# Patient Record
Sex: MALE | Race: BLACK OR AFRICAN AMERICAN | Employment: UNEMPLOYED | URBAN - METROPOLITAN AREA
[De-identification: names, ages, dates, MRNs, and addresses within clinical notes are randomized per-mention and may not be internally consistent; named-entity substitution may affect disease eponyms.]

---

## 2018-12-18 ENCOUNTER — APPOINTMENT (OUTPATIENT)
Dept: GENERAL RADIOLOGY | Age: 68
End: 2018-12-18
Attending: EMERGENCY MEDICINE
Payer: MEDICARE

## 2018-12-18 ENCOUNTER — HOSPITAL ENCOUNTER (EMERGENCY)
Age: 68
Discharge: HOME | End: 2018-12-18
Attending: EMERGENCY MEDICINE
Payer: MEDICARE

## 2018-12-18 VITALS
TEMPERATURE: 98 F | RESPIRATION RATE: 22 BRPM | SYSTOLIC BLOOD PRESSURE: 122 MMHG | OXYGEN SATURATION: 99 % | DIASTOLIC BLOOD PRESSURE: 79 MMHG | HEART RATE: 63 BPM

## 2018-12-18 DIAGNOSIS — R07.9 CHEST PAIN, UNSPECIFIED TYPE: Primary | ICD-10-CM

## 2018-12-18 LAB
ALBUMIN SERPL-MCNC: 2.8 G/DL (ref 3.5–5)
ALBUMIN/GLOB SERPL: 0.6 {RATIO} (ref 1.1–2.2)
ALP SERPL-CCNC: 87 U/L (ref 45–117)
ALT SERPL-CCNC: 22 U/L (ref 12–78)
ANION GAP SERPL CALC-SCNC: 7 MMOL/L (ref 5–15)
AST SERPL-CCNC: 34 U/L (ref 15–37)
BASOPHILS # BLD: 0.1 K/UL (ref 0–0.1)
BASOPHILS NFR BLD: 1 % (ref 0–1)
BILIRUB SERPL-MCNC: 0.6 MG/DL (ref 0.2–1)
BUN SERPL-MCNC: 22 MG/DL (ref 6–20)
BUN/CREAT SERPL: 20 (ref 12–20)
CALCIUM SERPL-MCNC: 8.6 MG/DL (ref 8.5–10.1)
CHLORIDE SERPL-SCNC: 111 MMOL/L (ref 97–108)
CO2 SERPL-SCNC: 23 MMOL/L (ref 21–32)
CREAT SERPL-MCNC: 1.1 MG/DL (ref 0.7–1.3)
D DIMER PPP FEU-MCNC: 0.42 MG/L FEU (ref 0–0.65)
DIFFERENTIAL METHOD BLD: ABNORMAL
EOSINOPHIL # BLD: 0.3 K/UL (ref 0–0.4)
EOSINOPHIL NFR BLD: 4 % (ref 0–7)
ERYTHROCYTE [DISTWIDTH] IN BLOOD BY AUTOMATED COUNT: 13.9 % (ref 11.5–14.5)
GLOBULIN SER CALC-MCNC: 4.5 G/DL (ref 2–4)
GLUCOSE SERPL-MCNC: 94 MG/DL (ref 65–100)
HCT VFR BLD AUTO: 51.4 % (ref 36.6–50.3)
HGB BLD-MCNC: 17.5 G/DL (ref 12.1–17)
IMM GRANULOCYTES # BLD: 0 K/UL (ref 0–0.04)
IMM GRANULOCYTES NFR BLD AUTO: 0 % (ref 0–0.5)
LYMPHOCYTES # BLD: 2.1 K/UL (ref 0.8–3.5)
LYMPHOCYTES NFR BLD: 34 % (ref 12–49)
MCH RBC QN AUTO: 31.8 PG (ref 26–34)
MCHC RBC AUTO-ENTMCNC: 34 G/DL (ref 30–36.5)
MCV RBC AUTO: 93.5 FL (ref 80–99)
MONOCYTES # BLD: 0.7 K/UL (ref 0–1)
MONOCYTES NFR BLD: 11 % (ref 5–13)
NEUTS SEG # BLD: 3.1 K/UL (ref 1.8–8)
NEUTS SEG NFR BLD: 50 % (ref 32–75)
NRBC # BLD: 0 K/UL (ref 0–0.01)
NRBC BLD-RTO: 0 PER 100 WBC
PLATELET # BLD AUTO: 194 K/UL (ref 150–400)
PMV BLD AUTO: 10.5 FL (ref 8.9–12.9)
POTASSIUM SERPL-SCNC: 4.2 MMOL/L (ref 3.5–5.1)
PROT SERPL-MCNC: 7.3 G/DL (ref 6.4–8.2)
RBC # BLD AUTO: 5.5 M/UL (ref 4.1–5.7)
SODIUM SERPL-SCNC: 141 MMOL/L (ref 136–145)
TROPONIN I SERPL-MCNC: <0.05 NG/ML
TROPONIN I SERPL-MCNC: <0.05 NG/ML
WBC # BLD AUTO: 6.1 K/UL (ref 4.1–11.1)

## 2018-12-18 PROCEDURE — 80053 COMPREHEN METABOLIC PANEL: CPT

## 2018-12-18 PROCEDURE — 71046 X-RAY EXAM CHEST 2 VIEWS: CPT

## 2018-12-18 PROCEDURE — 93005 ELECTROCARDIOGRAM TRACING: CPT

## 2018-12-18 PROCEDURE — 36415 COLL VENOUS BLD VENIPUNCTURE: CPT

## 2018-12-18 PROCEDURE — 74011250637 HC RX REV CODE- 250/637: Performed by: EMERGENCY MEDICINE

## 2018-12-18 PROCEDURE — 85025 COMPLETE CBC W/AUTO DIFF WBC: CPT

## 2018-12-18 PROCEDURE — 99285 EMERGENCY DEPT VISIT HI MDM: CPT

## 2018-12-18 PROCEDURE — 85379 FIBRIN DEGRADATION QUANT: CPT

## 2018-12-18 PROCEDURE — 84484 ASSAY OF TROPONIN QUANT: CPT

## 2018-12-18 RX ADMIN — ALUMINUM HYDROXIDE AND MAGNESIUM HYDROXIDE 30 ML: 200; 200 SUSPENSION ORAL at 21:03

## 2018-12-18 NOTE — ED PROVIDER NOTES
EMERGENCY DEPARTMENT HISTORY AND PHYSICAL EXAM      Date: 12/18/2018  Patient Name: Yong Parks    History of Presenting Illness     Chief Complaint   Patient presents with    Chest Pain       History Provided By: Patient    HPI: Yong Parks, 70 y.o. male with PMHx significant for HTN, presents via EMS to the ED with cc of new onset sharp, stabbing, sternal chest pain radiating to the left sided chest, to the left shoulder and into the left sided back [phone] this evening. Pt reports associated sx of lightheadedness, SOB and nausea as well. He expresses he was walking to get the mail at the onset of sx with no exacerbating or alleviating factors leading him to call EMS. Upon EMS arrival pt was given ASA with relief in his discomfort. He states his chest discomfort lasted ~15 minutes before resolving. Pt denies any h/o heart disease. Pt denies any h/o DVT/PE, recent surgeries, recent travel or any recent hospitalizations. He denies any fevers, chills, cough, leg swelling, abdominal pain, vomiting, diarrhea, or dysuria. Pt is sx free at the time of my eval    There are no other complaints, changes, or physical findings at this time. PCP: Nagi, MD Collin  SHx: (+)Tobacco: 5ppd; (+) ETOH: heavy; (+) Illicit drug use: marijuana  Current Outpatient Medications   Medication Sig Dispense Refill    clopidogrel (PLAVIX) 75 mg tablet Take  by mouth daily.  labetalol (NORMODYNE) 100 mg tablet Take  by mouth two (2) times a day.  spironolactone (ALDACTONE) 50 mg tablet Take  by mouth daily.  lisinopril (PRINIVIL, ZESTRIL) 10 mg tablet Take  by mouth daily.  ibuprofen (MOTRIN) 800 mg tablet Take 1 Tab by mouth two (2) times daily (after meals). 60 Tab 12    clopidogrel (PLAVIX) 75 mg tablet Take 1 Tab by mouth daily. 30 Tab 0    labetalol (NORMODYNE) 100 mg tablet Take 1 Tab by mouth every twelve (12) hours. 60 Tab 0    lisinopril (PRINIVIL, ZESTRIL) 10 mg tablet Take 1 Tab by mouth daily. 30 Tab 0    spironolactone (ALDACTONE) 50 mg tablet Take 1 Tab by mouth daily. 30 Tab 0       Past History     Past Medical History:  Past Medical History:   Diagnosis Date    HTN (hypertension)        Past Surgical History:  No past surgical history on file. Family History:  Family History   Problem Relation Age of Onset    Kidney Disease Mother     Alcohol abuse Father        Social History:  Social History     Tobacco Use    Smoking status: Current Every Day Smoker     Packs/day: 5.00     Types: Cigarettes   Substance Use Topics    Alcohol use: Yes     Comment: Reports being a \"heavy drinker\"    Drug use: Yes     Types: Marijuana       Allergies:  No Known Allergies      Review of Systems   Review of Systems   Constitutional: Negative for chills and fever. HENT: Negative for congestion, rhinorrhea and sore throat. Respiratory: Positive for shortness of breath. Negative for cough. Cardiovascular: Positive for chest pain (sternal radiating to left chest and into back ). Negative for leg swelling. Gastrointestinal: Positive for nausea. Negative for abdominal pain, diarrhea and vomiting. Genitourinary: Negative for dysuria and urgency. Skin: Negative for rash. Neurological: Positive for light-headedness. Negative for dizziness and headaches. All other systems reviewed and are negative. Physical Exam   Physical Exam   Constitutional: He is oriented to person, place, and time. He appears well-developed and well-nourished. No distress. HENT:   Head: Normocephalic and atraumatic. Eyes: Conjunctivae and EOM are normal. Pupils are equal, round, and reactive to light. Neck: Normal range of motion. Cardiovascular: Normal rate, regular rhythm and intact distal pulses. Pulmonary/Chest: Effort normal and breath sounds normal. No stridor. No respiratory distress. Abdominal: Soft. He exhibits no distension. There is no tenderness. Musculoskeletal: Normal range of motion. Neurological: He is alert and oriented to person, place, and time. Skin: Skin is warm and dry. Psychiatric: He has a normal mood and affect. Nursing note and vitals reviewed. Diagnostic Study Results     Labs -     Recent Results (from the past 12 hour(s))   EKG, 12 LEAD, INITIAL    Collection Time: 12/18/18  4:39 PM   Result Value Ref Range    Ventricular Rate 70 BPM    Atrial Rate 70 BPM    P-R Interval 336 ms    QRS Duration 100 ms    Q-T Interval 392 ms    QTC Calculation (Bezet) 423 ms    Calculated P Axis 79 degrees    Calculated R Axis 2 degrees    Calculated T Axis 66 degrees    Diagnosis       Sinus rhythm with 1st degree AV block  Minimal voltage criteria for LVH, may be normal variant  Nonspecific T wave abnormality  When compared with ECG of 30-JUN-2013 13:47,  QT has shortened     CBC WITH AUTOMATED DIFF    Collection Time: 12/18/18  5:28 PM   Result Value Ref Range    WBC 6.1 4.1 - 11.1 K/uL    RBC 5.50 4. 10 - 5.70 M/uL    HGB 17.5 (H) 12.1 - 17.0 g/dL    HCT 51.4 (H) 36.6 - 50.3 %    MCV 93.5 80.0 - 99.0 FL    MCH 31.8 26.0 - 34.0 PG    MCHC 34.0 30.0 - 36.5 g/dL    RDW 13.9 11.5 - 14.5 %    PLATELET 043 097 - 410 K/uL    MPV 10.5 8.9 - 12.9 FL    NRBC 0.0 0  WBC    ABSOLUTE NRBC 0.00 0.00 - 0.01 K/uL    NEUTROPHILS 50 32 - 75 %    LYMPHOCYTES 34 12 - 49 %    MONOCYTES 11 5 - 13 %    EOSINOPHILS 4 0 - 7 %    BASOPHILS 1 0 - 1 %    IMMATURE GRANULOCYTES 0 0.0 - 0.5 %    ABS. NEUTROPHILS 3.1 1.8 - 8.0 K/UL    ABS. LYMPHOCYTES 2.1 0.8 - 3.5 K/UL    ABS. MONOCYTES 0.7 0.0 - 1.0 K/UL    ABS. EOSINOPHILS 0.3 0.0 - 0.4 K/UL    ABS. BASOPHILS 0.1 0.0 - 0.1 K/UL    ABS. IMM. GRANS. 0.0 0.00 - 0.04 K/UL    DF AUTOMATED     D DIMER    Collection Time: 12/18/18  5:28 PM   Result Value Ref Range    D-dimer 0.42 0.00 - 0.65 mg/L FEU       Radiologic Studies -   XR CHEST PA LAT   Final Result   IMPRESSION: Atelectasis right lung base with elevated right hemidiaphragm.           Medical Decision Making   I am the first provider for this patient. I reviewed the vital signs, available nursing notes, past medical history, past surgical history, family history and social history. Vital Signs-Reviewed the patient's vital signs. Patient Vitals for the past 12 hrs:   Temp Pulse Resp BP SpO2   12/18/18 2130  64 16 121/81 98 %   12/18/18 2100  66 17 (!) 121/91 99 %   12/18/18 2030  62 14 121/88 100 %   12/18/18 2000  63 15 128/86 98 %   12/18/18 1930  65 14 122/83 100 %   12/18/18 1900  61 17 117/88 99 %   12/18/18 1830  66 16 (!) 139/97 100 %   12/18/18 1800  77 14 133/83 100 %   12/18/18 1753  65 15 131/90 100 %   12/18/18 1730  (!) 59 13 125/88    12/18/18 1700  70 16 (!) 156/107    12/18/18 1647 98 °F (36.7 °C) 74 16 (!) 160/109 99 %       Pulse Oximetry Analysis - 99% on room air    Cardiac Monitor:   Rate: 74 bpm  Rhythm: Normal Sinus Rhythm      EKG interpretation: (Preliminary) 1639  Rhythm: sinus rhythm with 1st degree AV block; and regular . Rate (approx.): 70; Axis: normal; [place] interval: prolonged; QRS interval: normal ; ST/T wave: nonspecific T wave abnormality; Other findings: non-ischemic; similar to prior on 6/30/2013. Records Reviewed: Nursing Notes, Old Medical Records, Previous electrocardiograms, Previous Radiology Studies and Previous Laboratory Studies    Provider Notes (Medical Decision Making):   DDx: ACS, Arrhythmia, PE, PNA, GERD, near syncope    The pt is unlikely to have PE. There is no pleuritic chest pain, no tachycardia, no hypoxia, no unilateral leg swelling, however will check ddimer    The pt is unlikely to have aortic dissection. The pulses are equal, there is no sharp tearing chest pain radiating to back, the patient is not extremily hypertensive. Will pursue cardiac work-up with EKG, troponinx2, CXR.  While the pt is less likely to have pneumonia as there is no cough and no fever, and less likely to have ptx, will order CXR to assess lung fields       ED Course:   Initial assessment performed. The patients presenting problems have been discussed, and they are in agreement with the care plan formulated and outlined with them. I have encouraged them to ask questions as they arise throughout their visit. Progress Notes:    9:49 PM   The pt has been re-evaluated. Pt was updated on reassuring troponin x2 and negative CXR. Pt was informed of the plan for discharge with PCP follow up should his sx persist.    Critical Care Time: 0 minutes    Disposition:  Discharge Note:  9:49 PM  The patient is ready for discharge. The patient's signs, symptoms, diagnosis, and discharge instruction have been discussed and the patient has conveyed their understanding. The patient is to follow up as recommended or return to the ER should their symptoms worsen. Plan has been discussed and the patient is in agreement. Written by Jose Luis Vaughn ED Scribe, as dictated by TAMARA Lara MD    PLAN:  1. Current Discharge Medication List        2. Follow-up Information     Follow up With Specialties Details Why Contact Info    Other, MD Collin  Schedule an appointment as soon as possible for a visit  Patient can only remember the practice name and not the physician      [facility] EMERGENCY DEPT Emergency Medicine  As needed, If symptoms worsen [address]  [address] [facility]  [phone]        Return to ED if worse     Diagnosis     Clinical Impression:   1. Chest pain, unspecified type        Attestations:    Attestation: This note is prepared by Milo Vaughn, acting as Scribe for TAMARA Lara MD.      Negro Hammer. Sudheer Lara MD: The scribe's documentation has been prepared under my direction and personally reviewed by me in its entirety. I confirm that the note above accurately reflects all work, treatment, procedures, and medical decision making performed by me.

## 2018-12-19 LAB
ATRIAL RATE: 70 BPM
CALCULATED P AXIS, ECG09: 79 DEGREES
CALCULATED R AXIS, ECG10: 2 DEGREES
CALCULATED T AXIS, ECG11: 66 DEGREES
DIAGNOSIS, 93000: NORMAL
P-R INTERVAL, ECG05: 336 MS
Q-T INTERVAL, ECG07: 392 MS
QRS DURATION, ECG06: 100 MS
QTC CALCULATION (BEZET), ECG08: 423 MS
VENTRICULAR RATE, ECG03: 70 BPM

## 2018-12-19 NOTE — ED NOTES
Pt. Resting comfortably in bed, denies needs at this time. No pain or SOB. Awaiting pending lab results. Bed locked and low, call bell in reach.

## 2018-12-19 NOTE — DISCHARGE INSTRUCTIONS

## 2018-12-19 NOTE — ED NOTES
Dr. Candy Carvalho gave and reviewed discharge instructions with the patient. The patient verbalized understanding. The patient was given opportunity for questions. Patient discharged in stable condition to the waiting room via wheelchair with female visitor.

## 2018-12-19 NOTE — ED NOTES
Assumed care of pt from [place] at bedside with verbal report consisting of Situation, Background, Assessment, and Recommendations (SBAR). Pt is A&O x 4. Pt resting comfortably on the stretcher in a position of comfort. Call bell within reach. Side rails x 2. Cardiac monitor x 3. Stretcher locked in the lowest position. Concerns and questions addressed at this time. Pt in no acute distress at this the time. Will continue to monitor.

## 2018-12-19 NOTE — ED NOTES
Pt resting comfortably with female visitor at bedside at this time. Pt aware that blood work is in process and that MD should be around soon.

## 2019-01-07 ENCOUNTER — HOSPITAL ENCOUNTER (EMERGENCY)
Age: 69
Discharge: HOME | End: 2019-01-07
Attending: EMERGENCY MEDICINE | Admitting: EMERGENCY MEDICINE
Payer: MEDICARE

## 2019-01-07 ENCOUNTER — APPOINTMENT (OUTPATIENT)
Dept: GENERAL RADIOLOGY | Age: 69
End: 2019-01-07
Attending: EMERGENCY MEDICINE
Payer: MEDICARE

## 2019-01-07 VITALS
HEART RATE: 80 BPM | SYSTOLIC BLOOD PRESSURE: 97 MMHG | WEIGHT: 154.32 LBS | RESPIRATION RATE: 16 BRPM | DIASTOLIC BLOOD PRESSURE: 74 MMHG | TEMPERATURE: 97.8 F | OXYGEN SATURATION: 99 % | BODY MASS INDEX: 24.17 KG/M2

## 2019-01-07 DIAGNOSIS — R07.9 ACUTE CHEST PAIN: Primary | ICD-10-CM

## 2019-01-07 LAB
ALBUMIN SERPL-MCNC: 3.6 G/DL (ref 3.5–5)
ALBUMIN/GLOB SERPL: 0.7 {RATIO} (ref 1.1–2.2)
ALP SERPL-CCNC: 95 U/L (ref 45–117)
ALT SERPL-CCNC: 27 U/L (ref 12–78)
ANION GAP SERPL CALC-SCNC: 9 MMOL/L (ref 5–15)
AST SERPL-CCNC: 46 U/L (ref 15–37)
BASOPHILS # BLD: 0 K/UL (ref 0–0.1)
BASOPHILS NFR BLD: 1 % (ref 0–1)
BILIRUB SERPL-MCNC: 0.9 MG/DL (ref 0.2–1)
BUN SERPL-MCNC: 28 MG/DL (ref 6–20)
BUN/CREAT SERPL: 19 (ref 12–20)
CALCIUM SERPL-MCNC: 9.2 MG/DL (ref 8.5–10.1)
CHLORIDE SERPL-SCNC: 106 MMOL/L (ref 97–108)
CK MB CFR SERPL CALC: 6.4 % (ref 0–2.5)
CK MB SERPL-MCNC: 1.8 NG/ML (ref 5–25)
CK SERPL-CCNC: 28 U/L (ref 39–308)
CO2 SERPL-SCNC: 24 MMOL/L (ref 21–32)
CREAT SERPL-MCNC: 1.47 MG/DL (ref 0.7–1.3)
DIFFERENTIAL METHOD BLD: ABNORMAL
EOSINOPHIL # BLD: 0.1 K/UL (ref 0–0.4)
EOSINOPHIL NFR BLD: 2 % (ref 0–7)
ERYTHROCYTE [DISTWIDTH] IN BLOOD BY AUTOMATED COUNT: 14.6 % (ref 11.5–14.5)
GLOBULIN SER CALC-MCNC: 5.4 G/DL (ref 2–4)
GLUCOSE SERPL-MCNC: 91 MG/DL (ref 65–100)
HCT VFR BLD AUTO: 52.9 % (ref 36.6–50.3)
HGB BLD-MCNC: 18.1 G/DL (ref 12.1–17)
IMM GRANULOCYTES # BLD: 0 K/UL (ref 0–0.04)
IMM GRANULOCYTES NFR BLD AUTO: 1 % (ref 0–0.5)
LYMPHOCYTES # BLD: 1.9 K/UL (ref 0.8–3.5)
LYMPHOCYTES NFR BLD: 33 % (ref 12–49)
MCH RBC QN AUTO: 32.4 PG (ref 26–34)
MCHC RBC AUTO-ENTMCNC: 34.2 G/DL (ref 30–36.5)
MCV RBC AUTO: 94.6 FL (ref 80–99)
MONOCYTES # BLD: 0.7 K/UL (ref 0–1)
MONOCYTES NFR BLD: 11 % (ref 5–13)
NEUTS SEG # BLD: 3.2 K/UL (ref 1.8–8)
NEUTS SEG NFR BLD: 53 % (ref 32–75)
NRBC # BLD: 0 K/UL (ref 0–0.01)
NRBC BLD-RTO: 0 PER 100 WBC
PLATELET # BLD AUTO: 274 K/UL (ref 150–400)
PMV BLD AUTO: 9.8 FL (ref 8.9–12.9)
POTASSIUM SERPL-SCNC: 4.5 MMOL/L (ref 3.5–5.1)
PROT SERPL-MCNC: 9 G/DL (ref 6.4–8.2)
RBC # BLD AUTO: 5.59 M/UL (ref 4.1–5.7)
SODIUM SERPL-SCNC: 139 MMOL/L (ref 136–145)
TROPONIN I BLD-MCNC: <0.04 NG/ML (ref 0–0.08)
TROPONIN I SERPL-MCNC: <0.05 NG/ML
WBC # BLD AUTO: 5.9 K/UL (ref 4.1–11.1)

## 2019-01-07 PROCEDURE — 36415 COLL VENOUS BLD VENIPUNCTURE: CPT

## 2019-01-07 PROCEDURE — 80053 COMPREHEN METABOLIC PANEL: CPT

## 2019-01-07 PROCEDURE — 84484 ASSAY OF TROPONIN QUANT: CPT

## 2019-01-07 PROCEDURE — 93005 ELECTROCARDIOGRAM TRACING: CPT

## 2019-01-07 PROCEDURE — 85025 COMPLETE CBC W/AUTO DIFF WBC: CPT

## 2019-01-07 PROCEDURE — 82550 ASSAY OF CK (CPK): CPT

## 2019-01-07 PROCEDURE — 99284 EMERGENCY DEPT VISIT MOD MDM: CPT

## 2019-01-07 PROCEDURE — 71046 X-RAY EXAM CHEST 2 VIEWS: CPT

## 2019-01-07 NOTE — ED PROVIDER NOTES
EXPRESS CARE NOTE: 
1:13 PM 
I have seen and evaluated this patient in the Express Care portion of triage for chest pain which he states is progressively worsening. Pt states he has had similar pain for several weeks and is scheduled for a stress test in a few weeks. Pt does not know who the cardiologist is and states the stress test was scheduled by his PCP at [facility]. The patients care will begin now and orders have been placed. This patient will be seen and provided further care in the Emergency Room. NHAN Jurado 
 
EMERGENCY DEPARTMENT HISTORY AND PHYSICAL EXAM 
 
 
Date: 1/7/2019 Patient Name: Duane Cove History of Presenting Illness Chief Complaint Patient presents with  Chest Pain Arrives via EMS for continued chest pain Pt reports onset of CP 3 weeks ago and reports being seen several times for same Pt states he is due for stress test \"sometime middle of month\" History Provided By: Patient and EMS 
 
HPI: Duane Cove, 70 y.o. male with PMHx significant for HTN, presents via EMS to the ED with cc of a worsening, mid, \"tight\" chest pain x 1 month. He reports associated symptoms of SOB. He notes he was sitting in bed x 2 days ago when his chest pain had restarted once more. He had taken a NTG today to which he had felt better afterwards. Pt endorses having a prior stress test x 2 years ago and per triage note he states he is due for another stress test \"sometime middle of month. \" He denies any tobacco use. Pt denies any prior h/o cardiac disease. There are no other complaints, changes, or physical findings at this time. PCP: Nagi, MD Collin 
 
No current facility-administered medications on file prior to encounter. Current Outpatient Medications on File Prior to Encounter Medication Sig Dispense Refill  clopidogrel (PLAVIX) 75 mg tablet Take  by mouth daily.  labetalol (NORMODYNE) 100 mg tablet Take  by mouth two (2) times a day.  spironolactone (ALDACTONE) 50 mg tablet Take  by mouth daily.  lisinopril (PRINIVIL, ZESTRIL) 10 mg tablet Take  by mouth daily.  ibuprofen (MOTRIN) 800 mg tablet Take 1 Tab by mouth two (2) times daily (after meals). 60 Tab 12  
 clopidogrel (PLAVIX) 75 mg tablet Take 1 Tab by mouth daily. 30 Tab 0  
 labetalol (NORMODYNE) 100 mg tablet Take 1 Tab by mouth every twelve (12) hours. 60 Tab 0  
 lisinopril (PRINIVIL, ZESTRIL) 10 mg tablet Take 1 Tab by mouth daily. 30 Tab 0  
 spironolactone (ALDACTONE) 50 mg tablet Take 1 Tab by mouth daily. 30 Tab 0 Past History Past Medical History: 
Past Medical History:  
Diagnosis Date  
 HTN (hypertension) Past Surgical History: 
History reviewed. No pertinent surgical history. Family History: 
Family History Problem Relation Age of Onset  Kidney Disease Mother  Alcohol abuse Father Social History: 
Social History Tobacco Use  Smoking status: Former Smoker Packs/day: 0.50 Types: Cigarettes  Smokeless tobacco: Never Used Substance Use Topics  Alcohol use: Yes Comment: Reports being a [place] drinker\"  Drug use: Yes Types: Marijuana Allergies: 
No Known Allergies Review of Systems Review of Systems Constitutional: Negative for chills, fatigue and fever. HENT: Negative for congestion and rhinorrhea. Eyes: Negative for visual disturbance. Respiratory: Positive for shortness of breath. Negative for cough and wheezing. Cardiovascular: Positive for chest pain. Negative for palpitations. Gastrointestinal: Negative for abdominal distention, abdominal pain, constipation, diarrhea, nausea and vomiting. Endocrine: Negative. Genitourinary: Negative for difficulty urinating and dysuria. Musculoskeletal: Negative. Skin: Negative for rash. Neurological: Negative for dizziness, weakness and light-headedness. Psychiatric/Behavioral: Negative for suicidal ideas. Physical Exam  
Physical Exam  
Constitutional: He is oriented to person, place, and time. He appears well-developed and well-nourished. No distress. HENT:  
Head: Normocephalic and atraumatic. Mouth/Throat: Oropharynx is clear and moist.  
Eyes: Conjunctivae and EOM are normal.  
Neck: Neck supple. No JVD present. No tracheal deviation present. Cardiovascular: Normal rate, regular rhythm and intact distal pulses. Exam reveals no gallop and no friction rub. No murmur heard. Pulmonary/Chest: Effort normal and breath sounds normal. No stridor. No respiratory distress. He has no wheezes. Abdominal: Soft. Bowel sounds are normal. He exhibits no distension and no mass. There is no tenderness. There is no guarding. Musculoskeletal: Normal range of motion. He exhibits no edema or tenderness. No deformity Neurological: He is alert and oriented to person, place, and time. He has normal strength. No focal deficits Skin: Skin is warm, dry and intact. No rash noted. Psychiatric: He has a normal mood and affect. His behavior is normal. Judgment and thought content normal.  
Nursing note and vitals reviewed. Diagnostic Study Results Labs - Recent Results (from the past 12 hour(s)) CBC WITH AUTOMATED DIFF Collection Time: 01/07/19  1:13 PM  
Result Value Ref Range WBC 5.9 4.1 - 11.1 K/uL  
 RBC 5.59 4. 10 - 5.70 M/uL  
 HGB 18.1 (H) 12.1 - 17.0 g/dL HCT 52.9 (H) 36.6 - 50.3 % MCV 94.6 80.0 - 99.0 FL  
 MCH 32.4 26.0 - 34.0 PG  
 MCHC 34.2 30.0 - 36.5 g/dL  
 RDW 14.6 (H) 11.5 - 14.5 % PLATELET 155 825 - 313 K/uL MPV 9.8 8.9 - 12.9 FL  
 NRBC 0.0 0  WBC ABSOLUTE NRBC 0.00 0.00 - 0.01 K/uL NEUTROPHILS 53 32 - 75 % LYMPHOCYTES 33 12 - 49 % MONOCYTES 11 5 - 13 % EOSINOPHILS 2 0 - 7 % BASOPHILS 1 0 - 1 % IMMATURE GRANULOCYTES 1 (H) 0.0 - 0.5 % ABS. NEUTROPHILS 3.2 1.8 - 8.0 K/UL  
 ABS. LYMPHOCYTES 1.9 0.8 - 3.5 K/UL  
 ABS. MONOCYTES 0.7 0.0 - 1.0 K/UL ABS. EOSINOPHILS 0.1 0.0 - 0.4 K/UL  
 ABS. BASOPHILS 0.0 0.0 - 0.1 K/UL  
 ABS. IMM. GRANS. 0.0 0.00 - 0.04 K/UL  
 DF AUTOMATED METABOLIC PANEL, COMPREHENSIVE Collection Time: 01/07/19  1:13 PM  
Result Value Ref Range Sodium 139 136 - 145 mmol/L Potassium 4.5 3.5 - 5.1 mmol/L Chloride 106 97 - 108 mmol/L  
 CO2 24 21 - 32 mmol/L Anion gap 9 5 - 15 mmol/L Glucose 91 65 - 100 mg/dL BUN 28 (H) 6 - 20 MG/DL Creatinine 1.47 (H) 0.70 - 1.30 MG/DL  
 BUN/Creatinine ratio 19 12 - 20 GFR est AA 58 (L) >60 ml/min/1.73m2 GFR est non-AA 47 (L) >60 ml/min/1.73m2 Calcium 9.2 8.5 - 10.1 MG/DL Bilirubin, total 0.9 0.2 - 1.0 MG/DL  
 ALT (SGPT) 27 12 - 78 U/L  
 AST (SGOT) 46 (H) 15 - 37 U/L Alk. phosphatase 95 45 - 117 U/L Protein, total 9.0 (H) 6.4 - 8.2 g/dL Albumin 3.6 3.5 - 5.0 g/dL Globulin 5.4 (H) 2.0 - 4.0 g/dL A-G Ratio 0.7 (L) 1.1 - 2.2 CK W/ CKMB & INDEX Collection Time: 01/07/19  1:13 PM  
Result Value Ref Range CK 28 (L) 39 - 308 U/L  
 CK - MB 1.8 <3.6 NG/ML  
 CK-MB Index 6.4 (H) 0 - 2.5    
TROPONIN I Collection Time: 01/07/19  1:13 PM  
Result Value Ref Range Troponin-I, Qt. <0.05 <0.05 ng/mL POC TROPONIN-I Collection Time: 01/07/19  4:54 PM  
Result Value Ref Range Troponin-I (POC) <0.04 0.00 - 0.08 ng/mL Radiologic Studies - CXR Results  (Last 48 hours) 01/07/19 1623  XR CHEST PA LAT Final result Impression:  IMPRESSION: No acute findings. Narrative:  EXAM: XR CHEST PA LAT INDICATION: Chest pain recurring since onset 3 weeks ago. COMPARISON: Chest x-ray 12/18/2018. FINDINGS: PA and lateral radiographs of the chest demonstrate platelike  
atelectasis overlying the right diaphragm with no significant change from prior. The lungs are otherwise clear.  The cardiac and mediastinal contours and  
pulmonary vascularity are normal. Atherosclerotic calcifications affect the  
 aortic arch. Chest structures visualized upper abdomen show no acute interval  
change with note of degenerative spine and shoulder changes. Medical Decision Making I am the first provider for this patient. I reviewed the vital signs, available nursing notes, past medical history, past surgical history, family history and social history. Vital Signs-Reviewed the patient's vital signs. Patient Vitals for the past 12 hrs: 
 Temp Pulse Resp BP SpO2  
01/07/19 1106 97.8 °F (36.6 °C) 80 16 97/74 99 % Pulse Oximetry Analysis - 98% on RA Cardiac Monitor:  
Rate: 69 bpm 
Rhythm: Normal Sinus Rhythm EKG interpretation: (Preliminary): 1110 Rhythm: sinus rhythm with 1st degree AV block; and regular . Rate (approx.): 81; Axis: normal; [place] interval: prolonged; QRS interval: normal ; ST/T wave: T wave inversion in lateral leads. Written by Eula Marmolejo ED Scribe, as dictated by Emelyn Johnson DO. Records Reviewed: Nursing Notes, Old Medical Records, Previous electrocardiograms, Ambulance Run Sheet, Previous Radiology Studies and Previous Laboratory Studies Provider Notes (Medical Decision Making): Pt presenting with CP. He was seen here several weeks ago for similar complaints. At that time he had a negative cardiac workup, negative CXR. Now presenting with similar pain. He has a stress test schedule for the 15th of January. Will check labs, cardiac enzymes to r/o ACS and have him f/u with cardiology. ED Course:  
Initial assessment performed. The patients presenting problems have been discussed, and they are in agreement with the care plan formulated and outlined with them. I have encouraged them to ask questions as they arise throughout their visit. PROGRESS NOTE: 
4:49 PM 
Pt has a stress test scheduled on 1/15/19. Will order a POC troponin. If negative, will anticipate discharge home. Critical Care Time:  
0 Disposition: 
DISCHARGE NOTE 
5:36 PM 
 The patient has been re-evaluated and is ready for discharge. Reviewed available results with patient. Counseled patient on diagnosis and care plan. Patient has expressed understanding, and all questions have been answered. Patient agrees with plan and agrees to follow up as recommended, or return to the ED if their symptoms worsen. Discharge instructions have been provided and explained to the patient, along with reasons to return to the ED. PLAN: 
1. Discharge Current Discharge Medication List  
  
 
2. Follow-up Information Follow up With Specialties Details Why Contact Info Your PCP  Schedule an appointment as soon as possible for a visit Your cardiologist  Schedule an appointment as soon as possible for a visit Return to ED if worse Diagnosis Clinical Impression: 1. Acute chest pain Attestations: This note is prepared by Elyse Ballesteros, acting as Scribe for Sharita Gunter DO. Sharita Gunter DO: The scribe's documentation has been prepared under my direction and personally reviewed by me in its entirety. I confirm that the note above accurately reflects all work, treatment, procedures, and medical decision making performed by me. This note will not be viewable in [address]

## 2019-01-07 NOTE — DISCHARGE INSTRUCTIONS
Patient Education        Chest Pain: Care Instructions    Go to your stress test on 1/15/19 and follow up with your primary care doctor and cardiologist.     Your Care Instructions    There are many things that can cause chest pain. Some are not serious and will get better on their own in a few days. But some kinds of chest pain need more testing and treatment. Your doctor may have recommended a follow-up visit in the next 8 to 12 hours. If you are not getting better, you may need more tests or treatment. Even though your doctor has released you, you still need to watch for any problems. The doctor carefully checked you, but sometimes problems can develop later. If you have new symptoms or if your symptoms do not get better, get medical care right away. If you have worse or different chest pain or pressure that lasts more than 5 minutes or you passed out (lost consciousness), call 911 or seek other emergency help right away. A medical visit is only one step in your treatment. Even if you feel better, you still need to do what your doctor recommends, such as going to all suggested follow-up appointments and taking medicines exactly as directed. This will help you recover and help prevent future problems. How can you care for yourself at home? · Rest until you feel better. · Take your medicine exactly as prescribed. Call your doctor if you think you are having a problem with your medicine. · Do not drive after taking a prescription pain medicine. When should you call for help? Call 911 if:    · You passed out (lost consciousness).     · You have severe difficulty breathing.     · You have symptoms of a heart attack. These may include:  ? Chest pain or pressure, or a strange feeling in your chest.  ? Sweating. ? Shortness of breath. ? Nausea or vomiting. ? Pain, pressure, or a strange feeling in your back, neck, jaw, or upper belly or in one or both shoulders or arms.   ? Lightheadedness or sudden weakness. ? A fast or irregular heartbeat. After you call 911, the  may tell you to chew 1 adult-strength or 2 to 4 low-dose aspirin. Wait for an ambulance. Do not try to drive yourself.    Call your doctor today if:    · You have any trouble breathing.     · Your chest pain gets worse.     · You are dizzy or lightheaded, or you feel like you may faint.     · You are not getting better as expected.     · You are having new or different chest pain. Where can you learn more? Go to http://lizzy-johana.info/. Enter A120 in the search box to learn more about \"Chest Pain: Care Instructions. \"  Current as of: November 20, 2017  Content Version: 11.8  © 9354-1995 [employer]. Care instructions adapted under license by [employer] (which disclaims liability or warranty for this information). If you have questions about a medical condition or this instruction, always ask your healthcare professional. [place] [address] any warranty or liability for your use of this information.

## 2019-01-08 LAB
ATRIAL RATE: 81 BPM
CALCULATED P AXIS, ECG09: 63 DEGREES
CALCULATED R AXIS, ECG10: 36 DEGREES
CALCULATED T AXIS, ECG11: 114 DEGREES
DIAGNOSIS, 93000: NORMAL
P-R INTERVAL, ECG05: 296 MS
Q-T INTERVAL, ECG07: 366 MS
QRS DURATION, ECG06: 100 MS
QTC CALCULATION (BEZET), ECG08: 425 MS
VENTRICULAR RATE, ECG03: 81 BPM

## 2021-08-20 ENCOUNTER — APPOINTMENT (OUTPATIENT)
Dept: GENERAL RADIOLOGY | Age: 71
End: 2021-08-20
Attending: STUDENT IN AN ORGANIZED HEALTH CARE EDUCATION/TRAINING PROGRAM
Payer: MEDICARE

## 2021-08-20 ENCOUNTER — HOSPITAL ENCOUNTER (EMERGENCY)
Age: 71
Discharge: HOME | End: 2021-08-20
Attending: STUDENT IN AN ORGANIZED HEALTH CARE EDUCATION/TRAINING PROGRAM
Payer: MEDICARE

## 2021-08-20 VITALS
OXYGEN SATURATION: 100 % | SYSTOLIC BLOOD PRESSURE: 151 MMHG | TEMPERATURE: 98.1 F | HEART RATE: 71 BPM | DIASTOLIC BLOOD PRESSURE: 105 MMHG | WEIGHT: 145.5 LBS | BODY MASS INDEX: 22.79 KG/M2 | RESPIRATION RATE: 21 BRPM

## 2021-08-20 DIAGNOSIS — R07.9 CHEST PAIN, UNSPECIFIED TYPE: Primary | ICD-10-CM

## 2021-08-20 LAB
ALBUMIN SERPL-MCNC: 3.3 G/DL (ref 3.5–5)
ALBUMIN/GLOB SERPL: 0.7 {RATIO} (ref 1.1–2.2)
ALP SERPL-CCNC: 52 U/L (ref 45–117)
ALT SERPL-CCNC: 14 U/L (ref 12–78)
ANION GAP SERPL CALC-SCNC: 11 MMOL/L (ref 5–15)
AST SERPL-CCNC: 33 U/L (ref 15–37)
BASOPHILS # BLD: 0 K/UL (ref 0–0.1)
BASOPHILS NFR BLD: 1 % (ref 0–1)
BILIRUB SERPL-MCNC: 1.2 MG/DL (ref 0.2–1)
BUN SERPL-MCNC: 28 MG/DL (ref 6–20)
BUN/CREAT SERPL: 17 (ref 12–20)
CALCIUM SERPL-MCNC: 9.6 MG/DL (ref 8.5–10.1)
CHLORIDE SERPL-SCNC: 109 MMOL/L (ref 97–108)
CO2 SERPL-SCNC: 27 MMOL/L (ref 21–32)
CREAT SERPL-MCNC: 1.64 MG/DL (ref 0.7–1.3)
DIFFERENTIAL METHOD BLD: ABNORMAL
EOSINOPHIL # BLD: 0.2 K/UL (ref 0–0.4)
EOSINOPHIL NFR BLD: 5 % (ref 0–7)
ERYTHROCYTE [DISTWIDTH] IN BLOOD BY AUTOMATED COUNT: 13.5 % (ref 11.5–14.5)
GLOBULIN SER CALC-MCNC: 4.7 G/DL (ref 2–4)
GLUCOSE SERPL-MCNC: 88 MG/DL (ref 65–100)
HCT VFR BLD AUTO: 48.7 % (ref 36.6–50.3)
HGB BLD-MCNC: 16.1 G/DL (ref 12.1–17)
IMM GRANULOCYTES # BLD AUTO: 0 K/UL (ref 0–0.04)
IMM GRANULOCYTES NFR BLD AUTO: 0 % (ref 0–0.5)
LYMPHOCYTES # BLD: 1.7 K/UL (ref 0.8–3.5)
LYMPHOCYTES NFR BLD: 32 % (ref 12–49)
MAGNESIUM SERPL-MCNC: 1.4 MG/DL (ref 1.6–2.4)
MCH RBC QN AUTO: 31.1 PG (ref 26–34)
MCHC RBC AUTO-ENTMCNC: 33.1 G/DL (ref 30–36.5)
MCV RBC AUTO: 94.2 FL (ref 80–99)
MONOCYTES # BLD: 0.8 K/UL (ref 0–1)
MONOCYTES NFR BLD: 15 % (ref 5–13)
NEUTS SEG # BLD: 2.5 K/UL (ref 1.8–8)
NEUTS SEG NFR BLD: 47 % (ref 32–75)
NRBC # BLD: 0 K/UL (ref 0–0.01)
NRBC BLD-RTO: 0 PER 100 WBC
PLATELET # BLD AUTO: 205 K/UL (ref 150–400)
PMV BLD AUTO: 10.7 FL (ref 8.9–12.9)
POTASSIUM SERPL-SCNC: 4.4 MMOL/L (ref 3.5–5.1)
PROT SERPL-MCNC: 8 G/DL (ref 6.4–8.2)
RBC # BLD AUTO: 5.17 M/UL (ref 4.1–5.7)
SODIUM SERPL-SCNC: 147 MMOL/L (ref 136–145)
TROPONIN I BLD-MCNC: <0.04 NG/ML (ref 0–0.08)
TROPONIN I SERPL-MCNC: <0.05 NG/ML
WBC # BLD AUTO: 5.2 K/UL (ref 4.1–11.1)

## 2021-08-20 PROCEDURE — 85025 COMPLETE CBC W/AUTO DIFF WBC: CPT

## 2021-08-20 PROCEDURE — 84484 ASSAY OF TROPONIN QUANT: CPT

## 2021-08-20 PROCEDURE — 74011250637 HC RX REV CODE- 250/637: Performed by: STUDENT IN AN ORGANIZED HEALTH CARE EDUCATION/TRAINING PROGRAM

## 2021-08-20 PROCEDURE — 80053 COMPREHEN METABOLIC PANEL: CPT

## 2021-08-20 PROCEDURE — 99285 EMERGENCY DEPT VISIT HI MDM: CPT

## 2021-08-20 PROCEDURE — 93005 ELECTROCARDIOGRAM TRACING: CPT

## 2021-08-20 PROCEDURE — 83735 ASSAY OF MAGNESIUM: CPT

## 2021-08-20 PROCEDURE — 71045 X-RAY EXAM CHEST 1 VIEW: CPT

## 2021-08-20 PROCEDURE — 36415 COLL VENOUS BLD VENIPUNCTURE: CPT

## 2021-08-20 RX ORDER — FAMOTIDINE 20 MG/1
20 TABLET, FILM COATED ORAL 2 TIMES DAILY
Qty: 20 TABLET | Refills: 0 | Status: SHIPPED | OUTPATIENT
Start: 2021-08-20 | End: 2021-08-30

## 2021-08-20 RX ORDER — NITROGLYCERIN 0.4 MG/1
0.4 TABLET SUBLINGUAL
Status: DISCONTINUED | OUTPATIENT
Start: 2021-08-20 | End: 2021-08-20 | Stop reason: HOSPADM

## 2021-08-20 RX ADMIN — NITROGLYCERIN 0.4 MG: 0.4 TABLET SUBLINGUAL at 13:40

## 2021-08-20 NOTE — ED PROVIDER NOTES
EMERGENCY DEPARTMENT HISTORY AND PHYSICAL EXAM      Date: 8/20/2021  Patient Name: Tremayne Castro    History of Presenting Illness     Chief Complaint   Patient presents with    Chest Pain         HPI: Tremayne Castro, 69 y.o. male presents to the ED with cc of chest pain. He describes this as a burning and sharp pain of the left side of his chest.  It has been going on for the past 3 days, it is worse with drinking sodas. Gets somewhat better when he drinks water. He reports some associated shortness of breath. He denies any fevers, no coughing, no associated nausea. He does feel slightly sweaty. No vomiting or abdominal pain. He reports having chest pain like this in the past, has had a stress test about a year ago, follows with his primary care doctor but has never followed up with cardiology. He denies any personal cardiac history. There are no other complaints, changes, or physical findings at this time. PCP: Other, MD Collin    No current facility-administered medications on file prior to encounter. Current Outpatient Medications on File Prior to Encounter   Medication Sig Dispense Refill    clopidogrel (PLAVIX) 75 mg tablet Take  by mouth daily.  labetalol (NORMODYNE) 100 mg tablet Take  by mouth two (2) times a day.  spironolactone (ALDACTONE) 50 mg tablet Take  by mouth daily.  lisinopril (PRINIVIL, ZESTRIL) 10 mg tablet Take  by mouth daily.  ibuprofen (MOTRIN) 800 mg tablet Take 1 Tab by mouth two (2) times daily (after meals). 60 Tab 12    clopidogrel (PLAVIX) 75 mg tablet Take 1 Tab by mouth daily. 30 Tab 0    labetalol (NORMODYNE) 100 mg tablet Take 1 Tab by mouth every twelve (12) hours. 60 Tab 0    lisinopril (PRINIVIL, ZESTRIL) 10 mg tablet Take 1 Tab by mouth daily. 30 Tab 0    spironolactone (ALDACTONE) 50 mg tablet Take 1 Tab by mouth daily.  30 Tab 0       Past History     Past Medical History:  Past Medical History:   Diagnosis Date    HTN (hypertension)        Past Surgical History:  No past surgical history on file. Family History:  Family History   Problem Relation Age of Onset    Kidney Disease Mother     Alcohol abuse Father        Social History:  Social History     Tobacco Use    Smoking status: Former Smoker     Packs/day: 0.50     Types: Cigarettes    Smokeless tobacco: Never Used   Substance Use Topics    Alcohol use: Yes     Comment: Reports being a \"heavy drinker\"    Drug use: Yes     Types: Marijuana       Allergies:  No Known Allergies      Review of Systems   no fever  No eye pain  No ear pain  Reports shortness of breath  Reports chest pain  no abdominal pain  no dysuria  no leg pain  No rash  No lymphadenopathy  No weight loss    Physical Exam   Physical Exam  Constitutional:       General: He is not in acute distress. Appearance: He is not toxic-appearing. HENT:      Head: Normocephalic. Eyes:      Extraocular Movements: Extraocular movements intact. Cardiovascular:      Rate and Rhythm: Normal rate and regular rhythm. Pulmonary:      Effort: Pulmonary effort is normal.      Breath sounds: Normal breath sounds. Abdominal:      Palpations: Abdomen is soft. Tenderness: There is no abdominal tenderness. Musculoskeletal:      Cervical back: Neck supple. Right lower leg: No tenderness. No edema. Left lower leg: No tenderness. No edema. Skin:     General: Skin is warm. Neurological:      General: No focal deficit present. Mental Status: He is alert.    Psychiatric:         Mood and Affect: Mood normal.         Diagnostic Study Results     Labs -     Recent Results (from the past 24 hour(s))   EKG, 12 LEAD, INITIAL    Collection Time: 08/20/21 12:37 PM   Result Value Ref Range    Ventricular Rate 80 BPM    Atrial Rate 80 BPM    P-R Interval 388 ms    QRS Duration 92 ms    Q-T Interval 488 ms    QTC Calculation (Bezet) 562 ms    Calculated P Axis 54 degrees    Calculated R Axis 25 degrees Calculated T Axis 59 degrees    Diagnosis       Sinus rhythm with 1st degree AV block  Nonspecific T wave abnormality  Prolonged QT  Abnormal ECG  When compared with ECG of 07-JAN-2019 11:10,  T wave inversion no longer evident in Lateral leads  QT has lengthened     CBC WITH AUTOMATED DIFF    Collection Time: 08/20/21  1:08 PM   Result Value Ref Range    WBC 5.2 4.1 - 11.1 K/uL    RBC 5.17 4.10 - 5.70 M/uL    HGB 16.1 12.1 - 17.0 g/dL    HCT 48.7 36.6 - 50.3 %    MCV 94.2 80.0 - 99.0 FL    MCH 31.1 26.0 - 34.0 PG    MCHC 33.1 30.0 - 36.5 g/dL    RDW 13.5 11.5 - 14.5 %    PLATELET 984 639 - 212 K/uL    MPV 10.7 8.9 - 12.9 FL    NRBC 0.0 0  WBC    ABSOLUTE NRBC 0.00 0.00 - 0.01 K/uL    NEUTROPHILS 47 32 - 75 %    LYMPHOCYTES 32 12 - 49 %    MONOCYTES 15 (H) 5 - 13 %    EOSINOPHILS 5 0 - 7 %    BASOPHILS 1 0 - 1 %    IMMATURE GRANULOCYTES 0 0.0 - 0.5 %    ABS. NEUTROPHILS 2.5 1.8 - 8.0 K/UL    ABS. LYMPHOCYTES 1.7 0.8 - 3.5 K/UL    ABS. MONOCYTES 0.8 0.0 - 1.0 K/UL    ABS. EOSINOPHILS 0.2 0.0 - 0.4 K/UL    ABS. BASOPHILS 0.0 0.0 - 0.1 K/UL    ABS. IMM. GRANS. 0.0 0.00 - 0.04 K/UL    DF AUTOMATED     METABOLIC PANEL, COMPREHENSIVE    Collection Time: 08/20/21  1:08 PM   Result Value Ref Range    Sodium 147 (H) 136 - 145 mmol/L    Potassium 4.4 3.5 - 5.1 mmol/L    Chloride 109 (H) 97 - 108 mmol/L    CO2 27 21 - 32 mmol/L    Anion gap 11 5 - 15 mmol/L    Glucose 88 65 - 100 mg/dL    BUN 28 (H) 6 - 20 MG/DL    Creatinine 1.64 (H) 0.70 - 1.30 MG/DL    BUN/Creatinine ratio 17 12 - 20      GFR est AA 50 (L) >60 ml/min/1.73m2    GFR est non-AA 42 (L) >60 ml/min/1.73m2    Calcium 9.6 8.5 - 10.1 MG/DL    Bilirubin, total 1.2 (H) 0.2 - 1.0 MG/DL    ALT (SGPT) 14 12 - 78 U/L    AST (SGOT) 33 15 - 37 U/L    Alk.  phosphatase 52 45 - 117 U/L    Protein, total 8.0 6.4 - 8.2 g/dL    Albumin 3.3 (L) 3.5 - 5.0 g/dL    Globulin 4.7 (H) 2.0 - 4.0 g/dL    A-G Ratio 0.7 (L) 1.1 - 2.2     TROPONIN I    Collection Time: 08/20/21  1:08 PM   Result Value Ref Range    Troponin-I, Qt. <0.05 <0.05 ng/mL       Radiologic Studies -   XR CHEST PORT   Final Result   No acute cardiopulmonary process. CT Results  (Last 48 hours)    None        CXR Results  (Last 48 hours)               08/20/21 1350  XR CHEST PORT Final result    Impression:  No acute cardiopulmonary process. Narrative:  EXAM: XR CHEST PORT       HISTORY: chest pain. COMPARISON: 1/7/2019       FINDINGS: Single view(s) of the chest. There is unchanged elevation right   hemidiaphragm with unchanged scarring/atelectasis in the right base. No focal   consolidation, pleural effusion, or pneumothorax. The cardiomediastinal   silhouette is unremarkable. The visualized osseous structures are unremarkable. Medical Decision Making   I am the first provider for this patient. I reviewed the vital signs, available nursing notes, past medical history, past surgical history, family history and social history. Vital Signs-Reviewed the patient's vital signs. Patient Vitals for the past 24 hrs:   Temp Pulse Resp BP SpO2   08/20/21 1233 98.1 °F (36.7 °C) 78 18 (!) 151/105 100 %         Provider Notes (Medical Decision Making):   78-year-old male presenting with chest pain. His burning nonexertional chest pain after drinking caffeinated beverages is concerning for possible gastritis or GERD, differential includes musculoskeletal pain, ACS is pleuritic type pain, atypical for ACS but given his age and comorbidities cardiac work-up also initiated. ED Course:     Initial assessment performed. The patients presenting problems have been discussed, and they are in agreement with the care plan formulated and outlined with them. I have encouraged them to ask questions as they arise throughout their visit.         EKG is performed at 12: 37, shows sinus rhythm with first-degree AV block at a rate of 80, , QRS 92, QTc is prolonged at 562, axis upright, no ST segment elevation or depression concerning for ACS, this is interpreted as sinus rhythm with first-degree AV block and prolonged QTC. CBC negative for leukocytosis or anemia, basic metabolic panel with elevated BUN/creatinine 28/1.64, prior labs reviewed and this is trending up from prior, he is informed of this finding. Troponin is negative, chest x-ray unremarkable. On reevaluation, patient is resting comfortably and states that they feel improved. Troponin will be repeated. Repeat troponin is also negative. Patient is counseled on supportive care and return precautions. Will return to the ED for any worsening pain, shortness of breath, or any new or worrisome symptoms. Will followup with cardiology, he will call on Monday, will also call his primary care doctor. Critical Care Time:         Disposition:  Home    PLAN:  1. Current Discharge Medication List        2.    Follow-up Information    None       Return to ED if worse     Diagnosis     Clinical Impression: Acute atypical chest pain

## 2021-08-20 NOTE — ED NOTES
It has been going on for the past 3 days, it is worse with drinking sodas. Gets somewhat better when he drinks water. He reports some associated shortness of breath. Patient is alert and oriented x 4 and in no acute distress at this time. Respirations are at a regular rate, depth, and pattern. Patient updated on plan of care and has no questions or concerns at this time. Call bell within reach. Will continue to monitor. Please reference nursing assessment. Emergency Department Nursing Plan of Care       The Nursing Plan of Care is developed from the Nursing assessment and Emergency Department Attending provider initial evaluation. The plan of care may be reviewed in the ED Provider note.     The Plan of Care was developed with the following considerations:   Patient / Family readiness to learn indicated by:verbalized understanding and successful return demonstration  Persons(s) to be included in education: patient  Barriers to Learning/Limitations:No    Signed     Cate Johnson RN    8/20/2021   1:00 PM

## 2021-08-20 NOTE — PROGRESS NOTES
CM opened case for assessment of D/C planning needs, CM reviewed chart. Cm assist with transportation back home.     [id number]  [phone]

## 2021-08-23 LAB
ATRIAL RATE: 80 BPM
CALCULATED P AXIS, ECG09: 54 DEGREES
CALCULATED R AXIS, ECG10: 25 DEGREES
CALCULATED T AXIS, ECG11: 59 DEGREES
DIAGNOSIS, 93000: NORMAL
P-R INTERVAL, ECG05: 388 MS
Q-T INTERVAL, ECG07: 488 MS
QRS DURATION, ECG06: 92 MS
QTC CALCULATION (BEZET), ECG08: 562 MS
VENTRICULAR RATE, ECG03: 80 BPM

## 2021-09-28 ENCOUNTER — HOSPITAL ENCOUNTER (INPATIENT)
Age: 71
LOS: 2 days | Discharge: NURSING, REHAB OR LONG-TERM CARE | DRG: 313 | End: 2021-10-05
Attending: STUDENT IN AN ORGANIZED HEALTH CARE EDUCATION/TRAINING PROGRAM | Admitting: INTERNAL MEDICINE
Payer: MEDICARE

## 2021-09-28 ENCOUNTER — APPOINTMENT (OUTPATIENT)
Dept: CT IMAGING | Age: 71
DRG: 313 | End: 2021-09-28
Attending: STUDENT IN AN ORGANIZED HEALTH CARE EDUCATION/TRAINING PROGRAM
Payer: MEDICARE

## 2021-09-28 ENCOUNTER — APPOINTMENT (OUTPATIENT)
Dept: GENERAL RADIOLOGY | Age: 71
DRG: 313 | End: 2021-09-28
Attending: STUDENT IN AN ORGANIZED HEALTH CARE EDUCATION/TRAINING PROGRAM
Payer: MEDICARE

## 2021-09-28 DIAGNOSIS — R07.9 CHEST PAIN, UNSPECIFIED TYPE: Primary | ICD-10-CM

## 2021-09-28 DIAGNOSIS — I44.1 MOBITZ (TYPE) I (WENCKEBACH'S) ATRIOVENTRICULAR BLOCK: Chronic | ICD-10-CM

## 2021-09-28 DIAGNOSIS — I10 HTN (HYPERTENSION), BENIGN: ICD-10-CM

## 2021-09-28 LAB
ALBUMIN SERPL-MCNC: 2.3 G/DL (ref 3.5–5)
ALBUMIN/GLOB SERPL: 0.6 {RATIO} (ref 1.1–2.2)
ALP SERPL-CCNC: 56 U/L (ref 45–117)
ALT SERPL-CCNC: 18 U/L (ref 12–78)
ANION GAP SERPL CALC-SCNC: 10 MMOL/L (ref 5–15)
AST SERPL-CCNC: 43 U/L (ref 15–37)
BASOPHILS # BLD: 0 K/UL (ref 0–0.1)
BASOPHILS NFR BLD: 1 % (ref 0–1)
BILIRUB SERPL-MCNC: 0.5 MG/DL (ref 0.2–1)
BNP SERPL-MCNC: 690 PG/ML
BUN SERPL-MCNC: 13 MG/DL (ref 6–20)
BUN/CREAT SERPL: 13 (ref 12–20)
CALCIUM SERPL-MCNC: 7.1 MG/DL (ref 8.5–10.1)
CHLORIDE SERPL-SCNC: 117 MMOL/L (ref 97–108)
CO2 SERPL-SCNC: 19 MMOL/L (ref 21–32)
CREAT SERPL-MCNC: 0.98 MG/DL (ref 0.7–1.3)
D DIMER PPP FEU-MCNC: 2.37 MG/L FEU (ref 0–0.65)
DIFFERENTIAL METHOD BLD: NORMAL
EOSINOPHIL # BLD: 0 K/UL (ref 0–0.4)
EOSINOPHIL NFR BLD: 1 % (ref 0–7)
ERYTHROCYTE [DISTWIDTH] IN BLOOD BY AUTOMATED COUNT: 14.3 % (ref 11.5–14.5)
GLOBULIN SER CALC-MCNC: 4 G/DL (ref 2–4)
GLUCOSE SERPL-MCNC: 78 MG/DL (ref 65–100)
HCT VFR BLD AUTO: 48.8 % (ref 36.6–50.3)
HGB BLD-MCNC: 16.5 G/DL (ref 12.1–17)
IMM GRANULOCYTES # BLD AUTO: 0 K/UL (ref 0–0.04)
IMM GRANULOCYTES NFR BLD AUTO: 0 % (ref 0–0.5)
LYMPHOCYTES # BLD: 1 K/UL (ref 0.8–3.5)
LYMPHOCYTES NFR BLD: 19 % (ref 12–49)
MAGNESIUM SERPL-MCNC: 1.6 MG/DL (ref 1.6–2.4)
MCH RBC QN AUTO: 31.4 PG (ref 26–34)
MCHC RBC AUTO-ENTMCNC: 33.8 G/DL (ref 30–36.5)
MCV RBC AUTO: 92.8 FL (ref 80–99)
MONOCYTES # BLD: 0.6 K/UL (ref 0–1)
MONOCYTES NFR BLD: 13 % (ref 5–13)
NEUTS SEG # BLD: 3.4 K/UL (ref 1.8–8)
NEUTS SEG NFR BLD: 66 % (ref 32–75)
NRBC # BLD: 0 K/UL (ref 0–0.01)
NRBC BLD-RTO: 0 PER 100 WBC
PLATELET # BLD AUTO: 185 K/UL (ref 150–400)
PMV BLD AUTO: 10.7 FL (ref 8.9–12.9)
POTASSIUM SERPL-SCNC: 3 MMOL/L (ref 3.5–5.1)
PROT SERPL-MCNC: 6.3 G/DL (ref 6.4–8.2)
RBC # BLD AUTO: 5.26 M/UL (ref 4.1–5.7)
SODIUM SERPL-SCNC: 146 MMOL/L (ref 136–145)
TROPONIN I SERPL-MCNC: <0.05 NG/ML
WBC # BLD AUTO: 5.1 K/UL (ref 4.1–11.1)

## 2021-09-28 PROCEDURE — 71045 X-RAY EXAM CHEST 1 VIEW: CPT

## 2021-09-28 PROCEDURE — 96374 THER/PROPH/DIAG INJ IV PUSH: CPT

## 2021-09-28 PROCEDURE — 74177 CT ABD & PELVIS W/CONTRAST: CPT

## 2021-09-28 PROCEDURE — 93005 ELECTROCARDIOGRAM TRACING: CPT

## 2021-09-28 PROCEDURE — 83880 ASSAY OF NATRIURETIC PEPTIDE: CPT

## 2021-09-28 PROCEDURE — 85379 FIBRIN DEGRADATION QUANT: CPT

## 2021-09-28 PROCEDURE — 83735 ASSAY OF MAGNESIUM: CPT

## 2021-09-28 PROCEDURE — 99285 EMERGENCY DEPT VISIT HI MDM: CPT

## 2021-09-28 PROCEDURE — 80053 COMPREHEN METABOLIC PANEL: CPT

## 2021-09-28 PROCEDURE — 85025 COMPLETE CBC W/AUTO DIFF WBC: CPT

## 2021-09-28 PROCEDURE — 74011000636 HC RX REV CODE- 636: Performed by: STUDENT IN AN ORGANIZED HEALTH CARE EDUCATION/TRAINING PROGRAM

## 2021-09-28 PROCEDURE — 74011250637 HC RX REV CODE- 250/637: Performed by: STUDENT IN AN ORGANIZED HEALTH CARE EDUCATION/TRAINING PROGRAM

## 2021-09-28 PROCEDURE — 36415 COLL VENOUS BLD VENIPUNCTURE: CPT

## 2021-09-28 PROCEDURE — 71275 CT ANGIOGRAPHY CHEST: CPT

## 2021-09-28 PROCEDURE — 74011250636 HC RX REV CODE- 250/636: Performed by: STUDENT IN AN ORGANIZED HEALTH CARE EDUCATION/TRAINING PROGRAM

## 2021-09-28 PROCEDURE — 96375 TX/PRO/DX INJ NEW DRUG ADDON: CPT

## 2021-09-28 PROCEDURE — 84484 ASSAY OF TROPONIN QUANT: CPT

## 2021-09-28 RX ORDER — MORPHINE SULFATE 2 MG/ML
4 INJECTION, SOLUTION INTRAMUSCULAR; INTRAVENOUS ONCE
Status: COMPLETED | OUTPATIENT
Start: 2021-09-28 | End: 2021-09-28

## 2021-09-28 RX ORDER — POTASSIUM CHLORIDE 20 MEQ/1
40 TABLET, EXTENDED RELEASE ORAL
Status: COMPLETED | OUTPATIENT
Start: 2021-09-28 | End: 2021-09-28

## 2021-09-28 RX ORDER — ONDANSETRON 2 MG/ML
4 INJECTION INTRAMUSCULAR; INTRAVENOUS
Status: COMPLETED | OUTPATIENT
Start: 2021-09-28 | End: 2021-09-28

## 2021-09-28 RX ORDER — LORAZEPAM 2 MG/ML
1 INJECTION INTRAMUSCULAR
Status: COMPLETED | OUTPATIENT
Start: 2021-09-28 | End: 2021-09-28

## 2021-09-28 RX ADMIN — ONDANSETRON 4 MG: 2 INJECTION INTRAMUSCULAR; INTRAVENOUS at 23:40

## 2021-09-28 RX ADMIN — IOPAMIDOL 100 ML: 755 INJECTION, SOLUTION INTRAVENOUS at 22:11

## 2021-09-28 RX ADMIN — MORPHINE SULFATE 4 MG: 2 INJECTION, SOLUTION INTRAMUSCULAR; INTRAVENOUS at 19:00

## 2021-09-28 RX ADMIN — POTASSIUM CHLORIDE 40 MEQ: 20 TABLET, EXTENDED RELEASE ORAL at 22:45

## 2021-09-28 RX ADMIN — LORAZEPAM 1 MG: 2 INJECTION INTRAMUSCULAR; INTRAVENOUS at 21:54

## 2021-09-28 NOTE — ED NOTES
Patient arrives to ED via EMS with a cc of chest pain that started this am.  Patient is also complaining of left hip/leg pain that started this am as well. Patient is alert and oriented x 4, resting comfortably in stretcher with side rails up and call bell within reach. MD blank at bedside evaluating patient.

## 2021-09-28 NOTE — ED NOTES
Bedside and Verbal shift change report given to Gala Esteban RN and Jane Carter RN (oncoming nurse) by this RN (offgoing nurse). Report included the following information SBAR.

## 2021-09-29 ENCOUNTER — APPOINTMENT (OUTPATIENT)
Dept: NON INVASIVE DIAGNOSTICS | Age: 71
DRG: 313 | End: 2021-09-29
Attending: INTERNAL MEDICINE
Payer: MEDICARE

## 2021-09-29 PROBLEM — I44.1 MOBITZ (TYPE) I (WENCKEBACH'S) ATRIOVENTRICULAR BLOCK: Chronic | Status: ACTIVE | Noted: 2021-09-29

## 2021-09-29 PROBLEM — Z86.73 HISTORY OF CVA (CEREBROVASCULAR ACCIDENT): Status: ACTIVE | Noted: 2021-09-29

## 2021-09-29 PROBLEM — I24.9 ACS (ACUTE CORONARY SYNDROME) (HCC): Status: ACTIVE | Noted: 2021-09-29

## 2021-09-29 LAB
ATRIAL RATE: 105 BPM
ATRIAL RATE: 52 BPM
ATRIAL RATE: 91 BPM
CALCULATED P AXIS, ECG09: 48 DEGREES
CALCULATED R AXIS, ECG10: 1 DEGREES
CALCULATED R AXIS, ECG10: 16 DEGREES
CALCULATED R AXIS, ECG10: 24 DEGREES
CALCULATED T AXIS, ECG11: 40 DEGREES
CALCULATED T AXIS, ECG11: 45 DEGREES
CALCULATED T AXIS, ECG11: 47 DEGREES
CHOLEST SERPL-MCNC: 198 MG/DL
DIAGNOSIS, 93000: NORMAL
ECHO AO ASC DIAM: 3.51 CM
ECHO AO ROOT DIAM: 2.96 CM
ECHO AV AREA PEAK VELOCITY: 3.28 CM2
ECHO AV AREA VTI: 4.03 CM2
ECHO AV AREA/BSA PEAK VELOCITY: 1.7 CM2/M2
ECHO AV AREA/BSA VTI: 2.1 CM2/M2
ECHO AV MEAN GRADIENT: 2.39 MMHG
ECHO AV PEAK GRADIENT: 4.39 MMHG
ECHO AV PEAK VELOCITY: 104.81 CM/S
ECHO AV VTI: 17.62 CM
ECHO LA MAJOR AXIS: 3.64 CM
ECHO LA MINOR AXIS: 1.92 CM
ECHO LV EDV A4C: 73.61 ML
ECHO LV EDV INDEX A4C: 38.7 ML/M2
ECHO LV EJECTION FRACTION A4C: 75 PERCENT
ECHO LV ESV A4C: 18.5 ML
ECHO LV ESV INDEX A4C: 9.7 ML/M2
ECHO LV INTERNAL DIMENSION DIASTOLIC: 3.26 CM (ref 4.2–5.9)
ECHO LV INTERNAL DIMENSION SYSTOLIC: 2.84 CM
ECHO LV IVSD: 1.1 CM (ref 0.6–1)
ECHO LV MASS 2D: 107.2 G (ref 88–224)
ECHO LV MASS INDEX 2D: 56.4 G/M2 (ref 49–115)
ECHO LV POSTERIOR WALL DIASTOLIC: 1.1 CM (ref 0.6–1)
ECHO LVOT DIAM: 2.08 CM
ECHO LVOT PEAK GRADIENT: 4.13 MMHG
ECHO LVOT PEAK VELOCITY: 101.56 CM/S
ECHO LVOT SV: 71.1 ML
ECHO LVOT VTI: 20.98 CM
ECHO MV AREA VTI: 3.12 CM2
ECHO MV E DECELERATION TIME (DT): 133.32 MS
ECHO MV E VELOCITY: 110.69 CM/S
ECHO MV MAX VELOCITY: 153.62 CM/S
ECHO MV MEAN GRADIENT: 3.52 MMHG
ECHO MV PEAK GRADIENT: 9.44 MMHG
ECHO MV VTI: 22.76 CM
ECHO PV MAX VELOCITY: 118.53 CM/S
ECHO PV PEAK INSTANTANEOUS GRADIENT SYSTOLIC: 5.62 MMHG
ECHO TV REGURGITANT MAX VELOCITY: 188.37 CM/S
ECHO TV REGURGITANT MAX VELOCITY: 563.47 CM/S
ECHO TV REGURGITANT PEAK GRADIENT: 14.23 MMHG
EST. AVERAGE GLUCOSE BLD GHB EST-MCNC: 85 MG/DL
HBA1C MFR BLD: 4.6 % (ref 4–5.6)
HDLC SERPL-MCNC: 125 MG/DL
HDLC SERPL: 1.6 {RATIO} (ref 0–5)
INR PPP: 1.1 (ref 0.9–1.1)
LDLC SERPL CALC-MCNC: 58 MG/DL (ref 0–100)
LVOT MG: 2.06 MMHG
PROTHROMBIN TIME: 11.6 SEC (ref 9–11.1)
Q-T INTERVAL, ECG07: 318 MS
Q-T INTERVAL, ECG07: 366 MS
Q-T INTERVAL, ECG07: 454 MS
QRS DURATION, ECG06: 104 MS
QRS DURATION, ECG06: 86 MS
QRS DURATION, ECG06: 94 MS
QTC CALCULATION (BEZET), ECG08: 460 MS
QTC CALCULATION (BEZET), ECG08: 472 MS
QTC CALCULATION (BEZET), ECG08: 475 MS
TRIGL SERPL-MCNC: 75 MG/DL (ref ?–150)
TROPONIN I SERPL-MCNC: <0.05 NG/ML
VENTRICULAR RATE, ECG03: 100 BPM
VENTRICULAR RATE, ECG03: 126 BPM
VENTRICULAR RATE, ECG03: 66 BPM
VLDLC SERPL CALC-MCNC: 15 MG/DL

## 2021-09-29 PROCEDURE — 96376 TX/PRO/DX INJ SAME DRUG ADON: CPT

## 2021-09-29 PROCEDURE — 99218 HC RM OBSERVATION: CPT

## 2021-09-29 PROCEDURE — 80061 LIPID PANEL: CPT

## 2021-09-29 PROCEDURE — 74011250636 HC RX REV CODE- 250/636: Performed by: INTERNAL MEDICINE

## 2021-09-29 PROCEDURE — 74011250636 HC RX REV CODE- 250/636: Performed by: NURSE PRACTITIONER

## 2021-09-29 PROCEDURE — 74011250637 HC RX REV CODE- 250/637: Performed by: NURSE PRACTITIONER

## 2021-09-29 PROCEDURE — 99223 1ST HOSP IP/OBS HIGH 75: CPT | Performed by: INTERNAL MEDICINE

## 2021-09-29 PROCEDURE — 84484 ASSAY OF TROPONIN QUANT: CPT

## 2021-09-29 PROCEDURE — 93306 TTE W/DOPPLER COMPLETE: CPT

## 2021-09-29 PROCEDURE — 77010033678 HC OXYGEN DAILY

## 2021-09-29 PROCEDURE — 93306 TTE W/DOPPLER COMPLETE: CPT | Performed by: INTERNAL MEDICINE

## 2021-09-29 PROCEDURE — 94760 N-INVAS EAR/PLS OXIMETRY 1: CPT

## 2021-09-29 PROCEDURE — 36415 COLL VENOUS BLD VENIPUNCTURE: CPT

## 2021-09-29 PROCEDURE — 83036 HEMOGLOBIN GLYCOSYLATED A1C: CPT

## 2021-09-29 PROCEDURE — 85610 PROTHROMBIN TIME: CPT

## 2021-09-29 RX ORDER — ACETAMINOPHEN 325 MG/1
650 TABLET ORAL
Status: DISCONTINUED | OUTPATIENT
Start: 2021-09-29 | End: 2021-10-05 | Stop reason: HOSPADM

## 2021-09-29 RX ORDER — ONDANSETRON 2 MG/ML
4 INJECTION INTRAMUSCULAR; INTRAVENOUS
Status: DISCONTINUED | OUTPATIENT
Start: 2021-09-29 | End: 2021-10-05 | Stop reason: HOSPADM

## 2021-09-29 RX ORDER — IPRATROPIUM BROMIDE AND ALBUTEROL SULFATE 2.5; .5 MG/3ML; MG/3ML
3 SOLUTION RESPIRATORY (INHALATION)
Status: DISCONTINUED | OUTPATIENT
Start: 2021-09-29 | End: 2021-10-05 | Stop reason: HOSPADM

## 2021-09-29 RX ORDER — FAMOTIDINE 20 MG/1
20 TABLET, FILM COATED ORAL 2 TIMES DAILY
Status: DISCONTINUED | OUTPATIENT
Start: 2021-09-29 | End: 2021-10-05 | Stop reason: HOSPADM

## 2021-09-29 RX ORDER — CLOPIDOGREL BISULFATE 75 MG/1
75 TABLET ORAL DAILY
Status: DISCONTINUED | OUTPATIENT
Start: 2021-09-29 | End: 2021-10-05 | Stop reason: HOSPADM

## 2021-09-29 RX ORDER — GUAIFENESIN 100 MG/5ML
81 LIQUID (ML) ORAL DAILY
Status: DISCONTINUED | OUTPATIENT
Start: 2021-09-29 | End: 2021-10-05 | Stop reason: HOSPADM

## 2021-09-29 RX ORDER — SODIUM CHLORIDE 0.9 % (FLUSH) 0.9 %
5-40 SYRINGE (ML) INJECTION EVERY 8 HOURS
Status: DISCONTINUED | OUTPATIENT
Start: 2021-09-29 | End: 2021-10-05 | Stop reason: HOSPADM

## 2021-09-29 RX ORDER — DEXTROSE MONOHYDRATE 50 MG/ML
50 INJECTION, SOLUTION INTRAVENOUS CONTINUOUS
Status: DISCONTINUED | OUTPATIENT
Start: 2021-09-29 | End: 2021-10-01

## 2021-09-29 RX ORDER — HYDRALAZINE HYDROCHLORIDE 20 MG/ML
10 INJECTION INTRAMUSCULAR; INTRAVENOUS
Status: DISCONTINUED | OUTPATIENT
Start: 2021-09-29 | End: 2021-10-05 | Stop reason: HOSPADM

## 2021-09-29 RX ORDER — OXYCODONE AND ACETAMINOPHEN 5; 325 MG/1; MG/1
1 TABLET ORAL
Status: DISCONTINUED | OUTPATIENT
Start: 2021-09-29 | End: 2021-10-05 | Stop reason: HOSPADM

## 2021-09-29 RX ORDER — MAG HYDROX/ALUMINUM HYD/SIMETH 200-200-20
30 SUSPENSION, ORAL (FINAL DOSE FORM) ORAL
Status: DISCONTINUED | OUTPATIENT
Start: 2021-09-29 | End: 2021-10-05 | Stop reason: HOSPADM

## 2021-09-29 RX ORDER — LISINOPRIL 10 MG/1
10 TABLET ORAL DAILY
Status: DISCONTINUED | OUTPATIENT
Start: 2021-09-29 | End: 2021-09-30

## 2021-09-29 RX ORDER — SODIUM CHLORIDE 0.9 % (FLUSH) 0.9 %
5-40 SYRINGE (ML) INJECTION AS NEEDED
Status: DISCONTINUED | OUTPATIENT
Start: 2021-09-29 | End: 2021-10-05 | Stop reason: HOSPADM

## 2021-09-29 RX ORDER — LABETALOL 100 MG/1
100 TABLET, FILM COATED ORAL 2 TIMES DAILY
Status: DISCONTINUED | OUTPATIENT
Start: 2021-09-29 | End: 2021-09-30

## 2021-09-29 RX ADMIN — Medication 10 ML: at 22:26

## 2021-09-29 RX ADMIN — Medication 10 ML: at 14:00

## 2021-09-29 RX ADMIN — OXYCODONE AND ACETAMINOPHEN 1 TABLET: 5; 325 TABLET ORAL at 18:41

## 2021-09-29 RX ADMIN — Medication 5 ML: at 06:13

## 2021-09-29 RX ADMIN — FAMOTIDINE 20 MG: 20 TABLET, FILM COATED ORAL at 17:24

## 2021-09-29 RX ADMIN — CLOPIDOGREL BISULFATE 75 MG: 75 TABLET ORAL at 09:53

## 2021-09-29 RX ADMIN — LISINOPRIL 10 MG: 10 TABLET ORAL at 09:53

## 2021-09-29 RX ADMIN — DEXTROSE MONOHYDRATE 50 ML/HR: 50 INJECTION, SOLUTION INTRAVENOUS at 09:28

## 2021-09-29 RX ADMIN — ONDANSETRON 4 MG: 2 INJECTION INTRAMUSCULAR; INTRAVENOUS at 09:41

## 2021-09-29 RX ADMIN — FAMOTIDINE 20 MG: 20 TABLET, FILM COATED ORAL at 09:53

## 2021-09-29 RX ADMIN — DEXTROSE MONOHYDRATE 50 ML/HR: 50 INJECTION, SOLUTION INTRAVENOUS at 22:26

## 2021-09-29 RX ADMIN — ASPIRIN 81 MG: 81 TABLET, CHEWABLE ORAL at 09:53

## 2021-09-29 NOTE — H&P
Hospitalist Admission Note    NAME: Ryan Casas   :  1950   MRN:  [id number]     Date/Time:  2021 12:38 AM    Patient PCP: Collin Lazar MD  ______________________________________________________________________  Given the patient's current clinical presentation, I have a high level of concern for decompensation if discharged from the emergency department. Complex decision making was performed, which includes reviewing the patient's available past medical records, laboratory results, and x-ray films. My assessment of this patient's clinical condition and my plan of care is as follows. Assessment / Plan:    Chest pain, POA  Costochondritis, POA  R/O GERD  ECG: Sinus tachycardia with 2nd degree AV block (Mobitz I)   Minimal voltage criteria for LVH, may be normal variant   CXR: Cardiomediastinal silhouette is within normal limits. Pulmonary  vasculature is not engorged. Right hemidiaphragm is elevated with atelectasis  right base. No change. No pneumothorax or consolidation. No pleural effusion  Troponin: <0.05, trend  Pro BNP: 690  A1C: pending  Lipid profile: pending  - cardiology consult  - NPO   - ASA, Plavix  - resumed home BB and ACEI  - prn O2 and neb treatment  - pain management, PPI      Hypertension, POA  - resume home meds    Code Status: Full  Surrogate Decision Maker: Anali Leahy, friend ([phone])    DVT Prophylaxis: SCD  GI Prophylaxis: Pepcid    Baseline: walks with crutches (pain on left leg with weight-bearing), lives alone      Subjective:   CHIEF COMPLAINT: chest pain, recurrent    HISTORY OF PRESENT ILLNESS:     Ryan Casas is a 66 y.o.  male who presents with left sided worsening chest pain for the past week. Pain is aggravated by carbonated drinks and is worse on pressure application. Accompanying symptoms include SOB and nausea.  Patient was recently seen in ED for the same complaint last 21 and was sent home on Famotidine and cardiology follow up. Patient never followed up with cardiology. He also reports left LE weakness and left hip pain making him unable to bear weight on his left leg and ambulates with crutches because of that. Also reports numbness and intermittent swelling on his left leg. Only documented past medical history is hypertension but also admitted to having asthma and uses inhaler intermittently. We were asked to admit for work up and evaluation of the above problems. Past Medical History:   Diagnosis Date    HTN (hypertension)         No past surgical history on file. Social History     Tobacco Use    Smoking status: Former Smoker     Packs/day: 0.50     Types: Cigarettes    Smokeless tobacco: Never Used   Substance Use Topics    Alcohol use: Yes     Comment: Reports being a \"heavy drinker\"        Family History   Problem Relation Age of Onset    Kidney Disease Mother     Alcohol abuse Father      No Known Allergies     Prior to Admission medications    Medication Sig Start Date End Date Taking? Authorizing Provider   clopidogrel (PLAVIX) 75 mg tablet Take  by mouth daily. Provider, Historical   labetalol (NORMODYNE) 100 mg tablet Take  by mouth two (2) times a day. Provider, Historical   spironolactone (ALDACTONE) 50 mg tablet Take  by mouth daily. Provider, Historical   lisinopril (PRINIVIL, ZESTRIL) 10 mg tablet Take  by mouth daily. Provider, Historical   ibuprofen (MOTRIN) 800 mg tablet Take 1 Tab by mouth two (2) times daily (after meals). 7/15/13   Emilia Sotelo MD   clopidogrel (PLAVIX) 75 mg tablet Take 1 Tab by mouth daily. 7/3/13   Wanda Cisneros MD   labetalol (NORMODYNE) 100 mg tablet Take 1 Tab by mouth every twelve (12) hours. 7/3/13   Wanda Cisneros MD   lisinopril (PRINIVIL, ZESTRIL) 10 mg tablet Take 1 Tab by mouth daily. 7/3/13   Wanda Cisneros MD   spironolactone (ALDACTONE) 50 mg tablet Take 1 Tab by mouth daily.  7/3/13   Wanda Cisnerso MD REVIEW OF SYSTEMS:     I am not able to complete the review of systems because: The patient is intubated and sedated    The patient has altered mental status due to his acute medical problems    The patient has baseline aphasia from prior stroke(s)    The patient has baseline dementia and is not reliable historian    The patient is in acute medical distress and unable to provide information           Total of 12 systems reviewed as follows:       POSITIVE= bolded text  Negative = text not bolded  General:  fever, chills, sweats, generalized weakness, weight loss/gain,      loss of appetite   Eyes:    blurred vision, eye pain, loss of vision, double vision  ENT:    rhinorrhea, pharyngitis   Respiratory:   cough, sputum production, SOB, SANDERS, wheezing, pleuritic pain   Cardiology:   chest pain, palpitations, orthopnea, PND, edema, syncope   Gastrointestinal:  abdominal pain , nausea, vomiting, diarrhea, dysphagia, constipation, bleeding   Genitourinary:  frequency, urgency, dysuria, hematuria, incontinence   Muskuloskeletal :  arthralgia, myalgia, back pain, left LE and hip pain  Hematology:  easy bruising, nose or gum bleeding, lymphadenopathy   Dermatological: rash, ulceration, pruritis, color change / jaundice  Endocrine:   hot flashes or polydipsia   Neurological:  headache, dizziness, confusion, left LE weakness, left LE paresthesia,     Speech difficulties, memory loss, gait difficulty  Psychological: Feelings of anxiety, depression, agitation    Objective:   VITALS:    Visit Vitals  /66 (BP 1 Location: Left upper arm, BP Patient Position: At rest)   Pulse 85   Temp 98.9 °F (37.2 °C)   Resp 15   Ht 5' 10\" (1.778 m)   Wt 72.6 kg (160 lb 1.6 oz)   SpO2 99%   BMI 22.97 kg/m²       PHYSICAL EXAM:    General:    Alert, cooperative, no distress, appears stated age.      HEENT: Atraumatic, anicteric sclerae, pink conjunctivae     No oral ulcers, mucosa moist, throat clear, dentition fair  Neck:  Supple, symmetrical,  thyroid: non tender  Lungs:   Clear to auscultation bilaterally. No Wheezing or Rhonchi. No rales. Chest wall:  tenderness to pressure application No Accessory muscle use. Heart:   Regular  rhythm,  No  murmur   No edema  Abdomen:   Soft, non-tender. Not distended. Bowel sounds normal  Extremities: No cyanosis. No clubbing,      Skin turgor normal, Capillary refill normal, Radial dial pulse 2+  Skin:     Not pale. Not Jaundiced  No rashes   Psych:  Good insight. Not depressed. Not anxious or agitated. Neurologic: EOMs intact. No facial asymmetry. No aphasia or slurred speech. Symmetrical strength, LE diminished sensation. Alert and oriented X 3.     _________________________________________________y______________________  Care Plan discussed with:    Comments   Patient y    Family      RN y    Care Manager                    Consultant:      _______________________________________________________________________  Expected  Disposition:   Home with Family y   HH/PT/OT/RN    SNF/LTC    JAYLEN    ________________________________________________________________________        Comments    y Reviewed previous records   >50% of visit spent in counseling and coordination of care y Discussion with patient and/or family and questions answered       ________________________________________________________________________  Signed: Donald Teixeira NP    Procedures: see electronic medical records for all procedures/Xrays and details which were not copied into this note but were reviewed prior to creation of Plan.     LAB DATA REVIEWED:    Recent Results (from the past 24 hour(s))   EKG, 12 LEAD, INITIAL    Collection Time: 09/28/21  6:27 PM   Result Value Ref Range    Ventricular Rate 100 BPM    Atrial Rate 52 BPM    QRS Duration 94 ms    Q-T Interval 366 ms    QTC Calculation (Bezet) 472 ms    Calculated R Axis 24 degrees    Calculated T Axis 40 degrees    Diagnosis       ** Poor data quality, interpretation may be adversely affected  Undetermined rhythm  Nonspecific T wave abnormality  When compared with ECG of 20-AUG-2021 12:37,  Current undetermined rhythm precludes rhythm comparison, needs review  QT has shortened     TROPONIN I    Collection Time: 09/28/21  6:44 PM   Result Value Ref Range    Troponin-I, Qt. <0.05 <1.54 ng/mL   METABOLIC PANEL, COMPREHENSIVE    Collection Time: 09/28/21  6:44 PM   Result Value Ref Range    Sodium 146 (H) 136 - 145 mmol/L    Potassium 3.0 (L) 3.5 - 5.1 mmol/L    Chloride 117 (H) 97 - 108 mmol/L    CO2 19 (L) 21 - 32 mmol/L    Anion gap 10 5 - 15 mmol/L    Glucose 78 65 - 100 mg/dL    BUN 13 6 - 20 MG/DL    Creatinine 0.98 0.70 - 1.30 MG/DL    BUN/Creatinine ratio 13 12 - 20      GFR est AA >60 >60 ml/min/1.73m2    GFR est non-AA >60 >60 ml/min/1.73m2    Calcium 7.1 (L) 8.5 - 10.1 MG/DL    Bilirubin, total 0.5 0.2 - 1.0 MG/DL    ALT (SGPT) 18 12 - 78 U/L    AST (SGOT) 43 (H) 15 - 37 U/L    Alk. phosphatase 56 45 - 117 U/L    Protein, total 6.3 (L) 6.4 - 8.2 g/dL    Albumin 2.3 (L) 3.5 - 5.0 g/dL    Globulin 4.0 2.0 - 4.0 g/dL    A-G Ratio 0.6 (L) 1.1 - 2.2     CBC WITH AUTOMATED DIFF    Collection Time: 09/28/21  6:44 PM   Result Value Ref Range    WBC 5.1 4.1 - 11.1 K/uL    RBC 5.26 4.10 - 5.70 M/uL    HGB 16.5 12.1 - 17.0 g/dL    HCT 48.8 36.6 - 50.3 %    MCV 92.8 80.0 - 99.0 FL    MCH 31.4 26.0 - 34.0 PG    MCHC 33.8 30.0 - 36.5 g/dL    RDW 14.3 11.5 - 14.5 %    PLATELET 759 050 - 773 K/uL    MPV 10.7 8.9 - 12.9 FL    NRBC 0.0 0  WBC    ABSOLUTE NRBC 0.00 0.00 - 0.01 K/uL    NEUTROPHILS 66 32 - 75 %    LYMPHOCYTES 19 12 - 49 %    MONOCYTES 13 5 - 13 %    EOSINOPHILS 1 0 - 7 %    BASOPHILS 1 0 - 1 %    IMMATURE GRANULOCYTES 0 0.0 - 0.5 %    ABS. NEUTROPHILS 3.4 1.8 - 8.0 K/UL    ABS. LYMPHOCYTES 1.0 0.8 - 3.5 K/UL    ABS. MONOCYTES 0.6 0.0 - 1.0 K/UL    ABS. EOSINOPHILS 0.0 0.0 - 0.4 K/UL    ABS. BASOPHILS 0.0 0.0 - 0.1 K/UL    ABS. IMM.  GRANS. 0.0 0.00 - 0.04 K/UL    DF AUTOMATED     D DIMER    Collection Time: 09/28/21  6:45 PM   Result Value Ref Range    D-dimer 2.37 (H) 0.00 - 0.65 mg/L FEU   NT-PRO BNP    Collection Time: 09/28/21  6:45 PM   Result Value Ref Range    NT pro- (H) <125 PG/ML   MAGNESIUM    Collection Time: 09/28/21  6:45 PM   Result Value Ref Range    Magnesium 1.6 1.6 - 2.4 mg/dL   EKG, 12 LEAD, INITIAL    Collection Time: 09/28/21  8:49 PM   Result Value Ref Range    Ventricular Rate 126 BPM    Atrial Rate 91 BPM    QRS Duration 86 ms    Q-T Interval 318 ms    QTC Calculation (Bezet) 460 ms    Calculated R Axis 16 degrees    Calculated T Axis 45 degrees    Diagnosis       ** Poor data quality, interpretation may be adversely affected  Accelerated Junctional rhythm with occasional premature ventricular complexes  Junctional ST depression, probably normal  When compared with ECG of 28-SEP-2021 18:27,  MANUAL COMPARISON REQUIRED, DATA IS UNCONFIRMED     EKG, 12 LEAD, SUBSEQUENT    Collection Time: 09/28/21 10:51 PM   Result Value Ref Range    Ventricular Rate 66 BPM    Atrial Rate 105 BPM    QRS Duration 104 ms    Q-T Interval 454 ms    QTC Calculation (Bezet) 475 ms    Calculated P Axis 48 degrees    Calculated R Axis 1 degrees    Calculated T Axis 47 degrees    Diagnosis       Sinus tachycardia with 2nd degree AV block (Mobitz I)  Minimal voltage criteria for LVH, may be normal variant  When compared with ECG of 28-SEP-2021 20:49,  MANUAL COMPARISON REQUIRED, DATA IS UNCONFIRMED

## 2021-09-29 NOTE — ED PROVIDER NOTES
EMERGENCY DEPARTMENT HISTORY AND PHYSICAL EXAM      Date: 9/28/2021  Patient Name: Shanon Bashir    History of Presenting Illness     Chief Complaint   Patient presents with    Chest Pain         HPI: Shanon Bashir, 66 y.o. male presents to the ED with cc of chest pain. He describes pain in his left leg and pain in his chest.  He states that he has had this multiple times in the past, however got much worse again today. He describes as a sharp pain in his left thigh region, and then a constant substernal chest tightness associated with shortness of breath, nausea and sweatiness. He denies any coughing or fevers. He denies any weakness or numbness in the extremities. He denies any history of trauma. There are no other complaints, changes, or physical findings at this time. PCP: Nagi, MD Collin    No current facility-administered medications on file prior to encounter. Current Outpatient Medications on File Prior to Encounter   Medication Sig Dispense Refill    clopidogrel (PLAVIX) 75 mg tablet Take  by mouth daily.  labetalol (NORMODYNE) 100 mg tablet Take  by mouth two (2) times a day.  spironolactone (ALDACTONE) 50 mg tablet Take  by mouth daily.  lisinopril (PRINIVIL, ZESTRIL) 10 mg tablet Take  by mouth daily.  ibuprofen (MOTRIN) 800 mg tablet Take 1 Tab by mouth two (2) times daily (after meals). 60 Tab 12    clopidogrel (PLAVIX) 75 mg tablet Take 1 Tab by mouth daily. 30 Tab 0    labetalol (NORMODYNE) 100 mg tablet Take 1 Tab by mouth every twelve (12) hours. 60 Tab 0    lisinopril (PRINIVIL, ZESTRIL) 10 mg tablet Take 1 Tab by mouth daily. 30 Tab 0    spironolactone (ALDACTONE) 50 mg tablet Take 1 Tab by mouth daily. 30 Tab 0       Past History     Past Medical History:  Past Medical History:   Diagnosis Date    HTN (hypertension)        Past Surgical History:  No past surgical history on file.     Family History:  Family History   Problem Relation Age of Onset    Kidney Disease Mother     Alcohol abuse Father        Social History:  Social History     Tobacco Use    Smoking status: Former Smoker     Packs/day: 0.50     Types: Cigarettes    Smokeless tobacco: Never Used   Substance Use Topics    Alcohol use: Yes     Comment: Reports being a \"heavy drinker\"    Drug use: Yes     Types: Marijuana       Allergies:  No Known Allergies      Review of Systems   no fever  No ear pain  No eye pain  Reports shortness of breath  Reports chest pain  no abdominal pain  no dysuria  Reports left leg pain  No rash  No lymphadenopathy  No weight loss    Physical Exam   Physical Exam  Constitutional:       General: He is in acute distress. Comments: Uncomfortable appearing   Eyes:      Extraocular Movements: Extraocular movements intact. Cardiovascular:      Rate and Rhythm: Tachycardia present. Rhythm irregular. Pulmonary:      Effort: Pulmonary effort is normal.      Breath sounds: Normal breath sounds. Abdominal:      Palpations: Abdomen is soft. Tenderness: There is no abdominal tenderness. Musculoskeletal:      Cervical back: Neck supple. Comments: I cannot elicit any reproducible tenderness over his extremities, no swelling or warmth, full range of motion at all joints, he points to his left thigh region when asked where the pain is, however not reproducible on exam.  Extremities are warm and well perfused, no edema, sensation to light touch intact diffusely   Skin:     General: Skin is warm. Neurological:      General: No focal deficit present. Mental Status: He is alert.    Psychiatric:         Mood and Affect: Mood normal.         Diagnostic Study Results     Labs -     Recent Results (from the past 24 hour(s))   EKG, 12 LEAD, INITIAL    Collection Time: 09/28/21  6:27 PM   Result Value Ref Range    Ventricular Rate 100 BPM    Atrial Rate 52 BPM    QRS Duration 94 ms    Q-T Interval 366 ms    QTC Calculation (Bezet) 472 ms    Calculated R Axis 24 degrees    Calculated T Axis 40 degrees    Diagnosis       ** Poor data quality, interpretation may be adversely affected  Undetermined rhythm  Nonspecific T wave abnormality  When compared with ECG of 20-AUG-2021 12:37,  Current undetermined rhythm precludes rhythm comparison, needs review  QT has shortened     TROPONIN I    Collection Time: 09/28/21  6:44 PM   Result Value Ref Range    Troponin-I, Qt. <0.05 <9.63 ng/mL   METABOLIC PANEL, COMPREHENSIVE    Collection Time: 09/28/21  6:44 PM   Result Value Ref Range    Sodium 146 (H) 136 - 145 mmol/L    Potassium 3.0 (L) 3.5 - 5.1 mmol/L    Chloride 117 (H) 97 - 108 mmol/L    CO2 19 (L) 21 - 32 mmol/L    Anion gap 10 5 - 15 mmol/L    Glucose 78 65 - 100 mg/dL    BUN 13 6 - 20 MG/DL    Creatinine 0.98 0.70 - 1.30 MG/DL    BUN/Creatinine ratio 13 12 - 20      GFR est AA >60 >60 ml/min/1.73m2    GFR est non-AA >60 >60 ml/min/1.73m2    Calcium 7.1 (L) 8.5 - 10.1 MG/DL    Bilirubin, total 0.5 0.2 - 1.0 MG/DL    ALT (SGPT) 18 12 - 78 U/L    AST (SGOT) 43 (H) 15 - 37 U/L    Alk. phosphatase 56 45 - 117 U/L    Protein, total 6.3 (L) 6.4 - 8.2 g/dL    Albumin 2.3 (L) 3.5 - 5.0 g/dL    Globulin 4.0 2.0 - 4.0 g/dL    A-G Ratio 0.6 (L) 1.1 - 2.2     CBC WITH AUTOMATED DIFF    Collection Time: 09/28/21  6:44 PM   Result Value Ref Range    WBC 5.1 4.1 - 11.1 K/uL    RBC 5.26 4.10 - 5.70 M/uL    HGB 16.5 12.1 - 17.0 g/dL    HCT 48.8 36.6 - 50.3 %    MCV 92.8 80.0 - 99.0 FL    MCH 31.4 26.0 - 34.0 PG    MCHC 33.8 30.0 - 36.5 g/dL    RDW 14.3 11.5 - 14.5 %    PLATELET 216 941 - 434 K/uL    MPV 10.7 8.9 - 12.9 FL    NRBC 0.0 0  WBC    ABSOLUTE NRBC 0.00 0.00 - 0.01 K/uL    NEUTROPHILS 66 32 - 75 %    LYMPHOCYTES 19 12 - 49 %    MONOCYTES 13 5 - 13 %    EOSINOPHILS 1 0 - 7 %    BASOPHILS 1 0 - 1 %    IMMATURE GRANULOCYTES 0 0.0 - 0.5 %    ABS. NEUTROPHILS 3.4 1.8 - 8.0 K/UL    ABS. LYMPHOCYTES 1.0 0.8 - 3.5 K/UL    ABS. MONOCYTES 0.6 0.0 - 1.0 K/UL    ABS.  EOSINOPHILS 0.0 0.0 - 0.4 K/UL    ABS. BASOPHILS 0.0 0.0 - 0.1 K/UL    ABS. IMM. GRANS. 0.0 0.00 - 0.04 K/UL    DF AUTOMATED     D DIMER    Collection Time: 09/28/21  6:45 PM   Result Value Ref Range    D-dimer 2.37 (H) 0.00 - 0.65 mg/L FEU   NT-PRO BNP    Collection Time: 09/28/21  6:45 PM   Result Value Ref Range    NT pro- (H) <125 PG/ML   MAGNESIUM    Collection Time: 09/28/21  6:45 PM   Result Value Ref Range    Magnesium 1.6 1.6 - 2.4 mg/dL   EKG, 12 LEAD, INITIAL    Collection Time: 09/28/21  8:49 PM   Result Value Ref Range    Ventricular Rate 126 BPM    Atrial Rate 91 BPM    QRS Duration 86 ms    Q-T Interval 318 ms    QTC Calculation (Bezet) 460 ms    Calculated R Axis 16 degrees    Calculated T Axis 45 degrees    Diagnosis       ** Poor data quality, interpretation may be adversely affected  Accelerated Junctional rhythm with occasional premature ventricular complexes  Junctional ST depression, probably normal  When compared with ECG of 28-SEP-2021 18:27,  MANUAL COMPARISON REQUIRED, DATA IS UNCONFIRMED     EKG, 12 LEAD, SUBSEQUENT    Collection Time: 09/28/21 10:51 PM   Result Value Ref Range    Ventricular Rate 66 BPM    Atrial Rate 105 BPM    QRS Duration 104 ms    Q-T Interval 454 ms    QTC Calculation (Bezet) 475 ms    Calculated P Axis 48 degrees    Calculated R Axis 1 degrees    Calculated T Axis 47 degrees    Diagnosis       Sinus tachycardia with 2nd degree AV block (Mobitz I)  Minimal voltage criteria for LVH, may be normal variant  When compared with ECG of 28-SEP-2021 20:49,  MANUAL COMPARISON REQUIRED, DATA IS UNCONFIRMED         Radiologic Studies -   CTA CHEST W OR W WO CONT   Final Result   1. No acute cardiopulmonary disease. No acute pulmonary embolus   2. No acute abnormality of the abdomen or pelvis      CT ABD PELV W CONT   Final Result   1. No acute cardiopulmonary disease. No acute pulmonary embolus   2. No acute abnormality of the abdomen or pelvis      XR CHEST PORT   Final Result   1. No change, no acute disease           CT Results  (Last 48 hours)               09/28/21 2211  CTA CHEST W OR W WO CONT Final result    Impression:  1. No acute cardiopulmonary disease. No acute pulmonary embolus   2. No acute abnormality of the abdomen or pelvis       Narrative:  EXAM: CTA CHEST W OR W WO CONT, CT ABD PELV W CONT       INDICATION: Chest pain. COMPARISON: None       TECHNIQUE:  2.5 mm axial images were obtained from the bases to the lung apices   after the intravenous administration of 100 cc of Isovue-370. Three-dimensional   postprocessing was performed by the technologist with MIP reconstructions. Portal venous imaging was obtained through the abdomen and pelvis with sagittal   and coronal reformats. Subsequent portal venous imaging of the abdomen and   pelvis was performed with sagittal and coronal reformats. Oral contrast Was not   administered CT dose reduction was achieved through use of a standardized   protocol tailored for this examination and automatic exposure control for dose   modulation. FINDINGS:       THYROID: No nodule. MEDIASTINUM: No mass or lymphadenopathy. AMANDA: No mass or lymphadenopathy. THORACIC AORTA: No dissection or marisol aneurysm with 3.9 cm ascending aortic   ectasia. MAIN PULMONARY ARTERY: No acute pulmonary embolus   TRACHEA/BRONCHI: Patent. ESOPHAGUS: No wall thickening or dilatation. HEART: The heart is normal in size without pericardial effusion. Coronary artery   calcifications are noted. PLEURA: No effusion or pneumothorax. LUNGS: Dependent atelectasis with otherwise clear lungs. LIVER: No mass or biliary dilatation. GALLBLADDER: Unremarkable. SPLEEN: No mass. PANCREAS: No mass or ductal dilatation. ADRENALS: Unremarkable. KIDNEYS: No mass, calculus, or hydronephrosis. STOMACH: Unremarkable. SMALL BOWEL: No dilatation or wall thickening. COLON: No dilatation or wall thickening. APPENDIX: Unremarkable. PERITONEUM: No ascites or pneumoperitoneum. RETROPERITONEUM: No lymphadenopathy or aortic aneurysm with tortuosity and   atherosclerotic change of the abdominal aorta. REPRODUCTIVE ORGANS: Prostate and seminal vesicles are unremarkable. URINARY BLADDER: No mass or calculus. BONES: Degenerative spine change. Severe right and mild left hip osteoarthritic   change. No acute fracture or aggressive lesion. ADDITIONAL COMMENTS: Bilateral gynecomastia.           09/28/21 2211  CT ABD PELV W CONT Final result    Impression:  1. No acute cardiopulmonary disease. No acute pulmonary embolus   2. No acute abnormality of the abdomen or pelvis       Narrative:  EXAM: CTA CHEST W OR W WO CONT, CT ABD PELV W CONT       INDICATION: Chest pain. COMPARISON: None       TECHNIQUE:  2.5 mm axial images were obtained from the bases to the lung apices   after the intravenous administration of 100 cc of Isovue-370. Three-dimensional   postprocessing was performed by the technologist with MIP reconstructions. Portal venous imaging was obtained through the abdomen and pelvis with sagittal   and coronal reformats. Subsequent portal venous imaging of the abdomen and   pelvis was performed with sagittal and coronal reformats. Oral contrast Was not   administered CT dose reduction was achieved through use of a standardized   protocol tailored for this examination and automatic exposure control for dose   modulation. FINDINGS:       THYROID: No nodule. MEDIASTINUM: No mass or lymphadenopathy. AMANDA: No mass or lymphadenopathy. THORACIC AORTA: No dissection or marisol aneurysm with 3.9 cm ascending aortic   ectasia. MAIN PULMONARY ARTERY: No acute pulmonary embolus   TRACHEA/BRONCHI: Patent. ESOPHAGUS: No wall thickening or dilatation. HEART: The heart is normal in size without pericardial effusion. Coronary artery   calcifications are noted. PLEURA: No effusion or pneumothorax.    LUNGS: Dependent atelectasis with otherwise clear lungs. LIVER: No mass or biliary dilatation. GALLBLADDER: Unremarkable. SPLEEN: No mass. PANCREAS: No mass or ductal dilatation. ADRENALS: Unremarkable. KIDNEYS: No mass, calculus, or hydronephrosis. STOMACH: Unremarkable. SMALL BOWEL: No dilatation or wall thickening. COLON: No dilatation or wall thickening. APPENDIX: Unremarkable. PERITONEUM: No ascites or pneumoperitoneum. RETROPERITONEUM: No lymphadenopathy or aortic aneurysm with tortuosity and   atherosclerotic change of the abdominal aorta. REPRODUCTIVE ORGANS: Prostate and seminal vesicles are unremarkable. URINARY BLADDER: No mass or calculus. BONES: Degenerative spine change. Severe right and mild left hip osteoarthritic   change. No acute fracture or aggressive lesion. ADDITIONAL COMMENTS: Bilateral gynecomastia. CXR Results  (Last 48 hours)               09/28/21 1847  XR CHEST PORT Final result    Impression:  1. No change, no acute disease           Narrative:  INDICATION:  cp        Exam: Portable chest 1839. Comparison: 8/20/2021. Findings: Cardiomediastinal silhouette is within normal limits. Pulmonary   vasculature is not engorged. Right hemidiaphragm is elevated with atelectasis   right base. No change. No pneumothorax or consolidation. No pleural effusion                   Medical Decision Making   I am the first provider for this patient. I reviewed the vital signs, available nursing notes, past medical history, past surgical history, family history and social history. Vital Signs-Reviewed the patient's vital signs.   Patient Vitals for the past 24 hrs:   Temp Pulse Resp BP SpO2   09/28/21 2343 98.9 °F (37.2 °C) 85 15 127/66 99 %   09/28/21 2259  82 19  98 %   09/28/21 1924 98.6 °F (37 °C) (!) 119 22 117/83 99 %   09/28/21 1833 98.3 °F (36.8 °C) (!) 117 26 (!) 156/103 98 %         Provider Notes (Medical Decision Making):   79-year-old male presenting with chest pain, shortness of breath and leg pain. Concern for possible ACS, musculoskeletal pain, dysrhythmia, PE.    ED Course:     Initial assessment performed. The patients presenting problems have been discussed, and they are in agreement with the care plan formulated and outlined with them. I have encouraged them to ask questions as they arise throughout their visit. EKG is performed at 18: 27, shows rhythm at a rate of 100, QRS 94, QTc 472, I can see some P waves, however the rate is irregular, this seems to be either heart block or first-degree AV block with frequent PACs. There is no ST segment elevation or depression concerning for ACS, this is interpreted as possible sinus rhythm with PACs and first-degree AV block. CBC negative for leukocytosis or anemia, basic metabolic panel with normal renal function. D-dimer is elevated 2.37, CT PE will be obtained. Potassium is low at 3, this is replaced orally. CT PE is negative for pulmonary embolus. On reevaluation, patient is resting comfortably, chest pain improved. Repeat EKG is performed at 22: 51, shows a rate of 66, , QTc 475, left axis deviation, findings are concerning for second-degree heart block, cannot rule out second-degree type II. Given this, the patient does warrant admission as well as with his high risk chest pain. He is in agreement with the plan. Critical Care Time:     CRITICAL CARE NOTE :    12:04 AM    IMPENDING DETERIORATION -Cardiovascular  ASSOCIATED RISK FACTORS - Dysrhythmia  MANAGEMENT- Bedside Assessment and Supervision of Care  INTERPRETATION -  Xrays, ECG and Blood Pressure  CASE REVIEW - Hospitalist/Intensivist, Medical Sub-Specialist and Nursing  TREATMENT RESPONSE -Improved and Stable  PERFORMED BY - Self    NOTES   :  I have spent 45 minutes of critical care time involved in lab review, consultations with specialist, family decision- making, bedside attention and documentation.  This time excludes time spent in any separate billed procedures. During this entire length of time I was immediately available to the patient . Sylwia Thornton MD      Disposition:  Admit    PLAN:  1. Current Discharge Medication List        2.    Follow-up Information    None       Return to ED if worse     Diagnosis     Clinical Impression: Acute chest pain, acute second-degree heart block

## 2021-09-29 NOTE — CONSULTS
Subjective:                  [address]  [phone]    PLEASE NOTE THAT WE CONFIRMED WITH THE REFERRING PHYSICIAN PRIOR TO SEEING THE PATIENT THAT THE PATIENT IS BEING REFERRED FOR INITIAL HOSPITAL EVALUATION AND FOR LONG-TERM ONGOING CARDIAC CARE. Date of  Admission: 9/28/2021  6:12 PM     Admission type:Emergency    Art Farmer is a 66 y.o. male admitted for ACS (acute coronary syndrome) ([facility] [address] [I24.9]. C/o chest pain - walking with crutches. Poor historian. Pain is reproducible with palpitations. Denies sob/syncope/near syncope. Asked to eval for mobitz I hb. Patient Active Problem List    Diagnosis Date Noted    ACS (acute coronary syndrome) ([facility] [address] 09/29/2021    Mobitz (type) I (Wenckebach's) atrioventricular block 09/29/2021    History of CVA (cerebrovascular accident) 09/29/2021    HTN (hypertension), benign 07/15/2013    Degeneration of cervical intervertebral disc 07/15/2013    Spinal stenosis of cervical region 07/15/2013    Degenerative disc disease, lumbar 07/15/2013    Cannabis abuse 07/01/2013    Hypertensive emergency 06/30/2013    Hypokalemia 06/30/2013    Arm weakness 06/30/2013    Leg weakness 06/30/2013    Abnormal LFTs 06/30/2013    Elevated hemoglobin ([facility] [address] 06/30/2013      Other, MD Collin  Past Medical History:   Diagnosis Date    History of CVA (cerebrovascular accident) 9/29/2021    HTN (hypertension)       No past surgical history on file.   No Known Allergies  Social History     Tobacco Use    Smoking status: Former Smoker     Packs/day: 0.50     Types: Cigarettes    Smokeless tobacco: Never Used   Substance Use Topics    Alcohol use: Yes     Comment: Reports being a \"heavy drinker\"    Drug use: Yes     Types: Marijuana     Family History   Problem Relation Age of Onset    Kidney Disease Mother     Alcohol abuse Father       Current Facility-Administered Medications   Medication Dose Route Frequency    sodium chloride (NS) flush 5-40 mL  5-40 mL IntraVENous Q8H    sodium chloride (NS) flush 5-40 mL  5-40 mL IntraVENous PRN    acetaminophen (TYLENOL) tablet 650 mg  650 mg Oral Q4H PRN    ondansetron (ZOFRAN) injection 4 mg  4 mg IntraVENous Q4H PRN    oxyCODONE-acetaminophen (PERCOCET) 5-325 mg per tablet 1 Tablet  1 Tablet Oral Q4H PRN    clopidogreL (PLAVIX) tablet 75 mg  75 mg Oral DAILY    lisinopriL (PRINIVIL, ZESTRIL) tablet 10 mg  10 mg Oral DAILY    aspirin chewable tablet 81 mg  81 mg Oral DAILY    [Held by provider] labetaloL (NORMODYNE) tablet 100 mg  100 mg Oral BID    albuterol-ipratropium (DUO-NEB) 2.5 MG-0.5 MG/3 ML  3 mL Nebulization Q4H PRN    alum-mag hydroxide-simeth (MYLANTA) oral suspension 30 mL  30 mL Oral Q4H PRN    famotidine (PEPCID) tablet 20 mg  20 mg Oral BID    dextrose 5% infusion  50 mL/hr IntraVENous CONTINUOUS         Review of Symptoms:    Constitutional: negative  Eyes: negative  Ears, nose, mouth, throat, and face: negative  Respiratory: SOB at rest  Cardiovascular: chest pain  Gastrointestinal: n/v new onset  Genitourinary:negative  Musculoskeletal:c/o buttocks and eliseo LE tingling  Neurological: LE weakness requiring walker  Behvioral/Psych: negative  Endocrine: negative         Subjective:      Visit Vitals  BP (!) 146/86 (BP 1 Location: Left upper arm, BP Patient Position: At rest)   Pulse 85   Temp 97.9 °F (36.6 °C)   Resp 16   Ht 5' 10\" (1.778 m)   Wt 160 lb 1.6 oz (72.6 kg)   SpO2 100%   BMI 22.97 kg/m²       Physical Exam:  General:  Alert, cooperative, thin, elderly AAM   Eyes:  Sclera anicteric. Pupils equally round and reactive to light. Mouth/Throat: Mucous membranes normal, oral pharynx clear   Neck: Supple   Lungs:   Clear to auscultation bilaterally, good effort   CV:  Regular rate and rhythm,no murmur, click, rub or gallop   Abdomen:   Soft, non-tender.  bowel sounds normal. non-distended   Extremities: No cyanosis or edema   Skin: Skin color, texture, turgor normal. no acute rash or lesions   Lymph nodes: Cervical and supraclavicular normal   Musculoskeletal: No swelling or deformity       Psych: Alert and oriented, normal mood affect given the setting         Cardiographics    Telemetry: s tach with mobitz 1 av block    ECG: nsr with mobitz 1 AV block      Labs:  Recent Labs     09/28/21  1844   WBC 5.1   HGB 16.5   HCT 48.8        Recent Labs     09/29/21  0134 09/28/21  1845 09/28/21  1844   NA  --   --  146*   K  --   --  3.0*   CL  --   --  117*   CO2  --   --  19*   GLU  --   --  78   BUN  --   --  13   CREA  --   --  0.98   CA  --   --  7.1*   MG  --  1.6  --    ALB  --   --  2.3*   TBILI  --   --  0.5   ALT  --   --  18   INR 1.1  --   --        Recent Labs     09/29/21 0134 09/28/21  1844   TROIQ <0.05 <0.05         Intake/Output Summary (Last 24 hours) at 9/29/2021 1346  Last data filed at 9/29/2021 0914  Gross per 24 hour   Intake    Output 200 ml   Net -200 ml         Assessment:     Assessment:       Principal Problem:    ACS (acute coronary syndrome) ([facility] [address] (9/29/2021)    Active Problems:    HTN (hypertension), benign (7/15/2013)      Mobitz (type) I (Wenckebach's) atrioventricular block (9/29/2021)      History of CVA (cerebrovascular accident) (9/29/2021)         Plan:     Ryan Casas is a pleasant gentleman with htn who has mobitz 1 heart block on ecg and telemetry. Labetalol is being held. Patient is sinus tach. No evidence of mobitz 2 heart block or need for a pacemaker. Will cont to observe on tele. htn management per Dr Radha Combs.  Thank you for this consultation      Dona Putnam MD, [facility]    9/29/2021

## 2021-09-29 NOTE — PROGRESS NOTES
Hospitalist Progress Note    NAME: Shanon Bashir   :  1950   MRN:  [id number]       Assessment / Plan:  Atypical chest pain to rule out acute coronary syndrome, POA  EKG on admission sinus tachycardia with second-degree AV block Mobitz 1  Chest x-ray on admission with no acute abnormality  Troponin less than 0.05 on admission, less than 0.05 for repeat  proBNP 690  On aspirin and Plavix, continue  Holding beta-blocker given AV block  Cardiology was consulted, input is appreciated. EP consultation to [facility] for pacemaker  Chest pain-free  Supportive care  Plans for stress test before discharge    History of GERD  Continue on famotidine    Hypertension  Continue home meds as tolerated except beta-blocker    Hypovolemic hypernatremia  Hypokalemia  Replace electrolytes  Start D5 for the hypernatremia  Follow BMP    Patient's primary care physician called me early this a.m. and updated me on this patient medical history. She reported that he had history of noncompliance and none showing up for his appointment. 18.5 - 24.9 Normal weight / Body mass index is 22.97 kg/m². Estimated discharge date:   Barriers: EP consultation, stress test, clinical improvement    Code status: Full  Prophylaxis: SCD's  Recommended Disposition: Home w/Family     Subjective:     Patient was seen and examined. No acute events overnight. Discussed with RN overnight events. All patient's questions were answered.     \"Doing okay\"    Review of Systems:  Symptom Y/N Comments  Symptom Y/N Comments   Fever/Chills n   Chest Pain n    Poor Appetite    Edema     Cough n   Abdominal Pain n    Sputum    Joint Pain     SOB/SANDERS n   Pruritis/Rash     Nausea/vomit n   Tolerating PT/OT     Diarrhea    Tolerating Diet y    Constipation    Other       Could NOT obtain due to:            Review of Systems:  Symptom Y/N Comments  Symptom Y/N Comments   Fever/Chills    Chest Pain     Poor Appetite    Edema Cough    Abdominal Pain     Sputum    Joint Pain     SOB/SANDERS    Pruritis/Rash     Nausea/vomit    Tolerating PT/OT     Diarrhea    Tolerating Diet     Constipation    Other       Could NOT obtain due to:      Objective:     VITALS:   Last 24hrs VS reviewed since prior progress note. Most recent are:  Patient Vitals for the past 24 hrs:   Temp Pulse Resp BP SpO2   09/29/21 1220 97.9 °F (36.6 °C) 85 16 (!) 146/86 100 %   09/29/21 0912 98 °F (36.7 °C) 87 16 (!) 151/99 97 %   09/29/21 0351 97.9 °F (36.6 °C) 88 16 (!) 149/89 93 %   09/29/21 0100  76 19 120/72 99 %   09/29/21 0015  75 17 106/65 98 %   09/28/21 2345  86 18 127/66 98 %   09/28/21 2343 98.9 °F (37.2 °C) 85 15 127/66 99 %   09/28/21 2259  82 19  98 %   09/28/21 1924 98.6 °F (37 °C) (!) 119 22 117/83 99 %   09/28/21 1833 98.3 °F (36.8 °C) (!) 117 26 (!) 156/103 98 %       Intake/Output Summary (Last 24 hours) at 9/29/2021 1317  Last data filed at 9/29/2021 0914  Gross per 24 hour   Intake    Output 200 ml   Net -200 ml        I had a face to face encounter and independently examined this patient on 9/29/2021, as outlined below:  PHYSICAL EXAM:  General: WD, WN. Alert, cooperative, no acute distress    EENT:  EOMI. Anicteric sclerae. MMM  Resp:  CTA bilaterally, no wheezing or rales. No accessory muscle use  CV:  Regular  rhythm,  No edema  GI:  Soft, Non distended, Non tender. +Bowel sounds  Neurologic:  Alert and oriented X 3, normal speech,   Psych:   Good insight. Not anxious nor agitated  Skin:  No rashes.   No jaundice    Reviewed most current lab test results and cultures  YES  Reviewed most current radiology test results   YES  Review and summation of old records today    NO  Reviewed patient's current orders and MAR    YES  PMH/SH reviewed - no change compared to H&P  ________________________________________________________________________  Care Plan discussed with:    Comments   Patient     Family      RN     Care Manager     Consultant Multidiciplinary team rounds were held today with , nursing, pharmacist and clinical coordinator. Patient's plan of care was discussed; medications were reviewed and discharge planning was addressed. ________________________________________________________________________  Total NON critical care TIME:  36   Minutes    Total CRITICAL CARE TIME Spent:   Minutes non procedure based      Comments   >50% of visit spent in counseling and coordination of care     ________________________________________________________________________  Misael Lowery MD     Procedures: see electronic medical records for all procedures/Xrays and details which were not copied into this note but were reviewed prior to creation of Plan. LABS:  I reviewed today's most current labs and imaging studies.   Pertinent labs include:  Recent Labs     09/28/21 1844   WBC 5.1   HGB 16.5   HCT 48.8        Recent Labs     09/29/21  0134 09/28/21  1845 09/28/21  1844   NA  --   --  146*   K  --   --  3.0*   CL  --   --  117*   CO2  --   --  19*   GLU  --   --  78   BUN  --   --  13   CREA  --   --  0.98   CA  --   --  7.1*   MG  --  1.6  --    ALB  --   --  2.3*   TBILI  --   --  0.5   ALT  --   --  18   INR 1.1  --   --        Signed: Misael Lowery MD

## 2021-09-29 NOTE — ACP (ADVANCE CARE PLANNING)
Advance Care Planning Note      NAME: Van Baig   :  1950   MRN:  [id number]     Date/Time:  2021 2:57 AM    Active Diagnoses:  Hospital Problems  Date Reviewed: 2013        Codes Class Noted POA    ACS (acute coronary syndrome) [facility]) ICD-10-CM: I24.9  ICD-9-CM: 411.1  2021 Unknown              These active diagnoses are of sufficient risk that focused discussion on advance care planning is indicated in order to allow the patient to thoughtfully consider personal goals of care, and if situations arise that prevent the ability to personally give input, to ensure appropriate representation of their personal desires for different levels and aggressiveness of care. Discussion:   Code status addressed and wants to be a Full Code. Patient wants central line and vasopressors if needed. Patient would also want a feeding tube, if needed, for nutritional support. Patient  would like to assign Guille Guevara, friend ([phone])  as the surrogate decision maker. Persons present and participating in discussion: Ruby Joel NP      Time Spent:   Total time spent face-to-face in education and discussion:   16  minutes.          Deysi Oscar NP   Hospitalist

## 2021-09-29 NOTE — CONSULTS
[facility]     Date of  Admission: 9/28/2021  6:12 PM     Admission type:Emergency    Referral for: heart block  Referral by:hospitalist     Subjective:   Before the patient was seen by [address] [place] [address] [address] [facility], we confirmed with the attending physician that this patient is referred for initial hospital care and for the long-term, ongoing follow up with our office. Yvan Regalado is a 66 y.o. male admitted for ACS (acute coronary syndrome) ([facility] [I24.9]. Admitted with c/o SOB, N/V and chest pain. Patient has no previous cardiac hx. Poor historian. Talking about walking with \"crutches\" and how it hurts his chest (walks with them and a walker due to previous CVA years ago). Denies any recent straining of his chest muscles. Difficult to follow why he was nauseated with vomiting. ECG with SR with 2nd deg type 1 mobitz, rate 80-110bpm (previous hx of 1st deg AVB)  Troponin neg. NTprBNP 600    Patient has not received COVID vaccine, and has not received COVID test since admission. PMH:  HTN, CVA at [facility]    Previous treatment/evaluation includes none . Cardiac risk factors: sedentary life style, male gender, hypertension.       Patient Active Problem List    Diagnosis Date Noted    ACS (acute coronary syndrome) ([facility] 09/29/2021    Mobitz (type) I (Wenckebach's) atrioventricular block 09/29/2021    History of CVA (cerebrovascular accident) 09/29/2021    HTN (hypertension), benign 07/15/2013    Degeneration of cervical intervertebral disc 07/15/2013    Spinal stenosis of cervical region 07/15/2013    Degenerative disc disease, lumbar 07/15/2013    Cannabis abuse 07/01/2013    Hypertensive emergency 06/30/2013    Hypokalemia 06/30/2013    Arm weakness 06/30/2013    Leg weakness 06/30/2013    Abnormal LFTs 06/30/2013    Elevated hemoglobin ([facility] 06/30/2013      Other, MD Collin  Past Medical History:   Diagnosis Date    History of CVA (cerebrovascular accident) 9/29/2021    HTN (hypertension)       Social History     Socioeconomic History    Marital status: SINGLE     Spouse name: Not on file    Number of children: Not on file    Years of education: Not on file    Highest education level: Not on file   Tobacco Use    Smoking status: Former Smoker     Packs/day: 0.50     Types: Cigarettes    Smokeless tobacco: Never Used   Substance and Sexual Activity    Alcohol use: Yes     Comment: Reports being a \"JLMES drinker\"    Drug use: Yes     Types: Marijuana    Sexual activity: Yes     Partners: Female   Other Topics Concern     Service No    Blood Transfusions No    Caffeine Concern No    Occupational Exposure No    Hobby Hazards No    Sleep Concern No    Stress Concern No    Weight Concern No    Special Diet No    Back Care No    Exercise No    Bike Helmet No    Seat Belt No    Self-Exams No   Social History Narrative    ** Merged History Encounter **         [place], native. In [place] for [place] years. 10th-grade education from [employer]     with two daughters in good health. Social Determinants of Health     Financial Resource Strain:     Difficulty of Paying Living Expenses:    Food Insecurity:     Worried About Running Out of Food in the Last Year:     [address] in the Last Year:    Transportation Needs:     Lack of Transportation (Medical):      Lack of Transportation (Non-Medical):    Physical Activity:     Days of Exercise per Week:     Minutes of Exercise per Session:    Stress:     Feeling of Stress :    Social Connections:     Frequency of Communication with Friends and Family:     Frequency of Social Gatherings with Friends and Family:     Attends Buddhism Services:     Active Member of Clubs or Organizations:     Attends Club or Organization Meetings:     Marital Status:      No Known Allergies   Family History   Problem Relation Age of Onset    Kidney Disease Mother     Alcohol abuse Father       Current Facility-Administered Medications   Medication Dose Route Frequency    sodium chloride (NS) flush 5-40 mL  5-40 mL IntraVENous Q8H    sodium chloride (NS) flush 5-40 mL  5-40 mL IntraVENous PRN    acetaminophen (TYLENOL) tablet 650 mg  650 mg Oral Q4H PRN    ondansetron (ZOFRAN) injection 4 mg  4 mg IntraVENous Q4H PRN    oxyCODONE-acetaminophen (PERCOCET) 5-325 mg per tablet 1 Tablet  1 Tablet Oral Q4H PRN    clopidogreL (PLAVIX) tablet 75 mg  75 mg Oral DAILY    lisinopriL (PRINIVIL, ZESTRIL) tablet 10 mg  10 mg Oral DAILY    aspirin chewable tablet 81 mg  81 mg Oral DAILY    [Held by provider] labetaloL (NORMODYNE) tablet 100 mg  100 mg Oral BID    albuterol-ipratropium (DUO-NEB) 2.5 MG-0.5 MG/3 ML  3 mL Nebulization Q4H PRN    alum-mag hydroxide-simeth (MYLANTA) oral suspension 30 mL  30 mL Oral Q4H PRN    famotidine (PEPCID) tablet 20 mg  20 mg Oral BID    dextrose 5% infusion  50 mL/hr IntraVENous CONTINUOUS        Review of Symptoms:  Constitutional: negative  Eyes: negative  Ears, nose, mouth, throat, and face: negative  Respiratory: SOB at rest  Cardiovascular: chest pain  Gastrointestinal: n/v new onset  Genitourinary:negative  Musculoskeletal:c/o buttocks and eliseo LE tingling  Neurological: LE weakness requiring walker  Behvioral/Psych: negative  Endocrine: negative        Objective:      Visit Vitals  BP (!) 151/99 (BP 1 Location: Left upper arm, BP Patient Position: At rest)   Pulse 87   Temp 98 °F (36.7 °C)   Resp 16   Ht 5' 10\" (1.778 m)   Wt 160 lb 1.6 oz (72.6 kg)   SpO2 97%   BMI 22.97 kg/m²       Physical Assessment:   General Appearance:  alert, cooperative, thin elderly AAM in no acute distress; appears stated age  Eyes: sclera anicteric  Mouth/Throat: moist mucous membranes; oral pharynx clear  Pulmonary:  clear to auscultation bilaterally; good effort  Cardiovascular: regular rate and rhythm; no murmur, click, rub, or gallop  Abdomen: soft, non-tender, non-distended; bowel sounds normal  Musculoskeletal: palpable tenderness at right intercostal; no swelling or deformity; moves all extremities  Extremities: no edema; palpable distal pulses   Skin: warm and dry  Neuro: grossly normal  Psych: normal mood and affect given the setting      Data Review:   Recent Labs     09/28/21 1844   WBC 5.1   HGB 16.5   HCT 48.8        Recent Labs     09/29/21  0134 09/28/21  1845 09/28/21 1844   NA  --   --  146*   K  --   --  3.0*   CL  --   --  117*   CO2  --   --  19*   GLU  --   --  78   BUN  --   --  13   CREA  --   --  0.98   CA  --   --  7.1*   MG  --  1.6  --    ALB  --   --  2.3*   TBILI  --   --  0.5   ALT  --   --  18   INR 1.1  --   --        Recent Labs     09/29/21 0134 09/28/21 1844   TROIQ <0.05 <0.05         Intake/Output Summary (Last 24 hours) at 9/29/2021 1042  Last data filed at 9/29/2021 0914  Gross per 24 hour   Intake    Output 200 ml   Net -200 ml        Cardiographics    Telemetry: SR with 2nd deg mobitz 1   ECG: SR with 2nd deg HB    Echocardiogram: pending    CXRAY: no acute process       Assessment:       Principal Problem:    ACS (acute coronary syndrome) ([facility] (9/29/2021)    Active Problems:    HTN (hypertension), benign (7/15/2013)      Mobitz (type) I (Wenckebach's) atrioventricular block (9/29/2021)      History of CVA (cerebrovascular accident) (9/29/2021)         Plan: Mobitz type 1 AVB; Rate stable at 90-110s, hold BB, last dose of BB PTA  EP consult to determine need for pacemaker    Chest Pain:  Neg troponin, neg ECG for ischemic changes, and chest pain likely musculoskeletal  Risk factors:  HTN, CVA,  Age   Plan for a dobutamine lexiscan stress test prior to discharge  Continues ASA, BB on hold. Check  Lipid profile. Thank you for consulting RCA. Kristen Vargas NP       [facility]             Patient seen, examined by me personally. Plan discussed as detailed.  Agree with note as outlined by  NP with modifications as noted. My independent physical exam reveals : Physical Exam  Vitals and nursing note reviewed. Cardiovascular:      Rate and Rhythm: Normal rate and regular rhythm. Heart sounds: Normal heart sounds. Pulmonary:      Breath sounds: Normal breath sounds. Neurological:      Mental Status: He is alert and oriented to person, place, and time. Psychiatric:         Mood and Affect: Mood normal.          No additional findings noted. Agree with plan as outlined above with modifications as noted. Will hold beta blocker. Ask EP to evaluate. Pharmacologic stress test. Chest pain appears atypical, reproducible. Has significant risk factors.     Shana Cano MD

## 2021-09-29 NOTE — ED NOTES
Patient is being transferred to [address], Room # 2180. Report given to [facility], RN on Gina Mullins for routine progression of care. Report consisted of the following information SBAR, ED Summary, MAR and Recent Results. Patient transferred to receiving unit. Outstanding consults needed: Yes    Next labs due: No     The following personal items will be sent with the patient during transfer to the floor:     All valuables:    Cardiac monitoring ordered: Yes    The following CURRENT information was reported to the receiving RN:    Code status: Full Code at time of transfer    Last set of vital signs:  Vital Signs  Level of Consciousness: Alert (0) (09/28/21 2343)  Temp: 98.9 °F (37.2 °C) (09/28/21 2343)  Temp Source: Oral (09/28/21 2343)  Pulse (Heart Rate): 76 (09/29/21 0100)  Heart Rate Source: Monitor (09/28/21 2343)  Resp Rate: 19 (09/29/21 0100)  BP: 120/72 (09/29/21 0100)  MAP (Monitor): 89 (09/29/21 0100)  MAP (Calculated): 88 (09/29/21 0100)  BP 1 Location: Left upper arm (09/28/21 2343)  BP 1 Method: Automatic (09/28/21 2343)  BP Patient Position: At rest (09/28/21 2343)  MEWS Score: 1 (09/28/21 2343)         Oxygen Therapy  O2 Sat (%): 99 % (09/29/21 0100)  Pulse via Oximetry: 79 beats per minute (09/29/21 0100)  O2 Device: Nasal cannula (09/28/21 2343)  O2 Flow Rate (L/min): 2 l/min (09/28/21 2343)      Last pain assessment:  Pain 1  Pain Scale 1: Numeric (0 - 10)  Pain Intensity 1: 10  Pain Onset 1: this am  Pain Location 1: Chest, Leg, Hip      Wounds: No     Urinary catheter: voiding  Is there a sharma order: No     LDAs:       Peripheral IV 09/28/21 Right Antecubital (Active)   Site Assessment Clean, dry, & intact 09/28/21 2125   Phlebitis Assessment 0 09/28/21 2125   Infiltration Assessment 0 09/28/21 2125   Dressing Status Clean, dry, & intact 09/28/21 2125   Dressing Type Transparent 09/28/21 2125   Hub Color/Line Status Pink;Flushed;Patent 09/28/21 2125   Action Taken [employer] 09/28/21 2125         Opportunity for questions and clarification was provided.     Leah Parisi RN

## 2021-09-29 NOTE — ED NOTES
Patient states that his heartburn is increasing and that there is pain through his entire left leg, and that his left arm is numb. Assisted patient to sit up on edge of bed, will stay with patient until returned to bed.  To notify MD.

## 2021-09-29 NOTE — ED NOTES
Patient stated that his chest felt like it was burning and that he was feeling dizzy. Monitor showed that the rhythm was similar to A-Fib. EKG repeated, Lindy CRESPO notified. MD to provide orders.

## 2021-09-29 NOTE — PROGRESS NOTES
Transition of Care Plan:    RUR: N/A - pt is observation status  Disposition: Home with family assistance  Follow up appointments: PCP  DME needed: Pt has crutches and a RW  Transportation at Discharge: Pt's girlfriend  Sharda Jaffe or means to access home:  Pt's girlfriend has access      IM Medicare Letter: To be given prior to d/c  Is patient a BCPI-A Bundle:      No     If yes, was Bundle Letter given?:     Caregiver Contact: Sandip Garcia ([phone])  Discharge Caregiver contacted prior to discharge? Pt's cg will be contact prior to d/c\    Reason for Admission:  ACS (acute coronary syndrome)                     RUR Score:          N/A - pt is oberservation           Plan for utilizing home health:     No needs identified at this time     PCP: First and Last name:  Other, MD Collin     Name of Practice:    Are you a current patient: Yes/No:    Approximate date of last visit:    Can you participate in a virtual visit with your PCP:                     Current Advanced Directive/Advance Care Plan: Full Code      Healthcare Decision Maker: Sandip Garcia ([phone])    Transition of Care Plan:     Home with family assistance        CM met with patient at the bedside to complete initial assessment. CM introduced role, verified demographics, and discussed discharge planning. Pt is a 65 yo male with an admitting diagnosis of ACS. Pt uses [address] on [address]. Pt lives with his girlfriend in a 2 story home with 6 NIALL. Pt reports that he is independent at baseline. Pt has crutches and RW. Pt does not drive and reports that he often uses [employer] for transportation. Pt has a hx of SNF in 2013 and [facility] however does not remember the name of the agency. Pt's girlfriend will transport him home at d/c. CM will continue to follow for discharge planning while patient is admitted on current unit.  Please contact this CM with questions or issues related to discharge. Care Management Interventions  PCP Verified by CM: No (Pt reports seeing Dr. Priyanka August at [facility])  Mode of Transport at Discharge:  Other (see comment) (Pt's girlfriend)  Transition of Care Consult (CM Consult): Discharge Planning  Discharge Durable Medical Equipment: No (Pt has crutches and a RW)  Physical Therapy Consult: No  Occupational Therapy Consult: No  Speech Therapy Consult: No  Support Systems: Spouse/Significant Other (Pt lives in a 2 story home with 6 NIALL)  Confirm Follow Up Transport: Family  Discharge Location  Discharge Placement: Home with family assistance    CLARENCE Hoang  Care Manager [facility]  [phone]

## 2021-09-29 NOTE — PROGRESS NOTES
Bedside and Verbal shift change report given to [address] (oncoming nurse) by Vicky Asencio (offgoing nurse). Report included the following information SBAR, Kardex, Intake/Output, MAR and Recent Results.

## 2021-09-29 NOTE — ED NOTES
CT came to get patient and told me that the patient's IV was out. Went to room and replaced IV line. Called CT to notify of new line.

## 2021-09-29 NOTE — ACP (ADVANCE CARE PLANNING)
Advance Care Planning Note      NAME: Nik Oleary   :  1950   MRN:  [id number]     Date/Time:  2021 3:13 AM    Active Diagnoses:  Hospital Problems  Date Reviewed: 2013        Codes Class Noted POA    ACS (acute coronary syndrome) [facility]) ICD-10-CM: I24.9  ICD-9-CM: 411.1  2021 Unknown              These active diagnoses are of sufficient risk that focused discussion on advance care planning is indicated in order to allow the patient to thoughtfully consider personal goals of care, and if situations arise that prevent the ability to personally give input, to ensure appropriate representation of their personal desires for different levels and aggressiveness of care. Discussion:   Code status addressed and wants to be a Full Code. Patient wants central line and vasopressors if needed. Patient would also want a feeding tube, if needed, for nutritional support. Patient  would like to assign Carlos De La Paz, katie ([phone])  as the surrogate decision maker. Persons present and participating in discussion: Cherelle oKch NP      Time Spent:   Total time spent face-to-face in education and discussion:   16  minutes.          Raelene Skiff, NP   Hospitalist

## 2021-09-29 NOTE — PROGRESS NOTES
[id number]    Pt taken to ECHO at 0, returned at [id number]. Awake and alert in bed. Coughing thick white sputum. Only had bites of food due to to poor appetite. No other complaints noted.

## 2021-09-30 ENCOUNTER — HOSPITAL ENCOUNTER (OUTPATIENT)
Dept: NON INVASIVE DIAGNOSTICS | Age: 71
Discharge: HOME | DRG: 313 | End: 2021-09-30
Attending: NURSE PRACTITIONER
Payer: MEDICARE

## 2021-09-30 ENCOUNTER — APPOINTMENT (OUTPATIENT)
Dept: NUCLEAR MEDICINE | Age: 71
DRG: 313 | End: 2021-09-30
Attending: NURSE PRACTITIONER
Payer: MEDICARE

## 2021-09-30 VITALS
WEIGHT: 150 LBS | SYSTOLIC BLOOD PRESSURE: 139 MMHG | DIASTOLIC BLOOD PRESSURE: 86 MMHG | BODY MASS INDEX: 21.52 KG/M2 | HEART RATE: 79 BPM

## 2021-09-30 LAB
ANION GAP SERPL CALC-SCNC: 5 MMOL/L (ref 5–15)
BUN SERPL-MCNC: 17 MG/DL (ref 6–20)
BUN/CREAT SERPL: 18 (ref 12–20)
CALCIUM SERPL-MCNC: 9 MG/DL (ref 8.5–10.1)
CHLORIDE SERPL-SCNC: 107 MMOL/L (ref 97–108)
CO2 SERPL-SCNC: 24 MMOL/L (ref 21–32)
CREAT SERPL-MCNC: 0.92 MG/DL (ref 0.7–1.3)
ERYTHROCYTE [DISTWIDTH] IN BLOOD BY AUTOMATED COUNT: 14.3 % (ref 11.5–14.5)
ERYTHROCYTE [DISTWIDTH] IN BLOOD BY AUTOMATED COUNT: 14.3 % (ref 11.5–14.5)
GLUCOSE SERPL-MCNC: 88 MG/DL (ref 65–100)
HCT VFR BLD AUTO: 42.7 % (ref 36.6–50.3)
HCT VFR BLD AUTO: 48 % (ref 36.6–50.3)
HGB BLD-MCNC: 14.3 G/DL (ref 12.1–17)
HGB BLD-MCNC: 16.2 G/DL (ref 12.1–17)
MCH RBC QN AUTO: 31.3 PG (ref 26–34)
MCH RBC QN AUTO: 31.6 PG (ref 26–34)
MCHC RBC AUTO-ENTMCNC: 33.5 G/DL (ref 30–36.5)
MCHC RBC AUTO-ENTMCNC: 33.8 G/DL (ref 30–36.5)
MCV RBC AUTO: 92.8 FL (ref 80–99)
MCV RBC AUTO: 94.3 FL (ref 80–99)
NRBC # BLD: 0 K/UL (ref 0–0.01)
NRBC # BLD: 0 K/UL (ref 0–0.01)
NRBC BLD-RTO: 0 PER 100 WBC
NRBC BLD-RTO: 0 PER 100 WBC
PLATELET # BLD AUTO: 135 K/UL (ref 150–400)
PLATELET # BLD AUTO: 153 K/UL (ref 150–400)
PMV BLD AUTO: 10.6 FL (ref 8.9–12.9)
PMV BLD AUTO: 10.8 FL (ref 8.9–12.9)
POTASSIUM SERPL-SCNC: 3.6 MMOL/L (ref 3.5–5.1)
RBC # BLD AUTO: 4.53 M/UL (ref 4.1–5.7)
RBC # BLD AUTO: 5.17 M/UL (ref 4.1–5.7)
SODIUM SERPL-SCNC: 136 MMOL/L (ref 136–145)
STRESS BASELINE DIAS BP: 95 MMHG
STRESS BASELINE HR: 86 BPM
STRESS BASELINE SYS BP: 158 MMHG
STRESS ESTIMATED WORKLOAD: 1 METS
STRESS EXERCISE DUR MIN: NORMAL
STRESS O2 SAT PEAK: 97 %
STRESS O2 SAT REST: 97 %
STRESS PEAK DIAS BP: 120 MMHG
STRESS PEAK SYS BP: 182 MMHG
STRESS PERCENT HR ACHIEVED: 109 %
STRESS POST PEAK HR: 162 BPM
STRESS RATE PRESSURE PRODUCT: NORMAL BPM*MMHG
STRESS ST DEPRESSION: 0 MM
STRESS ST ELEVATION: 0 MM
STRESS TARGET HR: 148 BPM
WBC # BLD AUTO: 5.6 K/UL (ref 4.1–11.1)
WBC # BLD AUTO: 6.1 K/UL (ref 4.1–11.1)

## 2021-09-30 PROCEDURE — 74011250636 HC RX REV CODE- 250/636: Performed by: INTERNAL MEDICINE

## 2021-09-30 PROCEDURE — 74011000250 HC RX REV CODE- 250: Performed by: INTERNAL MEDICINE

## 2021-09-30 PROCEDURE — 93017 CV STRESS TEST TRACING ONLY: CPT

## 2021-09-30 PROCEDURE — 80048 BASIC METABOLIC PNL TOTAL CA: CPT

## 2021-09-30 PROCEDURE — 74011250637 HC RX REV CODE- 250/637: Performed by: NURSE PRACTITIONER

## 2021-09-30 PROCEDURE — 99218 HC RM OBSERVATION: CPT

## 2021-09-30 PROCEDURE — 99232 SBSQ HOSP IP/OBS MODERATE 35: CPT | Performed by: INTERNAL MEDICINE

## 2021-09-30 PROCEDURE — 36415 COLL VENOUS BLD VENIPUNCTURE: CPT

## 2021-09-30 PROCEDURE — 94760 N-INVAS EAR/PLS OXIMETRY 1: CPT

## 2021-09-30 PROCEDURE — 85027 COMPLETE CBC AUTOMATED: CPT

## 2021-09-30 PROCEDURE — A9500 TC99M SESTAMIBI: HCPCS

## 2021-09-30 PROCEDURE — 77010033678 HC OXYGEN DAILY

## 2021-09-30 RX ORDER — TETRAKIS(2-METHOXYISOBUTYLISOCYANIDE)COPPER(I) TETRAFLUOROBORATE 1 MG/ML
10 INJECTION, POWDER, LYOPHILIZED, FOR SOLUTION INTRAVENOUS
Status: COMPLETED | OUTPATIENT
Start: 2021-09-30 | End: 2021-09-30

## 2021-09-30 RX ORDER — LISINOPRIL 20 MG/1
20 TABLET ORAL DAILY
Status: DISCONTINUED | OUTPATIENT
Start: 2021-10-01 | End: 2021-10-05 | Stop reason: HOSPADM

## 2021-09-30 RX ORDER — LISINOPRIL 10 MG/1
10 TABLET ORAL DAILY
Status: COMPLETED | OUTPATIENT
Start: 2021-09-30 | End: 2021-09-30

## 2021-09-30 RX ORDER — METOPROLOL TARTRATE 5 MG/5ML
2.5 INJECTION INTRAVENOUS ONCE
Status: COMPLETED | OUTPATIENT
Start: 2021-09-30 | End: 2021-09-30

## 2021-09-30 RX ORDER — DOBUTAMINE HYDROCHLORIDE 200 MG/100ML
0-10 INJECTION INTRAVENOUS
Status: DISCONTINUED | OUTPATIENT
Start: 2021-09-30 | End: 2021-10-04 | Stop reason: HOSPADM

## 2021-09-30 RX ORDER — METOPROLOL TARTRATE 25 MG/1
25 TABLET, FILM COATED ORAL EVERY 12 HOURS
Status: DISCONTINUED | OUTPATIENT
Start: 2021-09-30 | End: 2021-10-05 | Stop reason: HOSPADM

## 2021-09-30 RX ORDER — TETRAKIS(2-METHOXYISOBUTYLISOCYANIDE)COPPER(I) TETRAFLUOROBORATE 1 MG/ML
30 INJECTION, POWDER, LYOPHILIZED, FOR SOLUTION INTRAVENOUS
Status: COMPLETED | OUTPATIENT
Start: 2021-09-30 | End: 2021-09-30

## 2021-09-30 RX ORDER — ATROPINE SULFATE 0.4 MG/ML
.25-.5 INJECTION, SOLUTION ENDOTRACHEAL; INTRAMEDULLARY; INTRAMUSCULAR; INTRAVENOUS; SUBCUTANEOUS
Status: DISCONTINUED | OUTPATIENT
Start: 2021-09-30 | End: 2021-10-04 | Stop reason: HOSPADM

## 2021-09-30 RX ADMIN — Medication 10 ML: at 13:59

## 2021-09-30 RX ADMIN — ALUMINUM HYDROXIDE, MAGNESIUM HYDROXIDE, AND SIMETHICONE 30 ML: 200; 200; 20 SUSPENSION ORAL at 21:04

## 2021-09-30 RX ADMIN — Medication 10 ML: at 06:00

## 2021-09-30 RX ADMIN — OXYCODONE AND ACETAMINOPHEN 1 TABLET: 5; 325 TABLET ORAL at 21:04

## 2021-09-30 RX ADMIN — LISINOPRIL 10 MG: 10 TABLET ORAL at 13:54

## 2021-09-30 RX ADMIN — TETRAKIS(2-METHOXYISOBUTYLISOCYANIDE)COPPER(I) TETRAFLUOROBORATE 10 MILLICURIE: 1 INJECTION, POWDER, LYOPHILIZED, FOR SOLUTION INTRAVENOUS at 09:30

## 2021-09-30 RX ADMIN — DOBUTAMINE HYDROCHLORIDE 10 MCG/KG/MIN: 200 INJECTION INTRAVENOUS at 12:48

## 2021-09-30 RX ADMIN — CLOPIDOGREL BISULFATE 75 MG: 75 TABLET ORAL at 13:54

## 2021-09-30 RX ADMIN — DOBUTAMINE HYDROCHLORIDE 20 MCG/KG/MIN: 200 INJECTION INTRAVENOUS at 12:52

## 2021-09-30 RX ADMIN — TETRAKIS(2-METHOXYISOBUTYLISOCYANIDE)COPPER(I) TETRAFLUOROBORATE 30 MILLICURIE: 1 INJECTION, POWDER, LYOPHILIZED, FOR SOLUTION INTRAVENOUS at 12:50

## 2021-09-30 RX ADMIN — METOPROLOL TARTRATE 2.5 MG: 5 INJECTION INTRAVENOUS at 12:58

## 2021-09-30 RX ADMIN — OXYCODONE AND ACETAMINOPHEN 1 TABLET: 5; 325 TABLET ORAL at 13:57

## 2021-09-30 RX ADMIN — ASPIRIN 81 MG: 81 TABLET, CHEWABLE ORAL at 13:54

## 2021-09-30 NOTE — PROGRESS NOTES
Report received from [place], [address] [place] [address]. SBAR were discussed. , RN assumed care of the pt. Eron Reed RN    Problem: Falls - Risk of  Goal: *Absence of Falls  Description: Document Marciana Distance Fall Risk and appropriate interventions in the flowsheet. Outcome: Progressing Towards Goal  Note: Fall Risk Interventions:  Mobility Interventions: Bed/chair exit alarm, OT consult for ADLs, Patient to call before getting OOB, PT Consult for mobility concerns         Medication Interventions: Bed/chair exit alarm, Evaluate medications/consider consulting pharmacy, Patient to call before getting OOB, Teach patient to arise slowly                   Problem: Patient Education: Go to Patient Education Activity  Goal: Patient/Family Education  Outcome: Progressing Towards Goal     Problem: Pain  Goal: *Control of Pain  Outcome: Progressing Towards Goal  Goal: *PALLIATIVE CARE:  Alleviation of Pain  Outcome: Progressing Towards Goal     Problem: Patient Education: Go to Patient Education Activity  Goal: Patient/Family Education  Outcome: Progressing Towards Goal     Problem:  Activity Intolerance  Goal: *Oxygen saturation during activity within specified parameters  Outcome: Progressing Towards Goal  Goal: *Able to remain out of bed as prescribed  Outcome: Progressing Towards Goal     Problem: Patient Education: Go to Patient Education Activity  Goal: Patient/Family Education  Outcome: Progressing Towards Goal

## 2021-09-30 NOTE — PROGRESS NOTES
Hospitalist Progress Note    NAME: Ryan Casas   :  1950   MRN:  [id number]       Assessment / Plan:  Atypical chest pain to rule out acute coronary syndrome, POA  EKG on admission sinus tachycardia with second-degree AV block Mobitz 1  Chest x-ray on admission with no acute abnormality  Troponin less than 0.05 on admission, less than 0.05 for repeat  proBNP 690  On aspirin and Plavix, continue  Holding beta-blocker given AV block  Cardiology was consulted, plans for dobutamine stress test which will take 24 hours to be completed  EP were consulted, no plans for pacemaker at this point  Chest pain-free  Supportive care    History of GERD  Continue on famotidine    Hypertension  Continue home meds as tolerated except beta-blocker    Hypovolemic hypernatremia  Hypokalemia  Replace electrolytes  Started on, labs are pending today D5 for the hypernatremia  Follow BMP    Patient's primary care physician called me yesterday morning. and updated me on this patient medical history. She reported that he had history of noncompliance and none showing up for his appointment. 18.5 - 24.9 Normal weight / Body mass index is 22.96 kg/m². Estimated discharge date:   Barriers: EP consultation, stress test, clinical improvement    Code status: Full  Prophylaxis: SCD's  Recommended Disposition: Home w/Family     Subjective:     Patient was seen and examined. No acute events overnight. Discussed with RN overnight events. All patient's questions were answered.     \"feeling better\"    Review of Systems:  Symptom Y/N Comments  Symptom Y/N Comments   Fever/Chills n   Chest Pain n    Poor Appetite    Edema     Cough n   Abdominal Pain n    Sputum    Joint Pain     SOB/SANDERS n   Pruritis/Rash     Nausea/vomit n   Tolerating PT/OT     Diarrhea    Tolerating Diet  npo   Constipation    Other       Could NOT obtain due to:            Review of Systems:  Symptom Y/N Comments  Symptom Y/N Comments   Fever/Chills    Chest Pain     Poor Appetite    Edema     Cough    Abdominal Pain     Sputum    Joint Pain     SOB/SANDERS    Pruritis/Rash     Nausea/vomit    Tolerating PT/OT     Diarrhea    Tolerating Diet     Constipation    Other       Could NOT obtain due to:      Objective:     VITALS:   Last 24hrs VS reviewed since prior progress note. Most recent are:  Patient Vitals for the past 24 hrs:   Temp Pulse Resp BP SpO2   09/30/21 1117 98.3 °F (36.8 °C) (!) 103 18 (!) 156/87 98 %   09/30/21 0752 98.1 °F (36.7 °C) (!) 105 18 (!) 154/91 99 %   09/29/21 2334 97.9 °F (36.6 °C) 70 18 (!) 148/96 100 %   09/29/21 1958 98.2 °F (36.8 °C) 78 18 (!) 149/88 99 %   09/29/21 1600  85      09/29/21 1513 98.1 °F (36.7 °C) 81 16 (!) 160/88 100 %   09/29/21 1441    (!) 146/86        Intake/Output Summary (Last 24 hours) at 9/30/2021 1313  Last data filed at 9/29/2021 1500  Gross per 24 hour   Intake 750 ml   Output 400 ml   Net 350 ml        I had a face to face encounter and independently examined this patient on 9/30/2021, as outlined below:  PHYSICAL EXAM:  General: WD, WN. Alert, cooperative, no acute distress    EENT:  EOMI. Anicteric sclerae. MMM  Resp:  CTA bilaterally, no wheezing or rales. No accessory muscle use  CV:  Regular  rhythm,  No edema  GI:  Soft, Non distended, Non tender. +Bowel sounds  Neurologic:  Alert and oriented X 3, normal speech,   Psych:   Good insight. Not anxious nor agitated  Skin:  No rashes.   No jaundice    Reviewed most current lab test results and cultures  YES  Reviewed most current radiology test results   YES  Review and summation of old records today    NO  Reviewed patient's current orders and MAR    YES  PMH/SH reviewed - no change compared to H&P  ________________________________________________________________________  Care Plan discussed with:    Comments   Patient x    Family      RN x    Care Manager     Consultant  x  cardiology nurse practitioner Multidiciplinary team rounds were held today with , nursing, pharmacist and clinical coordinator. Patient's plan of care was discussed; medications were reviewed and discharge planning was addressed. ________________________________________________________________________  Total NON critical care TIME:  36   Minutes    Total CRITICAL CARE TIME Spent:   Minutes non procedure based      Comments   >50% of visit spent in counseling and coordination of care     ________________________________________________________________________  Uday Rebollar MD     Procedures: see electronic medical records for all procedures/Xrays and details which were not copied into this note but were reviewed prior to creation of Plan. LABS:  I reviewed today's most current labs and imaging studies.   Pertinent labs include:  Recent Labs     09/28/21 1844   WBC 5.1   HGB 16.5   HCT 48.8        Recent Labs     09/29/21  0134 09/28/21  1845 09/28/21 1844   NA  --   --  146*   K  --   --  3.0*   CL  --   --  117*   CO2  --   --  19*   GLU  --   --  78   BUN  --   --  13   CREA  --   --  0.98   CA  --   --  7.1*   MG  --  1.6  --    ALB  --   --  2.3*   TBILI  --   --  0.5   ALT  --   --  18   INR 1.1  --   --        Signed: Uday Rebollar MD

## 2021-09-30 NOTE — PROGRESS NOTES
[address], [facility], [address] [facility]  [phone]      Cardiology Progress Note      9/30/2021 10:00 AM    Admit Date: 9/28/2021    Admit Diagnosis:   ACS (acute coronary syndrome) ([address] [I24.9]    Subjective: Jie Oconnor has no c/o SOB, CP. Echo normal EF. Nuclear dobutamine lexiscan to start today for further evaluation of possible ischemia. Appreciate Dr. Violet Das, EP consult. No need for pacemaker indicated. Visit Vitals  BP (!) 148/96   Pulse 70   Temp 97.9 °F (36.6 °C)   Resp 18   Ht 5' 10\" (1.778 m)   Wt 160 lb (72.6 kg)   SpO2 100%   BMI 22.96 kg/m²       Current Facility-Administered Medications   Medication Dose Route Frequency    sodium chloride (NS) flush 5-40 mL  5-40 mL IntraVENous Q8H    sodium chloride (NS) flush 5-40 mL  5-40 mL IntraVENous PRN    acetaminophen (TYLENOL) tablet 650 mg  650 mg Oral Q4H PRN    ondansetron (ZOFRAN) injection 4 mg  4 mg IntraVENous Q4H PRN    oxyCODONE-acetaminophen (PERCOCET) 5-325 mg per tablet 1 Tablet  1 Tablet Oral Q4H PRN    clopidogreL (PLAVIX) tablet 75 mg  75 mg Oral DAILY    lisinopriL (PRINIVIL, ZESTRIL) tablet 10 mg  10 mg Oral DAILY    aspirin chewable tablet 81 mg  81 mg Oral DAILY    [Held by provider] labetaloL (NORMODYNE) tablet 100 mg  100 mg Oral BID    albuterol-ipratropium (DUO-NEB) 2.5 MG-0.5 MG/3 ML  3 mL Nebulization Q4H PRN    alum-mag hydroxide-simeth (MYLANTA) oral suspension 30 mL  30 mL Oral Q4H PRN    famotidine (PEPCID) tablet 20 mg  20 mg Oral BID    dextrose 5% infusion  50 mL/hr IntraVENous CONTINUOUS    hydrALAZINE (APRESOLINE) 20 mg/mL injection 10 mg  10 mg IntraVENous Q6H PRN       Objective:      Physical Exam:  General Appearance:  elederly thin AAM in no acute distress  Chest:   Clear  Cardiovascular:  Regular rate and rhythm, no murmur. Abdomen:   Soft, non-tender, bowel sounds are active. Extremities: no peripheral edema  Skin:  Warm and dry.      Data Review:   Recent Labs 09/28/21  1844   WBC 5.1   HGB 16.5   HCT 48.8        Recent Labs     09/29/21  0134 09/28/21  1845 09/28/21  1844   NA  --   --  146*   K  --   --  3.0*   CL  --   --  117*   CO2  --   --  19*   GLU  --   --  78   BUN  --   --  13   CREA  --   --  0.98   CA  --   --  7.1*   MG  --  1.6  --    ALB  --   --  2.3*   TBILI  --   --  0.5   ALT  --   --  18   INR 1.1  --   --        Recent Labs     09/29/21  0134 09/28/21  1844   TROIQ <0.05 <0.05         Intake/Output Summary (Last 24 hours) at 9/30/2021 0743  Last data filed at 9/29/2021 1500  Gross per 24 hour   Intake 750 ml   Output 600 ml   Net 150 ml        Telemetry: SR with Mobitz 1      Echo: 9/29/2021  · LV: Estimated LVEF is 55 - 60%. Normal cavity size, wall thickness, systolic function (ejection fraction normal) and diastolic function. · AV: Mild aortic valve leaflet calcification present without reduced excursion. · MV: Mitral valve leaflet calcification. Mild mitral valve regurgitation is present. Assessment:     Principal Problem:    ACS (acute coronary syndrome) ([address] (9/29/2021)    Active Problems:    HTN (hypertension), benign (7/15/2013)      Mobitz (type) I (Wenckebach's) atrioventricular block (9/29/2021)      History of CVA (cerebrovascular accident) (9/29/2021)        Plan: Mobitz type 1 AVB; Rate stable at 90-110s, BB on hold, but will restart after Dobumatine nuclear stress test completed. No need for pacemaker     Chest Pain:  Neg troponin, neg ECG for ischemic changes, and chest pain likely musculoskeletal  Risk factors:  HTN, CVA,  Age   Plan for a dobutamine lexiscan stress   Continues ASA, BB as above. Lipid profile with normal levels. No indications for statin     [address] Cardiology             Patient seen, examined by me personally. Plan discussed as detailed. Agree with note as outlined by  NP with modifications as noted.  My independent physical exam reveals : Physical Exam  Vitals and nursing note reviewed. Cardiovascular:      Rate and Rhythm: Normal rate and regular rhythm. Heart sounds: Normal heart sounds. Pulmonary:      Breath sounds: Normal breath sounds. Musculoskeletal:      Right lower leg: No edema. Left lower leg: No edema. Neurological:      Mental Status: He is alert. Mental status is at baseline. Psychiatric:         Mood and Affect: Mood normal.          No additional findings noted. Agree with plan as outlined above with modifications as noted. Await stress test results. Restart labetalol.      Awais Peraza MD

## 2021-09-30 NOTE — PROGRESS NOTES
Bedside and Verbal shift change report given to [facility] RN (oncoming nurse) by Mary Kat (offgoing nurse). Report included the following information SBAR, Kardex, Intake/Output, MAR and Recent Results.

## 2021-09-30 NOTE — PROGRESS NOTES
Stress test normal. Discussed with pcp at [facility]. He is on metoprolol 25 BID at home. Will switch. Has f/u with Ms Siomn Antes FNP on Monday at 2:30.

## 2021-09-30 NOTE — PALLIATIVE CARE DISCHARGE
Pt tolerated dobutamine stress test without difficulty. Target heart rate acheived at 20mcg/kg/min dobutamine. Pt received 2.5mg IV metoprolol to aid in lowering HR back down.

## 2021-10-01 ENCOUNTER — APPOINTMENT (OUTPATIENT)
Dept: CT IMAGING | Age: 71
DRG: 313 | End: 2021-10-01
Attending: INTERNAL MEDICINE
Payer: MEDICARE

## 2021-10-01 ENCOUNTER — APPOINTMENT (OUTPATIENT)
Dept: GENERAL RADIOLOGY | Age: 71
DRG: 313 | End: 2021-10-01
Attending: INTERNAL MEDICINE
Payer: MEDICARE

## 2021-10-01 PROBLEM — I44.1 MOBITZ (TYPE) I (WENCKEBACH'S) ATRIOVENTRICULAR BLOCK: Chronic | Status: RESOLVED | Noted: 2021-09-29 | Resolved: 2021-10-01

## 2021-10-01 PROBLEM — I24.9 ACS (ACUTE CORONARY SYNDROME) (HCC): Status: RESOLVED | Noted: 2021-09-29 | Resolved: 2021-10-01

## 2021-10-01 LAB
ANION GAP SERPL CALC-SCNC: 6 MMOL/L (ref 5–15)
BUN SERPL-MCNC: 18 MG/DL (ref 6–20)
BUN/CREAT SERPL: 23 (ref 12–20)
CALCIUM SERPL-MCNC: 8.7 MG/DL (ref 8.5–10.1)
CHLORIDE SERPL-SCNC: 104 MMOL/L (ref 97–108)
CO2 SERPL-SCNC: 26 MMOL/L (ref 21–32)
CREAT SERPL-MCNC: 0.78 MG/DL (ref 0.7–1.3)
ERYTHROCYTE [DISTWIDTH] IN BLOOD BY AUTOMATED COUNT: 14.1 % (ref 11.5–14.5)
GLUCOSE SERPL-MCNC: 77 MG/DL (ref 65–100)
HCT VFR BLD AUTO: 46.7 % (ref 36.6–50.3)
HGB BLD-MCNC: 15.7 G/DL (ref 12.1–17)
MCH RBC QN AUTO: 31.3 PG (ref 26–34)
MCHC RBC AUTO-ENTMCNC: 33.6 G/DL (ref 30–36.5)
MCV RBC AUTO: 93.2 FL (ref 80–99)
NRBC # BLD: 0 K/UL (ref 0–0.01)
NRBC BLD-RTO: 0 PER 100 WBC
PLATELET # BLD AUTO: 135 K/UL (ref 150–400)
PMV BLD AUTO: 10.8 FL (ref 8.9–12.9)
POTASSIUM SERPL-SCNC: 3.4 MMOL/L (ref 3.5–5.1)
RBC # BLD AUTO: 5.01 M/UL (ref 4.1–5.7)
SODIUM SERPL-SCNC: 136 MMOL/L (ref 136–145)
WBC # BLD AUTO: 7.3 K/UL (ref 4.1–11.1)

## 2021-10-01 PROCEDURE — 99218 HC RM OBSERVATION: CPT

## 2021-10-01 PROCEDURE — 99233 SBSQ HOSP IP/OBS HIGH 50: CPT | Performed by: INTERNAL MEDICINE

## 2021-10-01 PROCEDURE — 97161 PT EVAL LOW COMPLEX 20 MIN: CPT

## 2021-10-01 PROCEDURE — 77010033678 HC OXYGEN DAILY

## 2021-10-01 PROCEDURE — 85027 COMPLETE CBC AUTOMATED: CPT

## 2021-10-01 PROCEDURE — 94760 N-INVAS EAR/PLS OXIMETRY 1: CPT

## 2021-10-01 PROCEDURE — 74011250637 HC RX REV CODE- 250/637: Performed by: INTERNAL MEDICINE

## 2021-10-01 PROCEDURE — 74011250637 HC RX REV CODE- 250/637: Performed by: NURSE PRACTITIONER

## 2021-10-01 PROCEDURE — 72131 CT LUMBAR SPINE W/O DYE: CPT

## 2021-10-01 PROCEDURE — 97530 THERAPEUTIC ACTIVITIES: CPT

## 2021-10-01 PROCEDURE — 80048 BASIC METABOLIC PNL TOTAL CA: CPT

## 2021-10-01 PROCEDURE — 97116 GAIT TRAINING THERAPY: CPT

## 2021-10-01 PROCEDURE — 36415 COLL VENOUS BLD VENIPUNCTURE: CPT

## 2021-10-01 PROCEDURE — 73502 X-RAY EXAM HIP UNI 2-3 VIEWS: CPT

## 2021-10-01 PROCEDURE — 97166 OT EVAL MOD COMPLEX 45 MIN: CPT

## 2021-10-01 PROCEDURE — 74011250636 HC RX REV CODE- 250/636: Performed by: INTERNAL MEDICINE

## 2021-10-01 RX ORDER — GUAIFENESIN 100 MG/5ML
81 LIQUID (ML) ORAL DAILY
Qty: 30 TABLET | Refills: 0 | Status: SHIPPED | OUTPATIENT
Start: 2021-10-01

## 2021-10-01 RX ORDER — PANTOPRAZOLE SODIUM 40 MG/1
40 TABLET, DELAYED RELEASE ORAL DAILY
Qty: 30 TABLET | Refills: 0 | Status: SHIPPED | OUTPATIENT
Start: 2021-10-01

## 2021-10-01 RX ORDER — SODIUM CHLORIDE 9 MG/ML
100 INJECTION, SOLUTION INTRAVENOUS CONTINUOUS
Status: DISPENSED | OUTPATIENT
Start: 2021-10-01 | End: 2021-10-02

## 2021-10-01 RX ORDER — CLOPIDOGREL BISULFATE 75 MG/1
75 TABLET ORAL DAILY
Qty: 30 TABLET | Refills: 0 | Status: SHIPPED | OUTPATIENT
Start: 2021-10-01

## 2021-10-01 RX ORDER — METOPROLOL TARTRATE 25 MG/1
25 TABLET, FILM COATED ORAL EVERY 12 HOURS
Qty: 60 TABLET | Refills: 0 | Status: SHIPPED | OUTPATIENT
Start: 2021-10-01

## 2021-10-01 RX ADMIN — ALUMINUM HYDROXIDE, MAGNESIUM HYDROXIDE, AND SIMETHICONE 30 ML: 200; 200; 20 SUSPENSION ORAL at 16:22

## 2021-10-01 RX ADMIN — FAMOTIDINE 20 MG: 20 TABLET, FILM COATED ORAL at 16:44

## 2021-10-01 RX ADMIN — ALUMINUM HYDROXIDE, MAGNESIUM HYDROXIDE, AND SIMETHICONE 30 ML: 200; 200; 20 SUSPENSION ORAL at 21:16

## 2021-10-01 RX ADMIN — METOPROLOL TARTRATE 25 MG: 25 TABLET, FILM COATED ORAL at 09:36

## 2021-10-01 RX ADMIN — POTASSIUM BICARBONATE 40 MEQ: 782 TABLET, EFFERVESCENT ORAL at 10:00

## 2021-10-01 RX ADMIN — SODIUM CHLORIDE 100 ML/HR: 9 INJECTION, SOLUTION INTRAVENOUS at 23:16

## 2021-10-01 RX ADMIN — ASPIRIN 81 MG: 81 TABLET, CHEWABLE ORAL at 09:36

## 2021-10-01 RX ADMIN — Medication 10 ML: at 14:54

## 2021-10-01 RX ADMIN — CLOPIDOGREL BISULFATE 75 MG: 75 TABLET ORAL at 09:36

## 2021-10-01 RX ADMIN — METOPROLOL TARTRATE 25 MG: 25 TABLET, FILM COATED ORAL at 21:15

## 2021-10-01 RX ADMIN — LISINOPRIL 20 MG: 20 TABLET ORAL at 09:36

## 2021-10-01 RX ADMIN — Medication 10 ML: at 21:16

## 2021-10-01 RX ADMIN — SODIUM CHLORIDE 100 ML/HR: 9 INJECTION, SOLUTION INTRAVENOUS at 12:39

## 2021-10-01 RX ADMIN — FAMOTIDINE 20 MG: 20 TABLET, FILM COATED ORAL at 09:36

## 2021-10-01 NOTE — PROGRESS NOTES
Pharmacist Discharge Medication Reconciliation    Significant PMH:   Past Medical History:   Diagnosis Date    History of CVA (cerebrovascular accident) 9/29/2021    HTN (hypertension)     Stroke [facility])      Encounter Diagnoses:   Encounter Diagnoses   Name Primary? Chest pain, unspecified type Yes    Mobitz (type) I (Wenckebach's) atrioventricular block     HTN (hypertension), benign      Allergies: Patient has no known allergies. Discharge Medications:   Current Discharge Medication List        START taking these medications    Details   aspirin 81 mg chewable tablet Take 1 Tablet by mouth daily. Qty: 30 Tablet, Refills: 0  Start date: 10/1/2021      metoprolol tartrate (LOPRESSOR) 25 mg tablet Take 1 Tablet by mouth every twelve (12) hours. Qty: 60 Tablet, Refills: 0  Start date: 10/1/2021      pantoprazole (Protonix) 40 mg tablet Take 1 Tablet by mouth daily. Qty: 30 Tablet, Refills: 0  Start date: 10/1/2021           CONTINUE these medications which have CHANGED    Details   clopidogreL (PLAVIX) 75 mg tab Take 1 Tablet by mouth daily. Qty: 30 Tablet, Refills: 0  Start date: 10/1/2021    Associated Diagnoses: HTN (hypertension), benign           CONTINUE these medications which have NOT CHANGED    Details   lisinopril (PRINIVIL, ZESTRIL) 10 mg tablet Take 1 Tab by mouth daily. Qty: 30 Tab, Refills: 0      spironolactone (ALDACTONE) 50 mg tablet Take 1 Tab by mouth daily. Qty: 30 Tab, Refills: 0           STOP taking these medications       labetalol (NORMODYNE) 100 mg tablet Comments:   Reason for Stopping:         ibuprofen (MOTRIN) 800 mg tablet Comments:   Reason for Stopping:         labetalol (NORMODYNE) 100 mg tablet Comments:   Reason for Stopping:               The patient's chart, MAR and AVS were reviewed by Catherine Jiménez [facility].     Discharging Provider: Claudia Bernstein MD    Thank you,     Catherine Jiménez, [facility]

## 2021-10-01 NOTE — PROGRESS NOTES
Transition of Care Plan:     RUR: N/A - pt is observation status  Disposition: SNF - [address] [place] [address] pending auth  Follow up appointments: PCP  DME needed: Pt has crutches and a RW  Transportation at Discharge: Pt's girlfriend  [place] or means to access home:  Pt's girlfriend has access      IM Medicare Letter: To be given prior to d/c  Is patient a BCPI-A Bundle:      No                If yes, was Bundle Letter given?:     Caregiver Contact: Kira Garcia ([phone])  Discharge Caregiver contacted prior to discharge? Pt's cg will be contact prior to d/c    3:06 PM  CM updated [facility] about pt's discharge plan. Pt has been accepted by [address] [place] [address] and they will begin auth. 12:13 PM  CM acknowledged d/c orders. PT/OT are recommending pt d/c to a SNF. CM met with pt at the bedside to discuss d/c planning. He is in agreement with d/c to SNF for rehabilitation. Pt is a Sujit Jang pt, has [employer], and will require insurance auth. CM sent referral to [id number] [facility] to update Sujit Jang RN. CM will continue to follow for discharge planning while patient is admitted on current unit. Please contact this CM with questions or issues related to discharge.      Sharad Bagley MSW  Care Manager [facility]  [phone]

## 2021-10-01 NOTE — PROGRESS NOTES
Hospitalist Progress Note    NAME: Jie Oconnor   :  1950   MRN:  [id number]       Assessment / Plan:  Atypical chest pain to rule out acute coronary syndrome, POA  EKG on admission sinus tachycardia with second-degree AV block Mobitz 1  Chest x-ray on admission with no acute abnormality  Troponin less than 0.05 on admission, less than 0.05 for repeat  proBNP 690  On aspirin and Plavix, continue  I discussed cardiologist morning, recommendation to resume metoprolol  Cardiology was consulted, stress test with no evidence of ischemia reported  EP were consulted, no plans for pacemaker at this point  Chest pain-free  Supportive care    Orthostatic hypotension  Debility  Severe spinal stenosis L4-L5  We were planning to discharge patient today however when he was trying to stand up he felt weak  PT OT evaluated patient recommending SNF   is aware, progress appreciated  CT scan revealed severe spinal canal stenosis L4-5, will consult neurosurgery  I order x-ray of the left hip due to complaints of new pain    History of GERD  Continue on famotidine    Hypertension  Continue home meds as tolerated except beta-blocker    Hypovolemic hypernatremia  Hypokalemia  Replace electrolytes  Started on, labs are pending today D5 for the hypernatremia  Follow BMP    Patient's primary care physician called me yesterday morning. and updated me on this patient medical history. She reported that he had history of noncompliance and none showing up for his appointment. 18.5 - 24.9 Normal weight / Body mass index is 22.96 kg/m². Estimated discharge date:   Barriers: Clinically stable at this point, awaiting SNF and blood pressure optimization before discharge    Code status: Full  Prophylaxis: SCD's  Recommended Disposition: Home w/Family     Subjective:     Patient was seen and examined. No acute events overnight. Discussed with RN overnight events.     All patient's questions were answered. \"Feeling weak when I stand up, and my hip hurts today\"    Review of Systems:  Symptom Y/N Comments  Symptom Y/N Comments   Fever/Chills n   Chest Pain n    Poor Appetite    Edema     Cough n   Abdominal Pain n    Sputum    Joint Pain y  left hip   SOB/SANDERS n   Pruritis/Rash     Nausea/vomit n   Tolerating PT/OT     Diarrhea    Tolerating Diet y    Constipation    Other       Could NOT obtain due to:            Review of Systems:  Symptom Y/N Comments  Symptom Y/N Comments   Fever/Chills    Chest Pain     Poor Appetite    Edema     Cough    Abdominal Pain     Sputum    Joint Pain     SOB/SANDERS    Pruritis/Rash     Nausea/vomit    Tolerating PT/OT     Diarrhea    Tolerating Diet     Constipation    Other       Could NOT obtain due to:      Objective:     VITALS:   Last 24hrs VS reviewed since prior progress note. Most recent are:  Patient Vitals for the past 24 hrs:   Temp Pulse Resp BP SpO2   10/01/21 1128 98.8 °F (37.1 °C) 98 20 (!) 141/83 99 %   10/01/21 1011  (!) 108  (!) 140/97    10/01/21 1006  (!) 135  99/85    10/01/21 0955  (!) 115  (!) 142/101 98 %   10/01/21 0739 97.4 °F (36.3 °C) (!) 101 18 (!) 145/84 98 %   09/30/21 2300 98 °F (36.7 °C) 80 18 130/81 100 %   09/30/21 2015 98.3 °F (36.8 °C) 82 19 (!) 147/85 100 %   09/30/21 1438 98.4 °F (36.9 °C) 85 18 129/73 95 %       Intake/Output Summary (Last 24 hours) at 10/1/2021 1259  Last data filed at 10/1/2021 0500  Gross per 24 hour   Intake 2898.33 ml   Output 650 ml   Net 2248.33 ml        I had a face to face encounter and independently examined this patient on 10/1/2021, as outlined below:  PHYSICAL EXAM:  General: WD, WN. Alert, cooperative, no acute distress    EENT:  EOMI. Anicteric sclerae. MMM  Resp:  CTA bilaterally, no wheezing or rales. No accessory muscle use  CV:  Regular  rhythm,  No edema  GI:  Soft, Non distended, Non tender.   +Bowel sounds  Neurologic:  Alert and oriented X 3, normal speech,   Psych:   Good insight. Not anxious nor agitated  Skin:  No rashes. No jaundice    Reviewed most current lab test results and cultures  YES  Reviewed most current radiology test results   YES  Review and summation of old records today    NO  Reviewed patient's current orders and MAR    YES  PMH/SH reviewed - no change compared to H&P  ________________________________________________________________________  Care Plan discussed with:    Comments   Patient x    Family      RN x    Care Manager     Consultant  x  cardiology                      Multidiciplinary team rounds were held today with , nursing, pharmacist and clinical coordinator. Patient's plan of care was discussed; medications were reviewed and discharge planning was addressed. ________________________________________________________________________  Total NON critical care TIME:  36   Minutes    Total CRITICAL CARE TIME Spent:   Minutes non procedure based      Comments   >50% of visit spent in counseling and coordination of care     ________________________________________________________________________  Maria Del Rosario Frost MD     Procedures: see electronic medical records for all procedures/Xrays and details which were not copied into this note but were reviewed prior to creation of Plan. LABS:  I reviewed today's most current labs and imaging studies.   Pertinent labs include:  Recent Labs     10/01/21  0451 09/30/21  1620 09/30/21  1315   WBC 7.3 6.1 5.6   HGB 15.7 16.2 14.3   HCT 46.7 48.0 42.7   * 153 135*     Recent Labs     10/01/21  0451 09/30/21  1620 09/29/21  0134 09/28/21 1845 09/28/21 1844    136  --   --  146*   K 3.4* 3.6  --   --  3.0*    107  --   --  117*   CO2 26 24  --   --  19*   GLU 77 88  --   --  78   BUN 18 17  --   --  13   CREA 0.78 0.92  --   --  0.98   CA 8.7 9.0  --   --  7.1*   MG  --   --   --  1.6  --    ALB  --   --   --   --  2.3*   TBILI  --   --   --   --  0.5   ALT  --   --   --   --  18   INR --   --  1.1  --   --        Signed: Ryan Sevilla MD

## 2021-10-01 NOTE — PROGRESS NOTES
Cardiology Progress Note      10/1/2021 8:13 AM    Admit Date: 9/28/2021    Admit Diagnosis: ACS (acute coronary syndrome) ([facility] [address] [I24.9]      Subjective: Calos Buzzard c/o abdominal pain after taking percocet.     Visit Vitals  BP (!) 145/84 (BP 1 Location: Left upper arm, BP Patient Position: At rest)   Pulse (!) 101   Temp 97.4 °F (36.3 °C)   Resp 18   Ht 5' 10\" (1.778 m)   Wt 160 lb (72.6 kg)   SpO2 98%   BMI 22.96 kg/m²       Current Facility-Administered Medications   Medication Dose Route Frequency    lisinopriL (PRINIVIL, ZESTRIL) tablet 20 mg  20 mg Oral DAILY    metoprolol tartrate (LOPRESSOR) tablet 25 mg  25 mg Oral Q12H    sodium chloride (NS) flush 5-40 mL  5-40 mL IntraVENous Q8H    sodium chloride (NS) flush 5-40 mL  5-40 mL IntraVENous PRN    acetaminophen (TYLENOL) tablet 650 mg  650 mg Oral Q4H PRN    ondansetron (ZOFRAN) injection 4 mg  4 mg IntraVENous Q4H PRN    oxyCODONE-acetaminophen (PERCOCET) 5-325 mg per tablet 1 Tablet  1 Tablet Oral Q4H PRN    clopidogreL (PLAVIX) tablet 75 mg  75 mg Oral DAILY    aspirin chewable tablet 81 mg  81 mg Oral DAILY    albuterol-ipratropium (DUO-NEB) 2.5 MG-0.5 MG/3 ML  3 mL Nebulization Q4H PRN    alum-mag hydroxide-simeth (MYLANTA) oral suspension 30 mL  30 mL Oral Q4H PRN    famotidine (PEPCID) tablet 20 mg  20 mg Oral BID    dextrose 5% infusion  50 mL/hr IntraVENous CONTINUOUS    hydrALAZINE (APRESOLINE) 20 mg/mL injection 10 mg  10 mg IntraVENous Q6H PRN     Facility-Administered Medications Ordered in Other Encounters   Medication Dose Route Frequency    DOBUTamine (DOBUTREX) 500 mg/250 mL (2,000 mcg/mL) infusion  0-10 mcg/kg/min IntraVENous TITRATE    atropine 0.4 mg/mL injection 0.25-0.5 mg  0.25-0.5 mg IntraVENous Multiple         Objective:      Physical Exam:  Visit Vitals  BP (!) 145/84 (BP 1 Location: Left upper arm, BP Patient Position: At rest)   Pulse (!) 101   Temp 97.4 °F (36.3 °C)   Resp 18   Ht 5' 10\" (1.778 m) Wt 160 lb (72.6 kg)   SpO2 98%   BMI 22.96 kg/m²     General Appearance:  Well developed, well nourished,alert and oriented x 3, and individual in no acute distress. Ears/Nose/Mouth/Throat:   Hearing grossly normal.         Neck: Supple. Chest:   Lungs clear to auscultation bilaterally. Cardiovascular:  Regular rate and rhythm, S1, S2 normal, no murmur. Abdomen:   Soft, non-tender, bowel sounds are active. Extremities: No edema bilaterally. Skin: Warm and dry.                Data Review:   Labs:    Recent Results (from the past 24 hour(s))   CBC W/O DIFF    Collection Time: 09/30/21  1:15 PM   Result Value Ref Range    WBC 5.6 4.1 - 11.1 K/uL    RBC 4.53 4.10 - 5.70 M/uL    HGB 14.3 12.1 - 17.0 g/dL    HCT 42.7 36.6 - 50.3 %    MCV 94.3 80.0 - 99.0 FL    MCH 31.6 26.0 - 34.0 PG    MCHC 33.5 30.0 - 36.5 g/dL    RDW 14.3 11.5 - 14.5 %    PLATELET 187 (L) 696 - 400 K/uL    MPV 10.8 8.9 - 12.9 FL    NRBC 0.0 0  WBC    ABSOLUTE NRBC 0.00 0.00 - 0.01 K/uL   NUCLEAR CARDIAC STRESS TEST    Collection Time: 09/30/21  3:10 PM   Result Value Ref Range    Target  bpm    Exercise duration time 00:04:11     Stress Base Systolic  mmHg    Stress Base Diastolic  mmHg    Post peak  BPM    Baseline HR 86 BPM    Estimated workload 1.0 METS    Percent  %    Stress Rate Pressure Product 29,484 BPM*mmHg    Baseline  mmHg    O2 sat rest 97 %    O2 sat peak 97 %    Stress Base Diastolic BP 95 mmHg    ST Elevation (mm) 0 mm    ST Depression (mm) 0 mm   CBC W/O DIFF    Collection Time: 09/30/21  4:20 PM   Result Value Ref Range    WBC 6.1 4.1 - 11.1 K/uL    RBC 5.17 4.10 - 5.70 M/uL    HGB 16.2 12.1 - 17.0 g/dL    HCT 48.0 36.6 - 50.3 %    MCV 92.8 80.0 - 99.0 FL    MCH 31.3 26.0 - 34.0 PG    MCHC 33.8 30.0 - 36.5 g/dL    RDW 14.3 11.5 - 14.5 %    PLATELET 849 088 - 508 K/uL    MPV 10.6 8.9 - 12.9 FL    NRBC 0.0 0  WBC    ABSOLUTE NRBC 0.00 0.00 - 2.12 K/uL   METABOLIC PANEL, BASIC    Collection Time: 09/30/21  4:20 PM   Result Value Ref Range    Sodium 136 136 - 145 mmol/L    Potassium 3.6 3.5 - 5.1 mmol/L    Chloride 107 97 - 108 mmol/L    CO2 24 21 - 32 mmol/L    Anion gap 5 5 - 15 mmol/L    Glucose 88 65 - 100 mg/dL    BUN 17 6 - 20 MG/DL    Creatinine 0.92 0.70 - 1.30 MG/DL    BUN/Creatinine ratio 18 12 - 20      GFR est AA >60 >60 ml/min/1.73m2    GFR est non-AA >60 >60 ml/min/1.73m2    Calcium 9.0 8.5 - 10.7 MG/DL   METABOLIC PANEL, BASIC    Collection Time: 10/01/21  4:51 AM   Result Value Ref Range    Sodium 136 136 - 145 mmol/L    Potassium 3.4 (L) 3.5 - 5.1 mmol/L    Chloride 104 97 - 108 mmol/L    CO2 26 21 - 32 mmol/L    Anion gap 6 5 - 15 mmol/L    Glucose 77 65 - 100 mg/dL    BUN 18 6 - 20 MG/DL    Creatinine 0.78 0.70 - 1.30 MG/DL    BUN/Creatinine ratio 23 (H) 12 - 20      GFR est AA >60 >60 ml/min/1.73m2    GFR est non-AA >60 >60 ml/min/1.73m2    Calcium 8.7 8.5 - 10.1 MG/DL   CBC W/O DIFF    Collection Time: 10/01/21  4:51 AM   Result Value Ref Range    WBC 7.3 4.1 - 11.1 K/uL    RBC 5.01 4.10 - 5.70 M/uL    HGB 15.7 12.1 - 17.0 g/dL    HCT 46.7 36.6 - 50.3 %    MCV 93.2 80.0 - 99.0 FL    MCH 31.3 26.0 - 34.0 PG    MCHC 33.6 30.0 - 36.5 g/dL    RDW 14.1 11.5 - 14.5 %    PLATELET 299 (L) 090 - 400 K/uL    MPV 10.8 8.9 - 12.9 FL    NRBC 0.0 0  WBC    ABSOLUTE NRBC 0.00 0.00 - 0.01 K/uL       Telemetry: normal sinus rhythm      Assessment:     Hospital Problems  Date Reviewed: 9/29/2021        Codes Class Noted POA    * (Principal) ACS (acute coronary syndrome) (HCC) ICD-10-CM: I24.9  ICD-9-CM: 411.1  9/29/2021 Unknown        Mobitz (type) I [facility]) atrioventricular block (Chronic) ICD-10-CM: I44.1  ICD-9-CM: 426.13  9/29/2021 Unknown        History of CVA (cerebrovascular accident) ICD-10-CM: C45.51  ICD-9-CM: V12.54  9/29/2021 Unknown        HTN (hypertension), benign (Chronic) ICD-10-CM: I10  ICD-9-CM: 401.1  7/15/2013 Yes              incresaed.     Can go home from cardiac stand point. Has f/u with pcp Monday.     Neptali Argueta MD

## 2021-10-01 NOTE — PROGRESS NOTES
End of Shift Note    Bedside shift change report given to BRIGITTE Boggs (oncoming nurse) by Axel Dubon RN (offgoing nurse). Report included the following information SBAR, Kardex and Recent Results    Shift worked:  7p-7a     Shift summary and any significant changes:     Uneventful shift. Pt c/o indigestion and leg pain,  Pain meds given once during the shift. Concerns for physician to address:  none     Zone phone for oncoming shift:   [phone]       Activity:  Activity Level: Bed Rest  Number times ambulated in hallways past shift: 0  Number of times OOB to chair past shift: 0    Cardiac:   Cardiac Monitoring: Yes      Cardiac Rhythm: Sinus Rhythm, PVC    Access:   Current line(s): PIV     Genitourinary:   Urinary status: voiding    Respiratory:   O2 Device: Nasal cannula  Chronic home O2 use?: NO  Incentive spirometer at bedside: NO     GI:  Last Bowel Movement Date: 09/29/21  Current diet:  ADULT DIET Regular  DIET ONE TIME MESSAGE  Passing flatus: YES  Tolerating current diet: YES       Pain Management:   Patient states pain is manageable on current regimen: YES    Skin:  Kane Score: 19  Interventions: increase time out of bed and limit briefs    Patient Safety:  Fall Score:  Total Score: 2  Interventions: assistive device (walker, cane, etc), gripper socks, pt to call before getting OOB and stay with me (per policy)       Length of Stay:  Expected LOS: - - -  Actual LOS: 0      Axel Dubon RN

## 2021-10-01 NOTE — PROGRESS NOTES
Problem: Mobility Impaired (Adult and Pediatric)  Goal: *Acute Goals and Plan of Care (Insert Text)  Description: FUNCTIONAL STATUS PRIOR TO ADMISSION: Patient was modified independent using a rolling walker and crutches for functional mobility. He endorses several falls and stopped using the crutches because it caused him to have chest pain. HOME SUPPORT PRIOR TO ADMISSION: The patient lived with his SO who provided some assistance with adls. He reports she works during the day. Physical Therapy Goals  Initiated 10/1/2021  1. Patient will move from supine to sit and sit to supine , scoot up and down, and roll side to side in bed with minimal assistance/contact guard assist within 7 day(s). 2.  Patient will transfer from bed to chair and chair to bed with supervision/set-up using the least restrictive device within 7 day(s). 3.  Patient will perform sit to stand with supervision/set-up within 7 day(s). 4.  Patient will ambulate with contact guard assist for 25 feet with the least restrictive device within 7 day(s). Outcome: Not Progressing Towards Goal   PHYSICAL THERAPY EVALUATION  Patient: Pooja Javier (1 y.o. male)  Date: 10/1/2021  Primary Diagnosis: [facility] syndrome[facility] [I24.9]        Precautions:   Fall    ASSESSMENT  Based on the objective data described below, the patient presents with symptomatic orthostatic hypotension, BLE pain LLE>RLE, impaired gait mechanics, impaired standing balance, impaired mobility, and decreased activity tolerance. Pt received seated in bedside chair, agreeable to PT evaluation. Pt very limited by LE pain and orthostatic hypotension. His standing tolerance is very limited due to pain. He is functioning below his baseline, endorses several falls at home, and is alone during the day. At this time, pt would benefit from rehab at discharge to improve his mobility and safety prior to returning home.     Current Level of Function Impacting Discharge (mobility/balance):   Bed Mobility:   Deferred, pt seated in bedside chair  Transfers:  Sit to Stand: Contact guard assistance  Stand to Sit: Contact guard assistance  Bed to Chair: Moderate assistance; Additional time (inferred - received in chair)  Ambulation/Gait Training:  Distance (ft): 3 Feet (ft)  Assistive Device: Gait belt;Walker, rolling  Ambulation - Level of Assistance: Contact guard assistance;Minimal assistance     Functional Outcome Measure: The patient scored 45/100 on the Barthel Index outcome measure which is indicative of 55% impaired function/adls      Other factors to consider for discharge: limited support during the day at home, functioning below his baseline     Patient will benefit from skilled therapy intervention to address the above noted impairments. PLAN :  Recommendations and Planned Interventions: bed mobility training, transfer training, gait training, therapeutic exercises, patient and family training/education, and therapeutic activities      Frequency/Duration: Patient will be followed by physical therapy:  4 times a week to address goals. Recommendation for discharge: (in order for the patient to meet his/her long term goals)  Therapy up to 5 days/week in SNF setting    This discharge recommendation:  A follow-up discussion with the attending provider and/or case management is planned    IF patient discharges home will need the following DME: patient owns DME required for discharge         SUBJECTIVE:   Patient stated my leg is killing me.     OBJECTIVE DATA SUMMARY:   HISTORY:    Past Medical History:   Diagnosis Date    History of CVA (cerebrovascular accident) 9/29/2021    HTN (hypertension)     Stroke ([facility] [address]    No past surgical history on file.     Personal factors and/or comorbidities impacting plan of care: BLE pain, orthostatic hypotension    Home Situation  Home Environment: Private residence  # Steps to Enter: 5 (5)  Rails to Enter: Yes  Hand Rails : Bilateral  One/Two Story Residence: Two story, live on 1st floor  Living Alone: No  Support Systems: Spouse/Significant Other  Current DME Used/Available at Home: Crutches, [address] chair, Walker, rolling, Wheelchair, power  Tub or Shower Type: Tub/Shower combination    EXAMINATION/PRESENTATION/DECISION MAKING:   Critical Behavior:      Hearing: Auditory  Auditory Impairment: None  Range Of Motion:  AROM: Grossly decreased, non-functional (BLEs and R shoulder, R elbow/wrist/hand and LUE functional )       Strength:    Strength: Grossly decreased, non-functional      Tone & Sensation:   Tone: Normal         Coordination:  Coordination: Grossly decreased, non-functional    Functional Mobility:  Bed Mobility:   Deferred, pt seated in bedside chair        Transfers:  Sit to Stand: Contact guard assistance  Stand to Sit: Contact guard assistance        Bed to Chair: Moderate assistance; Additional time (inferred - received in chair)      Balance:   Sitting: Impaired  Sitting - Static: Fair (occasional)  Sitting - Dynamic: Fair (occasional)  Standing: Impaired; With support (RW)  Standing - Static: Fair;Constant support  Standing - Dynamic : Poor;Constant support  Ambulation/Gait Training:  Distance (ft): 3 Feet (ft)  Assistive Device: Gait belt;Walker, rolling  Ambulation - Level of Assistance: Contact guard assistance;Minimal assistance     Gait Description (WDL): Exceptions to WDL  Gait Abnormalities: Antalgic;Decreased step clearance; Step to gait; Toe walking        Base of Support: Narrowed  Stance: Left decreased  Speed/Nasra: Slow  Step Length: Left shortened;Right shortened  Swing Pattern: Left asymmetrical        Functional Measure:  Barthel Index:    Bathin  Bladder: 10  Bowels: 10  Groomin  Dressin  Feeding: 10  Mobility: 0  Stairs: 0  Toilet Use: 5  Transfer (Bed to Chair and Back): 5  Total: 45/100       The Barthel ADL Index: Guidelines  1.  The index should be used as a record of what a patient does, not as a record of what a patient could do. 2. The main aim is to establish degree of independence from any help, physical or verbal, however minor and for whatever reason. 3. The need for supervision renders the patient not independent. 4. A patient's performance should be established using the best available evidence. Asking the patient, friends/relatives and nurses are the usual sources, but direct observation and common sense are also important. However direct testing is not needed. 5. Usually the patient's performance over the preceding 24-48 hours is important, but occasionally longer periods will be relevant. 6. Middle categories imply that the patient supplies over 50 per cent of the effort. 7. Use of aids to be independent is allowed. Fan Gorman, Barthel, D.W. (9862). Functional evaluation: the Barthel Index. [address] [facility] (14)2. JASPER Paniagua, Adrian Beyer., Lorelei Marshall., [facility], [facility] (1999). Measuring the change indisability after inpatient rehabilitation; comparison of the responsiveness of the Barthel Index and Functional Walker Measure. Journal of Neurology, Neurosurgery, and Psychiatry, 66(4), 695-800. Trey Somers, N.J.A, SHELBY MillerJ.VAHE, & Pablo Milian M.A. (2004.) Assessment of post-stroke quality of life in cost-effectiveness studies: The usefulness of the Barthel Index and the EuroQoL-5D.  Quality of Life Research, 15, 707-56           Physical Therapy Evaluation Charge Determination   History Examination Presentation Decision-Making   MEDIUM  Complexity : 1-2 comorbidities / personal factors will impact the outcome/ POC  MEDIUM Complexity : 3 Standardized tests and measures addressing body structure, function, activity limitation and / or participation in recreation  MEDIUM Complexity : Evolving with changing characteristics  MEDIUM Complexity : FOTO score of 26-74      Based on the above components, the patient evaluation is determined to be of the following complexity level: MEDIUM    Pain Rating:  10/10 LLE, RLE painful but pt didn't quantify the pain    Activity Tolerance:   Poor, requires frequent rest breaks, and signs and symptoms of orthostatic hypotension    After treatment patient left in no apparent distress:   Sitting in chair and Call bell within reach    COMMUNICATION/EDUCATION:   The patients plan of care was discussed with: Occupational therapist, Registered nurse, and Case management. Fall prevention education was provided and the patient/caregiver indicated understanding., Patient/family have participated as able in goal setting and plan of care. , and Patient/family agree to work toward stated goals and plan of care.     Thank you for this referral.  Roxanne Gauthier, PT   Time Calculation: 38 mins

## 2021-10-01 NOTE — PROGRESS NOTES
End of Shift Note    Bedside shift change report given to [address] [place] [address] (oncoming nurse) by Sebastian Nava (offgoing nurse).   Report included the following information SBAR, Kardex, Procedure Summary, Intake/Output, MAR and Recent Results    Shift worked:  7-7pm     Shift summary and any significant changes:     Discussed on rounding with MD, orders received   Concerns for physician to address:       Zone phone for oncoming shift:              Sebastian Nava

## 2021-10-01 NOTE — ROUTINE PROCESS
Bedside and Verbal shift change report given to Shanell Clifford RN (oncoming nurse) by Matilde Torrez RN (offgoing nurse). Report included the following information SBAR, Kardex and Quality Measures.

## 2021-10-01 NOTE — PROGRESS NOTES
Orders received, chart reviewed and patient evaluated by Occupational Therapy. Pt is functioning far below his Mod I to Min A baseline with ADL using both crutches and RW for functional mobility, now at a Setup to Total Assist level with with c/o 10/10 BLE (LLE>RLE), orthostatic hypotension (SBP dropped 142 to 99 during 10 second stand at chair), poor standing tolerance. Pt remains a HIGH fall risk. RN was notified immediately of vitals and status. Pt may benefit from further imaging of BLEs d/t high pain levels and inability to tolerate standing more than 10 seconds. Recommend SNF level rehab once Pt is medically stable.      Full evaluation to follow.     Vitals:    10/01/21 0739 10/01/21 0955 10/01/21 1006 10/01/21 1011   BP: (!) 145/84 (!) 142/101 99/85 (!) 140/97   BP 1 Location: Left upper arm Left upper arm Left upper arm Left upper arm   BP Patient Position: At rest Sitting Sitting Sitting   Pulse: (!) 101 (!) 115  Comment: at rest (!) 135 (!) 108   Temp: 97.4 °F (36.3 °C)      Resp: 18      Height:       Weight:       SpO2: 98% 98%         Gabriela Molina OTR/L

## 2021-10-01 NOTE — PROGRESS NOTES
Vitals:    10/01/21 0739 10/01/21 0955 10/01/21 1006 10/01/21 1011   BP: (!) 145/84 (!) 142/101 99/85 (!) 140/97   BP 1 Location: Left upper arm Left upper arm Left upper arm Left upper arm   BP Patient Position: At rest Sitting Sitting Sitting   Pulse: (!) 101 (!) 115  Comment: at rest (!) 135 (!) 108   Temp: 97.4 °F (36.3 °C)      Resp: 18      Height:       Weight:       SpO2: 98% 98%         99/85 BP taken after pt was standing and reported being light headed    Orders received, chart reviewed and patient evaluated by physical therapy. Pending progression with skilled acute physical therapy, recommend:  Therapy up to 5 days/week in SNF setting. Pt orthostatic and symptomatic with increased HR with minimal activity. 10/10 LLE pain and also reporting some RLE pain. Gait is significantly impaired and he is a high fall risk. He reports not going out of the house much due to chest pain with ambulation, prior to this admission. Endorses many falls and would benefit from imaging of his lower extremities. Recommend with nursing OOB to chair 3x/day for meals and using the bedside commode with 1 person assist and walker. Thank you for completing as able in order to maintain patient strength, endurance and independence. Full evaluation to follow.

## 2021-10-01 NOTE — PROGRESS NOTES
Problem: Self Care Deficits Care Plan (Adult)  Goal: *Acute Goals and Plan of Care (Insert Text)  Description:   FUNCTIONAL STATUS PRIOR TO ADMISSION: Patient was modified independent using crutches for functional mobility. Sits on shower chair to bathe. 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 Does not drive. Admits to frequent falls. HOME SUPPORT: The patient lived with girlfriend who provided assist with IADL and transportation. Occupational Therapy Goals  Initiated 10/1/2021  1. Patient will perform grooming tasks standing with contact guard assist within 7 day(s). 2.  Patient will perform sponge bathing with moderate assistance  within 7 day(s). 3.  Patient will perform upper body dressing with supervision/set-up within 7 day(s). 4.  Patient will perform lower body dressing with moderate assistance within 7 days. 5.  Patient will perform toilet transfers with contact guard assist using least restrictive device within 7 day(s). 6.  Patient will perform all aspects of toileting with minimal assistance within 7 day(s). Outcome: Not Met   OCCUPATIONAL THERAPY EVALUATION  Patient: Sadie Rodríguez (46 y.o. male)  Date: 10/1/2021  Primary Diagnosis: ACS (acute coronary syndrome) ([facility] [address] [I24.9]        Precautions:  Fall    ASSESSMENT  Based on the objective data described below, the patient presents with significant BLE p! (L>R), poor activity tolerance, tachycardia (HR elevated to 135 during brief standing attempt) and overall impaired functional mobility following admission for ACS. At this time Pt requires Setup to Total A with ADL, well below his reported Mod I to Min A functional baseline limited d/t BLE p! Pt reports frequent falls PTA. He is unsafe to return home at this time given high fall risk and possible need for further imaging d/t significant BLE p! Recommend SNF level rehab at D/C once medically stable. RN aware of vitals and p! level.      Vitals:    10/01/21 0955 10/01/21 1006 10/01/21 1011 10/01/21 1128   BP: (!) 142/101 99/85 (!) 140/97 (!) 141/83   BP 1 Location: Left upper arm Left upper arm Left upper arm Left upper arm   BP Patient Position: Sitting Sitting Sitting Sitting   Pulse: (!) 115  Comment: at rest (!) 135 (!) 108 98   Temp:    98.8 °F (37.1 °C)   Resp:    20   Height:       Weight:       SpO2: 98%   99%        Current Level of Function Impacting Discharge (ADLs/self-care): Setup to Total A with ADL, CGA sit<>stand, Inferred Mod A bed<>chair. Functional Outcome Measure: The patient scored Total: 45/100 on the Barthel Index outcome measure which is indicative of 55% impaired ability to care for basic self needs/dependency on others; inferred 55% dependency on others for instrumental ADLs. Other factors to consider for discharge: Pain level, Orthostatic Hypotension, High fall risk, May need further imaging for BLE p! Patient will benefit from skilled therapy intervention to address the above noted impairments. PLAN :  Recommendations and Planned Interventions: self care training, functional mobility training, balance training, therapeutic activities, endurance activities, patient education, and home safety training    Frequency/Duration: Patient will be followed by occupational therapy 4 times a week to address goals. Recommendation for discharge: (in order for the patient to meet his/her long term goals)  Therapy up to 5 days/week in SNF setting    This discharge recommendation:  Has been made in collaboration with the attending provider and/or case management    IF patient discharges home will need the following DME: patient owns DME required for discharge       SUBJECTIVE:   Patient stated I need to sit down!     OBJECTIVE DATA SUMMARY:   HISTORY:   Past Medical History:   Diagnosis Date    History of CVA (cerebrovascular accident) 9/29/2021    HTN (hypertension)     Stroke ([facility] [address]    No past surgical history on file.     Expanded or extensive additional review of patient history: CVA, HTN, Arthritis     Home Situation  Home Environment: Private residence  # Steps to Enter: 5 (5)  Rails to Enter: Yes  Hand Rails : Bilateral  One/Two Story Residence: Two story, live on 1st floor  Living Alone: No  Support Systems: Spouse/Significant Other  Current DME Used/Available at Home: Crutches, Shower chair, Walker, rolling, Wheelchair, power  Tub or Shower Type: Tub/Shower combination    Hand dominance: Right    EXAMINATION OF PERFORMANCE DEFICITS:  Cognitive/Behavioral Status:                      Skin: Lines intact. Edema: None observed    Hearing: Auditory  Auditory Impairment: None    Vision/Perceptual:                      Diplopia: Yes    Acuity: Impaired far vision    Corrective Lenses: Glasses    Range of Motion:  AROM: Grossly decreased, non-functional (BLEs and R shoulder, R elbow/wrist/hand and LUE functional )                         Strength  Strength: Grossly decreased, non-functional                Coordination:  Coordination: Grossly decreased, non-functional  Fine Motor Skills-Upper: Left Intact; Right Impaired (d/t prior CVA)    Gross Motor Skills-Upper: Left Intact; Right Impaired (d/t prior CVA)    Tone & Sensation:    Tone: Normal                         Balance:  Sitting: Impaired  Sitting - Static: Fair (occasional)  Sitting - Dynamic: Fair (occasional)  Standing: Impaired; With support (RW)  Standing - Static: Fair;Constant support  Standing - Dynamic : Poor;Constant support    Functional Mobility and Transfers for ADLs:  Bed Mobility:   Received in chair. Transfers:  Sit to Stand: Contact guard assistance  Stand to Sit: Contact guard assistance  Bed to Chair: Moderate assistance; Additional time (inferred - received in chair)  Toilet Transfer : Moderate assistance (to [facility] only)  Assistive Device : Walker, rolling    ADL Assessment:  Feeding: Setup    Oral Facial Hygiene/Grooming: Minimum assistance    Bathing:  Moderate assistance    Upper Body Dressing: Minimum assistance    Lower Body Dressing: Total assistance    Toileting: Moderate assistance        ADL Intervention and task modifications:     Educated Pt re: importance of self-monitoring pain level and notifying staff/RN. Instructed pt to request staff assist prior to mobilizing and benefits to using [facility] for toileting needs at this time to maximize safety. Lower Body Dressing Assistance  Socks: Total assistance (dependent)  Position Performed: Seated in chair       Functional Measure:  Barthel Index:    Bathin  Bladder: 10  Bowels: 10  Groomin  Dressin  Feeding: 10  Mobility: 0  Stairs: 0  Toilet Use: 5  Transfer (Bed to Chair and Back): 5  Total: 45/100        The Barthel ADL Index: Guidelines  1. The index should be used as a record of what a patient does, not as a record of what a patient could do. 2. The main aim is to establish degree of independence from any help, physical or verbal, however minor and for whatever reason. 3. The need for supervision renders the patient not independent. 4. A patient's performance should be established using the best available evidence. Asking the patient, friends/relatives and nurses are the usual sources, but direct observation and common sense are also important. However direct testing is not needed. 5. Usually the patient's performance over the preceding 24-48 hours is important, but occasionally longer periods will be relevant. 6. Middle categories imply that the patient supplies over 50 per cent of the effort. 7. Use of aids to be independent is allowed. Zofia Virk., Barthel, D.W. (7862). Functional evaluation: the Barthel Index. [address] [facility] (14)2. JASPER Huffman, Owen Lubin., Karolina Emerson, Katie, [address] [facility] (). Measuring the change indisability after inpatient rehabilitation; comparison of the responsiveness of the Barthel Index and Functional [place] Measure.  Journal of Neurology, Neurosurgery, and Psychiatry, 664), 201-966. CLOVIS Luther, NAI Miller, & Nabeel Lee M.A. (2004.) Assessment of post-stroke quality of life in cost-effectiveness studies: The usefulness of the Barthel Index and the EuroQoL-5D. Quality of Life Research, 15, 906-31         Occupational Therapy Evaluation Charge Determination   History Examination Decision-Making   MEDIUM Complexity : Expanded review of history including physical, cognitive and psychosocial  history  LOW Complexity : 1-3 performance deficits relating to physical, cognitive , or psychosocial skils that result in activity limitations and / or participation restrictions  MEDIUM Complexity : Patient may present with comorbidities that affect occupational performnce. Miniml to moderate modification of tasks or assistance (eg, physical or verbal ) with assesment(s) is necessary to enable patient to complete evaluation       Based on the above components, the patient evaluation is determined to be of the following complexity level: MEDIUM  Pain Rating:  10/10 BLE p! (LLE>RLE)    Activity Tolerance:   Poor, requires rest breaks, observed SOB with activity, signs and symptoms of orthostatic hypotension, and tachycardia (HR elevated to 135 standing). After treatment patient left in no apparent distress:    Sitting in chair and Call bell within reach    COMMUNICATION/EDUCATION:   The patients plan of care was discussed with: Physical therapist, Registered nurse, and Patient . Home safety education was provided and the patient/caregiver indicated understanding., Patient/family have participated as able in goal setting and plan of care. , and Patient/family agree to work toward stated goals and plan of care.     Thank you for this referral.  Saad Boyd OTR/L  Time Calculation: 19 mins

## 2021-10-02 LAB
ANION GAP SERPL CALC-SCNC: 7 MMOL/L (ref 5–15)
BUN SERPL-MCNC: 16 MG/DL (ref 6–20)
BUN/CREAT SERPL: 24 (ref 12–20)
CALCIUM SERPL-MCNC: 8.6 MG/DL (ref 8.5–10.1)
CHLORIDE SERPL-SCNC: 104 MMOL/L (ref 97–108)
CO2 SERPL-SCNC: 25 MMOL/L (ref 21–32)
CREAT SERPL-MCNC: 0.67 MG/DL (ref 0.7–1.3)
ERYTHROCYTE [DISTWIDTH] IN BLOOD BY AUTOMATED COUNT: 14.1 % (ref 11.5–14.5)
GLUCOSE BLD STRIP.AUTO-MCNC: 51 MG/DL (ref 65–117)
GLUCOSE BLD STRIP.AUTO-MCNC: 66 MG/DL (ref 65–117)
GLUCOSE BLD STRIP.AUTO-MCNC: 84 MG/DL (ref 65–117)
GLUCOSE SERPL-MCNC: 51 MG/DL (ref 65–100)
HCT VFR BLD AUTO: 45.4 % (ref 36.6–50.3)
HGB BLD-MCNC: 15.8 G/DL (ref 12.1–17)
MCH RBC QN AUTO: 32 PG (ref 26–34)
MCHC RBC AUTO-ENTMCNC: 34.8 G/DL (ref 30–36.5)
MCV RBC AUTO: 91.9 FL (ref 80–99)
NRBC # BLD: 0 K/UL (ref 0–0.01)
NRBC BLD-RTO: 0 PER 100 WBC
PLATELET # BLD AUTO: 142 K/UL (ref 150–400)
PMV BLD AUTO: 10.7 FL (ref 8.9–12.9)
POTASSIUM SERPL-SCNC: 3.5 MMOL/L (ref 3.5–5.1)
RBC # BLD AUTO: 4.94 M/UL (ref 4.1–5.7)
SERVICE CMNT-IMP: ABNORMAL
SERVICE CMNT-IMP: NORMAL
SERVICE CMNT-IMP: NORMAL
SODIUM SERPL-SCNC: 136 MMOL/L (ref 136–145)
WBC # BLD AUTO: 10.1 K/UL (ref 4.1–11.1)

## 2021-10-02 PROCEDURE — 74011250637 HC RX REV CODE- 250/637: Performed by: INTERNAL MEDICINE

## 2021-10-02 PROCEDURE — 74011250637 HC RX REV CODE- 250/637: Performed by: NURSE PRACTITIONER

## 2021-10-02 PROCEDURE — 36415 COLL VENOUS BLD VENIPUNCTURE: CPT

## 2021-10-02 PROCEDURE — 99218 HC RM OBSERVATION: CPT

## 2021-10-02 PROCEDURE — 94760 N-INVAS EAR/PLS OXIMETRY 1: CPT

## 2021-10-02 PROCEDURE — 85027 COMPLETE CBC AUTOMATED: CPT

## 2021-10-02 PROCEDURE — 82962 GLUCOSE BLOOD TEST: CPT

## 2021-10-02 PROCEDURE — 80048 BASIC METABOLIC PNL TOTAL CA: CPT

## 2021-10-02 RX ORDER — POLYETHYLENE GLYCOL 3350 17 G/17G
17 POWDER, FOR SOLUTION ORAL DAILY
Status: DISCONTINUED | OUTPATIENT
Start: 2021-10-02 | End: 2021-10-05 | Stop reason: HOSPADM

## 2021-10-02 RX ADMIN — ALUMINUM HYDROXIDE, MAGNESIUM HYDROXIDE, AND SIMETHICONE 30 ML: 200; 200; 20 SUSPENSION ORAL at 21:44

## 2021-10-02 RX ADMIN — METOPROLOL TARTRATE 25 MG: 25 TABLET, FILM COATED ORAL at 21:46

## 2021-10-02 RX ADMIN — Medication 10 ML: at 06:22

## 2021-10-02 RX ADMIN — METOPROLOL TARTRATE 25 MG: 25 TABLET, FILM COATED ORAL at 11:23

## 2021-10-02 RX ADMIN — CLOPIDOGREL BISULFATE 75 MG: 75 TABLET ORAL at 11:23

## 2021-10-02 RX ADMIN — POLYETHYLENE GLYCOL 3350 17 G: 17 POWDER, FOR SOLUTION ORAL at 11:24

## 2021-10-02 RX ADMIN — FAMOTIDINE 20 MG: 20 TABLET, FILM COATED ORAL at 11:23

## 2021-10-02 RX ADMIN — FAMOTIDINE 20 MG: 20 TABLET, FILM COATED ORAL at 18:55

## 2021-10-02 RX ADMIN — ASPIRIN 81 MG: 81 TABLET, CHEWABLE ORAL at 11:23

## 2021-10-02 RX ADMIN — Medication 10 ML: at 18:55

## 2021-10-02 RX ADMIN — Medication 10 ML: at 21:44

## 2021-10-02 NOTE — PROGRESS NOTES
Hospitalist Progress Note    NAME: Ann Hollingsworth   :  1950   MRN:  [id number]       Assessment / Plan:  Atypical chest pain to rule out acute coronary syndrome, POA  EKG on admission sinus tachycardia with second-degree AV block Mobitz 1  Chest x-ray on admission with no acute abnormality  Troponin less than 0.05 on admission, less than 0.05 for repeat  proBNP 690  On aspirin and Plavix, continue  Cardiology recommended to continue metoprolol  Cardiology was consulted, stress test with no evidence of ischemia reported  EP were consulted, no plans for pacemaker at this point  Chest pain-free  Supportive care    Orthostatic hypotension  Debility  Severe spinal stenosis L4-L5  -Status post IV hydration  -Recheck orthostatic vitals today  PT OT evaluated patient recommending SNF   is aware, progress appreciated  CT scan revealed severe spinal canal stenosis L4-5, and pressure consult neurosurgery  Recommended to obtain MRI of the lumbar spine    History of GERD  Continue on famotidine    Hypertension  Continue home meds as tolerated except beta-blocker    Hypovolemic hypernatremia  Hypokalemia  Replace electrolytes  Sodium is 134  Follow BMP      18.5 - 24.9 Normal weight / Body mass index is 22.96 kg/m². Estimated discharge date:   Barriers: Clinically stable at this point, awaiting SNF and blood pressure optimization before discharge, MRI of the lumbar spine    Code status: Full  Prophylaxis: SCD's  Recommended Disposition: Home w/Family     Subjective:     Patient was seen and examined. No acute events overnight. Discussed with RN overnight events. All patient's questions were answered.     \"Continues to report hip pain, denies any chest pain\"    Review of Systems:  Symptom Y/N Comments  Symptom Y/N Comments   Fever/Chills n   Chest Pain n    Poor Appetite    Edema     Cough n   Abdominal Pain n    Sputum    Joint Pain y  left hip   SOB/SANDERS n Pruritis/Rash     Nausea/vomit n   Tolerating PT/OT     Diarrhea    Tolerating Diet y    Constipation    Other       Could NOT obtain due to:        Objective:     VITALS:   Last 24hrs VS reviewed since prior progress note. Most recent are:  Patient Vitals for the past 24 hrs:   Temp Pulse Resp BP SpO2   10/02/21 0241 98.6 °F (37 °C) 78 18 (!) 150/88 99 %   10/01/21 2035 98.4 °F (36.9 °C) 88 18 124/77 96 %   10/01/21 1533 98.8 °F (37.1 °C) 89 20 (!) 143/95 98 %   10/01/21 1128 98.8 °F (37.1 °C) 98 20 (!) 141/83 99 %   10/01/21 1011  (!) 108  (!) 140/97    10/01/21 1006  (!) 135  99/85    10/01/21 0955  (!) 115  (!) 142/101 98 %       Intake/Output Summary (Last 24 hours) at 10/2/2021 0743  Last data filed at 10/1/2021 1533  Gross per 24 hour   Intake 600 ml   Output    Net 600 ml        I had a face to face encounter and independently examined this patient on 10/2/2021, as outlined below:  PHYSICAL EXAM:  General: WD, WN. Alert, cooperative, no acute distress    EENT:  EOMI. Anicteric sclerae. MMM  Resp:  CTA bilaterally, no wheezing or rales. No accessory muscle use  CV:  Regular  rhythm,  No edema  GI:  Soft, Non distended, Non tender. +Bowel sounds  Neurologic:  Alert and oriented X 3, normal speech,   Psych:   Good insight. Not anxious nor agitated  Skin:  No rashes. No jaundice    Reviewed most current lab test results and cultures  YES  Reviewed most current radiology test results   YES  Review and summation of old records today    NO  Reviewed patient's current orders and MAR    YES  PMH/SH reviewed - no change compared to H&P  ________________________________________________________________________  Care Plan discussed with:    Comments   Patient x    Family      RN x    Care Manager     Consultant                        Multidiciplinary team rounds were held today with , nursing, pharmacist and clinical coordinator.   Patient's plan of care was discussed; medications were reviewed and discharge planning was addressed. ________________________________________________________________________  Total NON critical care TIME:  28   Minutes    Total CRITICAL CARE TIME Spent:   Minutes non procedure based      Comments   >50% of visit spent in counseling and coordination of care     ________________________________________________________________________  Brooks Pruett MD     Procedures: see electronic medical records for all procedures/Xrays and details which were not copied into this note but were reviewed prior to creation of Plan. LABS:  I reviewed today's most current labs and imaging studies.   Pertinent labs include:  Recent Labs     10/02/21  0253 10/01/21  0451 09/30/21  1620   WBC 10.1 7.3 6.1   HGB 15.8 15.7 16.2   HCT 45.4 46.7 48.0   * 135* 153     Recent Labs     10/02/21  0253 10/01/21  0451 09/30/21  1620    136 136   K 3.5 3.4* 3.6    104 107   CO2 25 26 24   GLU 51* 77 88   BUN 16 18 17   CREA 0.67* 0.78 0.92   CA 8.6 8.7 9.0       Signed: Brooks Pruett MD

## 2021-10-02 NOTE — PROGRESS NOTES
Attempted to complete MRI screening form and patient reports that he doesn't want to go forward with the test.  Will notify MD.

## 2021-10-02 NOTE — PROGRESS NOTES
-Please complete MRI History and Safety Screening Form   - Patient cannot be scanned until this form is completed and reviewed in MRI to ensure patient is SAFE and eligible for MRI. - CALL MRI when this has been successfully completed at [phone].

## 2021-10-02 NOTE — CONSULTS
72yo admitted for atypical chest pain. Cardiac workup unremarkable. He was to be discharged today when he had orthostatic hypotension when standng and felt lightheaded and weak in his legs. He had Lumbar CT as he was also complaining of severe pain in left LE.  CT shows stenosis at L4/5. 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 If pain persists consider MRI of lumbar spine to further evaluate lumbar stenosis. He may be a candidate for GRIFFIN or surgery if MRI confirms stenosis.   Will continue to be available as needed

## 2021-10-02 NOTE — PROGRESS NOTES
Bedside shift change report given to Keyur Max RN (oncoming nurse) by Devorah Mai RN (offgoing nurse). Report included the following information SBAR.

## 2021-10-02 NOTE — PROGRESS NOTES
End of Shift Note    Bedside shift change report given to Israel Rivas (oncoming nurse) by Ana García RN (offgoing nurse). Report included the following information SBAR, Kardex, Intake/Output, MAR, Accordion, Recent Results and Med Rec Status    Shift worked:  7pm-7am     Shift summary and any significant changes:     Blood  glucose of 51 with AM labs.  Managed  Per  Protocol until it was 80     Concerns for physician to address:  As above     Zone phone for oncoming shift:            Ana García RN

## 2021-10-03 PROBLEM — R07.9 CHEST PAIN: Status: ACTIVE | Noted: 2021-10-03

## 2021-10-03 LAB — TROPONIN I SERPL-MCNC: <0.05 NG/ML

## 2021-10-03 PROCEDURE — 36415 COLL VENOUS BLD VENIPUNCTURE: CPT

## 2021-10-03 PROCEDURE — 74011250637 HC RX REV CODE- 250/637: Performed by: INTERNAL MEDICINE

## 2021-10-03 PROCEDURE — 84484 ASSAY OF TROPONIN QUANT: CPT

## 2021-10-03 PROCEDURE — 74011250636 HC RX REV CODE- 250/636: Performed by: NURSE PRACTITIONER

## 2021-10-03 PROCEDURE — 94760 N-INVAS EAR/PLS OXIMETRY 1: CPT

## 2021-10-03 PROCEDURE — 65660000000 HC RM CCU STEPDOWN

## 2021-10-03 PROCEDURE — 74011250637 HC RX REV CODE- 250/637: Performed by: NURSE PRACTITIONER

## 2021-10-03 PROCEDURE — 99218 HC RM OBSERVATION: CPT

## 2021-10-03 PROCEDURE — 93005 ELECTROCARDIOGRAM TRACING: CPT

## 2021-10-03 RX ORDER — NITROGLYCERIN 0.4 MG/1
0.4 TABLET SUBLINGUAL AS NEEDED
Status: DISCONTINUED | OUTPATIENT
Start: 2021-10-03 | End: 2021-10-05 | Stop reason: HOSPADM

## 2021-10-03 RX ADMIN — POLYETHYLENE GLYCOL 3350 17 G: 17 POWDER, FOR SOLUTION ORAL at 09:44

## 2021-10-03 RX ADMIN — Medication 10 ML: at 05:26

## 2021-10-03 RX ADMIN — ALUMINUM HYDROXIDE, MAGNESIUM HYDROXIDE, AND SIMETHICONE 30 ML: 200; 200; 20 SUSPENSION ORAL at 15:12

## 2021-10-03 RX ADMIN — METOPROLOL TARTRATE 25 MG: 25 TABLET, FILM COATED ORAL at 21:01

## 2021-10-03 RX ADMIN — Medication 10 ML: at 21:05

## 2021-10-03 RX ADMIN — ALUMINUM HYDROXIDE, MAGNESIUM HYDROXIDE, AND SIMETHICONE 30 ML: 200; 200; 20 SUSPENSION ORAL at 21:01

## 2021-10-03 RX ADMIN — FAMOTIDINE 20 MG: 20 TABLET, FILM COATED ORAL at 17:32

## 2021-10-03 RX ADMIN — Medication 10 ML: at 17:32

## 2021-10-03 RX ADMIN — ONDANSETRON 4 MG: 2 INJECTION INTRAMUSCULAR; INTRAVENOUS at 21:00

## 2021-10-03 RX ADMIN — NITROGLYCERIN 0.4 MG: 0.4 TABLET, ORALLY DISINTEGRATING SUBLINGUAL at 21:00

## 2021-10-03 RX ADMIN — METOPROLOL TARTRATE 25 MG: 25 TABLET, FILM COATED ORAL at 09:43

## 2021-10-03 RX ADMIN — CLOPIDOGREL BISULFATE 75 MG: 75 TABLET ORAL at 09:43

## 2021-10-03 RX ADMIN — ASPIRIN 81 MG: 81 TABLET, CHEWABLE ORAL at 09:43

## 2021-10-03 RX ADMIN — FAMOTIDINE 20 MG: 20 TABLET, FILM COATED ORAL at 09:43

## 2021-10-03 NOTE — PROGRESS NOTES
RAPID RESPONSE TEAM    Responded to overhead rapid response chest pain 2180    Patient is agitated and c/o SSCP radiating to left arm, described as burning, states \"it hurts around my heart\". Pain is reproducible, \"it's hard to breathe\". Dr. Nii Rodrigues at bedside. STAT EKG obtained- SR w/1st degree, non-ischemic. STAT TROP/MAALOX ordered by MD. Patient had similar episode upon arrival to ED. Cardiac workup neg so far, trop and neg stress test. Patient reports hasn't had a BM in several days. Visit Vitals  BP (!) 149/102   Pulse 76   Temp 97.4 °F (36.3 °C)   Resp 18   Ht 5' 10\" (1.778 m)   Wt 72.6 kg (160 lb)   SpO2 100%   BMI 22.96 kg/m²     Patient to remain in 2180. Please call back if needed.       Omari Frey RN  Rapid Response Team  X . [phone]

## 2021-10-03 NOTE — PROGRESS NOTES
Spiritual Care Assessment/Progress Note  [facility]      NAME: Marta Ham      MRN: [id number]  AGE: 66 y.o. SEX: male  Moravian Affiliation: No Yarsanism   Language: English     10/3/2021     Total Time (in minutes): 13     Spiritual Assessment begun in [facility] 2 MED TELE through conversation with:         []Patient        [x] Family    [] Friend(s)        Reason for Consult: Rapid response team     Spiritual beliefs: (Please include comment if needed)     [] Identifies with a godfrey tradition:         [] Supported by a godfrey community:            [] Claims no spiritual orientation:           [] Seeking spiritual identity:                [] Adheres to an individual form of spirituality:           [x] Not able to assess:   Did not indicate                        Identified resources for coping:      [] Prayer                               [] Music                  [] Guided Imagery     [x] Family/friends                 [] Pet visits     [] Devotional reading                         [] Unknown     [] Other:                                              Interventions offered during this visit: (See comments for more details)    Patient Interventions: Crisis     Family/Friend(s): Catharsis/review of pertinent events in supportive environment, Initial Assessment     Plan of Care:     [x] Support spiritual and/or cultural needs    [] Support AMD and/or advance care planning process      [] Support grieving process   [] Coordinate Rites and/or Rituals    [] Coordination with community clergy   [] No spiritual needs identified at this time   [] Detailed Plan of Care below (See Comments)  [] Make referral to Music Therapy  [] Make referral to Pet Therapy     [] Make referral to Addiction services  [] Make referral to [facility]  [] Make referral to Spiritual Care Partner  [] No future visits requested        [x] Follow up upon further referrals     Comments:  Responded to RRT on Med Surg Tele. Family member arrived as code was called. She is a caregiver for him, but did not provide her name. Patient is responding to staff as he is being evaluated. Provided pastoral presence and advised visitor of ongoing availability of pastoral support.      MILY Douglas, [facility], Staff [address]    [address] Paging Service  [phone])

## 2021-10-03 NOTE — PROGRESS NOTES
Hospitalist Progress Note    NAME: Art Farmer   :  1950   MRN:  [id number]       Assessment / Plan:  Atypical chest pain to rule out acute coronary syndrome, POA  EKG on admission sinus tachycardia with second-degree AV block Mobitz 1  Chest x-ray on admission with no acute abnormality  Troponin less than 0.05 on admission, less than 0.05 for repeat  proBNP 690  On aspirin and Plavix, continue  Cardiology recommended to continue metoprolol  Cardiology was consulted, stress test with no evidence of ischemia reported  EP were consulted, no plans for pacemaker at this point  Chest pain-free  Supportive care    Orthostatic hypotension  Debility  Severe spinal stenosis L4-L5  -Status post IV hydration, orthostasis is resolved  -Recheck orthostatic vitals today  PT OT evaluated patient recommending SNF. [facility] accepted the patient but authorization pending  CT scan revealed severe spinal canal stenosis L4-5, appreciated neurosurgery consult. Recommended MRI  Patient refused to get an MRI, will have outpatient follow-up with neurosurgery    History of GERD  Continue on famotidine    Hypertension  Continue home meds as tolerated except beta-blocker    Hypovolemic hypernatremia  Hypokalemia  Replace electrolytes  Sodium is 134  Follow BMP      18.5 - 24.9 Normal weight / Body mass index is 22.96 kg/m². Estimated discharge date:   Barriers: Clinically stable at this point, awaiting insurance authorization for SNF     Code status: Full  Prophylaxis: SCD's  Recommended Disposition: Home w/Family     Subjective:     Patient was seen and examined. No acute events overnight. Discussed with RN overnight events. All patient's questions were answered. \"Continues to report hip pain, denies any chest pain. Refused to get MRI.  Pending insurance authorization for SNF placement\"    Review of Systems:  Symptom Y/N Comments  Symptom Y/N Comments   Fever/Chills n   Chest Pain n Poor Appetite    Edema     Cough n   Abdominal Pain n    Sputum    Joint Pain y  left hip   SOB/SANDERS n   Pruritis/Rash     Nausea/vomit n   Tolerating PT/OT     Diarrhea    Tolerating Diet y    Constipation    Other       Could NOT obtain due to:        Objective:     VITALS:   Last 24hrs VS reviewed since prior progress note. Most recent are:  Patient Vitals for the past 24 hrs:   Temp Pulse Resp BP SpO2   10/03/21 0743 98.3 °F (36.8 °C) 80 16 (!) 140/89 98 %   10/03/21 0339 98.1 °F (36.7 °C) 74 16 (!) 150/96 100 %   10/02/21 2342 98 °F (36.7 °C) 71 20 (!) 149/92 96 %   10/02/21 1813 99.2 °F (37.3 °C) 79 20 130/80 98 %   10/02/21 1408  92  (!) 145/99    10/02/21 1406  84  (!) 146/90    10/02/21 1403  74  138/80    10/02/21 1209 98.3 °F (36.8 °C) 78 19 (!) 147/88 98 %   10/02/21 0844 98.3 °F (36.8 °C) 89 18 128/83 99 %       Intake/Output Summary (Last 24 hours) at 10/3/2021 0755  Last data filed at 10/3/2021 4433  Gross per 24 hour   Intake    Output 650 ml   Net -650 ml        I had a face to face encounter and independently examined this patient on 10/3/2021, as outlined below:  PHYSICAL EXAM:  General: WD, WN. Alert, cooperative, no acute distress    EENT:  EOMI. Anicteric sclerae. MMM  Resp:  CTA bilaterally, no wheezing or rales. No accessory muscle use  CV:  Regular  rhythm,  No edema  GI:  Soft, Non distended, Non tender. +Bowel sounds  Neurologic:  Alert and oriented X 3, normal speech,   Psych:   Good insight. Not anxious nor agitated  Skin:  No rashes.   No jaundice    Reviewed most current lab test results and cultures  YES  Reviewed most current radiology test results   YES  Review and summation of old records today    NO  Reviewed patient's current orders and MAR    YES  PMH/SH reviewed - no change compared to H&P  ________________________________________________________________________  Care Plan discussed with:    Comments   Patient x    Family      RN x    Care Manager     Consultant Multidiciplinary team rounds were held today with , nursing, pharmacist and clinical coordinator. Patient's plan of care was discussed; medications were reviewed and discharge planning was addressed. ________________________________________________________________________  Total NON critical care TIME:  28   Minutes    Total CRITICAL CARE TIME Spent:   Minutes non procedure based      Comments   >50% of visit spent in counseling and coordination of care     ________________________________________________________________________  Link Guzman MD     Procedures: see electronic medical records for all procedures/Xrays and details which were not copied into this note but were reviewed prior to creation of Plan. LABS:  I reviewed today's most current labs and imaging studies.   Pertinent labs include:  Recent Labs     10/02/21  0253 10/01/21  0451 09/30/21  1620   WBC 10.1 7.3 6.1   HGB 15.8 15.7 16.2   HCT 45.4 46.7 48.0   * 135* 153     Recent Labs     10/02/21  0253 10/01/21  0451 09/30/21  1620    136 136   K 3.5 3.4* 3.6    104 107   CO2 25 26 24   GLU 51* 77 88   BUN 16 18 17   CREA 0.67* 0.78 0.92   CA 8.6 8.7 9.0       Signed: Link Guzman MD

## 2021-10-03 NOTE — PROGRESS NOTES
Referred by: Noel Levy MD; Medical Diagnosis (from order):    Diagnosis Information      Diagnosis    V45.89 (ICD-9-CM) - Z98.890 (ICD-10-CM) - History of surgery on arm    729.5 (ICD-9-CM) - M79.641 (ICD-10-CM) - Right hand pain                Occupational Therapy -  Daily Treatment Note    Visit: 12  Next referring provider appointment: 4/2/2020    Diagnosis Precautions: PROCEDURE PERFORMED: Right extensor tenosynovectomy with posterior interosseous nerve neurectomy and interposition bridge allografting repair of the extensor carpi ulnaris.  DATE OF PROCEDURE: 3-4-20    SUBJECTIVE                                                                                                             7 weeks post op. Pleased with transition to wrist splint. Reports minimal pain- \"stiffness and pulling. Tingling is better.\"   Functional Change: Wearing splint all of the time except exercises and hygiene.         Pain / Symptoms:  Pain rating (out of 10): Current: 1     OBJECTIVE                                                                                                                       Hand Dominance: right-handed;   Observation:   Comments / Details: Received in splint reports comfortable splint     Scar:     Location: Ulnar wrist  , hypersensitive, adherent and surrounding tissue restricted  Range of Motion (ROM) (norms in parentheses, measurement in degrees unless noted):   Elbow Flexion (140-150): Right:  Active: 144  Elbow Extension (0; negative=lacking to 0, positive=beyond 0): Right:  Active: 0  Forearm Supination (80): Left:  Active: 90 Right:  Active: 5  Forearm Pronation (80): Left:  Active: 90 Right:  Active: 65  Wrist Flexion (60-80): Left:  Active: 60 Right:  Active: 15  Wrist Extension (60-70): Left:  Active: 90 Right:  Active: 38  Wrist Radial Deviation (20): Right:  Active: 9  Wrist Ulnar Deviation (30): Right:  Active: 20  Index Metacarpophalangeal Flexion (90): Right:Active: 52  Index  Responded to rapid response for chest pain  Pt complains of left sided chest pain that radiates to the left arm, headache. Some shortness of breath. /95. O2 sat 98%. Epigastric tenderness on exam  EKG shows no acute ischemic changes  Pt had a negative stress test this admission. He is on ASA and plavix. Will check troponin  Likely non cardiac chest pain  Will give Mylanta and see if it helps. Metacarpophalangeal Extension (20): Right: Active: 0  Index Proximal Interphalangeal Flexion (100): Right: Active: 98  Index Proximal Interphalangeal Extension (0): Right: Active: 0  Index Distal Interphalangeal Flexion (100): Right: Active: 72  Index Distal Interphalangeal Extension (0): Right: Active: 0Index GAYTAN Right: 222  Middle Metacarpophalangeal Flexion (90): Right: Active: 62  Middle Metacarpophalangeal Extension (20): Right: Active: 0  Middle Proximal Interphalangeal Flexion (100): Right: Active: 95  Middle Proximal Interphalangeal Extension (0): Right: Active: 0  Middle Distal Interphalangeal Flexion (70-90): Right: Active: 78  Middle Distal Interphalangeal Extension (0): Right: Active: 0  Middle Total Arc of Motion Right: 235  Ring Metacarpophalangeal Flexion (90): Right: Active: 47  Ring Metacarpophalangeal Extension (20): Right: Active: 0  Ring Proximal Interphalangeal Flexion (100): Right: Active: 98  Ring Proximal Interphalangeal Extension (0): Right: Active: 0  Ring Distal Interphalangeal Flexion (70-90): Right: Active: 80  Ring Distal Interphalangeal Extension (0): Right: Active: 0  Ring Total Arc of Motion Right: 225  Small Metacarpophalangeal Flexion (90): Right: Active: 28  Small Metacarpophalangeal Extension (20): Right: Active: 0  Small Proximal Interphalangeal Flexion (100): Right: Active: 87  Small Proximal Interphalangeal Extension (0): Right: Active: 0  Small Distal Interphalangeal Flexion (70-90): Right: Active: 88  Small Distal Interphalangeal Extension (0): Right: Active: 0  Small Total Arc of Motion Right: 203      Strength  out of 5 unless otherwise indicated, standard test position unless noted, lbs tested with hand held dynamometer:   Not Tested: Right wrist/hand due to MD guidelines, post op status, restrictions.        Wrist/Hand Sensation:   Numbness/Tingling:      Numbness Right:  Absent;     Tingling Right:  Absent;    Median nerve distribution       TREATMENT                                                                                                                   Therapeutic Exercise:  Digit AROM  Elbow AROM    Gentle wrist and forearm AROM- issued handout for HEP. Pt seeing ortho following OT appt. Pt to clarify with MD if okay to start this as formal HEP  -wrist- dart thrower with gravity assist  -wrist flex, ext, deviation AAROM- resting on table, using piece of paper or washcloth to assist  -forearm AAROM- forearm supported on table     Elbow, forearm, wrist, digit ROM assessed for progress note   Therapeutic Activity:  Scar massage  Issued silicone gel sheet for scar   MEM (pt has edema glove)    Moist Heat ([id number])    Location: Right wrist/hand  Position: sitting  Temperature: 160° F  Duration: 10 minutes  Paraffin (64617)    Location: Right wrist/hand  Temperature: 130° F  Duration: 10 minutes  Results: decreased pain, tissue softening, improved range of motion and improved circulation  Reaction: no adverse reaction to treatment    Skilled input: verbal instruction/cues    Home Exercise Program: (*above indicates provided as part of home exercise program)  Hook fisting  Finger ROM  Shoulder ROM  Elbow ROM      ASSESSMENT                                                                                                             Progressing towards goals. Pt has started very gentle wrist and forearm ROM- limited motion, but reports minimal pain. Elbow AROM WNL. Digit AROM improving, but still limited at MCPs.   To date the patient has made gains as expected as reported. Patient continues to have impairments and functional deficits as noted.  Patient will continue to benefit from skilled care as outlined.    Pain/symptoms after session: 1  Patient Education:   Results of above outlined education: Needs reinforcement     PLAN                                                                                                                         Updates to plan of care: continue  current plan of care    patient involved in and agreed to plan of care and goals.    Suggestions for next session as indicated: Modalities, ROM to fingers, shoulder, elbow, as well as edema management and pain management.         Procedures and total treatment time documented Time Entry flowsheet.

## 2021-10-03 NOTE — PROGRESS NOTES
Bedside and Verbal shift change report given to [address] [place] [address] (oncoming nurse) by Conchita Zhou RN (offgoing nurse). Report included the following information SBAR, Kardex, Intake/Output, MAR, Accordion, Recent Results and Med Rec Status.

## 2021-10-03 NOTE — PROGRESS NOTES
Problem: Falls - Risk of  Goal: *Absence of Falls  Description: Document Giomorales Sarath Fall Risk and appropriate interventions in the flowsheet.   Outcome: Progressing Towards Goal  Note: Fall Risk Interventions:  Mobility Interventions: Bed/chair exit alarm         Medication Interventions: Evaluate medications/consider consulting pharmacy    Elimination Interventions: Call light in reach

## 2021-10-04 LAB
ATRIAL RATE: 78 BPM
CALCULATED P AXIS, ECG09: 64 DEGREES
CALCULATED R AXIS, ECG10: 14 DEGREES
CALCULATED T AXIS, ECG11: 53 DEGREES
DIAGNOSIS, 93000: NORMAL
P-R INTERVAL, ECG05: 318 MS
Q-T INTERVAL, ECG07: 410 MS
QRS DURATION, ECG06: 90 MS
QTC CALCULATION (BEZET), ECG08: 429 MS
VENTRICULAR RATE, ECG03: 66 BPM

## 2021-10-04 PROCEDURE — 97530 THERAPEUTIC ACTIVITIES: CPT

## 2021-10-04 PROCEDURE — 65660000000 HC RM CCU STEPDOWN

## 2021-10-04 PROCEDURE — 74011250637 HC RX REV CODE- 250/637: Performed by: NURSE PRACTITIONER

## 2021-10-04 PROCEDURE — 94760 N-INVAS EAR/PLS OXIMETRY 1: CPT

## 2021-10-04 PROCEDURE — 74011250637 HC RX REV CODE- 250/637: Performed by: INTERNAL MEDICINE

## 2021-10-04 RX ORDER — FACIAL-BODY WIPES
10 EACH TOPICAL ONCE
Status: COMPLETED | OUTPATIENT
Start: 2021-10-04 | End: 2021-10-04

## 2021-10-04 RX ADMIN — CLOPIDOGREL BISULFATE 75 MG: 75 TABLET ORAL at 09:25

## 2021-10-04 RX ADMIN — OXYCODONE AND ACETAMINOPHEN 1 TABLET: 5; 325 TABLET ORAL at 23:09

## 2021-10-04 RX ADMIN — FAMOTIDINE 20 MG: 20 TABLET, FILM COATED ORAL at 09:25

## 2021-10-04 RX ADMIN — Medication 10 ML: at 23:09

## 2021-10-04 RX ADMIN — LISINOPRIL 20 MG: 20 TABLET ORAL at 09:25

## 2021-10-04 RX ADMIN — ASPIRIN 81 MG: 81 TABLET, CHEWABLE ORAL at 09:24

## 2021-10-04 RX ADMIN — OXYCODONE AND ACETAMINOPHEN 1 TABLET: 5; 325 TABLET ORAL at 18:03

## 2021-10-04 RX ADMIN — Medication 10 ML: at 14:31

## 2021-10-04 RX ADMIN — FAMOTIDINE 20 MG: 20 TABLET, FILM COATED ORAL at 18:03

## 2021-10-04 RX ADMIN — Medication 10 ML: at 05:08

## 2021-10-04 RX ADMIN — METOPROLOL TARTRATE 25 MG: 25 TABLET, FILM COATED ORAL at 09:24

## 2021-10-04 RX ADMIN — BISACODYL 10 MG: 10 SUPPOSITORY RECTAL at 09:24

## 2021-10-04 RX ADMIN — METOPROLOL TARTRATE 25 MG: 25 TABLET, FILM COATED ORAL at 23:09

## 2021-10-04 RX ADMIN — ACETAMINOPHEN 650 MG: 325 TABLET ORAL at 09:25

## 2021-10-04 NOTE — PROGRESS NOTES
Transition of Care Plan:     RUR: N/A - pt is observation status  Disposition: SNF - [address] [place] [address] pending auth  Follow up appointments: PCP  DME needed: Pt has crutches and a RW  Transportation at Discharge: Pt's girlfriend  [address] or means to access home:  Pt's girlfriend has access       Medicare Letter: To be given prior to d/c  Is patient a BCPI-A Bundle:      No                If yes, was Bundle Letter given?:     Caregiver Contact: Contreras Garcia ([phone])  Discharge Caregiver contacted prior to discharge? Pt's cg will be contact prior to d/c      CM reviewed pt's chart. Pt is ready for d/c. CM contacted [address] [place] [address] and they report pt's insurance [facility] is still pending.      CLARENCE Cadena  Care Manager [facility]  [phone]

## 2021-10-04 NOTE — PROGRESS NOTES
End of Shift Note    Bedside shift change report given to Syeda Hodge (oncoming nurse) by Maria Del Rosario Dobbins RN (offgoing nurse).   Report included the following information SBAR, Kardex, Intake/Output, MAR, Accordion, Recent Results and Med Rec Status    Shift worked:  7PM-7AM     Shift summary and any significant changes:     Pt received  SL nitro X1 for chest pain 5/ 10   with relief     With   As  above     Zone phone for oncoming shift:            Maria Del Rosario Dobbins RN

## 2021-10-04 NOTE — PROGRESS NOTES
Problem: Self Care Deficits Care Plan (Adult)  Goal: *Acute Goals and Plan of Care (Insert Text)  Description:   FUNCTIONAL STATUS PRIOR TO ADMISSION: Patient was modified independent using a crutches for functional mobility. Sits on shower chair to bathe. 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 Does not drive. Admits to frequent falls. HOME SUPPORT: The patient lived with girlfriend who provided assist with IADL and transportation. Occupational Therapy Goals  Initiated 10/1/2021  1. Patient will perform grooming tasks standing with contact guard assist within 7 day(s). 2.  Patient will perform sponge bathing with moderate assistance  within 7 day(s). 3.  Patient will perform upper body dressing with supervision/set-up within 7 day(s). 4.  Patient will perform lower body dressing with moderate assistance within 7 days. 5.  Patient will perform toilet transfers with contact guard assist using least restrictive device within 7 day(s). 6.  Patient will perform all aspects of toileting with minimal assistance within 7 day(s). Outcome: Progressing Towards Goal    OCCUPATIONAL THERAPY TREATMENT  Patient: Sasha Ulrich (27 y.o. male)  Date: 10/4/2021  Diagnosis: ACS (acute coronary syndrome) ([facility] [address] [I24.9]  Chest pain [R07.9] ACS (acute coronary syndrome) [facility])       Precautions: Fall  Chart, occupational therapy assessment, plan of care, and goals were reviewed. ASSESSMENT  Patient continues with skilled OT services and is progressing towards goals. Pt progressing with mobility/balance with increased sit to stand and increased bed to chair; however, he continues to demonstrate increased BLE flexion tone, with pt provided with gait belt, as well as, education on stretching BLE; good understanding demonstrated through good engagement.   Pt continues to benefit from skilled OT to address functional deficits during acute hospitalization, with reporting therapist believing he would benefit from SNF rehab upon discharge. Current Level of Function Impacting Discharge (ADLs): Mod A ADLs    Other factors to consider for discharge: BLE flexor tone         PLAN :  Patient continues to benefit from skilled intervention to address the above impairments. Continue treatment per established plan of care to address goals. Recommend with staff: OOB meals, Active ADL engagement    Recommend next OT session: POC progression    Recommendation for discharge: (in order for the patient to meet his/her long term goals)  Therapy up to 5 days/week in SNF setting    This discharge recommendation:  Has been made in collaboration with the attending provider and/or case management    IF patient discharges home will need the following DME: TBD       SUBJECTIVE:   Patient stated it feels good to stretch my legs.     OBJECTIVE DATA SUMMARY:   Cognitive/Behavioral Status:  Neurologic State: Alert  Orientation Level: Oriented X4  Cognition: Follows commands  Perception: Cues to maintain midline in standing (secondary to noted flexed posture)  Perseveration: No perseveration noted  Safety/Judgement: Awareness of environment    Functional Mobility and Transfers for ADLs:  Bed Mobility:  Rolling: Stand-by assistance  Supine to Sit: Supervision  Scooting: Supervision    Transfers:  Sit to Stand: Contact guard assistance     Bed to Chair: Minimum assistance; Additional time    Balance:  Sitting: Impaired  Sitting - Static: Fair (occasional)  Sitting - Dynamic: Fair (occasional)  Standing: Impaired; With support  Standing - Static: Fair;Constant support  Standing - Dynamic : Poor;Constant support    ADL Intervention:  Cognitive Retraining  Safety/Judgement: Awareness of environment    Pt educated on safe transfer techniques, with specific emphasis on proper hand placement to push up from seated surface rather than attempt to pull self up, fully positioning self in-front of desired seated location, feeling chair on back of legs and reaching back with 1-2 UE to slowly lower self to seated position. Patient instructed and indicated understanding the benefits of maintaining activity tolerance, functional mobility, and independence with self care tasks during acute stay  to ensure safe return home and to baseline. Encouraged patient to increase frequency and duration OOB, be out of bed for all meals, perform daily ADLs (as approved by RN/MD regarding bathing etc), and performing functional mobility to/from [facility]    Pain:  No c/o pain    Activity Tolerance:   Good and Fair    After treatment patient left in no apparent distress:   Sitting in chair and Call bell within reach    COMMUNICATION/COLLABORATION:   The patients plan of care was discussed with: Physical therapy assistant, Registered nurse, and Case management.      Donya Gasca OT  Time Calculation: 15 mins

## 2021-10-04 NOTE — DISCHARGE INSTRUCTIONS
HOSPITALIST DISCHARGE INSTRUCTIONS    NAME: Marta Ham   :  1950   MRN:  [id number]     Date/Time:  10/4/2021 8:51 AM    ADMIT DATE: 2021     DISCHARGE DATE: 10/4/2021     DISCHARGE DIAGNOSIS:  Chest pain, ACS ruled out  Severe lumbar spinal stenosis    MEDICATIONS:  · It is important that you take the medication exactly as they are prescribed. · Keep your medication in the bottles provided by the pharmacist and keep a list of the medication names, dosages, and times to be taken in your wallet. · Do not take other medications without consulting your doctor. Pain Management: per above medications    What to do at Home    Recommended diet:  Regular Diet    Recommended activity: Activity as tolerated    If you have questions regarding the hospital related prescriptions or hospital related issues please call [facility] at [phone]. If you experience any of the following symptoms then please call your primary care physician or return to the emergency room if you cannot get hold of your doctor:  Fever, chills, nausea, vomiting, diarrhea, change in mentation, falling, bleeding, shortness of breath    Follow Up:  Dr. Arnel Flood, [place], MD  you are to call and set up an appointment to see them in 7-10 days. Information obtained by :  I understand that if any problems occur once I am at home I am to contact my physician. I understand and acknowledge receipt of the instructions indicated above.                                                                                                                                            Physician's or R.N.'s Signature                                                                  Date/Time                                                                                                                                              Patient or Representative Signature Date/Time

## 2021-10-04 NOTE — PROGRESS NOTES
Problem: Mobility Impaired (Adult and Pediatric)  Goal: *Acute Goals and Plan of Care (Insert Text)  Description: FUNCTIONAL STATUS PRIOR TO ADMISSION: Patient was modified independent using a rolling walker and crutches for functional mobility. He endorses several falls and stopped using the crutches because it caused him to have chest pain. HOME SUPPORT PRIOR TO ADMISSION: The patient lived with his SO who provided some assistance with adls. He reports she works during the day. Physical Therapy Goals  Initiated 10/1/2021  1. Patient will move from supine to sit and sit to supine , scoot up and down, and roll side to side in bed with minimal assistance/contact guard assist within 7 day(s). 2.  Patient will transfer from bed to chair and chair to bed with supervision/set-up using the least restrictive device within 7 day(s). 3.  Patient will perform sit to stand with supervision/set-up within 7 day(s). 4.  Patient will ambulate with contact guard assist for 25 feet with the least restrictive device within 7 day(s). Outcome: Progressing Towards Goal   PHYSICAL THERAPY TREATMENT  Patient: Rock Choi (24 y.o. male)  Date: 10/4/2021  Diagnosis: [facility]acute [facility] syndrome[facility] [I24.9]  Chest pain [R07.9] ACS (acute coronary syndrome) [facility])       Precautions: Fall  Chart, physical therapy assessment, plan of care and goals were reviewed. ASSESSMENT  Patient continues with skilled PT services and is progressing towards goals. Pt presents with decreased strength and endurance. Pt with increased knee flexion in standing. Pt performed bed mobility at supervision. Pt performed sit to stand transfer at [facility]. Pt able to take steps 3ft over to the recliner at min A. Pt requiring cueing for sequencing and to stand upright. Pt given a gait belt and educated on using the it for stretch LEs and ankles. Pt able to perform and able to get a good stretch.  Pt with improved mobility safety requiring less assistance. Current Level of Function Impacting Discharge (mobility/balance): bed mobility at supervision, sit to stand transfer at [facility], bed to chair transfer at [facility] A with RW     Other factors to consider for discharge: decreased strength , decreased endurance         PLAN :  Patient continues to benefit from skilled intervention to address the above impairments. Continue treatment per established plan of care. to address goals. Recommendation for discharge: (in order for the patient to meet his/her long term goals)  Therapy up to 5 days/week in SNF setting    This discharge recommendation:  Has been made in collaboration with the attending provider and/or case management    IF patient discharges home will need the following DME: to be determined (TBD)       SUBJECTIVE:   Patient stated  I wish I could move around more I feel like I'm in long-term.     OBJECTIVE DATA SUMMARY:   Critical Behavior:              Functional Mobility Training:  Bed Mobility:  Rolling: Stand-by assistance  Supine to Sit: Supervision     Scooting: Supervision        Transfers:  Sit to Stand: Contact guard assistance  Stand to Sit: Contact guard assistance        Bed to Chair: Minimum assistance; Additional time                    Balance:  Sitting: Impaired  Sitting - Static: Fair (occasional)  Sitting - Dynamic: Fair (occasional)  Standing: Impaired; With support  Standing - Static: Fair;Constant support  Standing - Dynamic : Poor;Constant support  Ambulation/Gait Training:  Distance (ft): 3 Feet (ft)  Assistive Device: Gait belt;Walker, rolling  Ambulation - Level of Assistance: Minimal assistance; Additional time        Gait Abnormalities: Antalgic;Decreased step clearance; Step to gait; Toe walking        Base of Support: Narrowed  Stance: Right decreased  Speed/Nasra: Slow;Shuffled  Step Length: Right shortened;Left shortened          Therapeutic Exercises:   Hamstring stretching   Ankle stretching   Pain Rating:  Pt reported increased pain of RLE    Activity Tolerance:   Fair and requires rest breaks    After treatment patient left in no apparent distress:   Sitting in chair and Call bell within reach    COMMUNICATION/COLLABORATION:   The patients plan of care was discussed with: Occupational therapist and Registered nurse.      Xander Salas PTA   Time Calculation: 14 mins

## 2021-10-04 NOTE — PROGRESS NOTES
RAPID RESPONSE TEAM- Follow Up     Rounded on patient due to recent rapid response for CP. EKG showed SR with 1st degree AVB; no ischemic changes noted. Lab Results   Component Value Date/Time    CK 28 (L) 01/07/2019 01:13 PM    CK - MB 1.8 01/07/2019 01:13 PM    CK-MB Index 6.4 (H) 01/07/2019 01:13 PM    Troponin-I, Qt. <0.05 10/03/2021 03:16 PM     Similar CP event when admitted. Cardiac workup so far negative. 9/30 cardiac stress test showed no ischemia or infarct. Cardiology signed off. Spoke with primary RN Isis Bentley; patient had another CP event around 2100; SL nitro x1 given with relief. No RRT interventions currently indicated. Please call with ay questions or concerns.    Corky Evangelista RN  Ext. [phone]

## 2021-10-05 VITALS
OXYGEN SATURATION: 100 % | RESPIRATION RATE: 18 BRPM | HEART RATE: 70 BPM | TEMPERATURE: 98.4 F | BODY MASS INDEX: 22.9 KG/M2 | HEIGHT: 70 IN | SYSTOLIC BLOOD PRESSURE: 141 MMHG | WEIGHT: 160 LBS | DIASTOLIC BLOOD PRESSURE: 86 MMHG

## 2021-10-05 PROCEDURE — 74011250637 HC RX REV CODE- 250/637: Performed by: NURSE PRACTITIONER

## 2021-10-05 PROCEDURE — 74011250637 HC RX REV CODE- 250/637: Performed by: INTERNAL MEDICINE

## 2021-10-05 PROCEDURE — 97535 SELF CARE MNGMENT TRAINING: CPT

## 2021-10-05 PROCEDURE — 94760 N-INVAS EAR/PLS OXIMETRY 1: CPT

## 2021-10-05 RX ADMIN — FAMOTIDINE 20 MG: 20 TABLET, FILM COATED ORAL at 17:09

## 2021-10-05 RX ADMIN — METOPROLOL TARTRATE 25 MG: 25 TABLET, FILM COATED ORAL at 09:37

## 2021-10-05 RX ADMIN — LISINOPRIL 20 MG: 20 TABLET ORAL at 09:37

## 2021-10-05 RX ADMIN — CLOPIDOGREL BISULFATE 75 MG: 75 TABLET ORAL at 09:37

## 2021-10-05 RX ADMIN — OXYCODONE AND ACETAMINOPHEN 1 TABLET: 5; 325 TABLET ORAL at 16:05

## 2021-10-05 RX ADMIN — ACETAMINOPHEN 650 MG: 325 TABLET ORAL at 09:37

## 2021-10-05 RX ADMIN — FAMOTIDINE 20 MG: 20 TABLET, FILM COATED ORAL at 09:37

## 2021-10-05 RX ADMIN — Medication 10 ML: at 14:57

## 2021-10-05 RX ADMIN — ASPIRIN 81 MG: 81 TABLET, CHEWABLE ORAL at 09:37

## 2021-10-05 NOTE — PROGRESS NOTES
Transition of Care Plan:     RUR: 11% low risk  Disposition: Home with  - [facility]  Follow up appointments: PCP  DME needed: Pt has crutches and a RW  Transportation at [address] via [facility] at 7:15 pm  Keys or means to access home:  Pt's girlfriend has access      IM Medicare Letter: Received on 10/5/2021  Is patient a BCPI-A Bundle:      No                If yes, was Bundle Letter given?:     Caregiver Contact: PariienElsa gordon ([phone])  Discharge Caregiver contacted prior to discharge? Pt's cg will be contact prior to d/c    3:48 PM  Pt will d/c home with [facility] though [address]. Pt will transport home via [facility] at 7:15 pm. CM updated pt's AVS with f/u information. CM met with pt at bedside and he is in agreement with d/c plan. Medicare pt has received, reviewed, and signed 2nd IM letter informing them of their right to appeal the discharge. Signed copy has been placed on pt bedside chart. CM completed PCS form and placed it on pt's bedside chart. No further needs identified at this time. Patient is ready to d/c on a CM standpoint. RN aware. Care Management Interventions  PCP Verified by CM: No (Pt reports seeing Dr. Kiah Santizo at [facility])  Mode of Transport at Discharge: [address] [facility]  [address] Time of Discharge: 1915  Transition of Care Consult (CM Consult): [address]: No  Reason Outside [facility]: Unable to staff case  Discharge Durable Medical Equipment: No (Pt has crutches and a RW)  Physical Therapy Consult: No  Occupational Therapy Consult: No  Speech Therapy Consult: No  Support Systems: Spouse/Significant Other  Confirm Follow Up Transport: Family  Discharge Location  Discharge Placement: Home with home health ([address])      1:53 PM  KIT met with pt at bedside to discuss d/c planning. He reports he does not want to go to a SNF and rather d/[facility]. KIT contacted [facility] and they are in contract with [facility].  KIT to send referrals via [employer]. 11:55 AM  CM received update that SNF has insurance auth for pt. However pt is unvaccinated and they are trying to see if they have a bed available for him.  Pt will need a Rapid Covid Test. CM notified MD. Gracy Cowden, MSW  Care Manager [facility]  [phone]

## 2021-10-05 NOTE — PROGRESS NOTES
Problem: Self Care Deficits Care Plan (Adult)  Goal: *Acute Goals and Plan of Care (Insert Text)  Description:   FUNCTIONAL STATUS PRIOR TO ADMISSION: Patient was modified independent using a crutches for functional mobility. Sits on shower chair to bathe. 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 Does not drive. Admits to frequent falls. HOME SUPPORT: The patient lived with girlfriend who provided assist with IADL and transportation. Occupational Therapy Goals  Initiated 10/1/2021  1. Patient will perform grooming tasks standing with contact guard assist within 7 day(s). 2.  Patient will perform sponge bathing with moderate assistance  within 7 day(s). 3.  Patient will perform upper body dressing with supervision/set-up within 7 day(s). 4.  Patient will perform lower body dressing with moderate assistance within 7 days. 5.  Patient will perform toilet transfers with contact guard assist using least restrictive device within 7 day(s). 6.  Patient will perform all aspects of toileting with minimal assistance within 7 day(s). Outcome: Progressing Towards Goal     OCCUPATIONAL THERAPY TREATMENT  Patient: Antionette Cazares (3 y.o. male)  Date: 10/5/2021  Diagnosis: ACS (acute coronary syndrome) ([facility]) [I24.9]  Chest pain [R07.9] ACS (acute coronary syndrome) [facility])       Precautions: Fall  Chart, occupational therapy assessment, plan of care, and goals were reviewed. ASSESSMENT  Patient continues with skilled OT services and is progressing towards goals. Pt noted with progressive mobility/balance and ADL independence, functionally evidenced by completing toileting, ADL item gathering and LE dressing, using RW, with Supervision and no LOB noted or c/o pain/fatigue. Pt reports [facility] as opposed to SNF level rehab secondary to concerns with Covid, reporting he has tub-transfer bench and all other DME needs fulfilled.   Pt reports he has good in-home PRN ADL/IADL assist from family and friends. Given pt's therapeutic progression this date, as well as, above mentioned, reporting therapist believes pt would benefit from [facility] and ADL/IADL Supervision upon discharge. Acute OT to continue to follow during acute hospitalization. Current Level of Function Impacting Discharge (ADLs): Supervision    Other factors to consider for discharge: none         PLAN :  Patient continues to benefit from skilled intervention to address the above impairments. Continue treatment per established plan of care to address goals. Recommend with staff: OOB meals, Active ADL engagement, Supervision RW toileting    Recommend next OT session: POC progression    Recommendation for discharge: (in order for the patient to meet his/her long term goals)  Occupational therapy at least 2 days/week in the home AND ensure assist and/or supervision for safety with ADLs/IADLs    This discharge recommendation:  Has been made in collaboration with the attending provider and/or case management    IF patient discharges home will need the following DME: patient owns DME required for discharge       SUBJECTIVE:   Patient stated I don't want to go to facility and be confined to a room, I'd rather do therapy at home.     OBJECTIVE DATA SUMMARY:   Cognitive/Behavioral Status:  Neurologic State: Alert  Orientation Level: Oriented X4  Cognition: Follows commands; Appropriate decision making  Perception: Appears intact  Perseveration: No perseveration noted  Safety/Judgement: Awareness of environment;Home safety; Insight into deficits    Functional Mobility and Transfers for ADLs:  Transfers:  Sit to Stand: Supervision  Functional Transfers  Bathroom Mobility: Supervision/set up  Toilet Transfer : Supervision  Bed to Chair: Supervision    Balance:  Sitting: Intact; Without support  Sitting - Static: Good (unsupported)  Sitting - Dynamic: Good (unsupported)  Standing: Impaired; With support  Standing - Static: Good;Constant support (RW)  Standing - Dynamic : Good;Constant support (RW)    ADL Intervention:  Grooming  Grooming Assistance: Supervision  Position Performed: Standing (at [employer])  Washing Hands: Supervision    Lower Body Dressing Assistance  Underpants: Supervision  Socks: Modified independent  Leg Crossed Method Used: Yes  Position Performed: Seated in chair  Adaptive Equipment Used: Walker    Cognitive Retraining  Safety/Judgement: Awareness of environment;Home safety; Insight into deficits    Pt benefits from Min verbal cues for widening base of support and full trunk elongation, no LOB noted at [address] level during ADL/IADL item gathering with multi-directional changes and sustained reach outside base of support. Pain:  No c/o pain    Activity Tolerance:   Good    After treatment patient left in no apparent distress:   Sitting in chair and Call bell within reach    COMMUNICATION/COLLABORATION:   The patients plan of care was discussed with: Physical therapy assistant, Registered nurse, and Case management.      Andre Barahona OT  Time Calculation: 31 mins

## 2021-10-05 NOTE — DISCHARGE SUMMARY
Hospitalist Discharge Summary     Patient ID:  Nimisha Duff  [id number]  12 y.o.  1950 9/28/2021    PCP on record: Collin Lazar MD    Admit date: 9/28/2021  Discharge date and time: 10/5/2021    DISCHARGE DIAGNOSIS:    Atypical chest pain to rule out acute coronary syndrome, POA  Orthostatic hypotension  Debility  Severe spinal stenosis L4-L5  History of GERD  Hypertension       Hypovolemic hypernatremia  Hypokalemia  Replace electrolytes  Sodium is 134  Follow BMP    CONSULTATIONS:  IP CONSULT TO CARDIOLOGY  IP CONSULT TO HOSPITALIST  IP CONSULT TO NEUROSURGERY    Excerpted HPI from H&P of Ina Baeza MD:    Nimisha Duff is a 66 y.o.  male who presents with left sided worsening chest pain for the past week. Pain is aggravated by carbonated drinks and is worse on pressure application. Accompanying symptoms include SOB and nausea. Patient was recently seen in ED for the same complaint last 08/20/21 and was sent home on Famotidine and cardiology follow up. Patient never followed up with cardiology. He also reports left LE weakness and left hip pain making him unable to bear weight on his left leg and ambulates with crutches because of that. Also reports numbness and intermittent swelling on his left leg. Only documented past medical history is hypertension but also admitted to having asthma and uses inhaler intermittently.     We were asked to admit for work up and evaluation of the above problems. ______________________________________________________________________  DISCHARGE SUMMARY/HOSPITAL COURSE:  for full details see H&P, daily progress notes, labs, consult notes.      Atypical chest pain to rule out acute coronary syndrome, POA  EKG on admission sinus tachycardia with second-degree AV block Mobitz 1  Chest x-ray on admission with no acute abnormality  Troponin less than 0.05 on admission, less than 0.05 for repeat  proBNP 690  On aspirin and Plavix, continue  Cardiology recommended to continue metoprolol  Cardiology was consulted, stress test with no evidence of ischemia reported  EP were consulted, no plans for pacemaker at this point  [facility] a recurrent chest pain 10/3, troponin negative again. EKG shows first-degree heart block. Patient's chest pain is exacerbated by the movement of the left shoulder and is likely musculoskeletal in nature  Supportive care  -Pt was discharged 10/4 for stayed waiting for insurance auth  -Can DC today using same order from 10/4     Orthostatic hypotension  Debility  Severe spinal stenosis L4-L5  -Status post IV hydration, orthostasis is resolved  PT OT evaluated patient recommending SNF. [facility]   CT scan revealed severe spinal canal stenosis L4-5, appreciated neurosurgery consult. Recommended MRI  Patient refused to get an MRI, will have outpatient follow-up with neurosurgery     History of GERD  Started on Protonix     Hypertension  Resume home meds as orthostatic hypotension is resolved  Hypovolemic hypernatremia  Hypokalemia  Replace electrolytes  Sodium is 134  Follow BMP        _______________________________________________________________________  Patient seen and examined by me on discharge day. Pertinent Findings:  Gen:    Not in distress  Chest: Clear lungs  CVS:   Regular rhythm. No edema  Abd:  Soft, not distended, not tender  Neuro:  Alert, oriented x4  _______________________________________________________________________  DISCHARGE MEDICATIONS:   Current Discharge Medication List      START taking these medications    Details   aspirin 81 mg chewable tablet Take 1 Tablet by mouth daily. Qty: 30 Tablet, Refills: 0  Start date: 10/1/2021      metoprolol tartrate (LOPRESSOR) 25 mg tablet Take 1 Tablet by mouth every twelve (12) hours. Qty: 60 Tablet, Refills: 0  Start date: 10/1/2021      pantoprazole (Protonix) 40 mg tablet Take 1 Tablet by mouth daily.   Qty: 30 Tablet, Refills: 0  Start date: 10/1/2021         CONTINUE these medications which have CHANGED    Details   clopidogreL (PLAVIX) 75 mg tab Take 1 Tablet by mouth daily. Qty: 30 Tablet, Refills: 0  Start date: 10/1/2021    Associated Diagnoses: HTN (hypertension), benign         CONTINUE these medications which have NOT CHANGED    Details   lisinopril (PRINIVIL, ZESTRIL) 10 mg tablet Take 1 Tab by mouth daily. Qty: 30 Tab, Refills: 0      spironolactone (ALDACTONE) 50 mg tablet Take 1 Tab by mouth daily. Qty: 30 Tab, Refills: 0         STOP taking these medications       labetalol (NORMODYNE) 100 mg tablet Comments:   Reason for Stopping:         ibuprofen (MOTRIN) 800 mg tablet Comments:   Reason for Stopping:         labetalol (NORMODYNE) 100 mg tablet Comments:   Reason for Stopping:                 Patient Follow Up Instructions: Activity: Activity as tolerated  Diet: Cardiac Diet  Wound Care: None needed    Follow-up with PCP and neurosurgery in 1 to 4 weeks.   Follow-up tests/labs none  Follow-up Information     Follow up With Specialties Details Why Contact Info    Other, MD Collin    Patient can only remember the practice name and not the physician      [address] [place] [address]  [phone]    Yefri Pedro MD Neurosurgery In 4 weeks  [address]  [phone]          ________________________________________________________________    Risk of deterioration: High    Condition at Discharge:  Stable  __________________________________________________________________    Disposition  SNF/LTC    ____________________________________________________________________    Code Status: Full Code  ___________________________________________________________________      Total time in minutes spent coordinating this discharge (includes going over instructions, follow-up, prescriptions, and preparing report for sign off to her PCP) :  35 minutes    Signed:  Magnus Schilder, MD

## 2021-10-05 NOTE — PROGRESS NOTES
Physician Progress Note      Klarissa Bernardo  [facility] #:                  [id number]  :                       1950  ADMIT DATE:       2021 6:12 PM  [address] [place] DATE:  RESPONDING  PROVIDER #:        Kaya Gutierrez MD          QUERY TEXT:    Patient admitted with Recurrent Chest pain. Noted documentation of Atypical chest pain to rule out acute coronary syndrome in DC Summary by IM dated 10/4. In order to support the diagnosis of \"rule out acute coronary syndrome\", please include additional clinical indicators in your documentation. Or please document if the diagnosis of \"rule out acute coronary syndrome\" has been ruled out after further study. The medical record reflects the following:  Risk Factors: Hx: HTN / GERD /  Clinical Indicators: hr: 117-119; rr:22-26; bp: 156/103. .. Beba Sagrario Zabala Tnl:<0.05; pBNP: 690; D.-dimer: 2.37. .. Beba Sagrario Beba Sagrario Tri; Choles: 198; LDL: 58. Beba Sagrario Beba Sagrario Hgb  A1c: 4.6.... Beba Sagrario Beba Sagrario EKG: Non-specific T wave abnormality; REPEAT EKG: Sinus tachycardia with 2nd degree AV block (Mobitz I); ECHO: LV: Estimated LVEF is 55 - 60%. Normal cavity size, wall thickness, systolic function (ejection fraction). ... Nuclear Cardiac stress Test: No ischemia or infarct demonstrated. LVEF 69%. ... Treatment: Tnl; D.-dimer; EKG; Echo; Cardiology consult; Nuclear Cardiac Stress Test; A1C; ASA, Plavix; resumed home BB and ACEI; prn O2 and neb treatment; pain management, PPI    Thank you,    Zane Li  [facility]    Options provided:  -- Rule out acute coronary syndrome present as evidenced by, Please document evidence. -- Acute coronary syndrome was ruled out  -- Other - I will add my own diagnosis  -- Disagree - Not applicable / Not valid  -- Disagree - Clinically unable to determine / Unknown  -- Refer to Clinical Documentation Reviewer    PROVIDER RESPONSE TEXT:    Acute coronary syndrome was ruled out after study.     Query created by: Pia Tanner on 10/4/2021 4:01 PM      Electronically signed by:  Kaya Gutierrez MD 10/5/2021 3:08 PM

## 2021-10-05 NOTE — PROGRESS NOTES
PT visit attempted pt declined stating he was tried and didn't sleep well last night. Will defer at this time and continue to follow.

## 2021-12-16 ENCOUNTER — HOSPITAL ENCOUNTER (EMERGENCY)
Age: 71
Discharge: HOME | End: 2021-12-16
Attending: EMERGENCY MEDICINE
Payer: MEDICARE

## 2021-12-16 ENCOUNTER — APPOINTMENT (OUTPATIENT)
Dept: GENERAL RADIOLOGY | Age: 71
End: 2021-12-16
Attending: EMERGENCY MEDICINE
Payer: MEDICARE

## 2021-12-16 VITALS
SYSTOLIC BLOOD PRESSURE: 136 MMHG | DIASTOLIC BLOOD PRESSURE: 89 MMHG | BODY MASS INDEX: 23.23 KG/M2 | TEMPERATURE: 98.3 F | RESPIRATION RATE: 13 BRPM | WEIGHT: 148 LBS | HEIGHT: 67 IN | HEART RATE: 56 BPM | OXYGEN SATURATION: 99 %

## 2021-12-16 DIAGNOSIS — R07.89 ATYPICAL CHEST PAIN: Primary | ICD-10-CM

## 2021-12-16 DIAGNOSIS — I44.0 1ST DEGREE AV BLOCK: ICD-10-CM

## 2021-12-16 LAB
ALBUMIN SERPL-MCNC: 3.2 G/DL (ref 3.5–5)
ALBUMIN/GLOB SERPL: 0.7 {RATIO} (ref 1.1–2.2)
ALP SERPL-CCNC: 56 U/L (ref 45–117)
ALT SERPL-CCNC: 17 U/L (ref 12–78)
ANION GAP SERPL CALC-SCNC: 8 MMOL/L (ref 5–15)
AST SERPL-CCNC: 30 U/L (ref 15–37)
BASOPHILS # BLD: 0 K/UL (ref 0–0.1)
BASOPHILS NFR BLD: 1 % (ref 0–1)
BILIRUB SERPL-MCNC: 1.3 MG/DL (ref 0.2–1)
BNP SERPL-MCNC: 658 PG/ML
BUN SERPL-MCNC: 19 MG/DL (ref 6–20)
BUN/CREAT SERPL: 18 (ref 12–20)
CALCIUM SERPL-MCNC: 9.3 MG/DL (ref 8.5–10.1)
CHLORIDE SERPL-SCNC: 112 MMOL/L (ref 97–108)
CO2 SERPL-SCNC: 22 MMOL/L (ref 21–32)
CREAT SERPL-MCNC: 1.06 MG/DL (ref 0.7–1.3)
DIFFERENTIAL METHOD BLD: ABNORMAL
EOSINOPHIL # BLD: 0.1 K/UL (ref 0–0.4)
EOSINOPHIL NFR BLD: 2 % (ref 0–7)
ERYTHROCYTE [DISTWIDTH] IN BLOOD BY AUTOMATED COUNT: 15.8 % (ref 11.5–14.5)
GLOBULIN SER CALC-MCNC: 4.5 G/DL (ref 2–4)
GLUCOSE SERPL-MCNC: 84 MG/DL (ref 65–100)
HCT VFR BLD AUTO: 45.3 % (ref 36.6–50.3)
HGB BLD-MCNC: 15.5 G/DL (ref 12.1–17)
IMM GRANULOCYTES # BLD AUTO: 0 K/UL (ref 0–0.04)
IMM GRANULOCYTES NFR BLD AUTO: 0 % (ref 0–0.5)
LYMPHOCYTES # BLD: 1.3 K/UL (ref 0.8–3.5)
LYMPHOCYTES NFR BLD: 29 % (ref 12–49)
MCH RBC QN AUTO: 32.3 PG (ref 26–34)
MCHC RBC AUTO-ENTMCNC: 34.2 G/DL (ref 30–36.5)
MCV RBC AUTO: 94.4 FL (ref 80–99)
MONOCYTES # BLD: 0.4 K/UL (ref 0–1)
MONOCYTES NFR BLD: 9 % (ref 5–13)
NEUTS SEG # BLD: 2.6 K/UL (ref 1.8–8)
NEUTS SEG NFR BLD: 59 % (ref 32–75)
NRBC # BLD: 0 K/UL (ref 0–0.01)
NRBC BLD-RTO: 0 PER 100 WBC
PLATELET # BLD AUTO: 179 K/UL (ref 150–400)
PMV BLD AUTO: 10.8 FL (ref 8.9–12.9)
POTASSIUM SERPL-SCNC: 3.9 MMOL/L (ref 3.5–5.1)
PROT SERPL-MCNC: 7.7 G/DL (ref 6.4–8.2)
RBC # BLD AUTO: 4.8 M/UL (ref 4.1–5.7)
SODIUM SERPL-SCNC: 142 MMOL/L (ref 136–145)
TROPONIN-HIGH SENSITIVITY: 6 NG/L (ref 0–76)
TROPONIN-HIGH SENSITIVITY: 7 NG/L (ref 0–76)
WBC # BLD AUTO: 4.4 K/UL (ref 4.1–11.1)

## 2021-12-16 PROCEDURE — 83880 ASSAY OF NATRIURETIC PEPTIDE: CPT

## 2021-12-16 PROCEDURE — 93005 ELECTROCARDIOGRAM TRACING: CPT

## 2021-12-16 PROCEDURE — 80053 COMPREHEN METABOLIC PANEL: CPT

## 2021-12-16 PROCEDURE — 84484 ASSAY OF TROPONIN QUANT: CPT

## 2021-12-16 PROCEDURE — 36415 COLL VENOUS BLD VENIPUNCTURE: CPT

## 2021-12-16 PROCEDURE — 96374 THER/PROPH/DIAG INJ IV PUSH: CPT

## 2021-12-16 PROCEDURE — 99285 EMERGENCY DEPT VISIT HI MDM: CPT

## 2021-12-16 PROCEDURE — 74011250636 HC RX REV CODE- 250/636: Performed by: EMERGENCY MEDICINE

## 2021-12-16 PROCEDURE — 71045 X-RAY EXAM CHEST 1 VIEW: CPT

## 2021-12-16 PROCEDURE — 85025 COMPLETE CBC W/AUTO DIFF WBC: CPT

## 2021-12-16 RX ORDER — KETOROLAC TROMETHAMINE 30 MG/ML
15 INJECTION, SOLUTION INTRAMUSCULAR; INTRAVENOUS
Status: COMPLETED | OUTPATIENT
Start: 2021-12-16 | End: 2021-12-16

## 2021-12-16 RX ADMIN — KETOROLAC TROMETHAMINE 15 MG: 30 INJECTION, SOLUTION INTRAMUSCULAR; INTRAVENOUS at 20:56

## 2021-12-17 LAB
ATRIAL RATE: 69 BPM
CALCULATED P AXIS, ECG09: 51 DEGREES
CALCULATED R AXIS, ECG10: 25 DEGREES
CALCULATED T AXIS, ECG11: 34 DEGREES
DIAGNOSIS, 93000: NORMAL
P-R INTERVAL, ECG05: 400 MS
Q-T INTERVAL, ECG07: 436 MS
QRS DURATION, ECG06: 118 MS
QTC CALCULATION (BEZET), ECG08: 467 MS
VENTRICULAR RATE, ECG03: 69 BPM

## 2021-12-17 NOTE — ED PROVIDER NOTES
EMERGENCY DEPARTMENT HISTORY AND PHYSICAL EXAM      Date: 12/16/2021  Patient Name: Jannie Law    History of Presenting Illness     Chief Complaint   Patient presents with    Chest Pain     pt has had worseing left-side chest pain and skipping beats x 2 days. no SOB. pain is nonradiating. pt took nitro last night. Pt takes a daily ASA and EMS withheld that intervention.  Irregular Heart Beat       History Provided By: Patient    HPI: Jannie Law, 66 y.o. male with PMHx significant for hypertension, prior CVA, spinal stenosis, who presents with a chief complaint of chest pain. Patient reports he has had intermittent chest tightness for the last week. He states is located over his \"heart. \"  Pain lasts only briefly prior to resolving. He denies any cough, fever, nausea, vomiting, diarrhea, dysuria. He states that he does not have any cardiac stents but had significant testing previously while in the hospital for similar symptoms. PCP: None    There are no other complaints, changes, or physical findings at this time. Current Outpatient Medications   Medication Sig Dispense Refill    clopidogreL (PLAVIX) 75 mg tab Take 1 Tablet by mouth daily. 30 Tablet 0    aspirin 81 mg chewable tablet Take 1 Tablet by mouth daily. 30 Tablet 0    metoprolol tartrate (LOPRESSOR) 25 mg tablet Take 1 Tablet by mouth every twelve (12) hours. 60 Tablet 0    pantoprazole (Protonix) 40 mg tablet Take 1 Tablet by mouth daily. 30 Tablet 0    lisinopril (PRINIVIL, ZESTRIL) 10 mg tablet Take 1 Tab by mouth daily. 30 Tab 0    spironolactone (ALDACTONE) 50 mg tablet Take 1 Tab by mouth daily. 30 Tab 0     Past History     Past Medical History:  Past Medical History:   Diagnosis Date    History of CVA (cerebrovascular accident) 9/29/2021    HTN (hypertension)     Stroke [facility])      Past Surgical History:  History reviewed. No pertinent surgical history.   Family History:  Family History   Problem Relation Age of Onset    Kidney Disease Mother     Alcohol abuse Father      Social History:  Social History     Tobacco Use    Smoking status: Former Smoker     Packs/day: 0.50     Types: Cigarettes    Smokeless tobacco: Never Used   Substance Use Topics    Alcohol use: Yes     Comment: Pt states drinking on the weekends.  Drug use: Yes     Types: Marijuana     Allergies:  No Known Allergies  Review of Systems   Review of Systems   Constitutional: Negative for chills and fever. HENT: Negative for congestion, rhinorrhea and sore throat. Respiratory: Negative for cough and shortness of breath. Cardiovascular: Positive for chest pain. Gastrointestinal: Negative for abdominal pain, nausea and vomiting. Genitourinary: Negative for dysuria and urgency. Skin: Negative for rash. Neurological: Negative for dizziness, light-headedness and headaches. All other systems reviewed and are negative. Physical Exam   Physical Exam  Vitals and nursing note reviewed. Constitutional:       General: He is not in acute distress. Appearance: He is well-developed. HENT:      Head: Normocephalic and atraumatic. Eyes:      Conjunctiva/sclera: Conjunctivae normal.      Pupils: Pupils are equal, round, and reactive to light. Cardiovascular:      Rate and Rhythm: Normal rate and regular rhythm. Pulmonary:      Effort: Pulmonary effort is normal. No respiratory distress. Breath sounds: Normal breath sounds. No stridor. Abdominal:      General: There is no distension. Palpations: Abdomen is soft. Tenderness: There is no abdominal tenderness. Musculoskeletal:         General: Normal range of motion. Cervical back: Normal range of motion. Skin:     General: Skin is warm and dry. Neurological:      Mental Status: He is alert and oriented to person, place, and time.        Diagnostic Study Results   Labs -     Recent Results (from the past 12 hour(s))   EKG, 12 LEAD, INITIAL    Collection Time: 12/16/21  4:12 PM   Result Value Ref Range    Ventricular Rate 69 BPM    Atrial Rate 69 BPM    P-R Interval 400 ms    QRS Duration 118 ms    Q-T Interval 436 ms    QTC Calculation (Bezet) 467 ms    Calculated P Axis 51 degrees    Calculated R Axis 25 degrees    Calculated T Axis 34 degrees    Diagnosis       Sinus rhythm with 1st degree AV block  Possible Left atrial enlargement  Nonspecific intraventricular conduction delay  Nonspecific T wave abnormality  Prolonged QT  When compared with ECG of 03-OCT-2021 14:47,  Nonspecific T wave abnormality no longer evident in Anterior leads     CBC WITH AUTOMATED DIFF    Collection Time: 12/16/21  4:47 PM   Result Value Ref Range    WBC 4.4 4.1 - 11.1 K/uL    RBC 4.80 4. 10 - 5.70 M/uL    HGB 15.5 12.1 - 17.0 g/dL    HCT 45.3 36.6 - 50.3 %    MCV 94.4 80.0 - 99.0 FL    MCH 32.3 26.0 - 34.0 PG    MCHC 34.2 30.0 - 36.5 g/dL    RDW 15.8 (H) 11.5 - 14.5 %    PLATELET 770 641 - 945 K/uL    MPV 10.8 8.9 - 12.9 FL    NRBC 0.0 0  WBC    ABSOLUTE NRBC 0.00 0.00 - 0.01 K/uL    NEUTROPHILS 59 32 - 75 %    LYMPHOCYTES 29 12 - 49 %    MONOCYTES 9 5 - 13 %    EOSINOPHILS 2 0 - 7 %    BASOPHILS 1 0 - 1 %    IMMATURE GRANULOCYTES 0 0.0 - 0.5 %    ABS. NEUTROPHILS 2.6 1.8 - 8.0 K/UL    ABS. LYMPHOCYTES 1.3 0.8 - 3.5 K/UL    ABS. MONOCYTES 0.4 0.0 - 1.0 K/UL    ABS. EOSINOPHILS 0.1 0.0 - 0.4 K/UL    ABS. BASOPHILS 0.0 0.0 - 0.1 K/UL    ABS. IMM.  GRANS. 0.0 0.00 - 0.04 K/UL    DF AUTOMATED     METABOLIC PANEL, COMPREHENSIVE    Collection Time: 12/16/21  4:47 PM   Result Value Ref Range    Sodium 142 136 - 145 mmol/L    Potassium 3.9 3.5 - 5.1 mmol/L    Chloride 112 (H) 97 - 108 mmol/L    CO2 22 21 - 32 mmol/L    Anion gap 8 5 - 15 mmol/L    Glucose 84 65 - 100 mg/dL    BUN 19 6 - 20 MG/DL    Creatinine 1.06 0.70 - 1.30 MG/DL    BUN/Creatinine ratio 18 12 - 20      GFR est AA >60 >60 ml/min/1.73m2    GFR est non-AA >60 >60 ml/min/1.73m2    Calcium 9.3 8.5 - 10.1 MG/DL    Bilirubin, total 1.3 (H) 0.2 - 1.0 MG/DL    ALT (SGPT) 17 12 - 78 U/L    AST (SGOT) 30 15 - 37 U/L    Alk. phosphatase 56 45 - 117 U/L    Protein, total 7.7 6.4 - 8.2 g/dL    Albumin 3.2 (L) 3.5 - 5.0 g/dL    Globulin 4.5 (H) 2.0 - 4.0 g/dL    A-G Ratio 0.7 (L) 1.1 - 2.2     NT-PRO BNP    Collection Time: 12/16/21  4:47 PM   Result Value Ref Range    NT pro- (H) <125 PG/ML   TROPONIN-HIGH SENSITIVITY    Collection Time: 12/16/21  4:47 PM   Result Value Ref Range    Troponin-High Sensitivity 7 0 - 76 ng/L   TROPONIN-HIGH SENSITIVITY    Collection Time: 12/16/21  7:32 PM   Result Value Ref Range    Troponin-High Sensitivity 6 0 - 76 ng/L       Radiologic Studies -   XR CHEST PORT   Final Result   No acute findings. XR CHEST PORT    Result Date: 12/16/2021  No acute findings. Medical Decision Making   I am the first provider for this patient. I reviewed the vital signs, available nursing notes, past medical history, past surgical history, family history and social history. Vital Signs-Reviewed the patient's vital signs. Patient Vitals for the past 12 hrs:   Temp Pulse Resp BP SpO2   12/16/21 2049  (!) 56 13 136/89 99 %   12/16/21 2034  (!) 57 16 (!) 143/85 100 %   12/16/21 2019  64 19 (!) 144/88 99 %   12/16/21 2004  (!) 56 14 (!) 144/87 99 %   12/1949  64 17 (!) 142/90 100 %   12/16/21 1934  64 13 (!) 151/88 99 %   12/16/21 1919  69 16 (!) 144/94 100 %   12/16/21 1904  66 16 (!) 156/88 100 %   12/16/21 1619 98.3 °F (36.8 °C) 71 16 (!) 154/93 100 %       Pulse Oximetry Analysis - 100% on ra    Cardiac Monitor:   Rate: 71 bpm  Rhythm: Normal Sinus Rhythm      ED EKG interpretation:  Rhythm: normal sinus rhythm and 1st degree block; and regular . Rate (approx.): 69; Axis: normal; P wave: normal; QRS interval: normal ; ST/T wave: normal; Other findings:  Artifact. This EKG was interpreted by TAMARA Beyer MD,ED Provider.     Records Reviewed: Nursing Notes, Old Medical Records and Previous electrocardiograms    Provider Notes (Medical Decision Making):   Patient presents with a chief complaint of intermittent left-sided chest pain ongoing for about a week. On exam he is overall well-appearing with stable vital signs. EKG shows a sinus rhythm with a first-degree AV block. Differential includes musculoskeletal chest pain, ACS, pneumonia less likely as the patient has no infectious symptoms. Check basic lab work, troponin x2, EKG, chest x-ray. ED Course:   Initial assessment performed. The patients presenting problems have been discussed, and they are in agreement with the care plan formulated and outlined with them. I have encouraged them to ask questions as they arise throughout their visit. Lab work and imaging unremarkable. Advised the patient follow-up with cardiology as an outpatient. ED return precautions were discussed. Procedures:  Procedures    Critical Care:  none    Disposition:  Discharge Note:  The patient has been re-evaluated and is ready for discharge. Reviewed available results with patient. Counseled patient on diagnosis and care plan. Patient has expressed understanding, and all questions have been answered. Patient agrees with plan and agrees to follow up as recommended, or to return to the ED if their symptoms worsen. Discharge instructions have been provided and explained to the patient, along with reasons to return to the ED. PLAN:  1. Discharge Medication List as of 12/16/2021  9:16 PM        2.    Follow-up Information     Follow up With Specialties Details Why Contact Info    Danielle Spicer MD Cardiology, [address] [facility] Vascular Surgery, Internal Medicine Schedule an appointment as soon as possible for a visit   [address]   [address]       [facility] EMERGENCY DEPT Emergency Medicine  As needed, If symptoms worsen [address]  [address] [facility]  [phone]        Return to ED if worse Diagnosis     Clinical Impression:   1. Atypical chest pain    2. 1st degree AV block            Please note that this dictation was completed with [employer], the computer voice recognition software. Quite often unanticipated grammatical, syntax, homophones, and other interpretive errors are inadvertently transcribed by the computer software. Please disregard these errors.   Please excuse any errors that have escaped final proofreading

## 2022-03-09 NOTE — PROGRESS NOTES
Bedside shift change report given to BRIGITTE Chowdhury (oncoming nurse) by Aicha Munoz RN (offgoing nurse). Report included the following information SBAR, Kardex, Procedure Summary, Intake/Output, MAR, Accordion, Recent Results, Med Rec Status and Cardiac Rhythm afib. Results sent to patient via [employer].

## 2022-03-18 PROBLEM — Z86.73 HISTORY OF CVA (CEREBROVASCULAR ACCIDENT): Status: ACTIVE | Noted: 2021-09-29

## 2022-03-19 PROBLEM — R07.9 CHEST PAIN: Status: ACTIVE | Noted: 2021-10-03

## 2024-09-14 ENCOUNTER — HOSPITAL ENCOUNTER (INPATIENT)
Facility: HOSPITAL | Age: 74
LOS: 8 days | Discharge: NURSING, REHAB OR LONG-TERM CARE | DRG: 243 | End: 2024-09-23
Attending: STUDENT IN AN ORGANIZED HEALTH CARE EDUCATION/TRAINING PROGRAM | Admitting: STUDENT IN AN ORGANIZED HEALTH CARE EDUCATION/TRAINING PROGRAM
Payer: MEDICARE

## 2024-09-14 ENCOUNTER — APPOINTMENT (OUTPATIENT)
Facility: HOSPITAL | Age: 74
DRG: 243 | End: 2024-09-14
Payer: MEDICARE

## 2024-09-14 DIAGNOSIS — I44.2 CHB (COMPLETE HEART BLOCK) (HCC): ICD-10-CM

## 2024-09-14 DIAGNOSIS — I49.9 ABNORMAL HEART RHYTHM: ICD-10-CM

## 2024-09-14 DIAGNOSIS — R07.9 CHEST PAIN, UNSPECIFIED TYPE: Primary | ICD-10-CM

## 2024-09-14 LAB
BASOPHILS # BLD: 0 K/UL (ref 0–0.1)
BASOPHILS NFR BLD: 1 % (ref 0–1)
COMMENT:: NORMAL
DIFFERENTIAL METHOD BLD: ABNORMAL
EOSINOPHIL # BLD: 0.2 K/UL (ref 0–0.4)
EOSINOPHIL NFR BLD: 3 % (ref 0–7)
ERYTHROCYTE [DISTWIDTH] IN BLOOD BY AUTOMATED COUNT: 13.4 % (ref 11.5–14.5)
HCT VFR BLD AUTO: 46.2 % (ref 36.6–50.3)
HGB BLD-MCNC: 15.7 G/DL (ref 12.1–17)
IMM GRANULOCYTES # BLD AUTO: 0 K/UL (ref 0–0.04)
IMM GRANULOCYTES NFR BLD AUTO: 0 % (ref 0–0.5)
LYMPHOCYTES # BLD: 2.1 K/UL (ref 0.8–3.5)
LYMPHOCYTES NFR BLD: 40 % (ref 12–49)
MCH RBC QN AUTO: 31.5 PG (ref 26–34)
MCHC RBC AUTO-ENTMCNC: 34 G/DL (ref 30–36.5)
MCV RBC AUTO: 92.6 FL (ref 80–99)
MONOCYTES # BLD: 0.8 K/UL (ref 0–1)
MONOCYTES NFR BLD: 15 % (ref 5–13)
NEUTS SEG # BLD: 2.2 K/UL (ref 1.8–8)
NEUTS SEG NFR BLD: 41 % (ref 32–75)
NRBC # BLD: 0 K/UL (ref 0–0.01)
NRBC BLD-RTO: 0 PER 100 WBC
PLATELET # BLD AUTO: 168 K/UL (ref 150–400)
PMV BLD AUTO: 11.4 FL (ref 8.9–12.9)
RBC # BLD AUTO: 4.99 M/UL (ref 4.1–5.7)
SPECIMEN HOLD: NORMAL
WBC # BLD AUTO: 5.2 K/UL (ref 4.1–11.1)

## 2024-09-14 PROCEDURE — 71045 X-RAY EXAM CHEST 1 VIEW: CPT

## 2024-09-14 PROCEDURE — 96365 THER/PROPH/DIAG IV INF INIT: CPT

## 2024-09-14 PROCEDURE — 36415 COLL VENOUS BLD VENIPUNCTURE: CPT

## 2024-09-14 PROCEDURE — 83735 ASSAY OF MAGNESIUM: CPT

## 2024-09-14 PROCEDURE — 84484 ASSAY OF TROPONIN QUANT: CPT

## 2024-09-14 PROCEDURE — 85025 COMPLETE CBC W/AUTO DIFF WBC: CPT

## 2024-09-14 PROCEDURE — 83690 ASSAY OF LIPASE: CPT

## 2024-09-14 PROCEDURE — 80053 COMPREHEN METABOLIC PANEL: CPT

## 2024-09-14 PROCEDURE — 93005 ELECTROCARDIOGRAM TRACING: CPT | Performed by: STUDENT IN AN ORGANIZED HEALTH CARE EDUCATION/TRAINING PROGRAM

## 2024-09-14 PROCEDURE — 99285 EMERGENCY DEPT VISIT HI MDM: CPT

## 2024-09-14 RX ORDER — IOPAMIDOL 755 MG/ML
100 INJECTION, SOLUTION INTRAVASCULAR
Status: COMPLETED | OUTPATIENT
Start: 2024-09-14 | End: 2024-09-15

## 2024-09-15 ENCOUNTER — APPOINTMENT (OUTPATIENT)
Facility: HOSPITAL | Age: 74
DRG: 243 | End: 2024-09-15
Payer: MEDICARE

## 2024-09-15 LAB
ALBUMIN SERPL-MCNC: 2.9 G/DL (ref 3.5–5)
ALBUMIN/GLOB SERPL: 0.6 (ref 1.1–2.2)
ALP SERPL-CCNC: 65 U/L (ref 45–117)
ALT SERPL-CCNC: 20 U/L (ref 12–78)
ANION GAP SERPL CALC-SCNC: 9 MMOL/L (ref 2–12)
APPEARANCE UR: CLEAR
AST SERPL-CCNC: 37 U/L (ref 15–37)
BACTERIA URNS QL MICRO: NEGATIVE /HPF
BILIRUB SERPL-MCNC: 1.1 MG/DL (ref 0.2–1)
BILIRUB UR QL: NEGATIVE
BUN SERPL-MCNC: 19 MG/DL (ref 6–20)
BUN/CREAT SERPL: 17 (ref 12–20)
CALCIUM SERPL-MCNC: 9.7 MG/DL (ref 8.5–10.1)
CHLORIDE SERPL-SCNC: 106 MMOL/L (ref 97–108)
CO2 SERPL-SCNC: 28 MMOL/L (ref 21–32)
COLOR UR: ABNORMAL
CREAT SERPL-MCNC: 1.13 MG/DL (ref 0.7–1.3)
EKG ATRIAL RATE: 97 BPM
EKG DIAGNOSIS: NORMAL
EKG P AXIS: 57 DEGREES
EKG Q-T INTERVAL: 446 MS
EKG QRS DURATION: 98 MS
EKG QTC CALCULATION (BAZETT): 448 MS
EKG R AXIS: 20 DEGREES
EKG T AXIS: 42 DEGREES
EKG VENTRICULAR RATE: 61 BPM
EPITH CASTS URNS QL MICRO: ABNORMAL /LPF
GLOBULIN SER CALC-MCNC: 4.5 G/DL (ref 2–4)
GLUCOSE SERPL-MCNC: 107 MG/DL (ref 65–100)
GLUCOSE UR STRIP.AUTO-MCNC: NEGATIVE MG/DL
HGB UR QL STRIP: NEGATIVE
HYALINE CASTS URNS QL MICRO: ABNORMAL /LPF (ref 0–2)
KETONES UR QL STRIP.AUTO: ABNORMAL MG/DL
LEUKOCYTE ESTERASE UR QL STRIP.AUTO: ABNORMAL
LIPASE SERPL-CCNC: 27 U/L (ref 13–75)
MAGNESIUM SERPL-MCNC: 2.5 MG/DL (ref 1.6–2.4)
NITRITE UR QL STRIP.AUTO: NEGATIVE
PH UR STRIP: 8 (ref 5–8)
POTASSIUM SERPL-SCNC: 3 MMOL/L (ref 3.5–5.1)
PROT SERPL-MCNC: 7.4 G/DL (ref 6.4–8.2)
PROT UR STRIP-MCNC: 100 MG/DL
RBC #/AREA URNS HPF: ABNORMAL /HPF (ref 0–5)
SODIUM SERPL-SCNC: 143 MMOL/L (ref 136–145)
SP GR UR REFRACTOMETRY: 1.02 (ref 1–1.03)
TROPONIN I SERPL HS-MCNC: 14 NG/L (ref 0–76)
TROPONIN I SERPL HS-MCNC: 16 NG/L (ref 0–76)
URINE CULTURE IF INDICATED: ABNORMAL
UROBILINOGEN UR QL STRIP.AUTO: 1 EU/DL (ref 0.2–1)
WBC URNS QL MICRO: ABNORMAL /HPF (ref 0–4)

## 2024-09-15 PROCEDURE — 2500000003 HC RX 250 WO HCPCS: Performed by: STUDENT IN AN ORGANIZED HEALTH CARE EDUCATION/TRAINING PROGRAM

## 2024-09-15 PROCEDURE — 6370000000 HC RX 637 (ALT 250 FOR IP): Performed by: STUDENT IN AN ORGANIZED HEALTH CARE EDUCATION/TRAINING PROGRAM

## 2024-09-15 PROCEDURE — 2580000003 HC RX 258: Performed by: STUDENT IN AN ORGANIZED HEALTH CARE EDUCATION/TRAINING PROGRAM

## 2024-09-15 PROCEDURE — 6360000002 HC RX W HCPCS: Performed by: STUDENT IN AN ORGANIZED HEALTH CARE EDUCATION/TRAINING PROGRAM

## 2024-09-15 PROCEDURE — 74177 CT ABD & PELVIS W/CONTRAST: CPT

## 2024-09-15 PROCEDURE — 81001 URINALYSIS AUTO W/SCOPE: CPT

## 2024-09-15 PROCEDURE — 87088 URINE BACTERIA CULTURE: CPT

## 2024-09-15 PROCEDURE — 71275 CT ANGIOGRAPHY CHEST: CPT

## 2024-09-15 PROCEDURE — 84484 ASSAY OF TROPONIN QUANT: CPT

## 2024-09-15 PROCEDURE — 36415 COLL VENOUS BLD VENIPUNCTURE: CPT

## 2024-09-15 PROCEDURE — 6360000004 HC RX CONTRAST MEDICATION: Performed by: EMERGENCY MEDICINE

## 2024-09-15 PROCEDURE — 87186 SC STD MICRODIL/AGAR DIL: CPT

## 2024-09-15 PROCEDURE — 87086 URINE CULTURE/COLONY COUNT: CPT

## 2024-09-15 PROCEDURE — 1100000003 HC PRIVATE W/ TELEMETRY

## 2024-09-15 PROCEDURE — 2060000000 HC ICU INTERMEDIATE R&B

## 2024-09-15 PROCEDURE — 6360000004 HC RX CONTRAST MEDICATION: Performed by: STUDENT IN AN ORGANIZED HEALTH CARE EDUCATION/TRAINING PROGRAM

## 2024-09-15 RX ORDER — SPIRONOLACTONE 25 MG/1
25 TABLET ORAL DAILY
Status: DISCONTINUED | OUTPATIENT
Start: 2024-09-15 | End: 2024-09-23 | Stop reason: HOSPADM

## 2024-09-15 RX ORDER — FAMOTIDINE 20 MG/1
20 TABLET, FILM COATED ORAL 2 TIMES DAILY
Status: DISCONTINUED | OUTPATIENT
Start: 2024-09-15 | End: 2024-09-23 | Stop reason: HOSPADM

## 2024-09-15 RX ORDER — PANTOPRAZOLE SODIUM 40 MG/1
40 TABLET, DELAYED RELEASE ORAL DAILY
COMMUNITY

## 2024-09-15 RX ORDER — POLYETHYLENE GLYCOL 3350 17 G/17G
17 POWDER, FOR SOLUTION ORAL DAILY PRN
Status: DISCONTINUED | OUTPATIENT
Start: 2024-09-15 | End: 2024-09-23 | Stop reason: HOSPADM

## 2024-09-15 RX ORDER — DEXTROSE MONOHYDRATE, SODIUM CHLORIDE, AND POTASSIUM CHLORIDE 50; 2.98; 4.5 G/1000ML; G/1000ML; G/1000ML
INJECTION, SOLUTION INTRAVENOUS CONTINUOUS
Status: DISCONTINUED | OUTPATIENT
Start: 2024-09-15 | End: 2024-09-16

## 2024-09-15 RX ORDER — POTASSIUM CHLORIDE 7.45 MG/ML
10 INJECTION INTRAVENOUS ONCE
Status: COMPLETED | OUTPATIENT
Start: 2024-09-15 | End: 2024-09-15

## 2024-09-15 RX ORDER — LANOLIN ALCOHOL/MO/W.PET/CERES
3 CREAM (GRAM) TOPICAL NIGHTLY PRN
Status: DISCONTINUED | OUTPATIENT
Start: 2024-09-15 | End: 2024-09-23 | Stop reason: HOSPADM

## 2024-09-15 RX ORDER — ASPIRIN 81 MG/1
81 TABLET, CHEWABLE ORAL DAILY
Status: DISCONTINUED | OUTPATIENT
Start: 2024-09-15 | End: 2024-09-23 | Stop reason: HOSPADM

## 2024-09-15 RX ORDER — SODIUM CHLORIDE 0.9 % (FLUSH) 0.9 %
5-40 SYRINGE (ML) INJECTION EVERY 12 HOURS SCHEDULED
Status: DISCONTINUED | OUTPATIENT
Start: 2024-09-15 | End: 2024-09-23 | Stop reason: HOSPADM

## 2024-09-15 RX ORDER — SODIUM CHLORIDE 9 MG/ML
INJECTION, SOLUTION INTRAVENOUS PRN
Status: DISCONTINUED | OUTPATIENT
Start: 2024-09-15 | End: 2024-09-23 | Stop reason: HOSPADM

## 2024-09-15 RX ORDER — ACETAMINOPHEN 650 MG/1
650 SUPPOSITORY RECTAL EVERY 6 HOURS PRN
Status: DISCONTINUED | OUTPATIENT
Start: 2024-09-15 | End: 2024-09-23 | Stop reason: HOSPADM

## 2024-09-15 RX ORDER — HYDRALAZINE HYDROCHLORIDE 20 MG/ML
10 INJECTION INTRAMUSCULAR; INTRAVENOUS EVERY 6 HOURS PRN
Status: DISCONTINUED | OUTPATIENT
Start: 2024-09-15 | End: 2024-09-23 | Stop reason: HOSPADM

## 2024-09-15 RX ORDER — CLOPIDOGREL BISULFATE 75 MG/1
75 TABLET ORAL DAILY
Status: DISCONTINUED | OUTPATIENT
Start: 2024-09-15 | End: 2024-09-23 | Stop reason: HOSPADM

## 2024-09-15 RX ORDER — PANTOPRAZOLE SODIUM 40 MG/1
40 TABLET, DELAYED RELEASE ORAL DAILY
Status: DISCONTINUED | OUTPATIENT
Start: 2024-09-15 | End: 2024-09-23 | Stop reason: HOSPADM

## 2024-09-15 RX ORDER — ASPIRIN 81 MG/1
81 TABLET, CHEWABLE ORAL DAILY
COMMUNITY

## 2024-09-15 RX ORDER — ONDANSETRON 4 MG/1
4 TABLET, ORALLY DISINTEGRATING ORAL EVERY 8 HOURS PRN
Status: DISCONTINUED | OUTPATIENT
Start: 2024-09-15 | End: 2024-09-23 | Stop reason: HOSPADM

## 2024-09-15 RX ORDER — SODIUM CHLORIDE 0.9 % (FLUSH) 0.9 %
5-40 SYRINGE (ML) INJECTION PRN
Status: DISCONTINUED | OUTPATIENT
Start: 2024-09-15 | End: 2024-09-23 | Stop reason: HOSPADM

## 2024-09-15 RX ORDER — CLOPIDOGREL BISULFATE 75 MG/1
75 TABLET ORAL DAILY
COMMUNITY

## 2024-09-15 RX ORDER — METOPROLOL TARTRATE 25 MG/1
25 TABLET, FILM COATED ORAL 2 TIMES DAILY
COMMUNITY

## 2024-09-15 RX ORDER — ENOXAPARIN SODIUM 100 MG/ML
40 INJECTION SUBCUTANEOUS DAILY
Status: DISCONTINUED | OUTPATIENT
Start: 2024-09-15 | End: 2024-09-23 | Stop reason: HOSPADM

## 2024-09-15 RX ORDER — KETOROLAC TROMETHAMINE 30 MG/ML
15 INJECTION, SOLUTION INTRAMUSCULAR; INTRAVENOUS EVERY 6 HOURS PRN
Status: DISCONTINUED | OUTPATIENT
Start: 2024-09-15 | End: 2024-09-16

## 2024-09-15 RX ORDER — ACETAMINOPHEN 325 MG/1
650 TABLET ORAL EVERY 6 HOURS PRN
Status: DISCONTINUED | OUTPATIENT
Start: 2024-09-15 | End: 2024-09-23 | Stop reason: HOSPADM

## 2024-09-15 RX ORDER — ONDANSETRON 2 MG/ML
4 INJECTION INTRAMUSCULAR; INTRAVENOUS EVERY 6 HOURS PRN
Status: DISCONTINUED | OUTPATIENT
Start: 2024-09-15 | End: 2024-09-23 | Stop reason: HOSPADM

## 2024-09-15 RX ORDER — DICYCLOMINE HYDROCHLORIDE 10 MG/1
10 CAPSULE ORAL 4 TIMES DAILY PRN
Status: DISCONTINUED | OUTPATIENT
Start: 2024-09-15 | End: 2024-09-22

## 2024-09-15 RX ORDER — IOPAMIDOL 755 MG/ML
100 INJECTION, SOLUTION INTRAVASCULAR
Status: COMPLETED | OUTPATIENT
Start: 2024-09-15 | End: 2024-09-15

## 2024-09-15 RX ORDER — SPIRONOLACTONE 25 MG/1
25 TABLET ORAL DAILY
COMMUNITY

## 2024-09-15 RX ORDER — METOPROLOL TARTRATE 25 MG/1
25 TABLET, FILM COATED ORAL 2 TIMES DAILY
Status: DISCONTINUED | OUTPATIENT
Start: 2024-09-15 | End: 2024-09-23 | Stop reason: HOSPADM

## 2024-09-15 RX ADMIN — KETOROLAC TROMETHAMINE 15 MG: 30 INJECTION, SOLUTION INTRAMUSCULAR at 18:47

## 2024-09-15 RX ADMIN — FAMOTIDINE 20 MG: 20 TABLET, FILM COATED ORAL at 09:12

## 2024-09-15 RX ADMIN — FAMOTIDINE 20 MG: 20 TABLET, FILM COATED ORAL at 21:51

## 2024-09-15 RX ADMIN — KETOROLAC TROMETHAMINE 15 MG: 30 INJECTION, SOLUTION INTRAMUSCULAR at 10:35

## 2024-09-15 RX ADMIN — CLOPIDOGREL BISULFATE 75 MG: 75 TABLET ORAL at 09:12

## 2024-09-15 RX ADMIN — SODIUM CHLORIDE 1000 MG: 900 INJECTION INTRAVENOUS at 02:36

## 2024-09-15 RX ADMIN — ACETAMINOPHEN 650 MG: 325 TABLET ORAL at 03:53

## 2024-09-15 RX ADMIN — ASPIRIN 81 MG: 81 TABLET, CHEWABLE ORAL at 09:12

## 2024-09-15 RX ADMIN — ENOXAPARIN SODIUM 40 MG: 100 INJECTION SUBCUTANEOUS at 09:12

## 2024-09-15 RX ADMIN — HYDRALAZINE HYDROCHLORIDE 10 MG: 20 INJECTION INTRAMUSCULAR; INTRAVENOUS at 18:47

## 2024-09-15 RX ADMIN — ACETAMINOPHEN 650 MG: 325 TABLET ORAL at 21:51

## 2024-09-15 RX ADMIN — SODIUM CHLORIDE, PRESERVATIVE FREE 5 ML: 5 INJECTION INTRAVENOUS at 21:56

## 2024-09-15 RX ADMIN — SPIRONOLACTONE 25 MG: 25 TABLET ORAL at 09:12

## 2024-09-15 RX ADMIN — IOPAMIDOL 100 ML: 755 INJECTION, SOLUTION INTRAVENOUS at 00:44

## 2024-09-15 RX ADMIN — IOPAMIDOL 70 ML: 755 INJECTION, SOLUTION INTRAVENOUS at 01:23

## 2024-09-15 RX ADMIN — MELATONIN 3 MG: at 21:51

## 2024-09-15 RX ADMIN — POTASSIUM CHLORIDE, DEXTROSE MONOHYDRATE AND SODIUM CHLORIDE: 300; 5; 450 INJECTION, SOLUTION INTRAVENOUS at 23:38

## 2024-09-15 RX ADMIN — POTASSIUM CHLORIDE 10 MEQ: 7.46 INJECTION, SOLUTION INTRAVENOUS at 03:51

## 2024-09-15 RX ADMIN — POTASSIUM CHLORIDE, DEXTROSE MONOHYDRATE AND SODIUM CHLORIDE: 300; 5; 450 INJECTION, SOLUTION INTRAVENOUS at 12:27

## 2024-09-15 RX ADMIN — POTASSIUM CHLORIDE, DEXTROSE MONOHYDRATE AND SODIUM CHLORIDE: 300; 5; 450 INJECTION, SOLUTION INTRAVENOUS at 03:50

## 2024-09-15 RX ADMIN — DICYCLOMINE HYDROCHLORIDE 10 MG: 10 CAPSULE ORAL at 10:35

## 2024-09-15 RX ADMIN — LIDOCAINE HYDROCHLORIDE 40 ML: 20 SOLUTION ORAL at 03:51

## 2024-09-15 RX ADMIN — PANTOPRAZOLE SODIUM 40 MG: 40 TABLET, DELAYED RELEASE ORAL at 09:12

## 2024-09-15 RX ADMIN — POTASSIUM BICARBONATE 40 MEQ: 782 TABLET, EFFERVESCENT ORAL at 02:35

## 2024-09-15 ASSESSMENT — PAIN SCALES - GENERAL
PAINLEVEL_OUTOF10: 8
PAINLEVEL_OUTOF10: 3
PAINLEVEL_OUTOF10: 5
PAINLEVEL_OUTOF10: 3
PAINLEVEL_OUTOF10: 3
PAINLEVEL_OUTOF10: 7

## 2024-09-15 ASSESSMENT — PAIN DESCRIPTION - LOCATION
LOCATION: CHEST
LOCATION: ABDOMEN
LOCATION: CHEST;NECK
LOCATION: BACK;NECK;SHOULDER

## 2024-09-15 ASSESSMENT — PAIN DESCRIPTION - DESCRIPTORS
DESCRIPTORS: ACHING
DESCRIPTORS: ACHING

## 2024-09-15 ASSESSMENT — PAIN DESCRIPTION - ORIENTATION
ORIENTATION: MID
ORIENTATION: RIGHT;LEFT

## 2024-09-15 ASSESSMENT — HEART SCORE: ECG: NON-SPECIFC REPOLARIZATION DISTURBANCE/LBTB/PM

## 2024-09-16 LAB
ANION GAP SERPL CALC-SCNC: 4 MMOL/L (ref 2–12)
BASOPHILS # BLD: 0 K/UL (ref 0–0.1)
BASOPHILS NFR BLD: 1 % (ref 0–1)
BUN SERPL-MCNC: 11 MG/DL (ref 6–20)
BUN/CREAT SERPL: 11 (ref 12–20)
CALCIUM SERPL-MCNC: 8.9 MG/DL (ref 8.5–10.1)
CHLORIDE SERPL-SCNC: 107 MMOL/L (ref 97–108)
CO2 SERPL-SCNC: 26 MMOL/L (ref 21–32)
CREAT SERPL-MCNC: 1 MG/DL (ref 0.7–1.3)
DIFFERENTIAL METHOD BLD: ABNORMAL
EKG ATRIAL RATE: 88 BPM
EKG DIAGNOSIS: NORMAL
EKG Q-T INTERVAL: 412 MS
EKG QRS DURATION: 96 MS
EKG QTC CALCULATION (BAZETT): 460 MS
EKG R AXIS: 11 DEGREES
EKG T AXIS: 50 DEGREES
EKG VENTRICULAR RATE: 75 BPM
EOSINOPHIL # BLD: 0.3 K/UL (ref 0–0.4)
EOSINOPHIL NFR BLD: 4 % (ref 0–7)
ERYTHROCYTE [DISTWIDTH] IN BLOOD BY AUTOMATED COUNT: 13.4 % (ref 11.5–14.5)
GLUCOSE SERPL-MCNC: 115 MG/DL (ref 65–100)
HCT VFR BLD AUTO: 48 % (ref 36.6–50.3)
HGB BLD-MCNC: 15.3 G/DL (ref 12.1–17)
IMM GRANULOCYTES # BLD AUTO: 0 K/UL (ref 0–0.04)
IMM GRANULOCYTES NFR BLD AUTO: 0 % (ref 0–0.5)
INR PPP: 1.1 (ref 0.9–1.1)
LYMPHOCYTES # BLD: 1.9 K/UL (ref 0.8–3.5)
LYMPHOCYTES NFR BLD: 34 % (ref 12–49)
MCH RBC QN AUTO: 30.4 PG (ref 26–34)
MCHC RBC AUTO-ENTMCNC: 31.9 G/DL (ref 30–36.5)
MCV RBC AUTO: 95.2 FL (ref 80–99)
MONOCYTES # BLD: 0.8 K/UL (ref 0–1)
MONOCYTES NFR BLD: 14 % (ref 5–13)
NEUTS SEG # BLD: 2.6 K/UL (ref 1.8–8)
NEUTS SEG NFR BLD: 47 % (ref 32–75)
NRBC # BLD: 0 K/UL (ref 0–0.01)
NRBC BLD-RTO: 0 PER 100 WBC
PLATELET # BLD AUTO: 154 K/UL (ref 150–400)
PMV BLD AUTO: 11.1 FL (ref 8.9–12.9)
POTASSIUM SERPL-SCNC: 4.1 MMOL/L (ref 3.5–5.1)
PROTHROMBIN TIME: 11.1 SEC (ref 9–11.1)
RBC # BLD AUTO: 5.04 M/UL (ref 4.1–5.7)
SODIUM SERPL-SCNC: 137 MMOL/L (ref 136–145)
WBC # BLD AUTO: 5.6 K/UL (ref 4.1–11.1)

## 2024-09-16 PROCEDURE — 2580000003 HC RX 258: Performed by: STUDENT IN AN ORGANIZED HEALTH CARE EDUCATION/TRAINING PROGRAM

## 2024-09-16 PROCEDURE — 97530 THERAPEUTIC ACTIVITIES: CPT

## 2024-09-16 PROCEDURE — 85610 PROTHROMBIN TIME: CPT

## 2024-09-16 PROCEDURE — 2700000000 HC OXYGEN THERAPY PER DAY

## 2024-09-16 PROCEDURE — 6370000000 HC RX 637 (ALT 250 FOR IP): Performed by: STUDENT IN AN ORGANIZED HEALTH CARE EDUCATION/TRAINING PROGRAM

## 2024-09-16 PROCEDURE — 85025 COMPLETE CBC W/AUTO DIFF WBC: CPT

## 2024-09-16 PROCEDURE — 97161 PT EVAL LOW COMPLEX 20 MIN: CPT

## 2024-09-16 PROCEDURE — 1100000003 HC PRIVATE W/ TELEMETRY

## 2024-09-16 PROCEDURE — 6360000002 HC RX W HCPCS: Performed by: STUDENT IN AN ORGANIZED HEALTH CARE EDUCATION/TRAINING PROGRAM

## 2024-09-16 PROCEDURE — 36415 COLL VENOUS BLD VENIPUNCTURE: CPT

## 2024-09-16 PROCEDURE — 97165 OT EVAL LOW COMPLEX 30 MIN: CPT

## 2024-09-16 PROCEDURE — 97535 SELF CARE MNGMENT TRAINING: CPT

## 2024-09-16 PROCEDURE — 93005 ELECTROCARDIOGRAM TRACING: CPT | Performed by: NURSE PRACTITIONER

## 2024-09-16 PROCEDURE — 80048 BASIC METABOLIC PNL TOTAL CA: CPT

## 2024-09-16 RX ORDER — LOSARTAN POTASSIUM 25 MG/1
25 TABLET ORAL DAILY
Status: DISCONTINUED | OUTPATIENT
Start: 2024-09-16 | End: 2024-09-23 | Stop reason: HOSPADM

## 2024-09-16 RX ORDER — HYDROMORPHONE HYDROCHLORIDE 1 MG/ML
1 INJECTION, SOLUTION INTRAMUSCULAR; INTRAVENOUS; SUBCUTANEOUS ONCE
Status: COMPLETED | OUTPATIENT
Start: 2024-09-16 | End: 2024-09-16

## 2024-09-16 RX ORDER — HYDROMORPHONE HYDROCHLORIDE 1 MG/ML
0.5 INJECTION, SOLUTION INTRAMUSCULAR; INTRAVENOUS; SUBCUTANEOUS EVERY 4 HOURS PRN
Status: DISCONTINUED | OUTPATIENT
Start: 2024-09-16 | End: 2024-09-23 | Stop reason: HOSPADM

## 2024-09-16 RX ADMIN — DICYCLOMINE HYDROCHLORIDE 10 MG: 10 CAPSULE ORAL at 07:57

## 2024-09-16 RX ADMIN — PANTOPRAZOLE SODIUM 40 MG: 40 TABLET, DELAYED RELEASE ORAL at 10:16

## 2024-09-16 RX ADMIN — SODIUM CHLORIDE, PRESERVATIVE FREE 10 ML: 5 INJECTION INTRAVENOUS at 21:38

## 2024-09-16 RX ADMIN — HYDRALAZINE HYDROCHLORIDE 10 MG: 20 INJECTION INTRAMUSCULAR; INTRAVENOUS at 04:04

## 2024-09-16 RX ADMIN — FAMOTIDINE 20 MG: 20 TABLET, FILM COATED ORAL at 10:16

## 2024-09-16 RX ADMIN — ACETAMINOPHEN 650 MG: 325 TABLET ORAL at 07:57

## 2024-09-16 RX ADMIN — SPIRONOLACTONE 25 MG: 25 TABLET ORAL at 10:15

## 2024-09-16 RX ADMIN — ASPIRIN 81 MG: 81 TABLET, CHEWABLE ORAL at 10:16

## 2024-09-16 RX ADMIN — ALUMINUM HYDROXIDE AND MAGNESIUM HYDROXIDE 30 ML: 200; 200 SUSPENSION ORAL at 10:50

## 2024-09-16 RX ADMIN — HYDROMORPHONE HYDROCHLORIDE 1 MG: 1 INJECTION, SOLUTION INTRAMUSCULAR; INTRAVENOUS; SUBCUTANEOUS at 12:10

## 2024-09-16 RX ADMIN — SODIUM CHLORIDE, PRESERVATIVE FREE 10 ML: 5 INJECTION INTRAVENOUS at 10:16

## 2024-09-16 RX ADMIN — FAMOTIDINE 20 MG: 20 TABLET, FILM COATED ORAL at 21:37

## 2024-09-16 RX ADMIN — CLOPIDOGREL BISULFATE 75 MG: 75 TABLET ORAL at 10:15

## 2024-09-16 RX ADMIN — SODIUM CHLORIDE 1000 MG: 900 INJECTION INTRAVENOUS at 03:12

## 2024-09-16 RX ADMIN — LOSARTAN POTASSIUM 25 MG: 25 TABLET, FILM COATED ORAL at 10:16

## 2024-09-16 RX ADMIN — DICYCLOMINE HYDROCHLORIDE 10 MG: 10 CAPSULE ORAL at 04:04

## 2024-09-16 RX ADMIN — ENOXAPARIN SODIUM 40 MG: 100 INJECTION SUBCUTANEOUS at 10:16

## 2024-09-16 ASSESSMENT — PAIN DESCRIPTION - LOCATION
LOCATION: ABDOMEN
LOCATION: ABDOMEN

## 2024-09-16 ASSESSMENT — PAIN DESCRIPTION - ORIENTATION
ORIENTATION: MID
ORIENTATION: RIGHT;LEFT;MID;LOWER

## 2024-09-16 ASSESSMENT — PAIN DESCRIPTION - DESCRIPTORS
DESCRIPTORS: STABBING
DESCRIPTORS: CRAMPING

## 2024-09-16 ASSESSMENT — PAIN SCALES - GENERAL
PAINLEVEL_OUTOF10: 10
PAINLEVEL_OUTOF10: 10

## 2024-09-17 LAB
ANION GAP SERPL CALC-SCNC: 7 MMOL/L (ref 2–12)
BACTERIA SPEC CULT: ABNORMAL
BUN SERPL-MCNC: 12 MG/DL (ref 6–20)
BUN/CREAT SERPL: 12 (ref 12–20)
CALCIUM SERPL-MCNC: 8.9 MG/DL (ref 8.5–10.1)
CC UR VC: ABNORMAL
CHLORIDE SERPL-SCNC: 108 MMOL/L (ref 97–108)
CO2 SERPL-SCNC: 24 MMOL/L (ref 21–32)
CREAT SERPL-MCNC: 0.99 MG/DL (ref 0.7–1.3)
GLUCOSE SERPL-MCNC: 86 MG/DL (ref 65–100)
POTASSIUM SERPL-SCNC: 4.2 MMOL/L (ref 3.5–5.1)
SERVICE CMNT-IMP: ABNORMAL
SODIUM SERPL-SCNC: 139 MMOL/L (ref 136–145)

## 2024-09-17 PROCEDURE — 97116 GAIT TRAINING THERAPY: CPT

## 2024-09-17 PROCEDURE — 6360000002 HC RX W HCPCS: Performed by: STUDENT IN AN ORGANIZED HEALTH CARE EDUCATION/TRAINING PROGRAM

## 2024-09-17 PROCEDURE — 1100000003 HC PRIVATE W/ TELEMETRY

## 2024-09-17 PROCEDURE — 6370000000 HC RX 637 (ALT 250 FOR IP): Performed by: STUDENT IN AN ORGANIZED HEALTH CARE EDUCATION/TRAINING PROGRAM

## 2024-09-17 PROCEDURE — 80048 BASIC METABOLIC PNL TOTAL CA: CPT

## 2024-09-17 PROCEDURE — 2580000003 HC RX 258: Performed by: STUDENT IN AN ORGANIZED HEALTH CARE EDUCATION/TRAINING PROGRAM

## 2024-09-17 PROCEDURE — 97530 THERAPEUTIC ACTIVITIES: CPT

## 2024-09-17 PROCEDURE — 36415 COLL VENOUS BLD VENIPUNCTURE: CPT

## 2024-09-17 RX ADMIN — PANTOPRAZOLE SODIUM 40 MG: 40 TABLET, DELAYED RELEASE ORAL at 09:27

## 2024-09-17 RX ADMIN — SODIUM CHLORIDE, PRESERVATIVE FREE 10 ML: 5 INJECTION INTRAVENOUS at 09:27

## 2024-09-17 RX ADMIN — LOSARTAN POTASSIUM 25 MG: 25 TABLET, FILM COATED ORAL at 09:27

## 2024-09-17 RX ADMIN — SODIUM CHLORIDE, PRESERVATIVE FREE 10 ML: 5 INJECTION INTRAVENOUS at 21:35

## 2024-09-17 RX ADMIN — CLOPIDOGREL BISULFATE 75 MG: 75 TABLET ORAL at 09:27

## 2024-09-17 RX ADMIN — ASPIRIN 81 MG: 81 TABLET, CHEWABLE ORAL at 09:27

## 2024-09-17 RX ADMIN — HYDRALAZINE HYDROCHLORIDE 10 MG: 20 INJECTION INTRAMUSCULAR; INTRAVENOUS at 17:37

## 2024-09-17 RX ADMIN — SPIRONOLACTONE 25 MG: 25 TABLET ORAL at 09:27

## 2024-09-17 RX ADMIN — FAMOTIDINE 20 MG: 20 TABLET, FILM COATED ORAL at 09:27

## 2024-09-17 RX ADMIN — HYDROMORPHONE HYDROCHLORIDE 0.5 MG: 1 INJECTION, SOLUTION INTRAMUSCULAR; INTRAVENOUS; SUBCUTANEOUS at 21:33

## 2024-09-17 RX ADMIN — HYDROMORPHONE HYDROCHLORIDE 0.5 MG: 1 INJECTION, SOLUTION INTRAMUSCULAR; INTRAVENOUS; SUBCUTANEOUS at 12:17

## 2024-09-17 RX ADMIN — FAMOTIDINE 20 MG: 20 TABLET, FILM COATED ORAL at 21:33

## 2024-09-17 RX ADMIN — SODIUM CHLORIDE 1000 MG: 900 INJECTION INTRAVENOUS at 02:28

## 2024-09-17 RX ADMIN — ONDANSETRON 4 MG: 4 TABLET, ORALLY DISINTEGRATING ORAL at 05:41

## 2024-09-17 RX ADMIN — HYDROMORPHONE HYDROCHLORIDE 0.5 MG: 1 INJECTION, SOLUTION INTRAMUSCULAR; INTRAVENOUS; SUBCUTANEOUS at 00:08

## 2024-09-17 RX ADMIN — ENOXAPARIN SODIUM 40 MG: 100 INJECTION SUBCUTANEOUS at 09:27

## 2024-09-17 ASSESSMENT — PAIN SCALES - GENERAL
PAINLEVEL_OUTOF10: 7
PAINLEVEL_OUTOF10: 3
PAINLEVEL_OUTOF10: 0
PAINLEVEL_OUTOF10: 3
PAINLEVEL_OUTOF10: 7
PAINLEVEL_OUTOF10: 10
PAINLEVEL_OUTOF10: 7
PAINLEVEL_OUTOF10: 7
PAINLEVEL_OUTOF10: 4

## 2024-09-17 ASSESSMENT — PAIN DESCRIPTION - DESCRIPTORS
DESCRIPTORS: THROBBING
DESCRIPTORS: CRAMPING;ACHING
DESCRIPTORS: THROBBING

## 2024-09-17 ASSESSMENT — PAIN DESCRIPTION - ORIENTATION
ORIENTATION: MID;LOWER
ORIENTATION: MID;LOWER
ORIENTATION: MID

## 2024-09-17 ASSESSMENT — PAIN DESCRIPTION - LOCATION
LOCATION: ABDOMEN;CHEST
LOCATION: ABDOMEN;CHEST
LOCATION: ABDOMEN;BACK
LOCATION: ABDOMEN;BACK
LOCATION: ABDOMEN;CHEST

## 2024-09-18 ENCOUNTER — ANESTHESIA (OUTPATIENT)
Facility: HOSPITAL | Age: 74
End: 2024-09-18
Payer: MEDICARE

## 2024-09-18 ENCOUNTER — APPOINTMENT (OUTPATIENT)
Facility: HOSPITAL | Age: 74
DRG: 243 | End: 2024-09-18
Payer: MEDICARE

## 2024-09-18 ENCOUNTER — ANESTHESIA EVENT (OUTPATIENT)
Facility: HOSPITAL | Age: 74
End: 2024-09-18
Payer: MEDICARE

## 2024-09-18 PROBLEM — I44.2 CHB (COMPLETE HEART BLOCK) (HCC): Status: ACTIVE | Noted: 2024-09-18

## 2024-09-18 LAB
ANION GAP SERPL CALC-SCNC: 9 MMOL/L (ref 2–12)
BUN SERPL-MCNC: 14 MG/DL (ref 6–20)
BUN/CREAT SERPL: 15 (ref 12–20)
CALCIUM SERPL-MCNC: 8.8 MG/DL (ref 8.5–10.1)
CHLORIDE SERPL-SCNC: 108 MMOL/L (ref 97–108)
CO2 SERPL-SCNC: 22 MMOL/L (ref 21–32)
CREAT SERPL-MCNC: 0.91 MG/DL (ref 0.7–1.3)
ECHO BSA: 1.83 M2
GLUCOSE SERPL-MCNC: 68 MG/DL (ref 65–100)
POTASSIUM SERPL-SCNC: 3.8 MMOL/L (ref 3.5–5.1)
SODIUM SERPL-SCNC: 139 MMOL/L (ref 136–145)

## 2024-09-18 PROCEDURE — 6370000000 HC RX 637 (ALT 250 FOR IP): Performed by: INTERNAL MEDICINE

## 2024-09-18 PROCEDURE — 2500000003 HC RX 250 WO HCPCS: Performed by: INTERNAL MEDICINE

## 2024-09-18 PROCEDURE — 2580000003 HC RX 258: Performed by: STUDENT IN AN ORGANIZED HEALTH CARE EDUCATION/TRAINING PROGRAM

## 2024-09-18 PROCEDURE — 6360000002 HC RX W HCPCS: Performed by: STUDENT IN AN ORGANIZED HEALTH CARE EDUCATION/TRAINING PROGRAM

## 2024-09-18 PROCEDURE — 3700000001 HC ADD 15 MINUTES (ANESTHESIA): Performed by: INTERNAL MEDICINE

## 2024-09-18 PROCEDURE — 2060000000 HC ICU INTERMEDIATE R&B

## 2024-09-18 PROCEDURE — 2500000003 HC RX 250 WO HCPCS: Performed by: NURSE ANESTHETIST, CERTIFIED REGISTERED

## 2024-09-18 PROCEDURE — C1898 LEAD, PMKR, OTHER THAN TRANS: HCPCS | Performed by: INTERNAL MEDICINE

## 2024-09-18 PROCEDURE — 2580000003 HC RX 258: Performed by: INTERNAL MEDICINE

## 2024-09-18 PROCEDURE — 71045 X-RAY EXAM CHEST 1 VIEW: CPT

## 2024-09-18 PROCEDURE — 6370000000 HC RX 637 (ALT 250 FOR IP): Performed by: STUDENT IN AN ORGANIZED HEALTH CARE EDUCATION/TRAINING PROGRAM

## 2024-09-18 PROCEDURE — C1769 GUIDE WIRE: HCPCS | Performed by: INTERNAL MEDICINE

## 2024-09-18 PROCEDURE — 3700000000 HC ANESTHESIA ATTENDED CARE: Performed by: INTERNAL MEDICINE

## 2024-09-18 PROCEDURE — 02H63JZ INSERTION OF PACEMAKER LEAD INTO RIGHT ATRIUM, PERCUTANEOUS APPROACH: ICD-10-PCS | Performed by: INTERNAL MEDICINE

## 2024-09-18 PROCEDURE — 2580000003 HC RX 258: Performed by: NURSE ANESTHETIST, CERTIFIED REGISTERED

## 2024-09-18 PROCEDURE — 93005 ELECTROCARDIOGRAM TRACING: CPT | Performed by: STUDENT IN AN ORGANIZED HEALTH CARE EDUCATION/TRAINING PROGRAM

## 2024-09-18 PROCEDURE — C1785 PMKR, DUAL, RATE-RESP: HCPCS | Performed by: INTERNAL MEDICINE

## 2024-09-18 PROCEDURE — 6360000002 HC RX W HCPCS: Performed by: NURSE ANESTHETIST, CERTIFIED REGISTERED

## 2024-09-18 PROCEDURE — 2709999900 HC NON-CHARGEABLE SUPPLY: Performed by: INTERNAL MEDICINE

## 2024-09-18 PROCEDURE — C1894 INTRO/SHEATH, NON-LASER: HCPCS | Performed by: INTERNAL MEDICINE

## 2024-09-18 PROCEDURE — 0JH606Z INSERTION OF PACEMAKER, DUAL CHAMBER INTO CHEST SUBCUTANEOUS TISSUE AND FASCIA, OPEN APPROACH: ICD-10-PCS | Performed by: INTERNAL MEDICINE

## 2024-09-18 PROCEDURE — 02HK3JZ INSERTION OF PACEMAKER LEAD INTO RIGHT VENTRICLE, PERCUTANEOUS APPROACH: ICD-10-PCS | Performed by: INTERNAL MEDICINE

## 2024-09-18 PROCEDURE — C1892 INTRO/SHEATH,FIXED,PEEL-AWAY: HCPCS | Performed by: INTERNAL MEDICINE

## 2024-09-18 PROCEDURE — 6360000002 HC RX W HCPCS: Performed by: INTERNAL MEDICINE

## 2024-09-18 PROCEDURE — 80048 BASIC METABOLIC PNL TOTAL CA: CPT

## 2024-09-18 PROCEDURE — 36415 COLL VENOUS BLD VENIPUNCTURE: CPT

## 2024-09-18 PROCEDURE — A4217 STERILE WATER/SALINE, 500 ML: HCPCS | Performed by: INTERNAL MEDICINE

## 2024-09-18 PROCEDURE — 33208 INSRT HEART PM ATRIAL & VENT: CPT | Performed by: INTERNAL MEDICINE

## 2024-09-18 DEVICE — PACEMAKER CARD 20GM 10.4CC W50XH47MM THK6MM 2 CHMBR IS-1: Type: IMPLANTABLE DEVICE | Status: FUNCTIONAL

## 2024-09-18 DEVICE — LEAD PACE 6FR L52CM 10MM SPACE TI NITRIDE PLAT IRIDIUM SIL: Type: IMPLANTABLE DEVICE | Status: FUNCTIONAL

## 2024-09-18 DEVICE — LEAD PACE 6FR L58CM 10MM SPACE TI NITRIDE PLAT IRIDIUM SIL: Type: IMPLANTABLE DEVICE | Status: FUNCTIONAL

## 2024-09-18 RX ORDER — LIDOCAINE HYDROCHLORIDE 20 MG/ML
INJECTION, SOLUTION EPIDURAL; INFILTRATION; INTRACAUDAL; PERINEURAL
Status: DISCONTINUED | OUTPATIENT
Start: 2024-09-18 | End: 2024-09-18 | Stop reason: SDUPTHER

## 2024-09-18 RX ORDER — ONDANSETRON 2 MG/ML
INJECTION INTRAMUSCULAR; INTRAVENOUS
Status: DISCONTINUED | OUTPATIENT
Start: 2024-09-18 | End: 2024-09-18 | Stop reason: SDUPTHER

## 2024-09-18 RX ORDER — SODIUM CHLORIDE 0.9 % (FLUSH) 0.9 %
5-40 SYRINGE (ML) INJECTION PRN
Status: DISCONTINUED | OUTPATIENT
Start: 2024-09-18 | End: 2024-09-23 | Stop reason: HOSPADM

## 2024-09-18 RX ORDER — PHENYLEPHRINE HCL IN 0.9% NACL 0.4MG/10ML
SYRINGE (ML) INTRAVENOUS
Status: DISCONTINUED | OUTPATIENT
Start: 2024-09-18 | End: 2024-09-18 | Stop reason: SDUPTHER

## 2024-09-18 RX ORDER — ACETAMINOPHEN 325 MG/1
650 TABLET ORAL EVERY 4 HOURS PRN
Status: DISCONTINUED | OUTPATIENT
Start: 2024-09-18 | End: 2024-09-23 | Stop reason: HOSPADM

## 2024-09-18 RX ORDER — DEXAMETHASONE SODIUM PHOSPHATE 4 MG/ML
INJECTION, SOLUTION INTRA-ARTICULAR; INTRALESIONAL; INTRAMUSCULAR; INTRAVENOUS; SOFT TISSUE
Status: DISCONTINUED | OUTPATIENT
Start: 2024-09-18 | End: 2024-09-18 | Stop reason: SDUPTHER

## 2024-09-18 RX ORDER — SODIUM CHLORIDE 0.9 % (FLUSH) 0.9 %
5-40 SYRINGE (ML) INJECTION EVERY 12 HOURS SCHEDULED
Status: DISCONTINUED | OUTPATIENT
Start: 2024-09-18 | End: 2024-09-23 | Stop reason: HOSPADM

## 2024-09-18 RX ORDER — FENTANYL CITRATE 50 UG/ML
INJECTION, SOLUTION INTRAMUSCULAR; INTRAVENOUS
Status: DISCONTINUED | OUTPATIENT
Start: 2024-09-18 | End: 2024-09-18 | Stop reason: SDUPTHER

## 2024-09-18 RX ORDER — LABETALOL HYDROCHLORIDE 5 MG/ML
5 INJECTION INTRAVENOUS EVERY 6 HOURS PRN
Status: DISCONTINUED | OUTPATIENT
Start: 2024-09-18 | End: 2024-09-23 | Stop reason: HOSPADM

## 2024-09-18 RX ORDER — SODIUM CHLORIDE 9 MG/ML
INJECTION, SOLUTION INTRAVENOUS
Status: DISCONTINUED | OUTPATIENT
Start: 2024-09-18 | End: 2024-09-18 | Stop reason: SDUPTHER

## 2024-09-18 RX ORDER — SODIUM CHLORIDE 9 MG/ML
INJECTION, SOLUTION INTRAVENOUS PRN
Status: DISCONTINUED | OUTPATIENT
Start: 2024-09-18 | End: 2024-09-23 | Stop reason: HOSPADM

## 2024-09-18 RX ORDER — LIDOCAINE HYDROCHLORIDE 10 MG/ML
INJECTION, SOLUTION INFILTRATION; PERINEURAL PRN
Status: DISCONTINUED | OUTPATIENT
Start: 2024-09-18 | End: 2024-09-18 | Stop reason: HOSPADM

## 2024-09-18 RX ADMIN — FAMOTIDINE 20 MG: 20 TABLET, FILM COATED ORAL at 20:54

## 2024-09-18 RX ADMIN — SODIUM CHLORIDE: 9 INJECTION, SOLUTION INTRAVENOUS at 02:37

## 2024-09-18 RX ADMIN — SODIUM CHLORIDE: 9 INJECTION, SOLUTION INTRAVENOUS at 14:21

## 2024-09-18 RX ADMIN — PROPOFOL 30 MG: 10 INJECTION, EMULSION INTRAVENOUS at 14:53

## 2024-09-18 RX ADMIN — FENTANYL CITRATE 25 MCG: 50 INJECTION, SOLUTION INTRAMUSCULAR; INTRAVENOUS at 14:51

## 2024-09-18 RX ADMIN — CLOPIDOGREL BISULFATE 75 MG: 75 TABLET ORAL at 09:15

## 2024-09-18 RX ADMIN — SPIRONOLACTONE 25 MG: 25 TABLET ORAL at 09:15

## 2024-09-18 RX ADMIN — HYDROMORPHONE HYDROCHLORIDE 0.5 MG: 1 INJECTION, SOLUTION INTRAMUSCULAR; INTRAVENOUS; SUBCUTANEOUS at 10:58

## 2024-09-18 RX ADMIN — FAMOTIDINE 20 MG: 20 TABLET, FILM COATED ORAL at 09:15

## 2024-09-18 RX ADMIN — SODIUM CHLORIDE 1000 MG: 900 INJECTION INTRAVENOUS at 02:37

## 2024-09-18 RX ADMIN — Medication 120 MCG: at 15:02

## 2024-09-18 RX ADMIN — FENTANYL CITRATE 25 MCG: 50 INJECTION, SOLUTION INTRAMUSCULAR; INTRAVENOUS at 14:38

## 2024-09-18 RX ADMIN — LOSARTAN POTASSIUM 25 MG: 25 TABLET, FILM COATED ORAL at 09:15

## 2024-09-18 RX ADMIN — ENOXAPARIN SODIUM 40 MG: 100 INJECTION SUBCUTANEOUS at 09:15

## 2024-09-18 RX ADMIN — FENTANYL CITRATE 25 MCG: 50 INJECTION, SOLUTION INTRAMUSCULAR; INTRAVENOUS at 14:53

## 2024-09-18 RX ADMIN — DEXAMETHASONE SODIUM PHOSPHATE 4 MG: 4 INJECTION, SOLUTION INTRAMUSCULAR; INTRAVENOUS at 15:07

## 2024-09-18 RX ADMIN — METOPROLOL TARTRATE 25 MG: 25 TABLET, FILM COATED ORAL at 20:54

## 2024-09-18 RX ADMIN — SODIUM CHLORIDE 1000 MG: 900 INJECTION INTRAVENOUS at 14:35

## 2024-09-18 RX ADMIN — LIDOCAINE HYDROCHLORIDE 50 MG: 20 INJECTION, SOLUTION EPIDURAL; INFILTRATION; INTRACAUDAL; PERINEURAL at 14:36

## 2024-09-18 RX ADMIN — FENTANYL CITRATE 25 MCG: 50 INJECTION, SOLUTION INTRAMUSCULAR; INTRAVENOUS at 14:50

## 2024-09-18 RX ADMIN — ASPIRIN 81 MG: 81 TABLET, CHEWABLE ORAL at 09:15

## 2024-09-18 RX ADMIN — PROPOFOL 30 MG: 10 INJECTION, EMULSION INTRAVENOUS at 14:39

## 2024-09-18 RX ADMIN — SODIUM CHLORIDE, PRESERVATIVE FREE 10 ML: 5 INJECTION INTRAVENOUS at 20:55

## 2024-09-18 RX ADMIN — ONDANSETRON HYDROCHLORIDE 4 MG: 2 INJECTION, SOLUTION INTRAMUSCULAR; INTRAVENOUS at 15:07

## 2024-09-18 RX ADMIN — Medication 120 MCG: at 15:04

## 2024-09-18 RX ADMIN — Medication 120 MCG: at 14:59

## 2024-09-18 RX ADMIN — PANTOPRAZOLE SODIUM 40 MG: 40 TABLET, DELAYED RELEASE ORAL at 09:15

## 2024-09-18 RX ADMIN — PROPOFOL 75 MCG/KG/MIN: 10 INJECTION, EMULSION INTRAVENOUS at 14:32

## 2024-09-18 RX ADMIN — HYDROMORPHONE HYDROCHLORIDE 0.5 MG: 1 INJECTION, SOLUTION INTRAMUSCULAR; INTRAVENOUS; SUBCUTANEOUS at 02:45

## 2024-09-18 RX ADMIN — LABETALOL HYDROCHLORIDE 5 MG: 5 INJECTION INTRAVENOUS at 17:26

## 2024-09-18 RX ADMIN — SODIUM CHLORIDE, PRESERVATIVE FREE 10 ML: 5 INJECTION INTRAVENOUS at 09:17

## 2024-09-18 RX ADMIN — PHENYLEPHRINE HYDROCHLORIDE 40 MCG/MIN: 10 INJECTION INTRAVENOUS at 14:45

## 2024-09-18 RX ADMIN — HYDROMORPHONE HYDROCHLORIDE 0.5 MG: 1 INJECTION, SOLUTION INTRAMUSCULAR; INTRAVENOUS; SUBCUTANEOUS at 22:03

## 2024-09-18 ASSESSMENT — PAIN DESCRIPTION - DESCRIPTORS
DESCRIPTORS: SHARP
DESCRIPTORS: ACHING

## 2024-09-18 ASSESSMENT — PAIN SCALES - GENERAL
PAINLEVEL_OUTOF10: 2
PAINLEVEL_OUTOF10: 9
PAINLEVEL_OUTOF10: 10
PAINLEVEL_OUTOF10: 10
PAINLEVEL_OUTOF10: 7

## 2024-09-18 ASSESSMENT — PAIN - FUNCTIONAL ASSESSMENT: PAIN_FUNCTIONAL_ASSESSMENT: PREVENTS OR INTERFERES SOME ACTIVE ACTIVITIES AND ADLS

## 2024-09-18 ASSESSMENT — PAIN DESCRIPTION - LOCATION
LOCATION: CHEST
LOCATION: ABDOMEN;CHEST;BACK
LOCATION: ABDOMEN;BACK
LOCATION: ABDOMEN

## 2024-09-18 ASSESSMENT — PAIN DESCRIPTION - ORIENTATION
ORIENTATION: LEFT
ORIENTATION: MID;RIGHT;LEFT

## 2024-09-19 LAB
ANION GAP SERPL CALC-SCNC: 9 MMOL/L (ref 2–12)
BUN SERPL-MCNC: 20 MG/DL (ref 6–20)
BUN/CREAT SERPL: 22 (ref 12–20)
CALCIUM SERPL-MCNC: 8 MG/DL (ref 8.5–10.1)
CHLORIDE SERPL-SCNC: 112 MMOL/L (ref 97–108)
CO2 SERPL-SCNC: 19 MMOL/L (ref 21–32)
CREAT SERPL-MCNC: 0.93 MG/DL (ref 0.7–1.3)
GLUCOSE SERPL-MCNC: 108 MG/DL (ref 65–100)
POTASSIUM SERPL-SCNC: 4 MMOL/L (ref 3.5–5.1)
SODIUM SERPL-SCNC: 140 MMOL/L (ref 136–145)

## 2024-09-19 PROCEDURE — 97530 THERAPEUTIC ACTIVITIES: CPT | Performed by: OCCUPATIONAL THERAPIST

## 2024-09-19 PROCEDURE — 2580000003 HC RX 258: Performed by: INTERNAL MEDICINE

## 2024-09-19 PROCEDURE — 97168 OT RE-EVAL EST PLAN CARE: CPT | Performed by: OCCUPATIONAL THERAPIST

## 2024-09-19 PROCEDURE — 6370000000 HC RX 637 (ALT 250 FOR IP): Performed by: STUDENT IN AN ORGANIZED HEALTH CARE EDUCATION/TRAINING PROGRAM

## 2024-09-19 PROCEDURE — 6360000002 HC RX W HCPCS: Performed by: INTERNAL MEDICINE

## 2024-09-19 PROCEDURE — 97164 PT RE-EVAL EST PLAN CARE: CPT

## 2024-09-19 PROCEDURE — 36415 COLL VENOUS BLD VENIPUNCTURE: CPT

## 2024-09-19 PROCEDURE — 97535 SELF CARE MNGMENT TRAINING: CPT | Performed by: OCCUPATIONAL THERAPIST

## 2024-09-19 PROCEDURE — 6370000000 HC RX 637 (ALT 250 FOR IP): Performed by: INTERNAL MEDICINE

## 2024-09-19 PROCEDURE — 6360000002 HC RX W HCPCS: Performed by: STUDENT IN AN ORGANIZED HEALTH CARE EDUCATION/TRAINING PROGRAM

## 2024-09-19 PROCEDURE — 97530 THERAPEUTIC ACTIVITIES: CPT

## 2024-09-19 PROCEDURE — 2580000003 HC RX 258: Performed by: STUDENT IN AN ORGANIZED HEALTH CARE EDUCATION/TRAINING PROGRAM

## 2024-09-19 PROCEDURE — 94760 N-INVAS EAR/PLS OXIMETRY 1: CPT

## 2024-09-19 PROCEDURE — 80048 BASIC METABOLIC PNL TOTAL CA: CPT

## 2024-09-19 PROCEDURE — 2060000000 HC ICU INTERMEDIATE R&B

## 2024-09-19 RX ADMIN — METOPROLOL TARTRATE 25 MG: 25 TABLET, FILM COATED ORAL at 09:47

## 2024-09-19 RX ADMIN — SPIRONOLACTONE 25 MG: 25 TABLET ORAL at 09:47

## 2024-09-19 RX ADMIN — METOPROLOL TARTRATE 25 MG: 25 TABLET, FILM COATED ORAL at 21:07

## 2024-09-19 RX ADMIN — CLOPIDOGREL BISULFATE 75 MG: 75 TABLET ORAL at 09:47

## 2024-09-19 RX ADMIN — SODIUM CHLORIDE, PRESERVATIVE FREE 10 ML: 5 INJECTION INTRAVENOUS at 21:07

## 2024-09-19 RX ADMIN — FAMOTIDINE 20 MG: 20 TABLET, FILM COATED ORAL at 21:07

## 2024-09-19 RX ADMIN — LOSARTAN POTASSIUM 25 MG: 25 TABLET, FILM COATED ORAL at 09:47

## 2024-09-19 RX ADMIN — FAMOTIDINE 20 MG: 20 TABLET, FILM COATED ORAL at 09:47

## 2024-09-19 RX ADMIN — SODIUM CHLORIDE, PRESERVATIVE FREE 10 ML: 5 INJECTION INTRAVENOUS at 09:48

## 2024-09-19 RX ADMIN — PANTOPRAZOLE SODIUM 40 MG: 40 TABLET, DELAYED RELEASE ORAL at 09:47

## 2024-09-19 RX ADMIN — SODIUM CHLORIDE 1000 MG: 900 INJECTION INTRAVENOUS at 03:21

## 2024-09-19 RX ADMIN — HYDROMORPHONE HYDROCHLORIDE 0.5 MG: 1 INJECTION, SOLUTION INTRAMUSCULAR; INTRAVENOUS; SUBCUTANEOUS at 21:30

## 2024-09-19 RX ADMIN — ASPIRIN 81 MG: 81 TABLET, CHEWABLE ORAL at 09:47

## 2024-09-19 RX ADMIN — HYDROMORPHONE HYDROCHLORIDE 0.5 MG: 1 INJECTION, SOLUTION INTRAMUSCULAR; INTRAVENOUS; SUBCUTANEOUS at 04:17

## 2024-09-19 RX ADMIN — HYDROMORPHONE HYDROCHLORIDE 0.5 MG: 1 INJECTION, SOLUTION INTRAMUSCULAR; INTRAVENOUS; SUBCUTANEOUS at 14:28

## 2024-09-19 RX ADMIN — ENOXAPARIN SODIUM 40 MG: 100 INJECTION SUBCUTANEOUS at 09:47

## 2024-09-19 ASSESSMENT — PAIN DESCRIPTION - LOCATION
LOCATION: CHEST;INCISION
LOCATION: ARM
LOCATION: SHOULDER
LOCATION: ARM
LOCATION: CHEST;INCISION

## 2024-09-19 ASSESSMENT — PAIN SCALES - GENERAL
PAINLEVEL_OUTOF10: 8
PAINLEVEL_OUTOF10: 9
PAINLEVEL_OUTOF10: 4
PAINLEVEL_OUTOF10: 10
PAINLEVEL_OUTOF10: 8
PAINLEVEL_OUTOF10: 3

## 2024-09-19 ASSESSMENT — PAIN DESCRIPTION - DESCRIPTORS
DESCRIPTORS: TENDER;SORE;DISCOMFORT;ACHING
DESCRIPTORS: ACHING
DESCRIPTORS: ACHING;SHARP
DESCRIPTORS: DISCOMFORT;SORE
DESCRIPTORS: ACHING

## 2024-09-19 ASSESSMENT — PAIN DESCRIPTION - ORIENTATION
ORIENTATION: LEFT

## 2024-09-19 ASSESSMENT — PAIN - FUNCTIONAL ASSESSMENT
PAIN_FUNCTIONAL_ASSESSMENT: PREVENTS OR INTERFERES SOME ACTIVE ACTIVITIES AND ADLS
PAIN_FUNCTIONAL_ASSESSMENT: PREVENTS OR INTERFERES SOME ACTIVE ACTIVITIES AND ADLS

## 2024-09-20 LAB
EKG ATRIAL RATE: 90 BPM
EKG DIAGNOSIS: NORMAL
EKG P AXIS: 56 DEGREES
EKG P-R INTERVAL: 210 MS
EKG Q-T INTERVAL: 432 MS
EKG QRS DURATION: 174 MS
EKG QTC CALCULATION (BAZETT): 528 MS
EKG R AXIS: -45 DEGREES
EKG T AXIS: 105 DEGREES
EKG VENTRICULAR RATE: 90 BPM

## 2024-09-20 PROCEDURE — 1100000003 HC PRIVATE W/ TELEMETRY

## 2024-09-20 PROCEDURE — 6360000002 HC RX W HCPCS: Performed by: STUDENT IN AN ORGANIZED HEALTH CARE EDUCATION/TRAINING PROGRAM

## 2024-09-20 PROCEDURE — 6370000000 HC RX 637 (ALT 250 FOR IP): Performed by: INTERNAL MEDICINE

## 2024-09-20 PROCEDURE — 2580000003 HC RX 258: Performed by: INTERNAL MEDICINE

## 2024-09-20 PROCEDURE — 6370000000 HC RX 637 (ALT 250 FOR IP): Performed by: STUDENT IN AN ORGANIZED HEALTH CARE EDUCATION/TRAINING PROGRAM

## 2024-09-20 PROCEDURE — 2580000003 HC RX 258: Performed by: STUDENT IN AN ORGANIZED HEALTH CARE EDUCATION/TRAINING PROGRAM

## 2024-09-20 RX ADMIN — ENOXAPARIN SODIUM 40 MG: 100 INJECTION SUBCUTANEOUS at 09:11

## 2024-09-20 RX ADMIN — SPIRONOLACTONE 25 MG: 25 TABLET ORAL at 09:10

## 2024-09-20 RX ADMIN — METOPROLOL TARTRATE 25 MG: 25 TABLET, FILM COATED ORAL at 21:18

## 2024-09-20 RX ADMIN — PANTOPRAZOLE SODIUM 40 MG: 40 TABLET, DELAYED RELEASE ORAL at 09:11

## 2024-09-20 RX ADMIN — ACETAMINOPHEN 650 MG: 325 TABLET ORAL at 00:08

## 2024-09-20 RX ADMIN — SODIUM CHLORIDE, PRESERVATIVE FREE 5 ML: 5 INJECTION INTRAVENOUS at 21:18

## 2024-09-20 RX ADMIN — LOSARTAN POTASSIUM 25 MG: 25 TABLET, FILM COATED ORAL at 09:10

## 2024-09-20 RX ADMIN — HYDROMORPHONE HYDROCHLORIDE 0.5 MG: 1 INJECTION, SOLUTION INTRAMUSCULAR; INTRAVENOUS; SUBCUTANEOUS at 18:42

## 2024-09-20 RX ADMIN — CLOPIDOGREL BISULFATE 75 MG: 75 TABLET ORAL at 09:11

## 2024-09-20 RX ADMIN — SODIUM CHLORIDE, PRESERVATIVE FREE 10 ML: 5 INJECTION INTRAVENOUS at 21:18

## 2024-09-20 RX ADMIN — FAMOTIDINE 20 MG: 20 TABLET, FILM COATED ORAL at 21:18

## 2024-09-20 RX ADMIN — METOPROLOL TARTRATE 25 MG: 25 TABLET, FILM COATED ORAL at 09:10

## 2024-09-20 RX ADMIN — FAMOTIDINE 20 MG: 20 TABLET, FILM COATED ORAL at 09:10

## 2024-09-20 RX ADMIN — ASPIRIN 81 MG: 81 TABLET, CHEWABLE ORAL at 09:10

## 2024-09-20 ASSESSMENT — PAIN DESCRIPTION - ORIENTATION
ORIENTATION: LEFT

## 2024-09-20 ASSESSMENT — PAIN SCALES - GENERAL
PAINLEVEL_OUTOF10: 3
PAINLEVEL_OUTOF10: 7
PAINLEVEL_OUTOF10: 10
PAINLEVEL_OUTOF10: 6
PAINLEVEL_OUTOF10: 7
PAINLEVEL_OUTOF10: 2

## 2024-09-20 ASSESSMENT — PAIN DESCRIPTION - DESCRIPTORS
DESCRIPTORS: TENDER;SORE;DISCOMFORT
DESCRIPTORS: SORE
DESCRIPTORS: TENDER;SORE;DISCOMFORT
DESCRIPTORS: TENDER;SORE

## 2024-09-20 ASSESSMENT — PAIN DESCRIPTION - LOCATION
LOCATION: CHEST;INCISION
LOCATION: INCISION;CHEST
LOCATION: CHEST;INCISION
LOCATION: ARM;BACK

## 2024-09-21 PROCEDURE — 2580000003 HC RX 258: Performed by: INTERNAL MEDICINE

## 2024-09-21 PROCEDURE — 6370000000 HC RX 637 (ALT 250 FOR IP): Performed by: INTERNAL MEDICINE

## 2024-09-21 PROCEDURE — 6360000002 HC RX W HCPCS: Performed by: STUDENT IN AN ORGANIZED HEALTH CARE EDUCATION/TRAINING PROGRAM

## 2024-09-21 PROCEDURE — 6370000000 HC RX 637 (ALT 250 FOR IP): Performed by: STUDENT IN AN ORGANIZED HEALTH CARE EDUCATION/TRAINING PROGRAM

## 2024-09-21 PROCEDURE — 2580000003 HC RX 258: Performed by: STUDENT IN AN ORGANIZED HEALTH CARE EDUCATION/TRAINING PROGRAM

## 2024-09-21 PROCEDURE — 1100000003 HC PRIVATE W/ TELEMETRY

## 2024-09-21 RX ADMIN — SODIUM CHLORIDE, PRESERVATIVE FREE 5 ML: 5 INJECTION INTRAVENOUS at 19:34

## 2024-09-21 RX ADMIN — SODIUM CHLORIDE, PRESERVATIVE FREE 10 ML: 5 INJECTION INTRAVENOUS at 09:52

## 2024-09-21 RX ADMIN — HYDROMORPHONE HYDROCHLORIDE 0.5 MG: 1 INJECTION, SOLUTION INTRAMUSCULAR; INTRAVENOUS; SUBCUTANEOUS at 18:00

## 2024-09-21 RX ADMIN — HYDROMORPHONE HYDROCHLORIDE 0.5 MG: 1 INJECTION, SOLUTION INTRAMUSCULAR; INTRAVENOUS; SUBCUTANEOUS at 23:31

## 2024-09-21 RX ADMIN — HYDROMORPHONE HYDROCHLORIDE 0.5 MG: 1 INJECTION, SOLUTION INTRAMUSCULAR; INTRAVENOUS; SUBCUTANEOUS at 09:52

## 2024-09-21 RX ADMIN — PANTOPRAZOLE SODIUM 40 MG: 40 TABLET, DELAYED RELEASE ORAL at 09:51

## 2024-09-21 RX ADMIN — ENOXAPARIN SODIUM 40 MG: 100 INJECTION SUBCUTANEOUS at 09:51

## 2024-09-21 RX ADMIN — METOPROLOL TARTRATE 25 MG: 25 TABLET, FILM COATED ORAL at 19:34

## 2024-09-21 RX ADMIN — HYDROMORPHONE HYDROCHLORIDE 0.5 MG: 1 INJECTION, SOLUTION INTRAMUSCULAR; INTRAVENOUS; SUBCUTANEOUS at 00:55

## 2024-09-21 RX ADMIN — METOPROLOL TARTRATE 25 MG: 25 TABLET, FILM COATED ORAL at 09:51

## 2024-09-21 RX ADMIN — FAMOTIDINE 20 MG: 20 TABLET, FILM COATED ORAL at 09:51

## 2024-09-21 RX ADMIN — FAMOTIDINE 20 MG: 20 TABLET, FILM COATED ORAL at 19:34

## 2024-09-21 RX ADMIN — SPIRONOLACTONE 25 MG: 25 TABLET ORAL at 09:51

## 2024-09-21 RX ADMIN — LOSARTAN POTASSIUM 25 MG: 25 TABLET, FILM COATED ORAL at 09:51

## 2024-09-21 RX ADMIN — ASPIRIN 81 MG: 81 TABLET, CHEWABLE ORAL at 09:51

## 2024-09-21 RX ADMIN — CLOPIDOGREL BISULFATE 75 MG: 75 TABLET ORAL at 09:51

## 2024-09-21 ASSESSMENT — PAIN DESCRIPTION - ORIENTATION
ORIENTATION: LEFT;POSTERIOR
ORIENTATION: LEFT
ORIENTATION: LEFT;POSTERIOR
ORIENTATION: RIGHT
ORIENTATION: LEFT

## 2024-09-21 ASSESSMENT — PAIN DESCRIPTION - LOCATION
LOCATION: ARM;BACK
LOCATION: ARM
LOCATION: INCISION
LOCATION: INCISION
LOCATION: ARM;BACK

## 2024-09-21 ASSESSMENT — PAIN SCALES - GENERAL
PAINLEVEL_OUTOF10: 9
PAINLEVEL_OUTOF10: 10
PAINLEVEL_OUTOF10: 6

## 2024-09-21 ASSESSMENT — PAIN DESCRIPTION - DESCRIPTORS
DESCRIPTORS: SORE
DESCRIPTORS: ACHING;THROBBING
DESCRIPTORS: ACHING
DESCRIPTORS: ACHING;THROBBING
DESCRIPTORS: ACHING;SORE

## 2024-09-22 PROCEDURE — 6370000000 HC RX 637 (ALT 250 FOR IP): Performed by: INTERNAL MEDICINE

## 2024-09-22 PROCEDURE — 2580000003 HC RX 258: Performed by: STUDENT IN AN ORGANIZED HEALTH CARE EDUCATION/TRAINING PROGRAM

## 2024-09-22 PROCEDURE — 6360000002 HC RX W HCPCS: Performed by: STUDENT IN AN ORGANIZED HEALTH CARE EDUCATION/TRAINING PROGRAM

## 2024-09-22 PROCEDURE — 6370000000 HC RX 637 (ALT 250 FOR IP): Performed by: STUDENT IN AN ORGANIZED HEALTH CARE EDUCATION/TRAINING PROGRAM

## 2024-09-22 PROCEDURE — 2500000003 HC RX 250 WO HCPCS: Performed by: STUDENT IN AN ORGANIZED HEALTH CARE EDUCATION/TRAINING PROGRAM

## 2024-09-22 PROCEDURE — 2580000003 HC RX 258: Performed by: INTERNAL MEDICINE

## 2024-09-22 PROCEDURE — 1100000003 HC PRIVATE W/ TELEMETRY

## 2024-09-22 RX ORDER — DICYCLOMINE HYDROCHLORIDE 10 MG/1
10 CAPSULE ORAL
Status: DISCONTINUED | OUTPATIENT
Start: 2024-09-22 | End: 2024-09-23 | Stop reason: HOSPADM

## 2024-09-22 RX ADMIN — HYDROMORPHONE HYDROCHLORIDE 0.5 MG: 1 INJECTION, SOLUTION INTRAMUSCULAR; INTRAVENOUS; SUBCUTANEOUS at 16:50

## 2024-09-22 RX ADMIN — HYDROMORPHONE HYDROCHLORIDE 0.5 MG: 1 INJECTION, SOLUTION INTRAMUSCULAR; INTRAVENOUS; SUBCUTANEOUS at 21:37

## 2024-09-22 RX ADMIN — SPIRONOLACTONE 25 MG: 25 TABLET ORAL at 10:29

## 2024-09-22 RX ADMIN — METOPROLOL TARTRATE 25 MG: 25 TABLET, FILM COATED ORAL at 20:28

## 2024-09-22 RX ADMIN — SODIUM CHLORIDE, PRESERVATIVE FREE 10 ML: 5 INJECTION INTRAVENOUS at 20:28

## 2024-09-22 RX ADMIN — ENOXAPARIN SODIUM 40 MG: 100 INJECTION SUBCUTANEOUS at 10:29

## 2024-09-22 RX ADMIN — CLOPIDOGREL BISULFATE 75 MG: 75 TABLET ORAL at 10:29

## 2024-09-22 RX ADMIN — SODIUM CHLORIDE, PRESERVATIVE FREE 10 ML: 5 INJECTION INTRAVENOUS at 10:30

## 2024-09-22 RX ADMIN — HYDROMORPHONE HYDROCHLORIDE 0.5 MG: 1 INJECTION, SOLUTION INTRAMUSCULAR; INTRAVENOUS; SUBCUTANEOUS at 12:44

## 2024-09-22 RX ADMIN — FAMOTIDINE 20 MG: 20 TABLET, FILM COATED ORAL at 20:28

## 2024-09-22 RX ADMIN — ASPIRIN 81 MG: 81 TABLET, CHEWABLE ORAL at 10:29

## 2024-09-22 RX ADMIN — HYDROMORPHONE HYDROCHLORIDE 0.5 MG: 1 INJECTION, SOLUTION INTRAMUSCULAR; INTRAVENOUS; SUBCUTANEOUS at 06:19

## 2024-09-22 RX ADMIN — SODIUM CHLORIDE, PRESERVATIVE FREE 5 ML: 5 INJECTION INTRAVENOUS at 20:28

## 2024-09-22 RX ADMIN — FAMOTIDINE 20 MG: 20 TABLET, FILM COATED ORAL at 10:29

## 2024-09-22 RX ADMIN — LOSARTAN POTASSIUM 25 MG: 25 TABLET, FILM COATED ORAL at 10:29

## 2024-09-22 RX ADMIN — PANTOPRAZOLE SODIUM 40 MG: 40 TABLET, DELAYED RELEASE ORAL at 10:29

## 2024-09-22 RX ADMIN — METOPROLOL TARTRATE 25 MG: 25 TABLET, FILM COATED ORAL at 10:29

## 2024-09-22 ASSESSMENT — PAIN - FUNCTIONAL ASSESSMENT
PAIN_FUNCTIONAL_ASSESSMENT: PREVENTS OR INTERFERES SOME ACTIVE ACTIVITIES AND ADLS

## 2024-09-22 ASSESSMENT — PAIN SCALES - GENERAL
PAINLEVEL_OUTOF10: 8
PAINLEVEL_OUTOF10: 4
PAINLEVEL_OUTOF10: 9
PAINLEVEL_OUTOF10: 6

## 2024-09-22 ASSESSMENT — PAIN DESCRIPTION - LOCATION
LOCATION: GENERALIZED;KNEE;BACK
LOCATION: BACK;ARM
LOCATION: BACK;ARM
LOCATION: BACK

## 2024-09-22 ASSESSMENT — PAIN DESCRIPTION - DESCRIPTORS
DESCRIPTORS: ACHING;DISCOMFORT
DESCRIPTORS: ACHING;DISCOMFORT
DESCRIPTORS: ACHING

## 2024-09-22 ASSESSMENT — PAIN DESCRIPTION - ORIENTATION
ORIENTATION: LOWER;MID
ORIENTATION: LEFT;LOWER
ORIENTATION: LOWER;LEFT

## 2024-09-23 VITALS
WEIGHT: 142.64 LBS | DIASTOLIC BLOOD PRESSURE: 79 MMHG | TEMPERATURE: 98.2 F | RESPIRATION RATE: 16 BRPM | HEART RATE: 69 BPM | SYSTOLIC BLOOD PRESSURE: 125 MMHG | HEIGHT: 67 IN | OXYGEN SATURATION: 100 % | BODY MASS INDEX: 22.39 KG/M2

## 2024-09-23 PROCEDURE — 6370000000 HC RX 637 (ALT 250 FOR IP): Performed by: INTERNAL MEDICINE

## 2024-09-23 PROCEDURE — 2580000003 HC RX 258: Performed by: STUDENT IN AN ORGANIZED HEALTH CARE EDUCATION/TRAINING PROGRAM

## 2024-09-23 PROCEDURE — 2580000003 HC RX 258: Performed by: INTERNAL MEDICINE

## 2024-09-23 PROCEDURE — 6360000002 HC RX W HCPCS: Performed by: INTERNAL MEDICINE

## 2024-09-23 PROCEDURE — 6370000000 HC RX 637 (ALT 250 FOR IP): Performed by: STUDENT IN AN ORGANIZED HEALTH CARE EDUCATION/TRAINING PROGRAM

## 2024-09-23 PROCEDURE — 6360000002 HC RX W HCPCS: Performed by: STUDENT IN AN ORGANIZED HEALTH CARE EDUCATION/TRAINING PROGRAM

## 2024-09-23 PROCEDURE — 97530 THERAPEUTIC ACTIVITIES: CPT

## 2024-09-23 PROCEDURE — 97535 SELF CARE MNGMENT TRAINING: CPT

## 2024-09-23 RX ORDER — LOSARTAN POTASSIUM 25 MG/1
25 TABLET ORAL DAILY
Qty: 30 TABLET | Refills: 3 | Status: SHIPPED
Start: 2024-09-24

## 2024-09-23 RX ORDER — DICYCLOMINE HYDROCHLORIDE 10 MG/1
10 CAPSULE ORAL
Qty: 120 CAPSULE | Refills: 3 | Status: SHIPPED
Start: 2024-09-23

## 2024-09-23 RX ADMIN — DICYCLOMINE HYDROCHLORIDE 10 MG: 10 CAPSULE ORAL at 05:34

## 2024-09-23 RX ADMIN — PANTOPRAZOLE SODIUM 40 MG: 40 TABLET, DELAYED RELEASE ORAL at 10:58

## 2024-09-23 RX ADMIN — HYDROMORPHONE HYDROCHLORIDE 0.5 MG: 1 INJECTION, SOLUTION INTRAMUSCULAR; INTRAVENOUS; SUBCUTANEOUS at 10:55

## 2024-09-23 RX ADMIN — LABETALOL HYDROCHLORIDE 5 MG: 5 INJECTION INTRAVENOUS at 04:50

## 2024-09-23 RX ADMIN — FAMOTIDINE 20 MG: 20 TABLET, FILM COATED ORAL at 10:58

## 2024-09-23 RX ADMIN — CLOPIDOGREL BISULFATE 75 MG: 75 TABLET ORAL at 10:58

## 2024-09-23 RX ADMIN — SODIUM CHLORIDE, PRESERVATIVE FREE 10 ML: 5 INJECTION INTRAVENOUS at 10:58

## 2024-09-23 RX ADMIN — HYDROMORPHONE HYDROCHLORIDE 0.5 MG: 1 INJECTION, SOLUTION INTRAMUSCULAR; INTRAVENOUS; SUBCUTANEOUS at 03:17

## 2024-09-23 RX ADMIN — SPIRONOLACTONE 25 MG: 25 TABLET ORAL at 10:58

## 2024-09-23 RX ADMIN — ENOXAPARIN SODIUM 40 MG: 100 INJECTION SUBCUTANEOUS at 10:58

## 2024-09-23 RX ADMIN — ACETAMINOPHEN 650 MG: 325 TABLET ORAL at 06:18

## 2024-09-23 RX ADMIN — METOPROLOL TARTRATE 25 MG: 25 TABLET, FILM COATED ORAL at 10:58

## 2024-09-23 RX ADMIN — LOSARTAN POTASSIUM 25 MG: 25 TABLET, FILM COATED ORAL at 10:58

## 2024-09-23 RX ADMIN — ASPIRIN 81 MG: 81 TABLET, CHEWABLE ORAL at 10:58

## 2024-09-23 ASSESSMENT — PAIN DESCRIPTION - LOCATION
LOCATION: LEG;NECK;ARM
LOCATION: BACK;ARM
LOCATION: BACK

## 2024-09-23 ASSESSMENT — PAIN DESCRIPTION - ORIENTATION
ORIENTATION: LOWER
ORIENTATION: LEFT;LOWER
ORIENTATION: LEFT
ORIENTATION: LEFT
ORIENTATION: LEFT;LOWER

## 2024-09-23 ASSESSMENT — PAIN SCALES - GENERAL
PAINLEVEL_OUTOF10: 10
PAINLEVEL_OUTOF10: 5
PAINLEVEL_OUTOF10: 9
PAINLEVEL_OUTOF10: 2
PAINLEVEL_OUTOF10: 5

## 2024-09-23 ASSESSMENT — PAIN DESCRIPTION - DESCRIPTORS
DESCRIPTORS: ACHING
DESCRIPTORS: ACHING;DISCOMFORT

## 2024-09-23 ASSESSMENT — PAIN - FUNCTIONAL ASSESSMENT
PAIN_FUNCTIONAL_ASSESSMENT: PREVENTS OR INTERFERES SOME ACTIVE ACTIVITIES AND ADLS

## 2024-09-23 ASSESSMENT — PAIN SCALES - WONG BAKER: WONGBAKER_NUMERICALRESPONSE: HURTS A LITTLE BIT

## 2024-10-16 ENCOUNTER — HOSPITAL ENCOUNTER (EMERGENCY)
Facility: HOSPITAL | Age: 74
Discharge: HOME | End: 2024-10-16
Attending: STUDENT IN AN ORGANIZED HEALTH CARE EDUCATION/TRAINING PROGRAM
Payer: MEDICARE

## 2024-10-16 ENCOUNTER — APPOINTMENT (OUTPATIENT)
Facility: HOSPITAL | Age: 74
End: 2024-10-16
Payer: MEDICARE

## 2024-10-16 VITALS
TEMPERATURE: 98.2 F | DIASTOLIC BLOOD PRESSURE: 93 MMHG | HEART RATE: 60 BPM | SYSTOLIC BLOOD PRESSURE: 162 MMHG | RESPIRATION RATE: 12 BRPM | WEIGHT: 143 LBS | OXYGEN SATURATION: 98 % | BODY MASS INDEX: 22.4 KG/M2

## 2024-10-16 DIAGNOSIS — M25.512 ACUTE PAIN OF LEFT SHOULDER: Primary | ICD-10-CM

## 2024-10-16 LAB
ALBUMIN SERPL-MCNC: 3.2 G/DL (ref 3.5–5)
ALBUMIN/GLOB SERPL: 0.7 (ref 1.1–2.2)
ALP SERPL-CCNC: 61 U/L (ref 45–117)
ALT SERPL-CCNC: 21 U/L (ref 12–78)
ANION GAP SERPL CALC-SCNC: 4 MMOL/L (ref 2–12)
AST SERPL-CCNC: 37 U/L (ref 15–37)
BASOPHILS # BLD: 0 K/UL (ref 0–0.1)
BASOPHILS NFR BLD: 1 % (ref 0–1)
BILIRUB SERPL-MCNC: 0.6 MG/DL (ref 0.2–1)
BUN SERPL-MCNC: 18 MG/DL (ref 6–20)
BUN/CREAT SERPL: 16 (ref 12–20)
CALCIUM SERPL-MCNC: 9.1 MG/DL (ref 8.5–10.1)
CHLORIDE SERPL-SCNC: 112 MMOL/L (ref 97–108)
CO2 SERPL-SCNC: 25 MMOL/L (ref 21–32)
CREAT SERPL-MCNC: 1.12 MG/DL (ref 0.7–1.3)
DIFFERENTIAL METHOD BLD: NORMAL
EOSINOPHIL # BLD: 0.4 K/UL (ref 0–0.4)
EOSINOPHIL NFR BLD: 7 % (ref 0–7)
ERYTHROCYTE [DISTWIDTH] IN BLOOD BY AUTOMATED COUNT: 13.9 % (ref 11.5–14.5)
GLOBULIN SER CALC-MCNC: 4.7 G/DL (ref 2–4)
GLUCOSE SERPL-MCNC: 88 MG/DL (ref 65–100)
HCT VFR BLD AUTO: 42.7 % (ref 36.6–50.3)
HGB BLD-MCNC: 14.1 G/DL (ref 12.1–17)
IMM GRANULOCYTES # BLD AUTO: 0 K/UL (ref 0–0.04)
IMM GRANULOCYTES NFR BLD AUTO: 0 % (ref 0–0.5)
LYMPHOCYTES # BLD: 1.9 K/UL (ref 0.8–3.5)
LYMPHOCYTES NFR BLD: 35 % (ref 12–49)
MCH RBC QN AUTO: 30.7 PG (ref 26–34)
MCHC RBC AUTO-ENTMCNC: 33 G/DL (ref 30–36.5)
MCV RBC AUTO: 93 FL (ref 80–99)
MONOCYTES # BLD: 0.6 K/UL (ref 0–1)
MONOCYTES NFR BLD: 11 % (ref 5–13)
NEUTS SEG # BLD: 2.5 K/UL (ref 1.8–8)
NEUTS SEG NFR BLD: 46 % (ref 32–75)
NRBC # BLD: 0 K/UL (ref 0–0.01)
NRBC BLD-RTO: 0 PER 100 WBC
PLATELET # BLD AUTO: 235 K/UL (ref 150–400)
PMV BLD AUTO: 10.3 FL (ref 8.9–12.9)
POTASSIUM SERPL-SCNC: 3.7 MMOL/L (ref 3.5–5.1)
PROT SERPL-MCNC: 7.9 G/DL (ref 6.4–8.2)
RBC # BLD AUTO: 4.59 M/UL (ref 4.1–5.7)
SODIUM SERPL-SCNC: 141 MMOL/L (ref 136–145)
TROPONIN I SERPL HS-MCNC: 9 NG/L (ref 0–76)
WBC # BLD AUTO: 5.4 K/UL (ref 4.1–11.1)

## 2024-10-16 PROCEDURE — 99285 EMERGENCY DEPT VISIT HI MDM: CPT

## 2024-10-16 PROCEDURE — 93005 ELECTROCARDIOGRAM TRACING: CPT | Performed by: STUDENT IN AN ORGANIZED HEALTH CARE EDUCATION/TRAINING PROGRAM

## 2024-10-16 PROCEDURE — 71045 X-RAY EXAM CHEST 1 VIEW: CPT

## 2024-10-16 PROCEDURE — 73030 X-RAY EXAM OF SHOULDER: CPT

## 2024-10-16 PROCEDURE — 6370000000 HC RX 637 (ALT 250 FOR IP): Performed by: STUDENT IN AN ORGANIZED HEALTH CARE EDUCATION/TRAINING PROGRAM

## 2024-10-16 PROCEDURE — 36415 COLL VENOUS BLD VENIPUNCTURE: CPT

## 2024-10-16 PROCEDURE — 85025 COMPLETE CBC W/AUTO DIFF WBC: CPT

## 2024-10-16 PROCEDURE — 84484 ASSAY OF TROPONIN QUANT: CPT

## 2024-10-16 PROCEDURE — 80053 COMPREHEN METABOLIC PANEL: CPT

## 2024-10-16 RX ORDER — OXYCODONE AND ACETAMINOPHEN 5; 325 MG/1; MG/1
1 TABLET ORAL
Status: COMPLETED | OUTPATIENT
Start: 2024-10-16 | End: 2024-10-16

## 2024-10-16 RX ORDER — OXYCODONE AND ACETAMINOPHEN 5; 325 MG/1; MG/1
1 TABLET ORAL EVERY 8 HOURS PRN
Qty: 6 TABLET | Refills: 0 | Status: SHIPPED | OUTPATIENT
Start: 2024-10-16 | End: 2024-10-19

## 2024-10-16 RX ADMIN — OXYCODONE HYDROCHLORIDE AND ACETAMINOPHEN 1 TABLET: 5; 325 TABLET ORAL at 19:58

## 2024-10-16 ASSESSMENT — PAIN DESCRIPTION - LOCATION: LOCATION: CHEST

## 2024-10-16 ASSESSMENT — PAIN SCALES - GENERAL: PAINLEVEL_OUTOF10: 7

## 2024-10-16 ASSESSMENT — PAIN - FUNCTIONAL ASSESSMENT: PAIN_FUNCTIONAL_ASSESSMENT: 0-10

## 2024-10-16 NOTE — ED NOTES
Assumed care of pt from EMS at this time. Pt complaining of chest pain that radiates into his shoulder and down his arm and hand. Pt had a recent pacemaker placed 2 weeks ago and is complaining of incisional site pain. Reports the pain started 4 days ago. MD Longoria at bedside.

## 2024-10-16 NOTE — ED NOTES
Pt states that ever since he had his pacemaker placed 2 weeks ago, he has been \"more ashy than usual\".

## 2024-10-17 NOTE — ED PROVIDER NOTES
EMERGENCY DEPARTMENT HISTORY AND PHYSICAL EXAM      Date: 10/16/2024  Patient Name: Roge Lobato    History of Presenting Illness     Chief Complaint   Patient presents with    Chest Pain     Pt via EMS for complaint of chest pain. Pt had a pacemaker placed 2 weeks ago and is experiencing pain at the incision site. States the pain radiates from the site into his chest and down into his shoulder/arm. Pt states the pain feels heavy and noted his left hand feels like its \"frozen\".          HPI: History From: patient, History limited by: none  Roge Lobato, 74 y.o. male presents to the ED with cc of left shoulder pain and pain over his pacemaker.  He reports having his pace maker placed about 3 weeks ago, it appears to have been placed on 9/18 on chart review.  Since then, he has had some pain around the site.  On Sunday, this became more sharp and burning pain in the shoulder that radiates down the arm.  He denies shortness of breath, no falls or trauma, no fever, no increase swelling or bruising at the pacemaker site.  He saw his cardiology provider yesterday, and reports that everything looked okay.  He has had a recent cough but this has been improving.  He reports history of prior stroke and residual right-sided weakness.        There are no other complaints, changes, or physical findings at this time.    PCP: No primary care provider on file.    No current facility-administered medications on file prior to encounter.     Current Outpatient Medications on File Prior to Encounter   Medication Sig Dispense Refill    dicyclomine (BENTYL) 10 MG capsule Take 1 capsule by mouth 3 times daily (before meals) 120 capsule 3    losartan (COZAAR) 25 MG tablet Take 1 tablet by mouth daily 30 tablet 3    aspirin 81 MG chewable tablet Take 1 tablet by mouth daily      clopidogrel (PLAVIX) 75 MG tablet Take 1 tablet by mouth daily      spironolactone (ALDACTONE) 25 MG tablet Take 1 tablet by mouth daily      metoprolol

## 2024-10-19 LAB
EKG ATRIAL RATE: 67 BPM
EKG DIAGNOSIS: NORMAL
EKG P AXIS: 28 DEGREES
EKG P-R INTERVAL: 210 MS
EKG Q-T INTERVAL: 500 MS
EKG QRS DURATION: 174 MS
EKG QTC CALCULATION (BAZETT): 528 MS
EKG R AXIS: -69 DEGREES
EKG T AXIS: 104 DEGREES
EKG VENTRICULAR RATE: 67 BPM

## 2024-11-09 ENCOUNTER — APPOINTMENT (OUTPATIENT)
Facility: HOSPITAL | Age: 74
End: 2024-11-09
Payer: MEDICARE

## 2024-11-09 ENCOUNTER — HOSPITAL ENCOUNTER (EMERGENCY)
Facility: HOSPITAL | Age: 74
Discharge: HOME | End: 2024-11-09
Attending: STUDENT IN AN ORGANIZED HEALTH CARE EDUCATION/TRAINING PROGRAM
Payer: MEDICARE

## 2024-11-09 VITALS
TEMPERATURE: 98.5 F | RESPIRATION RATE: 16 BRPM | BODY MASS INDEX: 23.39 KG/M2 | HEIGHT: 67 IN | SYSTOLIC BLOOD PRESSURE: 176 MMHG | OXYGEN SATURATION: 98 % | DIASTOLIC BLOOD PRESSURE: 100 MMHG | WEIGHT: 149 LBS | HEART RATE: 73 BPM

## 2024-11-09 DIAGNOSIS — M62.830 SPASM OF LEFT TRAPEZIUS MUSCLE: Primary | ICD-10-CM

## 2024-11-09 DIAGNOSIS — M43.12 ANTEROLISTHESIS OF CERVICAL SPINE: ICD-10-CM

## 2024-11-09 DIAGNOSIS — R07.89 ATYPICAL CHEST PAIN: ICD-10-CM

## 2024-11-09 LAB
ALBUMIN SERPL-MCNC: 2.9 G/DL (ref 3.5–5)
ALBUMIN/GLOB SERPL: 0.7 (ref 1.1–2.2)
ALP SERPL-CCNC: 73 U/L (ref 45–117)
ALT SERPL-CCNC: 22 U/L (ref 12–78)
ANION GAP SERPL CALC-SCNC: 4 MMOL/L (ref 2–12)
AST SERPL-CCNC: 37 U/L (ref 15–37)
BASOPHILS # BLD: 0 K/UL (ref 0–0.1)
BASOPHILS NFR BLD: 0 % (ref 0–1)
BILIRUB SERPL-MCNC: 1.2 MG/DL (ref 0.2–1)
BUN SERPL-MCNC: 17 MG/DL (ref 6–20)
BUN/CREAT SERPL: 20 (ref 12–20)
CALCIUM SERPL-MCNC: 9.3 MG/DL (ref 8.5–10.1)
CHLORIDE SERPL-SCNC: 108 MMOL/L (ref 97–108)
CO2 SERPL-SCNC: 30 MMOL/L (ref 21–32)
CREAT SERPL-MCNC: 0.87 MG/DL (ref 0.7–1.3)
DIFFERENTIAL METHOD BLD: NORMAL
EOSINOPHIL # BLD: 0.3 K/UL (ref 0–0.4)
EOSINOPHIL NFR BLD: 5 % (ref 0–7)
ERYTHROCYTE [DISTWIDTH] IN BLOOD BY AUTOMATED COUNT: 14.1 % (ref 11.5–14.5)
GLOBULIN SER CALC-MCNC: 4.2 G/DL (ref 2–4)
GLUCOSE SERPL-MCNC: 90 MG/DL (ref 65–100)
HCT VFR BLD AUTO: 41 % (ref 36.6–50.3)
HGB BLD-MCNC: 14 G/DL (ref 12.1–17)
IMM GRANULOCYTES # BLD AUTO: 0 K/UL (ref 0–0.04)
IMM GRANULOCYTES NFR BLD AUTO: 0 % (ref 0–0.5)
LYMPHOCYTES # BLD: 1.8 K/UL (ref 0.8–3.5)
LYMPHOCYTES NFR BLD: 30 % (ref 12–49)
MCH RBC QN AUTO: 30.4 PG (ref 26–34)
MCHC RBC AUTO-ENTMCNC: 34.1 G/DL (ref 30–36.5)
MCV RBC AUTO: 89.1 FL (ref 80–99)
MONOCYTES # BLD: 0.7 K/UL (ref 0–1)
MONOCYTES NFR BLD: 12 % (ref 5–13)
NEUTS SEG # BLD: 3.3 K/UL (ref 1.8–8)
NEUTS SEG NFR BLD: 53 % (ref 32–75)
NRBC # BLD: 0 K/UL (ref 0–0.01)
NRBC BLD-RTO: 0 PER 100 WBC
PLATELET # BLD AUTO: 218 K/UL (ref 150–400)
PMV BLD AUTO: 9.9 FL (ref 8.9–12.9)
POTASSIUM SERPL-SCNC: 3.8 MMOL/L (ref 3.5–5.1)
PROT SERPL-MCNC: 7.1 G/DL (ref 6.4–8.2)
RBC # BLD AUTO: 4.6 M/UL (ref 4.1–5.7)
SODIUM SERPL-SCNC: 142 MMOL/L (ref 136–145)
TROPONIN I SERPL HS-MCNC: 12 NG/L (ref 0–76)
TROPONIN I SERPL HS-MCNC: 12 NG/L (ref 0–76)
WBC # BLD AUTO: 6.2 K/UL (ref 4.1–11.1)

## 2024-11-09 PROCEDURE — 6370000000 HC RX 637 (ALT 250 FOR IP): Performed by: STUDENT IN AN ORGANIZED HEALTH CARE EDUCATION/TRAINING PROGRAM

## 2024-11-09 PROCEDURE — 85025 COMPLETE CBC W/AUTO DIFF WBC: CPT

## 2024-11-09 PROCEDURE — 36415 COLL VENOUS BLD VENIPUNCTURE: CPT

## 2024-11-09 PROCEDURE — 84484 ASSAY OF TROPONIN QUANT: CPT

## 2024-11-09 PROCEDURE — 80053 COMPREHEN METABOLIC PANEL: CPT

## 2024-11-09 PROCEDURE — 99285 EMERGENCY DEPT VISIT HI MDM: CPT

## 2024-11-09 PROCEDURE — 93005 ELECTROCARDIOGRAM TRACING: CPT | Performed by: STUDENT IN AN ORGANIZED HEALTH CARE EDUCATION/TRAINING PROGRAM

## 2024-11-09 PROCEDURE — 71045 X-RAY EXAM CHEST 1 VIEW: CPT

## 2024-11-09 PROCEDURE — 72125 CT NECK SPINE W/O DYE: CPT

## 2024-11-09 RX ORDER — ACETAMINOPHEN 500 MG
500 TABLET ORAL 4 TIMES DAILY PRN
Qty: 20 TABLET | Refills: 0 | Status: SHIPPED | OUTPATIENT
Start: 2024-11-09 | End: 2024-11-14

## 2024-11-09 RX ORDER — LIDOCAINE 50 MG/G
1 PATCH TOPICAL DAILY
Qty: 10 PATCH | Refills: 0 | Status: SHIPPED | OUTPATIENT
Start: 2024-11-09 | End: 2024-11-19

## 2024-11-09 RX ORDER — LIDOCAINE 4 G/G
1 PATCH TOPICAL
Status: DISCONTINUED | OUTPATIENT
Start: 2024-11-09 | End: 2024-11-10 | Stop reason: HOSPADM

## 2024-11-09 RX ORDER — ACETAMINOPHEN 325 MG/1
650 TABLET ORAL
Status: COMPLETED | OUTPATIENT
Start: 2024-11-09 | End: 2024-11-09

## 2024-11-09 RX ADMIN — ACETAMINOPHEN 650 MG: 325 TABLET ORAL at 20:03

## 2024-11-09 ASSESSMENT — LIFESTYLE VARIABLES
HOW OFTEN DO YOU HAVE A DRINK CONTAINING ALCOHOL: 2-4 TIMES A MONTH
HOW MANY STANDARD DRINKS CONTAINING ALCOHOL DO YOU HAVE ON A TYPICAL DAY: 1 OR 2

## 2024-11-09 ASSESSMENT — PAIN SCALES - GENERAL: PAINLEVEL_OUTOF10: 6

## 2024-11-09 ASSESSMENT — PAIN - FUNCTIONAL ASSESSMENT: PAIN_FUNCTIONAL_ASSESSMENT: 0-10

## 2024-11-09 NOTE — ED TRIAGE NOTES
Pt arrives via EMS from home for chest pain x 3 days. Pt is A&O x 4 speakinf in clear complete sentences. Pt states that chest pain has been constant since having pacemaker placed in September. Pt's family called EMS due to Pt's complaints of pain.

## 2024-11-10 LAB
EKG ATRIAL RATE: 70 BPM
EKG DIAGNOSIS: NORMAL
EKG P AXIS: 26 DEGREES
EKG P-R INTERVAL: 204 MS
EKG Q-T INTERVAL: 476 MS
EKG QRS DURATION: 184 MS
EKG QTC CALCULATION (BAZETT): 514 MS
EKG R AXIS: 99 DEGREES
EKG T AXIS: -57 DEGREES
EKG VENTRICULAR RATE: 70 BPM

## 2024-11-10 NOTE — DISCHARGE INSTRUCTIONS
If you experience any new or concerning symptoms or your symptoms change or worsen, return to the ER as soon as possible.     CT CSpine W/O Contrast    Result Date: 11/9/2024  INDICATION: posterio neck pain Exam: Noncontrast CT of the cervical spine is performed with 2.5 mm collimation. Sagittal and coronal reformatted images were also performed. CT dose reduction was achieved through use of a standardized protocol tailored for this examination and automatic exposure control for dose modulation. FINDINGS: There is no acute fracture. There is a 4 mm anterior listhesis of C3 with respect to C4, new compared to prior MRI dated July 2013. Laminectomy defects are noted at the C3 and C4 levels. The prevertebral soft tissues are within normal limits. Bones are diffusely demineralized. Extensive multilevel cervical degenerative changes are noted.     No acute fracture. 4 mm anterolisthesis of C3 with respect to C4, new compared to prior MR dated July 2013. Extensive multilevel cervical degenerative changes. MR can be performed for further evaluation, if indicated. Electronically signed by Misbah Dahl MD    XR CHEST PORTABLE    Result Date: 11/9/2024  EXAM: XR CHEST PORTABLE ACC#: [id number]  INDICATION: Chest Pain, []  COMPARISON: Chest radiograph October 16, 2024 FINDINGS: AP portable chest radiograph. There is chronic elevation of the right hemidiaphragm. The heart size is normal. The implanted pacemaker is unchanged. No lung consolidation is seen. There is no pleural effusion or pneumothorax.     No acute cardiopulmonary findings. Chronic elevation of the right hemidiaphragm. Electronically signed by TAMICA LOWE    XR CHEST PORTABLE    Result Date: 10/16/2024  EXAM:  XR CHEST PORTABLE INDICATION: Chest pain COMPARISON: 9/18/2024 TECHNIQUE: portable chest AP view at 2021 hours FINDINGS: The cardiac silhouette is within normal limits. The pulmonary vasculature is within normal limits. An artifact overlies the right chest.

## 2024-11-12 NOTE — ED PROVIDER NOTES
[facility] EMERGENCY DEPT  EMERGENCY DEPARTMENT ENCOUNTER       Pt Name: Roge Lobato  MRN: [id number]  Birthdate 1950  Date of evaluation: 11/9/2024  Provider: Guillaume Sams DO   PCP: No primary care provider on file.  Note Started: 9:09 PM 11/11/24     CHIEF COMPLAINT       Chief Complaint   Patient presents with    Chest Pain        HISTORY OF PRESENT ILLNESS: 1 or more elements      History From: Patient  None     Roge Lobato is a 74 y.o. male who presents with cc of left shoulder pain radiating into the left side of the neck and the left side of the chest over where his pacemaker was placed x 3 days. Pain is worse with moving his left arm and improved with rest. Denies any dyspnea, nausea, diaphoresis. Chest pain not currently present. He states it has been present since his pacemaker placement but occasionally worsens.      Nursing Notes were all reviewed and agreed with or any disagreements were addressed in the HPI.       PAST HISTORY     Past Medical History:  Past Medical History:   Diagnosis Date    History of CVA (cerebrovascular accident) 9/29/2021    HTN (hypertension)     Stroke (HCC)          Past Surgical History:  Past Surgical History:   Procedure Laterality Date    EP DEVICE PROCEDURE N/A 9/18/2024    Insert PPM dual performed by Fortino Mcfadden MD at [facility] CARDIAC CATH LAB       Family History:  Family History   Problem Relation Age of Onset    Alcohol Abuse Father     Kidney Disease Mother        Social History:  Social History     Tobacco Use    Smoking status: Former     Current packs/day: 0.50     Types: Cigarettes    Smokeless tobacco: Never   Substance Use Topics    Alcohol use: Yes    Drug use: Yes     Types: Marijuana (Weed)       Allergies:  No Known Allergies    CURRENT MEDICATIONS      Discharge Medication List as of 11/9/2024 10:32 PM        CONTINUE these medications which have NOT CHANGED    Details   dicyclomine (BENTYL) 10 MG capsule Take 1 capsule by mouth 3 times

## 2024-12-06 ENCOUNTER — TRANSCRIBE ORDERS (OUTPATIENT)
Facility: HOSPITAL | Age: 74
End: 2024-12-06

## 2024-12-06 DIAGNOSIS — I20.9 ANGINAL SYNDROME (HCC): ICD-10-CM

## 2024-12-06 DIAGNOSIS — I49.5 SINOATRIAL NODE DYSFUNCTION (HCC): Primary | ICD-10-CM

## 2025-02-08 ENCOUNTER — HOSPITAL ENCOUNTER (INPATIENT)
Facility: HOSPITAL | Age: 75
LOS: 9 days | Discharge: NURSING, REHAB OR LONG-TERM CARE | DRG: 947 | End: 2025-02-18
Attending: STUDENT IN AN ORGANIZED HEALTH CARE EDUCATION/TRAINING PROGRAM | Admitting: INTERNAL MEDICINE
Payer: MEDICARE

## 2025-02-08 ENCOUNTER — APPOINTMENT (OUTPATIENT)
Facility: HOSPITAL | Age: 75
DRG: 947 | End: 2025-02-08
Payer: MEDICARE

## 2025-02-08 DIAGNOSIS — J39.2 THROAT MASS: ICD-10-CM

## 2025-02-08 DIAGNOSIS — R10.13 ABDOMINAL PAIN, EPIGASTRIC: Primary | ICD-10-CM

## 2025-02-08 DIAGNOSIS — J02.9 SORE THROAT: ICD-10-CM

## 2025-02-08 DIAGNOSIS — E87.6 HYPOKALEMIA: ICD-10-CM

## 2025-02-08 LAB
BASOPHILS # BLD: 0.04 K/UL (ref 0–0.1)
BASOPHILS NFR BLD: 0.5 % (ref 0–1)
DIFFERENTIAL METHOD BLD: NORMAL
EOSINOPHIL # BLD: 0.1 K/UL (ref 0–0.4)
EOSINOPHIL NFR BLD: 1.3 % (ref 0–7)
ERYTHROCYTE [DISTWIDTH] IN BLOOD BY AUTOMATED COUNT: 14.2 % (ref 11.5–14.5)
FLUAV RNA SPEC QL NAA+PROBE: NOT DETECTED
FLUBV RNA SPEC QL NAA+PROBE: NOT DETECTED
HCT VFR BLD AUTO: 42.5 % (ref 36.6–50.3)
HGB BLD-MCNC: 14.5 G/DL (ref 12.1–17)
IMM GRANULOCYTES # BLD AUTO: 0.03 K/UL (ref 0–0.04)
IMM GRANULOCYTES NFR BLD AUTO: 0.4 % (ref 0–0.5)
LYMPHOCYTES # BLD: 1 K/UL (ref 0.8–3.5)
LYMPHOCYTES NFR BLD: 13.1 % (ref 12–49)
MCH RBC QN AUTO: 30.7 PG (ref 26–34)
MCHC RBC AUTO-ENTMCNC: 34.1 G/DL (ref 30–36.5)
MCV RBC AUTO: 89.9 FL (ref 80–99)
MONOCYTES # BLD: 0.79 K/UL (ref 0–1)
MONOCYTES NFR BLD: 10.3 % (ref 5–13)
NEUTS SEG # BLD: 5.68 K/UL (ref 1.8–8)
NEUTS SEG NFR BLD: 74.4 % (ref 32–75)
NRBC # BLD: 0 K/UL (ref 0–0.01)
NRBC BLD-RTO: 0 PER 100 WBC
PLATELET # BLD AUTO: 274 K/UL (ref 150–400)
PMV BLD AUTO: 9.8 FL (ref 8.9–12.9)
RBC # BLD AUTO: 4.73 M/UL (ref 4.1–5.7)
S PYO DNA THROAT QL NAA+PROBE: NOT DETECTED
SARS-COV-2 RNA RESP QL NAA+PROBE: NOT DETECTED
SOURCE: NORMAL
TROPONIN I SERPL HS-MCNC: 15 NG/L (ref 0–76)
WBC # BLD AUTO: 7.6 K/UL (ref 4.1–11.1)

## 2025-02-08 PROCEDURE — 2500000003 HC RX 250 WO HCPCS: Performed by: STUDENT IN AN ORGANIZED HEALTH CARE EDUCATION/TRAINING PROGRAM

## 2025-02-08 PROCEDURE — 36415 COLL VENOUS BLD VENIPUNCTURE: CPT

## 2025-02-08 PROCEDURE — 83690 ASSAY OF LIPASE: CPT

## 2025-02-08 PROCEDURE — 96375 TX/PRO/DX INJ NEW DRUG ADDON: CPT

## 2025-02-08 PROCEDURE — 93005 ELECTROCARDIOGRAM TRACING: CPT | Performed by: STUDENT IN AN ORGANIZED HEALTH CARE EDUCATION/TRAINING PROGRAM

## 2025-02-08 PROCEDURE — 87651 STREP A DNA AMP PROBE: CPT

## 2025-02-08 PROCEDURE — 6360000002 HC RX W HCPCS: Performed by: STUDENT IN AN ORGANIZED HEALTH CARE EDUCATION/TRAINING PROGRAM

## 2025-02-08 PROCEDURE — 80048 BASIC METABOLIC PNL TOTAL CA: CPT

## 2025-02-08 PROCEDURE — 2580000003 HC RX 258: Performed by: STUDENT IN AN ORGANIZED HEALTH CARE EDUCATION/TRAINING PROGRAM

## 2025-02-08 PROCEDURE — 85025 COMPLETE CBC W/AUTO DIFF WBC: CPT

## 2025-02-08 PROCEDURE — 71046 X-RAY EXAM CHEST 2 VIEWS: CPT

## 2025-02-08 PROCEDURE — 87636 SARSCOV2 & INF A&B AMP PRB: CPT

## 2025-02-08 PROCEDURE — 99285 EMERGENCY DEPT VISIT HI MDM: CPT

## 2025-02-08 PROCEDURE — 84484 ASSAY OF TROPONIN QUANT: CPT

## 2025-02-08 PROCEDURE — 80076 HEPATIC FUNCTION PANEL: CPT

## 2025-02-08 RX ORDER — IOPAMIDOL 755 MG/ML
100 INJECTION, SOLUTION INTRAVASCULAR
Status: COMPLETED | OUTPATIENT
Start: 2025-02-08 | End: 2025-02-09

## 2025-02-08 RX ORDER — KETOROLAC TROMETHAMINE 30 MG/ML
15 INJECTION, SOLUTION INTRAMUSCULAR; INTRAVENOUS
Status: COMPLETED | OUTPATIENT
Start: 2025-02-08 | End: 2025-02-08

## 2025-02-08 RX ADMIN — FAMOTIDINE 20 MG: 10 INJECTION, SOLUTION INTRAVENOUS at 23:27

## 2025-02-08 RX ADMIN — KETOROLAC TROMETHAMINE 15 MG: 30 INJECTION, SOLUTION INTRAMUSCULAR at 23:25

## 2025-02-08 ASSESSMENT — PAIN SCALES - GENERAL
PAINLEVEL_OUTOF10: 10
PAINLEVEL_OUTOF10: 10

## 2025-02-08 ASSESSMENT — PAIN DESCRIPTION - LOCATION: LOCATION: GENERALIZED;THROAT

## 2025-02-08 ASSESSMENT — LIFESTYLE VARIABLES
HOW MANY STANDARD DRINKS CONTAINING ALCOHOL DO YOU HAVE ON A TYPICAL DAY: PATIENT DOES NOT DRINK
HOW OFTEN DO YOU HAVE A DRINK CONTAINING ALCOHOL: NEVER

## 2025-02-08 ASSESSMENT — PAIN DESCRIPTION - PAIN TYPE: TYPE: ACUTE PAIN

## 2025-02-08 ASSESSMENT — PAIN DESCRIPTION - DESCRIPTORS: DESCRIPTORS: ACHING;DISCOMFORT;SHARP;SORE

## 2025-02-08 ASSESSMENT — PAIN - FUNCTIONAL ASSESSMENT: PAIN_FUNCTIONAL_ASSESSMENT: 0-10

## 2025-02-09 ENCOUNTER — APPOINTMENT (OUTPATIENT)
Facility: HOSPITAL | Age: 75
DRG: 947 | End: 2025-02-09
Payer: MEDICARE

## 2025-02-09 PROBLEM — R74.01 TRANSAMINITIS: Status: ACTIVE | Noted: 2025-02-09

## 2025-02-09 LAB
ALBUMIN SERPL-MCNC: 2.5 G/DL (ref 3.5–5)
ALBUMIN SERPL-MCNC: 2.8 G/DL (ref 3.5–5)
ALBUMIN/GLOB SERPL: 0.5 (ref 1.1–2.2)
ALBUMIN/GLOB SERPL: 0.6 (ref 1.1–2.2)
ALP SERPL-CCNC: 101 U/L (ref 45–117)
ALP SERPL-CCNC: 90 U/L (ref 45–117)
ALT SERPL-CCNC: 48 U/L (ref 12–78)
ALT SERPL-CCNC: 78 U/L (ref 12–78)
ANION GAP SERPL CALC-SCNC: 10 MMOL/L (ref 2–12)
ANION GAP SERPL CALC-SCNC: 7 MMOL/L (ref 2–12)
AST SERPL-CCNC: 354 U/L (ref 15–37)
AST SERPL-CCNC: 615 U/L (ref 15–37)
BILIRUB DIRECT SERPL-MCNC: 1.1 MG/DL (ref 0–0.2)
BILIRUB SERPL-MCNC: 1.7 MG/DL (ref 0.2–1)
BILIRUB SERPL-MCNC: 1.8 MG/DL (ref 0.2–1)
BUN SERPL-MCNC: 18 MG/DL (ref 6–20)
BUN SERPL-MCNC: 18 MG/DL (ref 6–20)
BUN/CREAT SERPL: 15 (ref 12–20)
BUN/CREAT SERPL: 17 (ref 12–20)
CALCIUM SERPL-MCNC: 9.3 MG/DL (ref 8.5–10.1)
CALCIUM SERPL-MCNC: 9.8 MG/DL (ref 8.5–10.1)
CHLORIDE SERPL-SCNC: 106 MMOL/L (ref 97–108)
CHLORIDE SERPL-SCNC: 109 MMOL/L (ref 97–108)
CO2 SERPL-SCNC: 23 MMOL/L (ref 21–32)
CO2 SERPL-SCNC: 23 MMOL/L (ref 21–32)
CREAT SERPL-MCNC: 1.07 MG/DL (ref 0.7–1.3)
CREAT SERPL-MCNC: 1.18 MG/DL (ref 0.7–1.3)
EKG ATRIAL RATE: 101 BPM
EKG DIAGNOSIS: NORMAL
EKG P AXIS: -2 DEGREES
EKG P-R INTERVAL: 208 MS
EKG Q-T INTERVAL: 388 MS
EKG QRS DURATION: 184 MS
EKG QTC CALCULATION (BAZETT): 503 MS
EKG R AXIS: -34 DEGREES
EKG T AXIS: 119 DEGREES
EKG VENTRICULAR RATE: 101 BPM
GLOBULIN SER CALC-MCNC: 4.4 G/DL (ref 2–4)
GLOBULIN SER CALC-MCNC: 5.3 G/DL (ref 2–4)
GLUCOSE SERPL-MCNC: 92 MG/DL (ref 65–100)
GLUCOSE SERPL-MCNC: 99 MG/DL (ref 65–100)
INR PPP: 1.3 (ref 0.9–1.1)
LIPASE SERPL-CCNC: 163 U/L (ref 13–75)
MAGNESIUM SERPL-MCNC: 1.9 MG/DL (ref 1.6–2.4)
POTASSIUM SERPL-SCNC: 2.6 MMOL/L (ref 3.5–5.1)
POTASSIUM SERPL-SCNC: 3 MMOL/L (ref 3.5–5.1)
PROT SERPL-MCNC: 6.9 G/DL (ref 6.4–8.2)
PROT SERPL-MCNC: 8.1 G/DL (ref 6.4–8.2)
PROTHROMBIN TIME: 13.8 SEC (ref 9.2–11.2)
SODIUM SERPL-SCNC: 139 MMOL/L (ref 136–145)
SODIUM SERPL-SCNC: 139 MMOL/L (ref 136–145)
TROPONIN I SERPL HS-MCNC: 14 NG/L (ref 0–76)

## 2025-02-09 PROCEDURE — 6360000002 HC RX W HCPCS: Performed by: INTERNAL MEDICINE

## 2025-02-09 PROCEDURE — 84484 ASSAY OF TROPONIN QUANT: CPT

## 2025-02-09 PROCEDURE — 2580000003 HC RX 258: Performed by: INTERNAL MEDICINE

## 2025-02-09 PROCEDURE — 74177 CT ABD & PELVIS W/CONTRAST: CPT

## 2025-02-09 PROCEDURE — 2500000003 HC RX 250 WO HCPCS: Performed by: INTERNAL MEDICINE

## 2025-02-09 PROCEDURE — 6370000000 HC RX 637 (ALT 250 FOR IP): Performed by: INTERNAL MEDICINE

## 2025-02-09 PROCEDURE — 80053 COMPREHEN METABOLIC PANEL: CPT

## 2025-02-09 PROCEDURE — 96365 THER/PROPH/DIAG IV INF INIT: CPT

## 2025-02-09 PROCEDURE — 36415 COLL VENOUS BLD VENIPUNCTURE: CPT

## 2025-02-09 PROCEDURE — 96366 THER/PROPH/DIAG IV INF ADDON: CPT

## 2025-02-09 PROCEDURE — 1100000003 HC PRIVATE W/ TELEMETRY

## 2025-02-09 PROCEDURE — 6360000002 HC RX W HCPCS: Performed by: STUDENT IN AN ORGANIZED HEALTH CARE EDUCATION/TRAINING PROGRAM

## 2025-02-09 PROCEDURE — 70490 CT SOFT TISSUE NECK W/O DYE: CPT

## 2025-02-09 PROCEDURE — 6360000004 HC RX CONTRAST MEDICATION: Performed by: RADIOLOGY

## 2025-02-09 PROCEDURE — 6370000000 HC RX 637 (ALT 250 FOR IP): Performed by: STUDENT IN AN ORGANIZED HEALTH CARE EDUCATION/TRAINING PROGRAM

## 2025-02-09 PROCEDURE — 83735 ASSAY OF MAGNESIUM: CPT

## 2025-02-09 PROCEDURE — 85610 PROTHROMBIN TIME: CPT

## 2025-02-09 RX ORDER — PANTOPRAZOLE SODIUM 40 MG/1
40 TABLET, DELAYED RELEASE ORAL DAILY
Status: DISCONTINUED | OUTPATIENT
Start: 2025-02-09 | End: 2025-02-18 | Stop reason: HOSPADM

## 2025-02-09 RX ORDER — SIMETHICONE 80 MG
80 TABLET,CHEWABLE ORAL ONCE
Status: COMPLETED | OUTPATIENT
Start: 2025-02-09 | End: 2025-02-09

## 2025-02-09 RX ORDER — ONDANSETRON 2 MG/ML
4 INJECTION INTRAMUSCULAR; INTRAVENOUS EVERY 6 HOURS PRN
Status: DISCONTINUED | OUTPATIENT
Start: 2025-02-09 | End: 2025-02-09

## 2025-02-09 RX ORDER — ACETAMINOPHEN 325 MG/1
650 TABLET ORAL EVERY 6 HOURS PRN
Status: DISCONTINUED | OUTPATIENT
Start: 2025-02-09 | End: 2025-02-18 | Stop reason: HOSPADM

## 2025-02-09 RX ORDER — DICYCLOMINE HCL 20 MG
20 TABLET ORAL ONCE
Status: COMPLETED | OUTPATIENT
Start: 2025-02-09 | End: 2025-02-09

## 2025-02-09 RX ORDER — SODIUM CHLORIDE 9 MG/ML
INJECTION, SOLUTION INTRAVENOUS CONTINUOUS
Status: ACTIVE | OUTPATIENT
Start: 2025-02-09 | End: 2025-02-10

## 2025-02-09 RX ORDER — POTASSIUM CHLORIDE 7.45 MG/ML
10 INJECTION INTRAVENOUS
Status: DISPENSED | OUTPATIENT
Start: 2025-02-09 | End: 2025-02-09

## 2025-02-09 RX ORDER — MORPHINE SULFATE 2 MG/ML
2 INJECTION, SOLUTION INTRAMUSCULAR; INTRAVENOUS EVERY 4 HOURS PRN
Status: DISCONTINUED | OUTPATIENT
Start: 2025-02-09 | End: 2025-02-18 | Stop reason: HOSPADM

## 2025-02-09 RX ORDER — ASPIRIN 81 MG/1
81 TABLET, CHEWABLE ORAL DAILY
Status: DISCONTINUED | OUTPATIENT
Start: 2025-02-09 | End: 2025-02-18 | Stop reason: HOSPADM

## 2025-02-09 RX ORDER — OXYCODONE HYDROCHLORIDE 5 MG/1
5 TABLET ORAL EVERY 6 HOURS PRN
Status: DISCONTINUED | OUTPATIENT
Start: 2025-02-09 | End: 2025-02-18 | Stop reason: HOSPADM

## 2025-02-09 RX ORDER — DICYCLOMINE HYDROCHLORIDE 10 MG/1
10 CAPSULE ORAL
Status: DISCONTINUED | OUTPATIENT
Start: 2025-02-09 | End: 2025-02-18 | Stop reason: HOSPADM

## 2025-02-09 RX ORDER — POTASSIUM CHLORIDE 7.45 MG/ML
10 INJECTION INTRAVENOUS ONCE
Status: COMPLETED | OUTPATIENT
Start: 2025-02-09 | End: 2025-02-09

## 2025-02-09 RX ORDER — SODIUM CHLORIDE 0.9 % (FLUSH) 0.9 %
5-40 SYRINGE (ML) INJECTION PRN
Status: DISCONTINUED | OUTPATIENT
Start: 2025-02-09 | End: 2025-02-18 | Stop reason: HOSPADM

## 2025-02-09 RX ORDER — SODIUM CHLORIDE 9 MG/ML
INJECTION, SOLUTION INTRAVENOUS PRN
Status: DISCONTINUED | OUTPATIENT
Start: 2025-02-09 | End: 2025-02-18 | Stop reason: HOSPADM

## 2025-02-09 RX ORDER — ONDANSETRON 4 MG/1
4 TABLET, ORALLY DISINTEGRATING ORAL EVERY 8 HOURS PRN
Status: DISCONTINUED | OUTPATIENT
Start: 2025-02-09 | End: 2025-02-18 | Stop reason: HOSPADM

## 2025-02-09 RX ORDER — ONDANSETRON 2 MG/ML
4 INJECTION INTRAMUSCULAR; INTRAVENOUS EVERY 6 HOURS PRN
Status: DISCONTINUED | OUTPATIENT
Start: 2025-02-09 | End: 2025-02-18 | Stop reason: HOSPADM

## 2025-02-09 RX ORDER — ACETAMINOPHEN 650 MG/1
650 SUPPOSITORY RECTAL EVERY 6 HOURS PRN
Status: DISCONTINUED | OUTPATIENT
Start: 2025-02-09 | End: 2025-02-18 | Stop reason: HOSPADM

## 2025-02-09 RX ORDER — POTASSIUM CHLORIDE 750 MG/1
40 TABLET, EXTENDED RELEASE ORAL ONCE
Status: COMPLETED | OUTPATIENT
Start: 2025-02-09 | End: 2025-02-09

## 2025-02-09 RX ORDER — SODIUM CHLORIDE 0.9 % (FLUSH) 0.9 %
5-40 SYRINGE (ML) INJECTION EVERY 12 HOURS SCHEDULED
Status: DISCONTINUED | OUTPATIENT
Start: 2025-02-09 | End: 2025-02-18 | Stop reason: HOSPADM

## 2025-02-09 RX ORDER — METOPROLOL TARTRATE 25 MG/1
25 TABLET, FILM COATED ORAL 2 TIMES DAILY
Status: DISCONTINUED | OUTPATIENT
Start: 2025-02-09 | End: 2025-02-18 | Stop reason: HOSPADM

## 2025-02-09 RX ORDER — POLYETHYLENE GLYCOL 3350 17 G/17G
17 POWDER, FOR SOLUTION ORAL DAILY PRN
Status: DISCONTINUED | OUTPATIENT
Start: 2025-02-09 | End: 2025-02-18 | Stop reason: HOSPADM

## 2025-02-09 RX ORDER — LOSARTAN POTASSIUM 25 MG/1
25 TABLET ORAL DAILY
Status: DISCONTINUED | OUTPATIENT
Start: 2025-02-09 | End: 2025-02-18 | Stop reason: HOSPADM

## 2025-02-09 RX ADMIN — DICYCLOMINE HYDROCHLORIDE 10 MG: 10 CAPSULE ORAL at 16:12

## 2025-02-09 RX ADMIN — POTASSIUM CHLORIDE 10 MEQ: 7.46 INJECTION, SOLUTION INTRAVENOUS at 01:33

## 2025-02-09 RX ADMIN — SODIUM CHLORIDE: 9 INJECTION, SOLUTION INTRAVENOUS at 11:38

## 2025-02-09 RX ADMIN — SIMETHICONE 80 MG: 80 TABLET, CHEWABLE ORAL at 06:09

## 2025-02-09 RX ADMIN — MORPHINE SULFATE 2 MG: 2 INJECTION, SOLUTION INTRAMUSCULAR; INTRAVENOUS at 20:05

## 2025-02-09 RX ADMIN — POTASSIUM CHLORIDE 10 MEQ: 7.46 INJECTION, SOLUTION INTRAVENOUS at 05:13

## 2025-02-09 RX ADMIN — LIDOCAINE HYDROCHLORIDE 40 ML: 20 SOLUTION ORAL at 01:41

## 2025-02-09 RX ADMIN — IOPAMIDOL 100 ML: 755 INJECTION, SOLUTION INTRAVENOUS at 01:04

## 2025-02-09 RX ADMIN — POTASSIUM CHLORIDE 40 MEQ: 750 TABLET, EXTENDED RELEASE ORAL at 01:37

## 2025-02-09 RX ADMIN — DICYCLOMINE HYDROCHLORIDE 20 MG: 20 TABLET ORAL at 06:09

## 2025-02-09 RX ADMIN — SODIUM CHLORIDE, PRESERVATIVE FREE 10 ML: 5 INJECTION INTRAVENOUS at 22:31

## 2025-02-09 RX ADMIN — MORPHINE SULFATE 2 MG: 2 INJECTION, SOLUTION INTRAMUSCULAR; INTRAVENOUS at 11:28

## 2025-02-09 RX ADMIN — METOPROLOL TARTRATE 25 MG: 25 TABLET, FILM COATED ORAL at 22:31

## 2025-02-09 RX ADMIN — LOSARTAN POTASSIUM 25 MG: 25 TABLET, FILM COATED ORAL at 16:13

## 2025-02-09 RX ADMIN — POTASSIUM CHLORIDE 10 MEQ: 7.46 INJECTION, SOLUTION INTRAVENOUS at 04:06

## 2025-02-09 RX ADMIN — SODIUM CHLORIDE, PRESERVATIVE FREE 10 ML: 5 INJECTION INTRAVENOUS at 11:28

## 2025-02-09 RX ADMIN — POTASSIUM CHLORIDE 10 MEQ: 7.46 INJECTION, SOLUTION INTRAVENOUS at 02:54

## 2025-02-09 RX ADMIN — SODIUM CHLORIDE: 9 INJECTION, SOLUTION INTRAVENOUS at 21:56

## 2025-02-09 ASSESSMENT — PAIN DESCRIPTION - DESCRIPTORS
DESCRIPTORS: STABBING
DESCRIPTORS: BURNING;ACHING
DESCRIPTORS: SHARP
DESCRIPTORS: ACHING
DESCRIPTORS: CRAMPING
DESCRIPTORS: CRAMPING
DESCRIPTORS: ACHING

## 2025-02-09 ASSESSMENT — PAIN SCALES - GENERAL
PAINLEVEL_OUTOF10: 6
PAINLEVEL_OUTOF10: 10
PAINLEVEL_OUTOF10: 3
PAINLEVEL_OUTOF10: 3
PAINLEVEL_OUTOF10: 10
PAINLEVEL_OUTOF10: 0
PAINLEVEL_OUTOF10: 5
PAINLEVEL_OUTOF10: 10
PAINLEVEL_OUTOF10: 6

## 2025-02-09 ASSESSMENT — PAIN DESCRIPTION - LOCATION
LOCATION: ABDOMEN;THROAT;CHEST
LOCATION: ABDOMEN
LOCATION: ABDOMEN;CHEST
LOCATION: ABDOMEN

## 2025-02-09 ASSESSMENT — PAIN DESCRIPTION - ORIENTATION
ORIENTATION: LOWER;UPPER
ORIENTATION: MID;UPPER
ORIENTATION: UPPER;ANTERIOR
ORIENTATION: ANTERIOR
ORIENTATION: LEFT;LOWER
ORIENTATION: LOWER;UPPER

## 2025-02-09 ASSESSMENT — PAIN DESCRIPTION - PAIN TYPE: TYPE: ACUTE PAIN

## 2025-02-09 ASSESSMENT — PAIN - FUNCTIONAL ASSESSMENT: PAIN_FUNCTIONAL_ASSESSMENT: ACTIVITIES ARE NOT PREVENTED

## 2025-02-09 NOTE — ED NOTES
Report received from KAILA Yañez. Reviewed reason for patient arrival, vitals, labs, medications, orders, procedures, results, pending orders/results and current plan for disposition. Questions were asked and answered prior to departure.

## 2025-02-09 NOTE — H&P
Hospitalist Admission Note    NAME:   Roge Lobato   : 1950   MRN: [id number]     Date/Time: 2025 8:03 AM    Patient PCP: No primary care provider on file.    ______________________________________________________________________  Given the patient's current clinical presentation, I have a high level of concern for decompensation if discharged from the emergency department.  Complex decision making was performed, which includes reviewing the patient's available past medical records, laboratory results, and x-ray films.       My assessment of this patient's clinical condition and my plan of care is as follows.    Assessment / Plan:      Acute transaminitis  Elevated lipase rule out pancreatitis  Total hyperbilirubinemia  -AST elevated at 3/9/1955.  Total bilirubin 1.7.  ALT is normal.  Lipase is elevated at 863  -CT abdomen shows calcifications in the head of pancreas compatible with chronic pancreatitis.  -MRI of the abdomen ordered in the emergency department.  Start clear liquid diet.  Start IV fluids.  Pain control.  GI consulted.  Keep n.p.o. from midnight.  -Check CMP and INR now    Severe hypokalemia  -Potassium is 2.6.  Magnesium level 1.1.  Replaced with KCl.  Recheck CMP now    Chronic throat discomfort  Cough/URTI  -COVID and flu are negative.  Chest x-ray shows no pneumonia  -Will check CT of the pharynx given chronic discomfort  -May need outpatient ENT follow-up    History of CVA  Hypertension  GERD  -Continue aspirin and will hold Plavix  -Continue PTA metoprolol and losartan.  Will hold Aldactone.  -Continue PTA PPI          Medical Decision Making:   I personally reviewed labs: CBC, BMP  I personally reviewed imaging: CT abdomen  I personally reviewed EKG:  Toxic drug monitoring:   Discussed case with: ED provider. After discussion I am in agreement that acuity of patient's medical condition necessitates hospital stay.      Code Status: Full code  DVT Prophylaxis: CDs  Baseline:

## 2025-02-09 NOTE — ED PROVIDER NOTES
[facility] EMERGENCY DEPT  EMERGENCY DEPARTMENT HISTORY AND PHYSICAL EXAM      Date: 2/8/2025  Patient Name: Roge Lobato  MRN: [id number]  Birthdate 1950  Date of evaluation: 2/8/2025  Provider: Saul Cancino MD   Note Started: 10:48 PM EST 2/8/25    HISTORY OF PRESENT ILLNESS     Chief Complaint   Patient presents with    Pharyngitis     BIBEMS from home with cc of sore throat with generalized body aches  and R isded ear ache and weakness x 2 weeks.    Abdominal Pain     Complaining also of Mid/L sided abd sharp pain     Chest Pain     Complaining too of midsternum chest pain when coughing. Pt has a pacemaker        History Provided By: Patient    HPI: Roge Lobato is a 74 y.o. male with PMH HTN, previous stroke, presenting with multiple complaints including abdominal pain which is in the mid epigastric region and has been present for several days.  Patient also complaining of sore throat which has been present since December 6, 3 months and he seen his primary care doctor for this complaint which did not improve with the medications were prescribed.  He also ports generalized bodyaches as well as generalized weakness.  He denies any focal weakness.  Denies any focal numbness.  He also reports chest tightness with cough, and frequent coughing.  But denies any chest tightness when not coughing.  Denies any chest pain with the symptoms.  Reports he does have a pacemaker for a slow heart rate but has never had a heart attack or MI.    PAST MEDICAL HISTORY   Past Medical History:  Past Medical History:   Diagnosis Date    History of CVA (cerebrovascular accident) 9/29/2021    HTN (hypertension)     Stroke (HCC)        Past Surgical History:  Past Surgical History:   Procedure Laterality Date    EP DEVICE PROCEDURE N/A 9/18/2024    Insert PPM dual performed by Fortino Mcfadden MD at [facility] CARDIAC CATH LAB       Family History:  Family History   Problem Relation Age of Onset    Alcohol Abuse Father     Kidney  Time: 02/09/25  8:24 AM   Result Value Ref Range    Troponin, High Sensitivity 14 0 - 76 ng/L       EKG:.EKG interpreted by me. Shows Sinus Tachycardia with a HR of 101. No STEMI.    Radiologic Studies:  Non-plain film images such as CT, Ultrasound and MRI are read by the radiologist. 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   Direct visualization is recommended      Electronically signed by BAILEY MEHTA      CT ABDOMEN PELVIS W IV CONTRAST Additional Contrast? None   Final Result   No acute intra-abdominal abnormality. Right lower lobe atelectasis.      Electronically signed by HERNANDO ALLEN      XR CHEST (2 VW)   Final Result   No acute disease. No significant change.         Electronically signed by CEHRELLE BARRIENTOS      MRI ABDOMEN W CONTRAST    (Results Pending)        ED COURSE and DIFFERENTIAL DIAGNOSIS/MDM   11 PM differential and Considerations: Patient is a 74-year-old male presenting with multiple complaints including abdominal pain sore throat and cough as well as chest tightness with the cough.  No current chest pain.  Differential includes possible cholecystitis, cholelithiasis, SBO, viral gastroenteritis.  Throat for 3 months could be esophagitis, less likely bacterial pharyngitis, possible back-to-back viral pharyngitis infections.  Possible dry mouth from multiple medication prescriptions, polypharmacy.    The differential diagnosis for this patient's current medical condition takes into consideration: Acute and severe exacerbation of patient's chronic medical conditions,, Complicated illness and injury pathology,, previously undiagnosed new problems with uncertain prognosis,, and this patient has

## 2025-02-09 NOTE — CONSULTS
[facility]  [address] [place] [address]  Phone- [phone]              Gastroenterology Consult     Referring Physician:    Consult Date: 2/9/2025     Subjective:     Chief Complaint: Abdominal pain    History of Present Illness: Roge Lobato is a 74 y.o. male with history of stroke, sick sinus syndrome, status post cardiac pacemaker who is seen in consultation for abdominal pain, elevated liver enzymes and acute pancreatitis.  Reports having intermittent abdominal pain for a couple of months but became really bad in the last couple of days was not able to manage with previously prescribed medications so decided to come to ED.  Pain is in the upper abdomen, radiates to back, associated nausea but no emesis.  Denied any fever or chills.  He denied any prior episodes of acute pancreatitis or hospitalization for the same.  He did report history of heavy alcohol use in his younger age for about 10 to 20 years but has had only few drinks per week for last 20 years, last alcohol use was 3 weeks ago.  Denied any smoking    Of note, he reports pain in the throat for last couple of days, severe, unable to swallow or drink anything but does not report difficulty in swallowing solids.  Food is not getting stuck.    Patient was hospitalized in September of last year for nonspecific chest pain,, ACS was ruled out.  He was seen by Dr. Montoya (GI) then, outpatient GI evaluation was recommended.    Past Medical History:   Diagnosis Date    History of CVA (cerebrovascular accident) 9/29/2021    HTN (hypertension)     Stroke (HCC)      Past Surgical History:   Procedure Laterality Date    EP DEVICE PROCEDURE N/A 9/18/2024    Insert PPM dual performed by Fortino Mcfadden MD at [facility] CARDIAC CATH LAB      Family History   Problem Relation Age of Onset    Alcohol Abuse Father     Kidney Disease Mother      Social History     Tobacco  Use    Smoking status: Former     Current packs/day: 0.50     Types: Cigarettes    Smokeless tobacco: Never   Substance Use Topics    Alcohol use: Yes      No Known Allergies  Current Facility-Administered Medications   Medication Dose Route Frequency    acetaminophen (TYLENOL) tablet 650 mg  650 mg Oral Q6H PRN    aspirin chewable tablet 81 mg  81 mg Oral Daily    dicyclomine (BENTYL) capsule 10 mg  10 mg Oral TID AC    losartan (COZAAR) tablet 25 mg  25 mg Oral Daily    metoprolol tartrate (LOPRESSOR) tablet 25 mg  25 mg Oral BID    pantoprazole (PROTONIX) tablet 40 mg  40 mg Oral Daily    sodium chloride flush 0.9 % injection 5-40 mL  5-40 mL IntraVENous 2 times per day    sodium chloride flush 0.9 % injection 5-40 mL  5-40 mL IntraVENous PRN    0.9 % sodium chloride infusion   IntraVENous PRN    ondansetron (ZOFRAN-ODT) disintegrating tablet 4 mg  4 mg Oral Q8H PRN    Or    ondansetron (ZOFRAN) injection 4 mg  4 mg IntraVENous Q6H PRN    polyethylene glycol (GLYCOLAX) packet 17 g  17 g Oral Daily PRN    acetaminophen (TYLENOL) tablet 650 mg  650 mg Oral Q6H PRN    Or    acetaminophen (TYLENOL) suppository 650 mg  650 mg Rectal Q6H PRN    0.9 % sodium chloride infusion   IntraVENous Continuous    oxyCODONE (ROXICODONE) immediate release tablet 5 mg  5 mg Oral Q6H PRN    morphine (PF) injection 2 mg  2 mg IntraVENous Q4H PRN        Review of Systems:  Negative except see HPI      Objective:     Physical Exam:  BP (!) 141/86   Pulse 96   Temp 98.6 °F (37 °C) (Oral)   Resp 24   Ht 1.702 m (5' 7\")   Wt 63.4 kg (139 lb 12.4 oz)   SpO2 99%   BMI 21.89 kg/m²      Skin:  Extremities and face reveal no rashes.   HEENT: Sclerae anicteric. No oral thrush  Cardiovascular: Regular rate and rhythm. No murmurs  Respiratory:  Comfortable breathing with no accessory muscle use.   GI:  Abdomen nondistended, soft, tender in upper abdomen.  No guarding or rigidity.  Rectal:  Deferred  Musculoskeletal:  No pitting edema of the

## 2025-02-10 ENCOUNTER — APPOINTMENT (OUTPATIENT)
Facility: HOSPITAL | Age: 75
DRG: 947 | End: 2025-02-10
Payer: MEDICARE

## 2025-02-10 PROBLEM — E43 SEVERE PROTEIN-CALORIE MALNUTRITION: Status: ACTIVE | Noted: 2025-02-10

## 2025-02-10 LAB
ALBUMIN SERPL-MCNC: 2.4 G/DL (ref 3.5–5)
ALBUMIN/GLOB SERPL: 0.5 (ref 1.1–2.2)
ALP SERPL-CCNC: 80 U/L (ref 45–117)
ALT SERPL-CCNC: 136 U/L (ref 12–78)
ANION GAP SERPL CALC-SCNC: 6 MMOL/L (ref 2–12)
AST SERPL-CCNC: 778 U/L (ref 15–37)
BASOPHILS # BLD: 0.04 K/UL (ref 0–0.1)
BASOPHILS NFR BLD: 0.5 % (ref 0–1)
BILIRUB SERPL-MCNC: 1.2 MG/DL (ref 0.2–1)
BUN SERPL-MCNC: 15 MG/DL (ref 6–20)
BUN/CREAT SERPL: 18 (ref 12–20)
CALCIUM SERPL-MCNC: 9.2 MG/DL (ref 8.5–10.1)
CHLORIDE SERPL-SCNC: 113 MMOL/L (ref 97–108)
CO2 SERPL-SCNC: 23 MMOL/L (ref 21–32)
CREAT SERPL-MCNC: 0.82 MG/DL (ref 0.7–1.3)
DIFFERENTIAL METHOD BLD: ABNORMAL
EOSINOPHIL # BLD: 0.3 K/UL (ref 0–0.4)
EOSINOPHIL NFR BLD: 3.8 % (ref 0–7)
ERYTHROCYTE [DISTWIDTH] IN BLOOD BY AUTOMATED COUNT: 14.4 % (ref 11.5–14.5)
GLOBULIN SER CALC-MCNC: 4.4 G/DL (ref 2–4)
GLUCOSE SERPL-MCNC: 85 MG/DL (ref 65–100)
HCT VFR BLD AUTO: 36.7 % (ref 36.6–50.3)
HGB BLD-MCNC: 12.4 G/DL (ref 12.1–17)
IMM GRANULOCYTES # BLD AUTO: 0.04 K/UL (ref 0–0.04)
IMM GRANULOCYTES NFR BLD AUTO: 0.5 % (ref 0–0.5)
LYMPHOCYTES # BLD: 1.53 K/UL (ref 0.8–3.5)
LYMPHOCYTES NFR BLD: 19.1 % (ref 12–49)
MAGNESIUM SERPL-MCNC: 1.9 MG/DL (ref 1.6–2.4)
MCH RBC QN AUTO: 30.6 PG (ref 26–34)
MCHC RBC AUTO-ENTMCNC: 33.8 G/DL (ref 30–36.5)
MCV RBC AUTO: 90.6 FL (ref 80–99)
MONOCYTES # BLD: 1 K/UL (ref 0–1)
MONOCYTES NFR BLD: 12.5 % (ref 5–13)
NEUTS SEG # BLD: 5.08 K/UL (ref 1.8–8)
NEUTS SEG NFR BLD: 63.6 % (ref 32–75)
NRBC # BLD: 0 K/UL (ref 0–0.01)
NRBC BLD-RTO: 0 PER 100 WBC
PLATELET # BLD AUTO: 234 K/UL (ref 150–400)
PMV BLD AUTO: 9.7 FL (ref 8.9–12.9)
POTASSIUM SERPL-SCNC: 3 MMOL/L (ref 3.5–5.1)
PROT SERPL-MCNC: 6.8 G/DL (ref 6.4–8.2)
RBC # BLD AUTO: 4.05 M/UL (ref 4.1–5.7)
SODIUM SERPL-SCNC: 142 MMOL/L (ref 136–145)
WBC # BLD AUTO: 8 K/UL (ref 4.1–11.1)

## 2025-02-10 PROCEDURE — 76705 ECHO EXAM OF ABDOMEN: CPT

## 2025-02-10 PROCEDURE — 80053 COMPREHEN METABOLIC PANEL: CPT

## 2025-02-10 PROCEDURE — 6360000002 HC RX W HCPCS: Performed by: INTERNAL MEDICINE

## 2025-02-10 PROCEDURE — 1100000003 HC PRIVATE W/ TELEMETRY

## 2025-02-10 PROCEDURE — 2580000003 HC RX 258: Performed by: INTERNAL MEDICINE

## 2025-02-10 PROCEDURE — 85025 COMPLETE CBC W/AUTO DIFF WBC: CPT

## 2025-02-10 PROCEDURE — 83735 ASSAY OF MAGNESIUM: CPT

## 2025-02-10 PROCEDURE — 36415 COLL VENOUS BLD VENIPUNCTURE: CPT

## 2025-02-10 PROCEDURE — 2500000003 HC RX 250 WO HCPCS: Performed by: INTERNAL MEDICINE

## 2025-02-10 PROCEDURE — 6370000000 HC RX 637 (ALT 250 FOR IP): Performed by: INTERNAL MEDICINE

## 2025-02-10 RX ORDER — POTASSIUM CHLORIDE 7.45 MG/ML
10 INJECTION INTRAVENOUS
Status: COMPLETED | OUTPATIENT
Start: 2025-02-10 | End: 2025-02-10

## 2025-02-10 RX ORDER — POTASSIUM CHLORIDE 7.45 MG/ML
10 INJECTION INTRAVENOUS
Status: DISPENSED | OUTPATIENT
Start: 2025-02-10 | End: 2025-02-10

## 2025-02-10 RX ORDER — SODIUM CHLORIDE 9 MG/ML
INJECTION, SOLUTION INTRAVENOUS CONTINUOUS
Status: DISCONTINUED | OUTPATIENT
Start: 2025-02-10 | End: 2025-02-16

## 2025-02-10 RX ORDER — POTASSIUM CHLORIDE 7.45 MG/ML
10 INJECTION INTRAVENOUS ONCE
Status: COMPLETED | OUTPATIENT
Start: 2025-02-10 | End: 2025-02-10

## 2025-02-10 RX ADMIN — PANTOPRAZOLE SODIUM 40 MG: 40 TABLET, DELAYED RELEASE ORAL at 09:03

## 2025-02-10 RX ADMIN — OXYCODONE 5 MG: 5 TABLET ORAL at 13:18

## 2025-02-10 RX ADMIN — SODIUM CHLORIDE: 9 INJECTION, SOLUTION INTRAVENOUS at 07:16

## 2025-02-10 RX ADMIN — POTASSIUM CHLORIDE 10 MEQ: 7.46 INJECTION, SOLUTION INTRAVENOUS at 13:23

## 2025-02-10 RX ADMIN — POTASSIUM CHLORIDE 10 MEQ: 7.46 INJECTION, SOLUTION INTRAVENOUS at 10:44

## 2025-02-10 RX ADMIN — LOSARTAN POTASSIUM 25 MG: 25 TABLET, FILM COATED ORAL at 09:03

## 2025-02-10 RX ADMIN — OXYCODONE 5 MG: 5 TABLET ORAL at 20:09

## 2025-02-10 RX ADMIN — POTASSIUM CHLORIDE 10 MEQ: 7.46 INJECTION, SOLUTION INTRAVENOUS at 14:43

## 2025-02-10 RX ADMIN — METOPROLOL TARTRATE 25 MG: 25 TABLET, FILM COATED ORAL at 09:04

## 2025-02-10 RX ADMIN — MORPHINE SULFATE 2 MG: 2 INJECTION, SOLUTION INTRAMUSCULAR; INTRAVENOUS at 00:53

## 2025-02-10 RX ADMIN — METOPROLOL TARTRATE 25 MG: 25 TABLET, FILM COATED ORAL at 20:09

## 2025-02-10 RX ADMIN — DICYCLOMINE HYDROCHLORIDE 10 MG: 10 CAPSULE ORAL at 13:18

## 2025-02-10 RX ADMIN — SODIUM CHLORIDE: 9 INJECTION, SOLUTION INTRAVENOUS at 10:41

## 2025-02-10 RX ADMIN — POTASSIUM CHLORIDE 10 MEQ: 7.46 INJECTION, SOLUTION INTRAVENOUS at 13:53

## 2025-02-10 RX ADMIN — DICYCLOMINE HYDROCHLORIDE 10 MG: 10 CAPSULE ORAL at 07:07

## 2025-02-10 RX ADMIN — POTASSIUM CHLORIDE 10 MEQ: 7.46 INJECTION, SOLUTION INTRAVENOUS at 18:12

## 2025-02-10 RX ADMIN — SODIUM CHLORIDE, PRESERVATIVE FREE 10 ML: 5 INJECTION INTRAVENOUS at 09:04

## 2025-02-10 RX ADMIN — MORPHINE SULFATE 2 MG: 2 INJECTION, SOLUTION INTRAMUSCULAR; INTRAVENOUS at 07:13

## 2025-02-10 RX ADMIN — ASPIRIN 81 MG: 81 TABLET, CHEWABLE ORAL at 09:04

## 2025-02-10 ASSESSMENT — PAIN - FUNCTIONAL ASSESSMENT
PAIN_FUNCTIONAL_ASSESSMENT: ACTIVITIES ARE NOT PREVENTED

## 2025-02-10 ASSESSMENT — PAIN SCALES - GENERAL
PAINLEVEL_OUTOF10: 10
PAINLEVEL_OUTOF10: 3
PAINLEVEL_OUTOF10: 2
PAINLEVEL_OUTOF10: 7
PAINLEVEL_OUTOF10: 0
PAINLEVEL_OUTOF10: 9
PAINLEVEL_OUTOF10: 0
PAINLEVEL_OUTOF10: 2
PAINLEVEL_OUTOF10: 10

## 2025-02-10 ASSESSMENT — PAIN DESCRIPTION - PAIN TYPE
TYPE: ACUTE PAIN

## 2025-02-10 ASSESSMENT — PAIN DESCRIPTION - DESCRIPTORS
DESCRIPTORS: STABBING
DESCRIPTORS: ACHING;STABBING
DESCRIPTORS: STABBING
DESCRIPTORS: STABBING
DESCRIPTORS: ACHING

## 2025-02-10 ASSESSMENT — PAIN DESCRIPTION - ORIENTATION
ORIENTATION: MID;UPPER
ORIENTATION: ANTERIOR
ORIENTATION: MID
ORIENTATION: MID;UPPER
ORIENTATION: MID;UPPER

## 2025-02-10 ASSESSMENT — PAIN DESCRIPTION - LOCATION
LOCATION: ABDOMEN

## 2025-02-10 ASSESSMENT — PAIN DESCRIPTION - FREQUENCY: FREQUENCY: INTERMITTENT

## 2025-02-10 ASSESSMENT — PAIN DESCRIPTION - ONSET: ONSET: GRADUAL

## 2025-02-10 NOTE — PROGRESS NOTES
End of Shift Note    Bedside shift change report given to KAILA Wilson (oncoming nurse) by Lilo Blunt RN (offgoing nurse).  Report included the following information SBAR, Kardex, Intake/Output, and MAR    Shift worked:  7p - 7a     Shift summary and any significant changes:    Medications administered per MAR, education provided.  Morphine administered X2 for pain with good effect.  Patient has been NPO since midnight, understanding verbalized.  AM labs obtained per order, tolerated well.  Caring rounds completed.     Concerns for physician to address:       Zone phone for oncoming shift:          Activity:  Level of Assistance: Minimal assist, patient does 75% or more    Cardiac:   Cardiac Monitoring:  yes - V-paced    Access:  Current line(s): PIV     Genitourinary:   Urinary Status: Voiding    Respiratory:   O2 Device: None (Room air)    GI:  Current diet: Diet NPO    Pain Management:   Patient states pain is manageable on current regimen: YES    Skin:  Bernard Scale Score: 19  Interventions: Wound Offloading (Prevention Methods): Bed, pressure reduction mattress, Elevate heels, Pillows, Repositioning  Pressure injury: no    Patient Safety:  Fall Score: Knott Total Score: 70  Fall Risk Interventions  Nursing Judgement-Fall Risk High(Add Comments): Yes  Toilet Every 2 Hours-In Advance of Need: Yes  Hourly Visual Checks: Awake, In bed  Fall Visual Posted: Armband, Fall sign posted, Socks  Room Door Open: Yes  Alarm On: Bed  Patient Moved Closer to Nursing Station: No    Active Consults:  IP CONSULT TO GI    Length of Stay:  Expected LOS: 2  Actual LOS: 1      Lilo Blunt RN

## 2025-02-10 NOTE — PLAN OF CARE
Problem: Safety - Adult  Goal: Free from fall injury  Outcome: Progressing     Problem: Chronic Conditions and Co-morbidities  Goal: Patient's chronic conditions and co-morbidity symptoms are monitored and maintained or improved  Outcome: Progressing  Flowsheets (Taken 2/9/2025 1112)  Care Plan - Patient's Chronic Conditions and Co-Morbidity Symptoms are Monitored and Maintained or Improved: Monitor and assess patient's chronic conditions and comorbid symptoms for stability, deterioration, or improvement     Problem: Pain  Goal: Verbalizes/displays adequate comfort level or baseline comfort level  Outcome: Progressing

## 2025-02-10 NOTE — PROGRESS NOTES
Comprehensive Nutrition Assessment    Type and Reason for Visit:  Initial, Positive nutrition screen    Nutrition Recommendations/Plan:   Rec resume diet as able  Ensure 3x/day (strawberry) as able  Monitor and record PO intakes, supplement acceptance, and Bms in I/Os     Malnutrition Assessment:  Malnutrition Status:  Severe malnutrition (02/10/25 1218)    Context:  Acute Illness     Findings of the 6 clinical characteristics of malnutrition:  Energy Intake:  50% or less of estimated energy requirements for 5 or more days  Weight Loss:  Greater than 5% over 1 month     Body Fat Loss:  Mild body fat loss Orbital, Triceps   Muscle Mass Loss:  Moderate muscle mass loss Temples (temporalis), Clavicles (pectoralis & deltoids)  Fluid Accumulation:  No fluid accumulation     Strength:  Not Performed    Nutrition Assessment:    Admitted for transaminitis. PMHx includes CVA, HTN. Screened for MST2, pt reported weight loss, decreased appetite/intakes. Pt reports UBW of ~152lbs, significant weight loss (-8.2%). He reports poor intakes r/t abdominal pain, throat pain over the last month. Denies issues chewing/swallowing, n/v, d/c. Currently NPO, but he is agreeable to stawberry ensure once diet advanced, plan to add.    Nutrition Related Findings:    Labs: Na 142, K 3.0, BUN 15, Creat 0.82, Gluc 85, Mag 1.9. Meds: pantoprazole, KCl, 0.9%NaCl. No edema. BM 2/8. Wound Type: None       Current Nutrition Intake & Therapies:    Average Meal Intake: NPO  Average Supplements Intake: NPO  Diet NPO Exceptions are: Sips of Water with Meds    Anthropometric Measures:  Height: 170.2 cm (5' 7.01\")  Ideal Body Weight (IBW): 148 lbs (67 kg)    Current Body Weight: 63.4 kg (139 lb 12.4 oz), 94.4 % IBW. Weight Source: Not specified  Current BMI (kg/m2): 21.9  BMI Categories: Underweight (BMI less than 22) age over 65    Estimated Daily Nutrient Needs:  Energy Requirements Based On: Kcal/kg  Weight Used for Energy Requirements:

## 2025-02-10 NOTE — PROGRESS NOTES
Hospitalist Progress Note    NAME:   Roge Lobato   : 1950   MRN: [id number]     Date/Time: 2/10/2025 3:13 PM  Patient PCP: No primary care provider on file.    Estimated discharge date:  Barriers:       Assessment / Plan:    Acute transaminitis worsening  Elevated lipase rule out pancreatitis  Total hyperbilirubinemia  -CT abdomen shows calcifications in the head of pancreas compatible with chronic pancreatitis.  -Pending MRI of the abdomen  -GI following and appreciate recommendations  -Transaminases are trending up and currently  and AST of 778.  Total bilirubin 1.2.  Check CMP in a.m.  -INR is 1.3    Hypokalemia  -Potassium replaced.  Recheck in a.m.     Chronic throat discomfort  Cough/URTI  Suspected new diagnosis of throat cancer  -COVID and flu are negative.  Chest x-ray shows no pneumonia  -CT soft tissue neck shows evidence of 2.6 x 2.4 cm right pyriform mass concerning for malignancy.  Will refer him to outpatient ENT for biopsy and also further evaluation.     History of CVA  Hypertension  GERD  -Continue aspirin and will hold Plavix  -Continue PTA metoprolol and losartan.  Will hold Aldactone.  -Continue PTA PPI          Medical Decision Making:   I personally reviewed labs: CBC, BMP  I personally reviewed imaging: CT abdomen  I personally reviewed EKG: Telemetry  Toxic drug monitoring: Monitor respiratory rate while on IV morphine, monitor mental status while on IV morphine  Discussed case with:         Code Status: Full code  DVT Prophylaxis: SCDs  GI Prophylaxis:    Subjective:     Chief Complaint / Reason for Physician Visit  Still reports nausea and vomiting.  Abdominal pain.  No chest pain or shortness of breath  Overnight events noted      Objective:     VITALS:   Last 24hrs VS reviewed since prior progress note. Most recent are:  Patient Vitals for the past 24 hrs:   BP Temp Temp src Pulse Resp SpO2 Height   02/10/25 1219 -- -- -- -- -- -- 1.702 m (5' 7.01\")   02/10/25

## 2025-02-10 NOTE — PROGRESS NOTES
.End of Shift Note    Bedside shift change report given to KAILA Benavidez (oncoming nurse) by Tabitha Rose RN (offgoing nurse).  Report included the following information SBAR, Kardex, and MAR    Shift worked: 7a-7p     Shift summary and any significant changes:    Medications given per MAR and education provided. US ABD completed. X5 runs K infused. Pt is up x1 w/ walker. Diet advance to full liquid.      Concerns for physician to address: N/A     Zone phone for oncoming shift:  [phone]       Activity:  Level of Assistance: Minimal assist, patient does 75% or more  Number times ambulated in hallways past shift: 0  Number of times OOB to chair past shift: 1    Cardiac:   Cardiac Monitoring: Yes      Cardiac Rhythm: Ventricular paced    Access:  Current line(s): PIV     Genitourinary:   Urinary Status: Voiding    Respiratory:   O2 Device: None (Room air)  Chronic home O2 use?: NO  Incentive spirometer at bedside: NO    GI:  Last BM (including prior to admit): 02/08/25  Current diet:  Diet NPO Exceptions are: Sips of Water with Meds  Passing flatus: YES    Pain Management:   Patient states pain is manageable on current regimen: YES    Skin:  Bernard Scale Score: 18  Interventions: Wound Offloading (Prevention Methods): Bed, pressure reduction mattress, Elevate heels, Pillows, Repositioning, Turning    Patient Safety:  Fall Risk: Nursing Judgement-Fall Risk High(Add Comments): Yes  Fall Risk Interventions  Nursing Judgement-Fall Risk High(Add Comments): Yes  Toilet Every 2 Hours-In Advance of Need: Yes  Hourly Visual Checks: Awake, In bed  Fall Visual Posted: Armband, Socks  Room Door Open: Yes  Alarm On: Bed  Patient Moved Closer to Nursing Station: No    Active Consults:   IP CONSULT TO GI    Length of Stay:  Expected LOS: 4  Actual LOS: 1    Tabitha Rose, RN

## 2025-02-11 ENCOUNTER — APPOINTMENT (OUTPATIENT)
Facility: HOSPITAL | Age: 75
DRG: 947 | End: 2025-02-11
Payer: MEDICARE

## 2025-02-11 LAB
-: ABNORMAL
ALBUMIN SERPL-MCNC: 2.2 G/DL (ref 3.5–5)
ALBUMIN/GLOB SERPL: 0.5 (ref 1.1–2.2)
ALP SERPL-CCNC: 70 U/L (ref 45–117)
ALT SERPL-CCNC: 75 U/L (ref 12–78)
ANION GAP SERPL CALC-SCNC: 7 MMOL/L (ref 2–12)
AST SERPL-CCNC: 219 U/L (ref 15–37)
BASOPHILS # BLD: 0.04 K/UL (ref 0–0.1)
BASOPHILS NFR BLD: 0.5 % (ref 0–1)
BILIRUB SERPL-MCNC: 1.1 MG/DL (ref 0.2–1)
BUN SERPL-MCNC: 14 MG/DL (ref 6–20)
BUN/CREAT SERPL: 19 (ref 12–20)
CALCIUM SERPL-MCNC: 9.7 MG/DL (ref 8.5–10.1)
CHLORIDE SERPL-SCNC: 114 MMOL/L (ref 97–108)
CO2 SERPL-SCNC: 20 MMOL/L (ref 21–32)
CREAT SERPL-MCNC: 0.72 MG/DL (ref 0.7–1.3)
DIFFERENTIAL METHOD BLD: ABNORMAL
EOSINOPHIL # BLD: 0.29 K/UL (ref 0–0.4)
EOSINOPHIL NFR BLD: 3.7 % (ref 0–7)
ERYTHROCYTE [DISTWIDTH] IN BLOOD BY AUTOMATED COUNT: 14.6 % (ref 11.5–14.5)
GLOBULIN SER CALC-MCNC: 4.2 G/DL (ref 2–4)
GLUCOSE SERPL-MCNC: 64 MG/DL (ref 65–100)
HAV IGM SER QL: NONREACTIVE
HBV CORE IGM SER QL: NONREACTIVE
HBV SURFACE AG SER QL: <0.1 INDEX
HBV SURFACE AG SER QL: NEGATIVE
HCT VFR BLD AUTO: 35.6 % (ref 36.6–50.3)
HCV AB SER IA-ACNC: >11 INDEX
HCV AB SERPL QL IA: REACTIVE
HGB BLD-MCNC: 11.9 G/DL (ref 12.1–17)
IMM GRANULOCYTES # BLD AUTO: 0.03 K/UL (ref 0–0.04)
IMM GRANULOCYTES NFR BLD AUTO: 0.4 % (ref 0–0.5)
LIPASE SERPL-CCNC: 49 U/L (ref 13–75)
LYMPHOCYTES # BLD: 1.05 K/UL (ref 0.8–3.5)
LYMPHOCYTES NFR BLD: 13.3 % (ref 12–49)
MCH RBC QN AUTO: 30.8 PG (ref 26–34)
MCHC RBC AUTO-ENTMCNC: 33.4 G/DL (ref 30–36.5)
MCV RBC AUTO: 92.2 FL (ref 80–99)
MONOCYTES # BLD: 0.96 K/UL (ref 0–1)
MONOCYTES NFR BLD: 12.2 % (ref 5–13)
NEUTS SEG # BLD: 5.53 K/UL (ref 1.8–8)
NEUTS SEG NFR BLD: 69.9 % (ref 32–75)
NRBC # BLD: 0 K/UL (ref 0–0.01)
NRBC BLD-RTO: 0 PER 100 WBC
PLATELET # BLD AUTO: 258 K/UL (ref 150–400)
PMV BLD AUTO: 10.2 FL (ref 8.9–12.9)
POTASSIUM SERPL-SCNC: 3.2 MMOL/L (ref 3.5–5.1)
PROT SERPL-MCNC: 6.4 G/DL (ref 6.4–8.2)
RBC # BLD AUTO: 3.86 M/UL (ref 4.1–5.7)
SODIUM SERPL-SCNC: 141 MMOL/L (ref 136–145)
WBC # BLD AUTO: 7.9 K/UL (ref 4.1–11.1)

## 2025-02-11 PROCEDURE — A9579 GAD-BASE MR CONTRAST NOS,1ML: HCPCS | Performed by: INTERNAL MEDICINE

## 2025-02-11 PROCEDURE — 6360000004 HC RX CONTRAST MEDICATION: Performed by: INTERNAL MEDICINE

## 2025-02-11 PROCEDURE — 80074 ACUTE HEPATITIS PANEL: CPT

## 2025-02-11 PROCEDURE — 2500000003 HC RX 250 WO HCPCS: Performed by: INTERNAL MEDICINE

## 2025-02-11 PROCEDURE — 80053 COMPREHEN METABOLIC PANEL: CPT

## 2025-02-11 PROCEDURE — 85025 COMPLETE CBC W/AUTO DIFF WBC: CPT

## 2025-02-11 PROCEDURE — 6370000000 HC RX 637 (ALT 250 FOR IP): Performed by: INTERNAL MEDICINE

## 2025-02-11 PROCEDURE — 74183 MRI ABD W/O CNTR FLWD CNTR: CPT

## 2025-02-11 PROCEDURE — 1100000003 HC PRIVATE W/ TELEMETRY

## 2025-02-11 PROCEDURE — 2580000003 HC RX 258: Performed by: INTERNAL MEDICINE

## 2025-02-11 PROCEDURE — 83690 ASSAY OF LIPASE: CPT

## 2025-02-11 PROCEDURE — 36415 COLL VENOUS BLD VENIPUNCTURE: CPT

## 2025-02-11 PROCEDURE — 6360000002 HC RX W HCPCS: Performed by: INTERNAL MEDICINE

## 2025-02-11 RX ORDER — POTASSIUM CHLORIDE 7.45 MG/ML
10 INJECTION INTRAVENOUS
Status: COMPLETED | OUTPATIENT
Start: 2025-02-11 | End: 2025-02-11

## 2025-02-11 RX ORDER — POTASSIUM CHLORIDE 750 MG/1
40 TABLET, EXTENDED RELEASE ORAL ONCE
Status: DISCONTINUED | OUTPATIENT
Start: 2025-02-11 | End: 2025-02-11

## 2025-02-11 RX ADMIN — SODIUM CHLORIDE, PRESERVATIVE FREE 10 ML: 5 INJECTION INTRAVENOUS at 08:27

## 2025-02-11 RX ADMIN — ASPIRIN 81 MG: 81 TABLET, CHEWABLE ORAL at 08:26

## 2025-02-11 RX ADMIN — LOSARTAN POTASSIUM 25 MG: 25 TABLET, FILM COATED ORAL at 08:26

## 2025-02-11 RX ADMIN — GADOTERIDOL 15 ML: 279.3 INJECTION, SOLUTION INTRAVENOUS at 08:08

## 2025-02-11 RX ADMIN — POTASSIUM CHLORIDE 10 MEQ: 7.46 INJECTION, SOLUTION INTRAVENOUS at 12:21

## 2025-02-11 RX ADMIN — OXYCODONE 5 MG: 5 TABLET ORAL at 14:36

## 2025-02-11 RX ADMIN — POTASSIUM CHLORIDE 10 MEQ: 7.46 INJECTION, SOLUTION INTRAVENOUS at 14:35

## 2025-02-11 RX ADMIN — POTASSIUM CHLORIDE 10 MEQ: 7.46 INJECTION, SOLUTION INTRAVENOUS at 13:29

## 2025-02-11 RX ADMIN — POTASSIUM CHLORIDE 10 MEQ: 7.46 INJECTION, SOLUTION INTRAVENOUS at 11:18

## 2025-02-11 RX ADMIN — DICYCLOMINE HYDROCHLORIDE 10 MG: 10 CAPSULE ORAL at 07:04

## 2025-02-11 RX ADMIN — METOPROLOL TARTRATE 25 MG: 25 TABLET, FILM COATED ORAL at 08:29

## 2025-02-11 RX ADMIN — DICYCLOMINE HYDROCHLORIDE 10 MG: 10 CAPSULE ORAL at 14:36

## 2025-02-11 RX ADMIN — PANTOPRAZOLE SODIUM 40 MG: 40 TABLET, DELAYED RELEASE ORAL at 08:26

## 2025-02-11 RX ADMIN — SODIUM CHLORIDE: 9 INJECTION, SOLUTION INTRAVENOUS at 00:49

## 2025-02-11 RX ADMIN — DICYCLOMINE HYDROCHLORIDE 10 MG: 10 CAPSULE ORAL at 11:21

## 2025-02-11 RX ADMIN — METOPROLOL TARTRATE 25 MG: 25 TABLET, FILM COATED ORAL at 20:06

## 2025-02-11 RX ADMIN — SODIUM CHLORIDE: 9 INJECTION, SOLUTION INTRAVENOUS at 18:26

## 2025-02-11 RX ADMIN — OXYCODONE 5 MG: 5 TABLET ORAL at 08:26

## 2025-02-11 ASSESSMENT — PAIN DESCRIPTION - LOCATION
LOCATION: ABDOMEN

## 2025-02-11 ASSESSMENT — PAIN DESCRIPTION - ORIENTATION
ORIENTATION: ANTERIOR
ORIENTATION: UPPER;RIGHT;LEFT
ORIENTATION: ANTERIOR

## 2025-02-11 ASSESSMENT — PAIN DESCRIPTION - DESCRIPTORS
DESCRIPTORS: ACHING

## 2025-02-11 ASSESSMENT — PAIN SCALES - GENERAL
PAINLEVEL_OUTOF10: 7
PAINLEVEL_OUTOF10: 9
PAINLEVEL_OUTOF10: 3
PAINLEVEL_OUTOF10: 7

## 2025-02-11 ASSESSMENT — PAIN - FUNCTIONAL ASSESSMENT
PAIN_FUNCTIONAL_ASSESSMENT: ACTIVITIES ARE NOT PREVENTED

## 2025-02-11 ASSESSMENT — PAIN DESCRIPTION - PAIN TYPE
TYPE: ACUTE PAIN

## 2025-02-11 NOTE — PROGRESS NOTES
Hospitalist Progress Note    NAME:   Roge Lobato   : 1950   MRN: [id number]     Date/Time: 2025 12:20 PM  Patient PCP: No primary care provider on file.    Estimated discharge date:  Barriers:       Assessment / Plan:    Acute transaminitis worsening  Elevated lipase, chronic pancreatitis  Total hyperbilirubinemia  -CT abdomen shows calcifications in the head of pancreas compatible with chronic pancreatitis.  -MRI a/p:Extrahepatic biliary dilatation. No focal obstructing lesion is identified Pancreatic ductal dilatation in the neck of the pancreas with probable side branch ectasia in the tail of pancreas. Valuation is limited. Findings may be secondary to chronic pancreatitis.   -on CLD, advance as tolerated  -reduce IVF  - GI evaluated on , reconsult ordered due to findings of biliary dilatation  -Transaminases are down trending, monitor     Hypokalemia  -replace with KCL.  Recheck in a.m.     Chronic throat discomfort  Cough/URTI  Suspected new diagnosis of throat cancer  -COVID and flu are negative.  Chest x-ray shows no pneumonia  -CT soft tissue neck shows evidence of 2.6 x 2.4 cm right pyriform mass concerning for malignancy.  Will need referral to outpatient ENT at discharge for biopsy and also further evaluation.     History of CVA  Hypertension  GERD  -Continue aspirin and will hold Plavix  -Continue PTA metoprolol and losartan.  Aldactone on hold  -Continue PTA PPI          Medical Decision Making:   I personally reviewed labs: CBC, BMP  I personally reviewed imaging: CT abdomen  I personally reviewed EKG: Telemetry  Toxic drug monitoring: Monitor respiratory rate while on IV morphine, monitor mental status while on IV morphine  Discussed case with:         Code Status: Full code  DVT Prophylaxis: SCDs  GI Prophylaxis:    Subjective:   Pt is still having abd pain however is improved from admission.       Objective:     VITALS:   Last 24hrs VS reviewed since prior progress note.  Most recent are:  Patient Vitals for the past 24 hrs:   BP Temp Temp src Pulse Resp SpO2   02/11/25 0745 135/75 98.6 °F (37 °C) Oral 88 16 96 %   02/11/25 0358 136/75 98.6 °F (37 °C) Oral 81 14 97 %   02/10/25 2000 134/76 98.4 °F (36.9 °C) Oral 88 18 98 %   02/10/25 1545 116/77 98.5 °F (36.9 °C) Oral 70 20 99 %         Intake/Output Summary (Last 24 hours) at 2/11/2025 1220  Last data filed at 2/11/2025 0653  Gross per 24 hour   Intake 1500 ml   Output 620 ml   Net 880 ml        I had a face to face encounter and independently examined this patient on 2/11/2025, as outlined below:  PHYSICAL EXAM:  General: Alert, cooperative  EENT:  EOMI. Anicteric sclerae.  Resp:  CTA bilaterally, no wheezing or rales.  No accessory muscle use  CV:  Regular  rhythm,  No edema  GI:  Soft, Non distended, Non tender.  +Bowel sounds  Neurologic:  Alert and awake, normal speech  Psych:   Pleasant  Skin:  No rashes.  No jaundice    Reviewed most current lab test results and cultures  YES  Reviewed most current radiology test results   YES  Review and summation of old records today    NO  Reviewed patient's current orders and MAR    YES  PMH/SH reviewed - no change compared to H&P    Procedures: see electronic medical records for all procedures/Xrays and details which were not copied into this note but were reviewed prior to creation of Plan.      LABS:  I reviewed today's most current labs and imaging studies.  Pertinent labs include:  Recent Labs     02/08/25  2130 02/10/25  0608 02/11/25  0628   WBC 7.6 8.0 7.9   HGB 14.5 12.4 11.9*   HCT 42.5 36.7 35.6*    234 258     Recent Labs     02/09/25  0152 02/09/25  0824 02/10/25  0608 02/11/25  0628   NA  --  139 142 141   K  --  3.0* 3.0* 3.2*   CL  --  109* 113* 114*   CO2  --  23 23 20*   GLUCOSE  --  92 85 64*   BUN  --  18 15 14   CREATININE  --  1.07 0.82 0.72   CALCIUM  --  9.3 9.2 9.7   MG 1.9  --  1.9  --    BILITOT  --  1.8* 1.2* 1.1*   AST  --  615* 778* 219*   ALT  --  78

## 2025-02-11 NOTE — PROGRESS NOTES
Received call from Derrick Tapia, was told patient follows with Dr. Jim Montoya. Called Dr. Montoya's office, was told he is off until Thursday and no one is on call.

## 2025-02-11 NOTE — PROGRESS NOTES
Spiritual Care Partner Volunteer visited patient at [facility] in [facility] on 2/11/2025   Documented by:  Evette Prather MPS, BCC, Staff   [facility]     Paging Service [phone] ([phone])

## 2025-02-11 NOTE — PROGRESS NOTES
.End of Shift Note    Bedside shift change report given to KAILA Benavidez (oncoming nurse) by Tabitha Rose RN (offgoing nurse).  Report included the following information SBAR, Kardex, and MAR    Shift worked: 7a-7p     Shift summary and any significant changes:    Medications given per MAR and education provided. PRN Oxy given x2. MRI completed. 4x runs K infused. Pt is up x1 w/ walker for short distances.     Awaiting GI consult Thursday.      Concerns for physician to address: N/A     [facility] phone for oncoming shift:  [phone]       Activity:  Level of Assistance: Minimal assist, patient does 75% or more  Number times ambulated in hallways past shift: 0  Number of times OOB to chair past shift: 0    Cardiac:   Cardiac Monitoring: Yes      Cardiac Rhythm: Ventricular paced    Access:  Current line(s): PIV     Genitourinary:   Urinary Status: Voiding    Respiratory:   O2 Device: None (Room air)  Chronic home O2 use?: NO  Incentive spirometer at bedside: NO    GI:  Last BM (including prior to admit): 02/08/25  Current diet:  ADULT DIET; Full Liquid  Passing flatus: YES    Pain Management:   Patient states pain is manageable on current regimen: YES    Skin:  Bernard Scale Score: 18  Interventions: Wound Offloading (Prevention Methods): Bed, pressure reduction mattress, Elevate heels, Pillows, Repositioning, Turning    Patient Safety:  Fall Risk: Nursing Judgement-Fall Risk High(Add Comments): Yes  Fall Risk Interventions  Nursing Judgement-Fall Risk High(Add Comments): Yes  Toilet Every 2 Hours-In Advance of Need: Yes  Hourly Visual Checks: Awake, In bed  Fall Visual Posted: Armband, Socks  Room Door Open: Yes  Alarm On: Bed  Patient Moved Closer to Nursing Station: No    Active Consults:   IP CONSULT TO GI  IP CONSULT TO GI    Length of Stay:  Expected LOS: 4  Actual LOS: 2    Tabitha Rose, RN

## 2025-02-12 LAB
ALBUMIN SERPL-MCNC: 2 G/DL (ref 3.5–5)
ALBUMIN/GLOB SERPL: 0.5 (ref 1.1–2.2)
ALP SERPL-CCNC: 63 U/L (ref 45–117)
ALT SERPL-CCNC: 46 U/L (ref 12–78)
ANION GAP SERPL CALC-SCNC: 6 MMOL/L (ref 2–12)
AST SERPL-CCNC: 74 U/L (ref 15–37)
BASOPHILS # BLD: 0.03 K/UL (ref 0–0.1)
BASOPHILS NFR BLD: 0.4 % (ref 0–1)
BILIRUB DIRECT SERPL-MCNC: 0.6 MG/DL (ref 0–0.2)
BILIRUB SERPL-MCNC: 1.1 MG/DL (ref 0.2–1)
BUN SERPL-MCNC: 10 MG/DL (ref 6–20)
BUN/CREAT SERPL: 15 (ref 12–20)
CALCIUM SERPL-MCNC: 9.3 MG/DL (ref 8.5–10.1)
CHLORIDE SERPL-SCNC: 114 MMOL/L (ref 97–108)
CO2 SERPL-SCNC: 20 MMOL/L (ref 21–32)
CREAT SERPL-MCNC: 0.66 MG/DL (ref 0.7–1.3)
DIFFERENTIAL METHOD BLD: ABNORMAL
EOSINOPHIL # BLD: 0.3 K/UL (ref 0–0.4)
EOSINOPHIL NFR BLD: 3.6 % (ref 0–7)
ERYTHROCYTE [DISTWIDTH] IN BLOOD BY AUTOMATED COUNT: 14.8 % (ref 11.5–14.5)
GLOBULIN SER CALC-MCNC: 4.1 G/DL (ref 2–4)
GLUCOSE SERPL-MCNC: 67 MG/DL (ref 65–100)
HCT VFR BLD AUTO: 33.4 % (ref 36.6–50.3)
HGB BLD-MCNC: 11 G/DL (ref 12.1–17)
IMM GRANULOCYTES # BLD AUTO: 0.04 K/UL (ref 0–0.04)
IMM GRANULOCYTES NFR BLD AUTO: 0.5 % (ref 0–0.5)
LYMPHOCYTES # BLD: 1.35 K/UL (ref 0.8–3.5)
LYMPHOCYTES NFR BLD: 16.3 % (ref 12–49)
MCH RBC QN AUTO: 30.6 PG (ref 26–34)
MCHC RBC AUTO-ENTMCNC: 32.9 G/DL (ref 30–36.5)
MCV RBC AUTO: 93 FL (ref 80–99)
MONOCYTES # BLD: 1.1 K/UL (ref 0–1)
MONOCYTES NFR BLD: 13.3 % (ref 5–13)
NEUTS SEG # BLD: 5.46 K/UL (ref 1.8–8)
NEUTS SEG NFR BLD: 65.9 % (ref 32–75)
NRBC # BLD: 0 K/UL (ref 0–0.01)
NRBC BLD-RTO: 0 PER 100 WBC
PLATELET # BLD AUTO: 249 K/UL (ref 150–400)
PMV BLD AUTO: 10.1 FL (ref 8.9–12.9)
POTASSIUM SERPL-SCNC: 3.3 MMOL/L (ref 3.5–5.1)
PROT SERPL-MCNC: 6.1 G/DL (ref 6.4–8.2)
RBC # BLD AUTO: 3.59 M/UL (ref 4.1–5.7)
SODIUM SERPL-SCNC: 140 MMOL/L (ref 136–145)
WBC # BLD AUTO: 8.3 K/UL (ref 4.1–11.1)

## 2025-02-12 PROCEDURE — 85025 COMPLETE CBC W/AUTO DIFF WBC: CPT

## 2025-02-12 PROCEDURE — 80076 HEPATIC FUNCTION PANEL: CPT

## 2025-02-12 PROCEDURE — 2500000003 HC RX 250 WO HCPCS: Performed by: INTERNAL MEDICINE

## 2025-02-12 PROCEDURE — 6370000000 HC RX 637 (ALT 250 FOR IP): Performed by: INTERNAL MEDICINE

## 2025-02-12 PROCEDURE — 2580000003 HC RX 258: Performed by: INTERNAL MEDICINE

## 2025-02-12 PROCEDURE — 1100000003 HC PRIVATE W/ TELEMETRY

## 2025-02-12 PROCEDURE — 36415 COLL VENOUS BLD VENIPUNCTURE: CPT

## 2025-02-12 PROCEDURE — 80048 BASIC METABOLIC PNL TOTAL CA: CPT

## 2025-02-12 RX ORDER — GUAIFENESIN 200 MG/10ML
200 LIQUID ORAL EVERY 4 HOURS PRN
Status: DISCONTINUED | OUTPATIENT
Start: 2025-02-12 | End: 2025-02-18 | Stop reason: HOSPADM

## 2025-02-12 RX ADMIN — GUAIFENESIN 200 MG: 200 SOLUTION ORAL at 11:45

## 2025-02-12 RX ADMIN — OXYCODONE 5 MG: 5 TABLET ORAL at 02:30

## 2025-02-12 RX ADMIN — METOPROLOL TARTRATE 25 MG: 25 TABLET, FILM COATED ORAL at 08:15

## 2025-02-12 RX ADMIN — GUAIFENESIN 200 MG: 200 SOLUTION ORAL at 20:45

## 2025-02-12 RX ADMIN — LOSARTAN POTASSIUM 25 MG: 25 TABLET, FILM COATED ORAL at 08:16

## 2025-02-12 RX ADMIN — SODIUM CHLORIDE: 9 INJECTION, SOLUTION INTRAVENOUS at 22:53

## 2025-02-12 RX ADMIN — METOPROLOL TARTRATE 25 MG: 25 TABLET, FILM COATED ORAL at 20:43

## 2025-02-12 RX ADMIN — DICYCLOMINE HYDROCHLORIDE 10 MG: 10 CAPSULE ORAL at 11:45

## 2025-02-12 RX ADMIN — SODIUM CHLORIDE: 9 INJECTION, SOLUTION INTRAVENOUS at 08:17

## 2025-02-12 RX ADMIN — DICYCLOMINE HYDROCHLORIDE 10 MG: 10 CAPSULE ORAL at 15:18

## 2025-02-12 RX ADMIN — PANTOPRAZOLE SODIUM 40 MG: 40 TABLET, DELAYED RELEASE ORAL at 08:16

## 2025-02-12 RX ADMIN — ASPIRIN 81 MG: 81 TABLET, CHEWABLE ORAL at 08:15

## 2025-02-12 RX ADMIN — SODIUM CHLORIDE, PRESERVATIVE FREE 10 ML: 5 INJECTION INTRAVENOUS at 21:10

## 2025-02-12 RX ADMIN — DICYCLOMINE HYDROCHLORIDE 10 MG: 10 CAPSULE ORAL at 07:03

## 2025-02-12 RX ADMIN — SODIUM CHLORIDE, PRESERVATIVE FREE 10 ML: 5 INJECTION INTRAVENOUS at 08:14

## 2025-02-12 RX ADMIN — OXYCODONE 5 MG: 5 TABLET ORAL at 08:14

## 2025-02-12 ASSESSMENT — PAIN DESCRIPTION - ORIENTATION
ORIENTATION: ANTERIOR
ORIENTATION: LEFT

## 2025-02-12 ASSESSMENT — PAIN DESCRIPTION - DESCRIPTORS
DESCRIPTORS: ACHING
DESCRIPTORS: ACHING;SORE

## 2025-02-12 ASSESSMENT — PAIN DESCRIPTION - ONSET: ONSET: GRADUAL

## 2025-02-12 ASSESSMENT — PAIN DESCRIPTION - FREQUENCY
FREQUENCY: INTERMITTENT
FREQUENCY: INTERMITTENT

## 2025-02-12 ASSESSMENT — PAIN DESCRIPTION - LOCATION
LOCATION: SHOULDER
LOCATION: ABDOMEN
LOCATION: SHOULDER

## 2025-02-12 ASSESSMENT — PAIN - FUNCTIONAL ASSESSMENT
PAIN_FUNCTIONAL_ASSESSMENT: ACTIVITIES ARE NOT PREVENTED
PAIN_FUNCTIONAL_ASSESSMENT: ACTIVITIES ARE NOT PREVENTED

## 2025-02-12 ASSESSMENT — PAIN SCALES - GENERAL
PAINLEVEL_OUTOF10: 9
PAINLEVEL_OUTOF10: 7
PAINLEVEL_OUTOF10: 3
PAINLEVEL_OUTOF10: 0
PAINLEVEL_OUTOF10: 6
PAINLEVEL_OUTOF10: 7
PAINLEVEL_OUTOF10: 6

## 2025-02-12 ASSESSMENT — PAIN DESCRIPTION - PAIN TYPE: TYPE: ACUTE PAIN

## 2025-02-12 NOTE — PROGRESS NOTES
.End of Shift Note    Bedside shift change report given to Joel BRIGHT  (oncoming nurse) by Allie Latham RN (offgoing nurse).  Report included the following information SBAR, Kardex, Intake/Output, MAR, Recent Results, and Med Rec Status    Shift worked:  9428-6234     Shift summary and any significant changes:     Pt given prescribed med's one at a time with applesauce. Pt prescribed cough suppressant for cough discomfort. Given x1 on shift. Caring rounds completed.      Concerns for physician to address:  none     Zone phone for oncoming shift:   [phone]         Allie Latham, RN

## 2025-02-12 NOTE — PROGRESS NOTES
Physician Progress Note      PATIENT:               NEDRA JACOB  [facility] #:                  [id number]  :                       1950  ADMIT DATE:       2025 9:11 PM  DISCH DATE:  RESPONDING  PROVIDER #:        Crystal Dodge MD          QUERY TEXT:    Patient admitted with acute pancreatitis.   Noted to have   severe    malnutrition on  eval   2/10.   If possible, please document in progress notes   and discharge summary if you are evaluating and /or treating any of the   following:    The medical record reflects the following:  Risk Factors:  He reports poor intakes r/t abdominal pain, throat pain over   the last month, possible  throat  cancer.  Clinical Indicators:   Severe malnutrition (02/10/25 1218)  Context:  Acute Illness  Findings of the 6 clinical characteristics of malnutrition:  Energy Intake:  50% or less of estimated energy requirements for 5 or more   days  Weight Loss:  Greater than 5% over 1 month  Body Fat Loss:  Mild body fat loss Orbital, Triceps  Muscle Mass Loss:  Moderate muscle mass loss Temples (temporalis), Clavicles   (pectoralis & deltoids)  Fluid Accumulation:  No fluid accumulation   Strength:  Not Performed    Treatment: Ensure 3x/day (strawberry) as able    ASPEN Criteria:    https://aspenjournals.onlinelibrary.genao.com/doi/full/10.1177/503800797859252  5    Thank you  very  much  Options provided:  -- Protein calorie malnutrition severe  -- Other - I will add my own diagnosis  -- Disagree - Not applicable / Not valid  -- Disagree - Clinically unable to determine / Unknown  -- Refer to Clinical Documentation Reviewer    PROVIDER RESPONSE TEXT:    This patient has severe protein calorie malnutrition.    Query created by: Ana Medellin on 2025 11:13 AM      Electronically signed by:  Crystal Dodge MD 2025 9:07 AM

## 2025-02-12 NOTE — PROGRESS NOTES
Hospitalist Progress Note    NAME:   Roge Lobato   : 1950   MRN: [id number]     Date/Time: 2025 3:43 PM  Patient PCP: No primary care provider on file.    Estimated discharge date:  Barriers:       Assessment / Plan:    Acute transaminitis POA- improved  Elevated lipase, chronic pancreatitis  Total hyperbilirubinemia  -CT abdomen shows calcifications in the head of pancreas compatible with chronic pancreatitis.  -MRI a/p:Extrahepatic biliary dilatation. No focal obstructing lesion is identified Pancreatic ductal dilatation in the neck of the pancreas with probable side branch ectasia in the tail of pancreas. Valuation is limited. Findings may be secondary to chronic pancreatitis.   -on CLD, advance as tolerated  DC IVF  - GI evaluated on , reconsult ordered due to findings of biliary dilatation  -Transaminases are down trending, monitor     Hypokalemia  -replace with KCL x 1 again today.  Recheck in a.m.     Chronic throat discomfort  Cough/URTI  Suspected new diagnosis of throat cancer  -COVID and flu are negative.  Chest x-ray shows no pneumonia  -CT soft tissue neck shows evidence of 2.6 x 2.4 cm right pyriform mass concerning for malignancy.  Will need referral to outpatient ENT at discharge for biopsy and also further evaluation.  Added Robitussin DM prn     History of CVA  Hypertension  GERD  -Continue aspirin and will hold Plavix  -Continue PTA metoprolol and losartan.  Aldactone on hold  -Continue PTA PPI          Medical Decision Making:   I personally reviewed labs: CBC, BMP  I personally reviewed imaging: CT abdomen  I personally reviewed EKG: Telemetry  Toxic drug monitoring: Monitor respiratory rate while on IV morphine, monitor mental status while on IV morphine  Discussed case with: Pt, RN, CM on IDRs        Code Status: Full code  DVT Prophylaxis: SCDs  GI Prophylaxis:    Subjective:   Pt is still having abd pain however is improved from admission.       Objective:

## 2025-02-12 NOTE — CARE COORDINATION
Transition of Care Plan:    RUR: 10%  Prior Level of Functioning: independent with ADLs  Disposition: home with family support   Follow up appointments: follow up with pcp and/or specialist   DME needed: N/A  Transportation at discharge: family to transport   IM/IMM Medicare/ letter given: 2nd IM Medicare Letter   Is patient a  and connected with [facility]? N/A   If yes, was [place] transfer form completed and [facility] notified?   Caregiver Contact: Andre Lobato (family) [phone]  Discharge Caregiver contacted prior to discharge? Family to transport   Care Conference needed? N/A  Barriers to discharge: medical clearance       CM staffed case with clinical team, and pt remain as inpatient for 24hrs.  Pt pending GI clearance.  Pt is pending diet advancement.    ESTELA Vick   [phone]

## 2025-02-13 LAB
BASOPHILS # BLD: 0.02 K/UL (ref 0–0.1)
BASOPHILS NFR BLD: 0.3 % (ref 0–1)
DIFFERENTIAL METHOD BLD: ABNORMAL
EOSINOPHIL # BLD: 0.27 K/UL (ref 0–0.4)
EOSINOPHIL NFR BLD: 3.4 % (ref 0–7)
ERYTHROCYTE [DISTWIDTH] IN BLOOD BY AUTOMATED COUNT: 14.9 % (ref 11.5–14.5)
HCT VFR BLD AUTO: 36.3 % (ref 36.6–50.3)
HGB BLD-MCNC: 12 G/DL (ref 12.1–17)
IMM GRANULOCYTES # BLD AUTO: 0.03 K/UL (ref 0–0.04)
IMM GRANULOCYTES NFR BLD AUTO: 0.4 % (ref 0–0.5)
LYMPHOCYTES # BLD: 1.11 K/UL (ref 0.8–3.5)
LYMPHOCYTES NFR BLD: 14.1 % (ref 12–49)
MCH RBC QN AUTO: 30.7 PG (ref 26–34)
MCHC RBC AUTO-ENTMCNC: 33.1 G/DL (ref 30–36.5)
MCV RBC AUTO: 92.8 FL (ref 80–99)
MONOCYTES # BLD: 1.15 K/UL (ref 0–1)
MONOCYTES NFR BLD: 14.6 % (ref 5–13)
NEUTS SEG # BLD: 5.32 K/UL (ref 1.8–8)
NEUTS SEG NFR BLD: 67.2 % (ref 32–75)
NRBC # BLD: 0 K/UL (ref 0–0.01)
NRBC BLD-RTO: 0 PER 100 WBC
PLATELET # BLD AUTO: 257 K/UL (ref 150–400)
PMV BLD AUTO: 9.9 FL (ref 8.9–12.9)
RBC # BLD AUTO: 3.91 M/UL (ref 4.1–5.7)
WBC # BLD AUTO: 7.9 K/UL (ref 4.1–11.1)

## 2025-02-13 PROCEDURE — 2500000003 HC RX 250 WO HCPCS: Performed by: INTERNAL MEDICINE

## 2025-02-13 PROCEDURE — 97161 PT EVAL LOW COMPLEX 20 MIN: CPT | Performed by: PHYSICAL THERAPIST

## 2025-02-13 PROCEDURE — 6370000000 HC RX 637 (ALT 250 FOR IP): Performed by: INTERNAL MEDICINE

## 2025-02-13 PROCEDURE — 85025 COMPLETE CBC W/AUTO DIFF WBC: CPT

## 2025-02-13 PROCEDURE — 36415 COLL VENOUS BLD VENIPUNCTURE: CPT

## 2025-02-13 PROCEDURE — 1100000003 HC PRIVATE W/ TELEMETRY

## 2025-02-13 PROCEDURE — 97535 SELF CARE MNGMENT TRAINING: CPT

## 2025-02-13 PROCEDURE — 97165 OT EVAL LOW COMPLEX 30 MIN: CPT

## 2025-02-13 PROCEDURE — 97116 GAIT TRAINING THERAPY: CPT | Performed by: PHYSICAL THERAPIST

## 2025-02-13 RX ORDER — OXYCODONE HYDROCHLORIDE 5 MG/1
5 TABLET ORAL EVERY 6 HOURS PRN
Qty: 20 TABLET | Refills: 0 | Status: SHIPPED | OUTPATIENT
Start: 2025-02-13 | End: 2025-02-18

## 2025-02-13 RX ADMIN — LOSARTAN POTASSIUM 25 MG: 25 TABLET, FILM COATED ORAL at 08:11

## 2025-02-13 RX ADMIN — METOPROLOL TARTRATE 25 MG: 25 TABLET, FILM COATED ORAL at 21:30

## 2025-02-13 RX ADMIN — DICYCLOMINE HYDROCHLORIDE 10 MG: 10 CAPSULE ORAL at 05:25

## 2025-02-13 RX ADMIN — DICYCLOMINE HYDROCHLORIDE 10 MG: 10 CAPSULE ORAL at 17:03

## 2025-02-13 RX ADMIN — GUAIFENESIN 200 MG: 200 SOLUTION ORAL at 21:30

## 2025-02-13 RX ADMIN — METOPROLOL TARTRATE 25 MG: 25 TABLET, FILM COATED ORAL at 08:11

## 2025-02-13 RX ADMIN — SODIUM CHLORIDE, PRESERVATIVE FREE 10 ML: 5 INJECTION INTRAVENOUS at 08:11

## 2025-02-13 RX ADMIN — DICYCLOMINE HYDROCHLORIDE 10 MG: 10 CAPSULE ORAL at 12:44

## 2025-02-13 RX ADMIN — ACETAMINOPHEN 650 MG: 325 TABLET ORAL at 08:11

## 2025-02-13 RX ADMIN — GUAIFENESIN 200 MG: 200 SOLUTION ORAL at 05:27

## 2025-02-13 RX ADMIN — ASPIRIN 81 MG: 81 TABLET, CHEWABLE ORAL at 08:11

## 2025-02-13 RX ADMIN — OXYCODONE 5 MG: 5 TABLET ORAL at 17:07

## 2025-02-13 RX ADMIN — PANTOPRAZOLE SODIUM 40 MG: 40 TABLET, DELAYED RELEASE ORAL at 08:11

## 2025-02-13 ASSESSMENT — PAIN DESCRIPTION - LOCATION
LOCATION: THROAT
LOCATION: SHOULDER

## 2025-02-13 ASSESSMENT — PAIN SCALES - GENERAL
PAINLEVEL_OUTOF10: 9
PAINLEVEL_OUTOF10: 10
PAINLEVEL_OUTOF10: 5

## 2025-02-13 ASSESSMENT — PAIN DESCRIPTION - ORIENTATION: ORIENTATION: RIGHT

## 2025-02-13 ASSESSMENT — PAIN DESCRIPTION - DESCRIPTORS: DESCRIPTORS: ACHING

## 2025-02-13 NOTE — PLAN OF CARE
Problem: Occupational Therapy - Adult  Goal: By Discharge: Performs self-care activities at highest level of function for planned discharge setting.  See evaluation for individualized goals.  Description: FUNCTIONAL STATUS PRIOR TO ADMISSION:  Pt reports independence with ADLs and functional mobility with RW at baseline. Lives alone \"most of the time\", but has a s/o that stays with him at times.   ,  ,  ,  ,  ,  ,  ,  ,  ,  ,       HOME SUPPORT: Patient lived alone, s/o stays with him sometimes.    Occupational Therapy Goals:  Initiated 2/13/2025  1.  Patient will perform supine<>sit with Stand by Assist within 7 day(s).  2.  Patient will perform lower body dressing with Moderate Assist within 7 day(s).  3.  Patient will perform toileting with Minimal Assist within 7 day(s).  4.  Patient will perform toilet transfers with Contact Guard Assist  within 7 day(s).  5.  Patient will perform functional ambulation with least restrictive AD with Contact Guard Assist within 7 day(s).      Outcome: Progressing   OCCUPATIONAL THERAPY EVALUATION    Patient: Roge Lobato (74 y.o. male)  Date: 2/13/2025  Primary Diagnosis: Abdominal pain, epigastric [R10.13]  Hypokalemia [E87.6]  Sore throat [J02.9]  Transaminitis [R74.01]         Precautions: Fall Risk                  ASSESSMENT :  The patient is limited by decreased functional mobility, independence in ADLs, high-level IADLs, ROM, strength, activity tolerance, endurance, safety awareness, cognition, command following, coordination, balance, posture, fine-motor control, increased pain levels. Pt admitted with abdominal pain and GW.    Based on the impairments listed above pt is presenting below his independent baseline, currently requiring up to mod/max assist for ADLs and min assist for functional mobility with RW. Pt tolerated evaluation fair. Endorsed pain in RLE and BLE \"feeling heavy\". Pt noted with narrow EDGAR and flexed trunk posture, fair standing balance, only

## 2025-02-13 NOTE — PROGRESS NOTES
.End of Shift Note    Bedside shift change report given to Antonina BRIGHT (oncoming nurse) by Allie Latham RN (offgoing nurse).  Report included the following information SBAR, Kardex, Intake/Output, MAR, Recent Results, and Med Rec Status    Shift worked:  5342-0658     Shift summary and any significant changes:     Pt given prescribed med's per MAR. Pain med given once on shift for throat pain. Pt worked with PT/OT. Caring rounds completed.      Concerns for physician to address:  none     Zone phone for oncoming shift:   [phone]         Allie Latham, RN

## 2025-02-13 NOTE — PLAN OF CARE
Problem: Physical Therapy - Adult  Goal: By Discharge: Performs mobility at highest level of function for planned discharge setting.  See evaluation for individualized goals.  Description: FUNCTIONAL STATUS PRIOR TO ADMISSION: Patient reports that he was ambulatory with a RW at home and used a w/c to 'move around the house'.  States that he is able to do his own ADL's and ambulation at baseline.  Has 2 level house but does not go upstairs.  Normally able to manage stairs to enter the home.    HOME SUPPORT PRIOR TO ADMISSION: The patient lived mostly alone but girlfriend stays with him at times.  States that she is often gone on \"trips\" and is sometimes gone for a month or so at a time.    Physical Therapy Goals  Initiated 2/13/2025  1.  Patient will move from supine to sit and sit to supine in bed with supervision/set-up within 7 day(s).    2.  Patient will perform sit to stand with contact guard assist within 7 day(s).  3.  Patient will transfer from bed to chair and chair to bed with contact guard assist using the least restrictive device within 7 day(s).  4.  Patient will ambulate with contact guard assist for 50 feet with the least restrictive device within 7 day(s).   5.  Patient will ascend/descend 5 stairs with 1 handrail(s) with minimal assistance within 7 day(s).  Outcome: Progressing  PHYSICAL THERAPY EVALUATION    Patient: Roge Lobato (74 y.o. male)  Date: 2/13/2025  Primary Diagnosis: Abdominal pain, epigastric [R10.13]  Hypokalemia [E87.6]  Sore throat [J02.9]  Transaminitis [R74.01]       Precautions: Restrictions/Precautions: Fall Risk                      ASSESSMENT :   DEFICITS/IMPAIRMENTS:   The patient is limited by impaired balance, gait instability, generalized weakness, pain, and decreased activity tolerance.  Currently patient requires modA to roll and modA to come to sit on the EOB.  Has limited ROM in R shld and pain in L shld that limits his ability to grab rail to transition to EOB.  is there \"sometimes\")  Type of Home: House  Home Layout: Two level, Able to Live on Main level with bedroom/bathroom  Home Access: Stairs to enter with rails  Entrance Stairs - Number of Steps: 5  Entrance Stairs - Rails: Right  Bathroom Shower/Tub: Walk-in shower  Bathroom Equipment: Shower chair  Home Equipment: Walker - Rolling, Rollator, Wheelchair - Manual    Cognitive/Behavioral Status:  Orientation  Orientation Level: Oriented X4         Strength:    Strength: Generally decreased, functional    Tone & Sensation:   Tone: Normal  Sensation: Intact    Coordination:  Coordination: Within functional limits    Range Of Motion:  AROM: Generally decreased, functional  PROM: Generally decreased, functional    Functional Mobility:  Bed Mobility:     Bed Mobility Training  Bed Mobility Training: Yes  Rolling: Partial/Moderate assistance  Supine to Sit: Partial/Moderate assistance  Scooting: Minimal assistance  Transfers:     Transfer Training  Transfer Training: Yes  Sit to Stand: Minimal assistance  Stand to Sit: Minimal assistance  Balance:               Balance  Sitting: Impaired  Sitting - Static: Good (unsupported)  Sitting - Dynamic: Fair (occasional)  Standing: Impaired  Standing - Static: Constant support;Fair  Standing - Dynamic: Constant support;Fair  Ambulation/Gait Training:                       Gait  Gait Training: Yes  Overall Level of Assistance: Minimal assistance  Distance (ft): 6 Feet (from bed to chair)  Assistive Device: Gait belt;Walker, rolling  Base of Support: Narrowed;Center of gravity altered  Speed/Gisele: Slow;Shuffled;Pace decreased (< 100 feet/min)  Step Length: Left shortened;Right shortened  Gait Abnormalities: Decreased step clearance;Shuffling gait                                 Pain Ratin/10   Pain Intervention(s):       Activity Tolerance:   Fair  and requires rest breaks    After treatment:   Patient left in no apparent distress sitting up in chair, Call bell within reach,

## 2025-02-13 NOTE — PROGRESS NOTES
End of Shift Note    Bedside shift change report given to KAILA Oliveira (oncoming nurse) by Joel Chan RN (offgoing nurse).  Report included the following information SBAR, Kardex, Intake/Output, MAR, and Recent Results    Shift worked:  7p-7a     Shift summary and any significant changes:     Patient tolerated care. Scheduled medication given with apple sauce. PRN robitussin given x2. VSS. Patient sat on the side of the bed. Patient had shoulder pain but refused pain medication. Caring rounds completed.     Concerns for physician to address:  none     Zone phone for oncoming shift:   [phone]       Activity:  Level of Assistance: Moderate assist, patient does 50-74%  Number times ambulated in hallways past shift: 0  Number of times OOB to chair past shift: 0    Cardiac:   Cardiac Monitoring: Yes      Cardiac Rhythm: Ventricular paced, Sinus rhythm    Access:  Current line(s): PIV     Genitourinary:   Urinary Status: Voiding, Bathroom privileges    Respiratory:   O2 Device: None (Room air)  Chronic home O2 use?: NO  Incentive spirometer at bedside: NO    GI:  Last BM (including prior to admit): 02/08/25  Current diet:  ADULT DIET; Full Liquid  ADULT ORAL NUTRITION SUPPLEMENT; Breakfast, Lunch, Dinner; Standard High Calorie/High Protein Oral Supplement  ADULT ORAL NUTRITION SUPPLEMENT; Breakfast; Standard High Calorie/High Protein Oral Supplement  Passing flatus: YES    Pain Management:   Patient states pain is manageable on current regimen: YES    Skin:  Brenard Scale Score: 19  Interventions: Wound Offloading (Prevention Methods): Bed, pressure reduction mattress, Pillows, Repositioning, Turning    Patient Safety:  Fall Risk: Nursing Judgement-Fall Risk High(Add Comments): Yes  Fall Risk Interventions  Nursing Judgement-Fall Risk High(Add Comments): Yes  Toilet Every 2 Hours-In Advance of Need: Yes  Hourly Visual Checks: Awake, In bed  Fall Visual Posted: Socks, Armband  Room Door Open: Deferred to decrease  stimulation  Alarm On: Bed  Patient Moved Closer to Nursing Station: Yes    Active Consults:   IP CONSULT TO GI  IP CONSULT TO GI    Length of Stay:  Expected LOS: 5  Actual LOS: 4    Joel Chan RN

## 2025-02-13 NOTE — PROGRESS NOTES
Hospitalist Progress Note    NAME:   Roge Lobato   : 1950   MRN: [id number]     Date/Time: 2025 12:30 PM  Patient PCP: No primary care provider on file.    Estimated discharge date: 24-48 hrs?, SNF recommended  Barriers: SNF versus HH per pt wishes arranged, Clinical improvement      Assessment / Plan:    Acute transaminitis POA- improved  Elevated lipase, chronic pancreatitis  Total hyperbilirubinemia  -CT abdomen shows calcifications in the head of pancreas compatible with chronic pancreatitis.  -MRI a/p:Extrahepatic biliary dilatation. No focal obstructing lesion is identified Pancreatic ductal dilatation in the neck of the pancreas with probable side branch ectasia in the tail of pancreas. Valuation is limited. Findings may be secondary to chronic pancreatitis.   -on CLD, advance as tolerated  DC IVF  - GI evaluated on , reconsult ordered due to findings of biliary dilatation  -Transaminases are down trending, monitor     Hypokalemia  -replaced     Chronic throat discomfort  Cough/URTI  Suspected new diagnosis of throat cancer  -COVID and flu are negative.  Chest x-ray shows no pneumonia  -CT soft tissue neck shows evidence of 2.6 x 2.4 cm right pyriform mass concerning for malignancy.  Will need referral to outpatient ENT at discharge for biopsy and also further evaluation.  Added Robitussin prn     History of CVA  Hypertension  GERD  -Continue aspirin and will hold Plavix  -Continue PTA metoprolol and losartan.  Aldactone on hold  -Continue PTA PPI          Medical Decision Making:   I personally reviewed labs: CBC, BMP  I personally reviewed imaging: CT abdomen  I personally reviewed EKG: Telemetry  Toxic drug monitoring: Monitor respiratory rate while on IV morphine, monitor mental status while on IV morphine  Discussed case with: Pt, RN, CM on IDRs        Code Status: Full code  DVT Prophylaxis: SCDs  GI Prophylaxis:    Subjective:   Pt is still having abd pain however is improved

## 2025-02-14 LAB
ALBUMIN SERPL-MCNC: 2.2 G/DL (ref 3.5–5)
ALBUMIN/GLOB SERPL: 0.5 (ref 1.1–2.2)
ALP SERPL-CCNC: 62 U/L (ref 45–117)
ALT SERPL-CCNC: 27 U/L (ref 12–78)
ANION GAP SERPL CALC-SCNC: 8 MMOL/L (ref 2–12)
AST SERPL-CCNC: 31 U/L (ref 15–37)
BASOPHILS # BLD: 0.01 K/UL (ref 0–0.1)
BASOPHILS NFR BLD: 0.1 % (ref 0–1)
BILIRUB DIRECT SERPL-MCNC: 0.6 MG/DL (ref 0–0.2)
BILIRUB SERPL-MCNC: 0.9 MG/DL (ref 0.2–1)
BUN SERPL-MCNC: 9 MG/DL (ref 6–20)
BUN/CREAT SERPL: 12 (ref 12–20)
CALCIUM SERPL-MCNC: 9.6 MG/DL (ref 8.5–10.1)
CHLORIDE SERPL-SCNC: 110 MMOL/L (ref 97–108)
CO2 SERPL-SCNC: 20 MMOL/L (ref 21–32)
CREAT SERPL-MCNC: 0.77 MG/DL (ref 0.7–1.3)
CRP SERPL-MCNC: 2.7 MG/DL (ref 0–0.3)
DIFFERENTIAL METHOD BLD: ABNORMAL
EOSINOPHIL # BLD: 0.18 K/UL (ref 0–0.4)
EOSINOPHIL NFR BLD: 2.5 % (ref 0–7)
ERYTHROCYTE [DISTWIDTH] IN BLOOD BY AUTOMATED COUNT: 14.5 % (ref 11.5–14.5)
FLUAV RNA SPEC QL NAA+PROBE: NOT DETECTED
FLUBV RNA SPEC QL NAA+PROBE: NOT DETECTED
GLOBULIN SER CALC-MCNC: 4.5 G/DL (ref 2–4)
GLUCOSE SERPL-MCNC: 63 MG/DL (ref 65–100)
HCT VFR BLD AUTO: 35 % (ref 36.6–50.3)
HGB BLD-MCNC: 11.9 G/DL (ref 12.1–17)
IMM GRANULOCYTES # BLD AUTO: 0.05 K/UL (ref 0–0.04)
IMM GRANULOCYTES NFR BLD AUTO: 0.7 % (ref 0–0.5)
LYMPHOCYTES # BLD: 0.69 K/UL (ref 0.8–3.5)
LYMPHOCYTES NFR BLD: 9.4 % (ref 12–49)
MCH RBC QN AUTO: 30.7 PG (ref 26–34)
MCHC RBC AUTO-ENTMCNC: 34 G/DL (ref 30–36.5)
MCV RBC AUTO: 90.4 FL (ref 80–99)
MONOCYTES # BLD: 1.34 K/UL (ref 0–1)
MONOCYTES NFR BLD: 18.4 % (ref 5–13)
NEUTS SEG # BLD: 5.03 K/UL (ref 1.8–8)
NEUTS SEG NFR BLD: 68.9 % (ref 32–75)
NRBC # BLD: 0 K/UL (ref 0–0.01)
NRBC BLD-RTO: 0 PER 100 WBC
PLATELET # BLD AUTO: 245 K/UL (ref 150–400)
PMV BLD AUTO: 9.8 FL (ref 8.9–12.9)
POTASSIUM SERPL-SCNC: 3.2 MMOL/L (ref 3.5–5.1)
PROCALCITONIN SERPL-MCNC: 0.44 NG/ML
PROT SERPL-MCNC: 6.7 G/DL (ref 6.4–8.2)
RBC # BLD AUTO: 3.87 M/UL (ref 4.1–5.7)
RBC MORPH BLD: ABNORMAL
S PYO DNA THROAT QL NAA+PROBE: NOT DETECTED
SARS-COV-2 RNA RESP QL NAA+PROBE: DETECTED
SODIUM SERPL-SCNC: 138 MMOL/L (ref 136–145)
SOURCE: ABNORMAL
WBC # BLD AUTO: 7.3 K/UL (ref 4.1–11.1)

## 2025-02-14 PROCEDURE — 85025 COMPLETE CBC W/AUTO DIFF WBC: CPT

## 2025-02-14 PROCEDURE — 2500000003 HC RX 250 WO HCPCS: Performed by: INTERNAL MEDICINE

## 2025-02-14 PROCEDURE — 86140 C-REACTIVE PROTEIN: CPT

## 2025-02-14 PROCEDURE — 36415 COLL VENOUS BLD VENIPUNCTURE: CPT

## 2025-02-14 PROCEDURE — 6370000000 HC RX 637 (ALT 250 FOR IP): Performed by: INTERNAL MEDICINE

## 2025-02-14 PROCEDURE — 87651 STREP A DNA AMP PROBE: CPT

## 2025-02-14 PROCEDURE — 87636 SARSCOV2 & INF A&B AMP PRB: CPT

## 2025-02-14 PROCEDURE — 1100000000 HC RM PRIVATE

## 2025-02-14 PROCEDURE — 2580000003 HC RX 258: Performed by: INTERNAL MEDICINE

## 2025-02-14 PROCEDURE — 84145 PROCALCITONIN (PCT): CPT

## 2025-02-14 PROCEDURE — 80076 HEPATIC FUNCTION PANEL: CPT

## 2025-02-14 PROCEDURE — 80048 BASIC METABOLIC PNL TOTAL CA: CPT

## 2025-02-14 PROCEDURE — 6360000002 HC RX W HCPCS: Performed by: INTERNAL MEDICINE

## 2025-02-14 RX ORDER — POLYETHYLENE GLYCOL 3350 17 G/17G
17 POWDER, FOR SOLUTION ORAL DAILY
Status: DISCONTINUED | OUTPATIENT
Start: 2025-02-14 | End: 2025-02-18 | Stop reason: HOSPADM

## 2025-02-14 RX ORDER — POTASSIUM CHLORIDE 1.5 G/1.58G
40 POWDER, FOR SOLUTION ORAL ONCE
Status: COMPLETED | OUTPATIENT
Start: 2025-02-14 | End: 2025-02-14

## 2025-02-14 RX ORDER — BISACODYL 10 MG
10 SUPPOSITORY, RECTAL RECTAL DAILY PRN
Status: DISCONTINUED | OUTPATIENT
Start: 2025-02-14 | End: 2025-02-18 | Stop reason: HOSPADM

## 2025-02-14 RX ORDER — POTASSIUM CHLORIDE 750 MG/1
40 TABLET, EXTENDED RELEASE ORAL ONCE
Status: DISCONTINUED | OUTPATIENT
Start: 2025-02-14 | End: 2025-02-14

## 2025-02-14 RX ADMIN — POLYETHYLENE GLYCOL 3350 17 G: 17 POWDER, FOR SOLUTION ORAL at 10:01

## 2025-02-14 RX ADMIN — ASPIRIN 81 MG: 81 TABLET, CHEWABLE ORAL at 10:01

## 2025-02-14 RX ADMIN — LOSARTAN POTASSIUM 25 MG: 25 TABLET, FILM COATED ORAL at 10:01

## 2025-02-14 RX ADMIN — SODIUM CHLORIDE, PRESERVATIVE FREE 10 ML: 5 INJECTION INTRAVENOUS at 09:59

## 2025-02-14 RX ADMIN — METOPROLOL TARTRATE 25 MG: 25 TABLET, FILM COATED ORAL at 21:39

## 2025-02-14 RX ADMIN — PANTOPRAZOLE SODIUM 40 MG: 40 TABLET, DELAYED RELEASE ORAL at 10:01

## 2025-02-14 RX ADMIN — DICYCLOMINE HYDROCHLORIDE 10 MG: 10 CAPSULE ORAL at 06:08

## 2025-02-14 RX ADMIN — MORPHINE SULFATE 2 MG: 2 INJECTION, SOLUTION INTRAMUSCULAR; INTRAVENOUS at 09:55

## 2025-02-14 RX ADMIN — SODIUM CHLORIDE: 9 INJECTION, SOLUTION INTRAVENOUS at 13:32

## 2025-02-14 RX ADMIN — POTASSIUM CHLORIDE 40 MEQ: 1.5 FOR SOLUTION ORAL at 12:39

## 2025-02-14 RX ADMIN — ACETAMINOPHEN 650 MG: 325 TABLET ORAL at 21:39

## 2025-02-14 RX ADMIN — DICYCLOMINE HYDROCHLORIDE 10 MG: 10 CAPSULE ORAL at 15:31

## 2025-02-14 RX ADMIN — METOPROLOL TARTRATE 25 MG: 25 TABLET, FILM COATED ORAL at 10:01

## 2025-02-14 RX ADMIN — SODIUM CHLORIDE: 9 INJECTION, SOLUTION INTRAVENOUS at 00:35

## 2025-02-14 RX ADMIN — DICYCLOMINE HYDROCHLORIDE 10 MG: 10 CAPSULE ORAL at 10:01

## 2025-02-14 RX ADMIN — SODIUM CHLORIDE, PRESERVATIVE FREE 10 ML: 5 INJECTION INTRAVENOUS at 21:46

## 2025-02-14 RX ADMIN — ACETAMINOPHEN 650 MG: 325 TABLET ORAL at 15:47

## 2025-02-14 ASSESSMENT — PAIN SCALES - WONG BAKER: WONGBAKER_NUMERICALRESPONSE: NO HURT

## 2025-02-14 ASSESSMENT — PAIN DESCRIPTION - ORIENTATION
ORIENTATION: MID
ORIENTATION: MID

## 2025-02-14 ASSESSMENT — PAIN SCALES - GENERAL
PAINLEVEL_OUTOF10: 0
PAINLEVEL_OUTOF10: 2
PAINLEVEL_OUTOF10: 3
PAINLEVEL_OUTOF10: 2
PAINLEVEL_OUTOF10: 10

## 2025-02-14 ASSESSMENT — PAIN DESCRIPTION - DESCRIPTORS
DESCRIPTORS: ACHING

## 2025-02-14 ASSESSMENT — PAIN DESCRIPTION - LOCATION
LOCATION: THROAT
LOCATION: THROAT
LOCATION: HEAD

## 2025-02-14 NOTE — PROGRESS NOTES
Hospitalist Progress Note    NAME:   Roge Lobato   : 1950   MRN: [id number]     Date/Time: 2025 9:44 AM  Patient PCP: No primary care provider on file.    Estimated discharge date: 24-48 hrs?, SNF recommended  Barriers: SNF versus HH per pt wishes arranged, Clinical improvement      Assessment / Plan:  Persistent/chronic throat pain  Cough/URTI  Suspected new diagnosis of throat cancer  CT soft tissue neck shows evidence of 2.6 x 2.4 cm right pyriform mass concerning for malignancy.  Will need referral to outpatient ENT at discharge for biopsy and also further evaluation.  Robitussin prn  Check StrepA Ag, Covid, Flu  neg  Check procal  Supportive care    Acute transaminitis POA- improved  Elevated lipase, chronic pancreatitis  Total hyperbilirubinemia  -CT abdomen shows calcifications in the head of pancreas compatible with chronic pancreatitis.  -MRI a/p:Extrahepatic biliary dilatation. No focal obstructing lesion is identified Pancreatic ductal dilatation in the neck of the pancreas with probable side branch ectasia in the tail of pancreas. Valuation is limited. Findings may be secondary to chronic pancreatitis.   -on CLD, advance as tolerated  DC IVF  - GI evaluated on .  Recommend outpt f/u  -Transaminases are down trending    Hypokalemia  -replaced    History of CVA  Hypertension  GERD  -Continue aspirin and will hold Plavix  -Continue PTA metoprolol and losartan.  Aldactone on hold  -Continue PTA PPI          Medical Decision Making:  I personally reviewed labs  I personally reviewed imaging  I personally reviewed EKG  Toxic drug monitoring  Discussed case with: Pt, RN, CM, plan of care discussed during round.         Code Status: Full code  DVT Prophylaxis: SCDs    Subjective:   Pt is forgetful. Does not recall discussions regarding med treatment.  We reviewed labs, diagnosis      Objective:     VITALS:   Last 24hrs VS reviewed since prior progress note. Most recent are:  Patient

## 2025-02-14 NOTE — CARE COORDINATION
Transition of Care Plan:    RUR: 10%  Prior Level of Functioning: independent with ADLs  Disposition: snf placement-[facility]   Follow up appointments: follow up with pcp and/or specialist   DME needed: N/A  Transportation at discharge: family to transport   IM/IMM Medicare/ letter given: 2nd IM Medicare Letter   Is patient a [place] and connected with [facility]? N/A   If yes, was  transfer form completed and [facility] notified?   Caregiver Contact: Andre Lobato (family) [phone]  Discharge Caregiver contacted prior to discharge? Family to transport   Care Conference needed? N/A  Barriers to discharge: medical clearance     UPDATE: 11:43PM    [facility] accepted.  Insurance auth pending.    ESTELA Vick   [phone]          INITIAL NOTE: FILOMENA spoke with pt at bedside to discuss d/c options.  Pt agreeable to snf, but has been known to switch his d/c options.  CM will be in contact with pts family on emergency contact.  Pt stated that a referral can be sent to [facility] on today.   CM sent referral and insurance auth will be initiated, with [employer].    ESTELA Vick CM  [phone]

## 2025-02-14 NOTE — PLAN OF CARE
Problem: Safety - Adult  Goal: Free from fall injury  Outcome: Progressing     Problem: Chronic Conditions and Co-morbidities  Goal: Patient's chronic conditions and co-morbidity symptoms are monitored and maintained or improved  Outcome: Progressing     Problem: Pain  Goal: Verbalizes/displays adequate comfort level or baseline comfort level  Outcome: Progressing     Problem: Skin/Tissue Integrity  Goal: Skin integrity remains intact  Description: 1.  Monitor for areas of redness and/or skin breakdown  2.  Assess vascular access sites hourly  3.  Every 4-6 hours minimum:  Change oxygen saturation probe site  4.  Every 4-6 hours:  If on nasal continuous positive airway pressure, respiratory therapy assess nares and determine need for appliance change or resting period  Outcome: Progressing  Flowsheets (Taken 2/14/2025 0728 by Steph Ojeda RN)  Skin Integrity Remains Intact: Monitor for areas of redness and/or skin breakdown     Problem: Nutrition Deficit:  Goal: Optimize nutritional status  Outcome: Progressing     Problem: Discharge Planning  Goal: Discharge to home or other facility with appropriate resources  Outcome: Progressing

## 2025-02-14 NOTE — PROGRESS NOTES
Pt AOX4, VSS. Pt transferred from oncology unit. Oriented to room, bed in low position, call light within reach, bedside table within reach. Denies pain and discomfort at this time. X2 assist to bedside commode.

## 2025-02-14 NOTE — PROGRESS NOTES
Comprehensive Nutrition Assessment    Type and Reason for Visit:  Reassess    Nutrition Recommendations/Plan:   Continue current diet  Ensure 3x/day  Encourage PO intakes, honor preferences as able, provide assistance PRN  Consider bowel regimen, no documented BM x6 days  Monitor and record PO intakes, supplement acceptance, and Bms in I/Os     Malnutrition Assessment:  Malnutrition Status:  Severe malnutrition (02/10/25 1218)    Context:  Acute Illness     Findings of the 6 clinical characteristics of malnutrition:  Energy Intake:  50% or less of estimated energy requirements for 5 or more days  Weight Loss:  Greater than 5% over 1 month     Body Fat Loss:  Mild body fat loss Orbital, Triceps   Muscle Mass Loss:  Moderate muscle mass loss Temples (temporalis), Clavicles (pectoralis & deltoids)  Fluid Accumulation:  No fluid accumulation     Strength:  Not Performed    Nutrition Assessment:    Follow up. Noted pt now covid+. Ensure added once diet advanced to regular yesterday. Plan to continue diet/ONS, encourage intakes.    Nutrition Related Findings:    Labs: Na 138, K 3.2, BUN 9, Creat 0.77, Gluc 63. Meds:, pantoprazole, polyethylene glycol, 0.9%NaCl. No edema. BM 2/8. Wound Type: None       Current Nutrition Intake & Therapies:    Average Meal Intake: Unable to assess  Average Supplements Intake: Unable to assess  ADULT ORAL NUTRITION SUPPLEMENT; Breakfast, Lunch, Dinner; Standard High Calorie/High Protein Oral Supplement  ADULT DIET; Regular    Anthropometric Measures:  Height: 170.2 cm (5' 7.01\")  Ideal Body Weight (IBW): 148 lbs (67 kg)    Current Body Weight: 63.4 kg (139 lb 12.4 oz), 94.4 % IBW. Weight Source: Not specified  Current BMI (kg/m2): 21.9  BMI Categories: Underweight (BMI less than 22) age over 65    Estimated Daily Nutrient Needs:  Energy Requirements Based On: Kcal/kg  Weight Used for Energy Requirements: Current  Energy (kcal/day): 1585-1902kcal (25-30kcal/kg)  Weight Used for Protein

## 2025-02-14 NOTE — PROGRESS NOTES
End of Shift Note    Bedside shift change report given to KAILA Andrews (oncoming nurse) by Joel Chan RN (offgoing nurse).  Report included the following information SBAR, Kardex, Intake/Output, MAR, and Recent Results    Shift worked:  7p-7a     Shift summary and any significant changes:     Patient tolerated care. Scheduled medication given per MAR. Caring rounds and reminding pt to turn in the bed. Labs drawn. Pt up to the bedside commode x1.     Concerns for physician to address:  none     Zone phone for oncoming shift:   [phone]       Activity:  Level of Assistance: Moderate assist, patient does 50-74%  Number times ambulated in hallways past shift: 0  Number of times OOB to chair past shift: 1    Cardiac:   Cardiac Monitoring: No      Cardiac Rhythm: Ventricular paced, Sinus rhythm    Access:  Current line(s): PIV     Genitourinary:   Urinary Status: Voiding    Respiratory:   O2 Device: None (Room air)  Chronic home O2 use?: NO  Incentive spirometer at bedside: YES    GI:  Last BM (including prior to admit): 02/08/25  Current diet:  ADULT ORAL NUTRITION SUPPLEMENT; Breakfast, Lunch, Dinner; Standard High Calorie/High Protein Oral Supplement  ADULT DIET; Regular  Passing flatus: YES    Pain Management:   Patient states pain is manageable on current regimen: YES    Skin:  Bernard Scale Score: 18  Interventions: Wound Offloading (Prevention Methods): Bed, pressure reduction mattress, Pillows, Repositioning, Turning    Patient Safety:  Fall Risk: Nursing Judgement-Fall Risk High(Add Comments): Yes  Fall Risk Interventions  Nursing Judgement-Fall Risk High(Add Comments): Yes  Toilet Every 2 Hours-In Advance of Need: Yes  Hourly Visual Checks: In bed, Eyes closed  Fall Visual Posted: Fall sign posted, Socks  Room Door Open: Yes  Alarm On: Bed  Patient Moved Closer to Nursing Station: No    Active Consults:   IP CONSULT TO GI    Length of Stay:  Expected LOS: 6  Actual LOS: 5    Joel Chan

## 2025-02-14 NOTE — PROGRESS NOTES
Report given to Meron BRIGHT on [facility]. All questions answered. Pt left via his bed at this time with transport.

## 2025-02-15 LAB
ANION GAP SERPL CALC-SCNC: 6 MMOL/L (ref 2–12)
BASOPHILS # BLD: 0.02 K/UL (ref 0–0.1)
BASOPHILS NFR BLD: 0.4 % (ref 0–1)
BUN SERPL-MCNC: 8 MG/DL (ref 6–20)
BUN/CREAT SERPL: 11 (ref 12–20)
CALCIUM SERPL-MCNC: 9.4 MG/DL (ref 8.5–10.1)
CHLORIDE SERPL-SCNC: 113 MMOL/L (ref 97–108)
CO2 SERPL-SCNC: 20 MMOL/L (ref 21–32)
CREAT SERPL-MCNC: 0.74 MG/DL (ref 0.7–1.3)
DIFFERENTIAL METHOD BLD: ABNORMAL
EOSINOPHIL # BLD: 0.22 K/UL (ref 0–0.4)
EOSINOPHIL NFR BLD: 4.2 % (ref 0–7)
ERYTHROCYTE [DISTWIDTH] IN BLOOD BY AUTOMATED COUNT: 14.5 % (ref 11.5–14.5)
GLUCOSE SERPL-MCNC: 77 MG/DL (ref 65–100)
HCT VFR BLD AUTO: 33.2 % (ref 36.6–50.3)
HGB BLD-MCNC: 11.1 G/DL (ref 12.1–17)
IMM GRANULOCYTES # BLD AUTO: 0.02 K/UL (ref 0–0.04)
IMM GRANULOCYTES NFR BLD AUTO: 0.4 % (ref 0–0.5)
LYMPHOCYTES # BLD: 1 K/UL (ref 0.8–3.5)
LYMPHOCYTES NFR BLD: 19.3 % (ref 12–49)
MCH RBC QN AUTO: 30.4 PG (ref 26–34)
MCHC RBC AUTO-ENTMCNC: 33.4 G/DL (ref 30–36.5)
MCV RBC AUTO: 91 FL (ref 80–99)
MONOCYTES # BLD: 1.48 K/UL (ref 0–1)
MONOCYTES NFR BLD: 28.5 % (ref 5–13)
NEUTS SEG # BLD: 2.46 K/UL (ref 1.8–8)
NEUTS SEG NFR BLD: 47.2 % (ref 32–75)
NRBC # BLD: 0 K/UL (ref 0–0.01)
NRBC BLD-RTO: 0 PER 100 WBC
PLATELET # BLD AUTO: 241 K/UL (ref 150–400)
PMV BLD AUTO: 9.6 FL (ref 8.9–12.9)
POTASSIUM SERPL-SCNC: 3.3 MMOL/L (ref 3.5–5.1)
RBC # BLD AUTO: 3.65 M/UL (ref 4.1–5.7)
RBC MORPH BLD: ABNORMAL
SODIUM SERPL-SCNC: 139 MMOL/L (ref 136–145)
WBC # BLD AUTO: 5.2 K/UL (ref 4.1–11.1)

## 2025-02-15 PROCEDURE — 2500000003 HC RX 250 WO HCPCS: Performed by: INTERNAL MEDICINE

## 2025-02-15 PROCEDURE — 1100000000 HC RM PRIVATE

## 2025-02-15 PROCEDURE — 80048 BASIC METABOLIC PNL TOTAL CA: CPT

## 2025-02-15 PROCEDURE — 6370000000 HC RX 637 (ALT 250 FOR IP): Performed by: INTERNAL MEDICINE

## 2025-02-15 PROCEDURE — 36415 COLL VENOUS BLD VENIPUNCTURE: CPT

## 2025-02-15 PROCEDURE — 85025 COMPLETE CBC W/AUTO DIFF WBC: CPT

## 2025-02-15 PROCEDURE — 6360000002 HC RX W HCPCS: Performed by: INTERNAL MEDICINE

## 2025-02-15 PROCEDURE — 2580000003 HC RX 258: Performed by: INTERNAL MEDICINE

## 2025-02-15 RX ADMIN — METOPROLOL TARTRATE 25 MG: 25 TABLET, FILM COATED ORAL at 09:52

## 2025-02-15 RX ADMIN — SODIUM CHLORIDE, PRESERVATIVE FREE 10 ML: 5 INJECTION INTRAVENOUS at 20:44

## 2025-02-15 RX ADMIN — DICYCLOMINE HYDROCHLORIDE 10 MG: 10 CAPSULE ORAL at 16:46

## 2025-02-15 RX ADMIN — DICYCLOMINE HYDROCHLORIDE 10 MG: 10 CAPSULE ORAL at 10:24

## 2025-02-15 RX ADMIN — Medication 1 LOZENGE: at 16:51

## 2025-02-15 RX ADMIN — SODIUM CHLORIDE: 9 INJECTION, SOLUTION INTRAVENOUS at 20:42

## 2025-02-15 RX ADMIN — ASPIRIN 81 MG: 81 TABLET, CHEWABLE ORAL at 09:52

## 2025-02-15 RX ADMIN — METOPROLOL TARTRATE 25 MG: 25 TABLET, FILM COATED ORAL at 20:37

## 2025-02-15 RX ADMIN — GUAIFENESIN 200 MG: 200 SOLUTION ORAL at 03:27

## 2025-02-15 RX ADMIN — OXYCODONE 5 MG: 5 TABLET ORAL at 10:25

## 2025-02-15 RX ADMIN — PANTOPRAZOLE SODIUM 40 MG: 40 TABLET, DELAYED RELEASE ORAL at 09:52

## 2025-02-15 RX ADMIN — POLYETHYLENE GLYCOL 3350 17 G: 17 POWDER, FOR SOLUTION ORAL at 09:52

## 2025-02-15 RX ADMIN — DICYCLOMINE HYDROCHLORIDE 10 MG: 10 CAPSULE ORAL at 06:14

## 2025-02-15 RX ADMIN — MORPHINE SULFATE 2 MG: 2 INJECTION, SOLUTION INTRAMUSCULAR; INTRAVENOUS at 20:37

## 2025-02-15 RX ADMIN — LOSARTAN POTASSIUM 25 MG: 25 TABLET, FILM COATED ORAL at 09:52

## 2025-02-15 RX ADMIN — SODIUM CHLORIDE, PRESERVATIVE FREE 10 ML: 5 INJECTION INTRAVENOUS at 09:52

## 2025-02-15 ASSESSMENT — PAIN SCALES - GENERAL
PAINLEVEL_OUTOF10: 0
PAINLEVEL_OUTOF10: 3
PAINLEVEL_OUTOF10: 3
PAINLEVEL_OUTOF10: 7
PAINLEVEL_OUTOF10: 0
PAINLEVEL_OUTOF10: 2
PAINLEVEL_OUTOF10: 7

## 2025-02-15 ASSESSMENT — PAIN DESCRIPTION - ORIENTATION: ORIENTATION: LEFT

## 2025-02-15 ASSESSMENT — PAIN DESCRIPTION - DESCRIPTORS
DESCRIPTORS: SORE
DESCRIPTORS: SORE;DISCOMFORT
DESCRIPTORS: ACHING

## 2025-02-15 ASSESSMENT — PAIN DESCRIPTION - LOCATION
LOCATION: SHOULDER
LOCATION: THROAT
LOCATION: THROAT

## 2025-02-15 NOTE — PLAN OF CARE
Problem: Safety - Adult  Goal: Free from fall injury  2/15/2025 0725 by Bailee Solorio, RN  Outcome: Progressing  2/14/2025 1831 by Meron Avalos RN  Outcome: Progressing     Problem: Pain  Goal: Verbalizes/displays adequate comfort level or baseline comfort level  2/15/2025 0725 by Bailee Solorio, RN  Outcome: Progressing  2/14/2025 1831 by Meron Avalos, RN  Outcome: Progressing

## 2025-02-15 NOTE — PROGRESS NOTES
Hospitalist Progress Note    NAME:   Roge Lobato   : 1950   MRN: [id number]     Date/Time: 2/15/2025 11:40 AM  Patient PCP: No primary care provider on file.    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  Will need referral to outpatient ENT at discharge for biopsy and also further evaluation.  Robitussin prn  Check StrepA Ag, Covid, Flu  neg  Check procal  Supportive care  02/15: COVID-19 came back positive, patient is on room air therefore no need for Decadron  Again updated on the result of the CT scan of the neck showing a neck mass concerning for throat cancer    Acute transaminitis POA- improved  Elevated lipase, chronic pancreatitis  Total hyperbilirubinemia  -CT abdomen shows calcifications in the head of pancreas compatible with chronic pancreatitis.  -MRI a/p:Extrahepatic biliary dilatation. No focal obstructing lesion is identified Pancreatic ductal dilatation in the neck of the pancreas with probable side branch ectasia in the tail of pancreas. Valuation is limited. Findings may be secondary to chronic pancreatitis.   -on CLD, advance as tolerated  DC IVF  - GI evaluated on .  Recommend outpt f/u  -Transaminases are down trending    Hypokalemia  -replaced    History of CVA  Hypertension  GERD  -Continue aspirin and will hold Plavix  -Continue PTA metoprolol and losartan.  Aldactone on hold  -Continue PTA PPI    Medical Decision Making:  I personally reviewed labs: CBC BMP  I personally reviewed imaging: CT abdomen pelvis  I personally reviewed EKG  Toxic drug monitoring  Discussed case with: Pt, RN, CM, plan of care discussed during round.         Code Status: Full code  DVT

## 2025-02-15 NOTE — PROGRESS NOTES
End of Shift Note    Bedside shift change report given to KAILA Chapman (oncoming nurse) by Bailee Solorio RN (offgoing nurse).  Report included the following information SBAR, Kardex, MAR, and Recent Results    Shift worked:  0625-2282     Shift summary and any significant changes:     Pt took medication per MAR. Pt complained of cough and sore throat, prn meds given. Safety rounding and Toileting needs were met. Labs collected.         Skin:  Bernard Scale Score: 18  Interventions: Wound Offloading (Prevention Methods): Pillows, Repositioning    Patient Safety:  Fall Risk: Nursing Judgement-Fall Risk High(Add Comments): Yes  Fall Risk Interventions  Nursing Judgement-Fall Risk High(Add Comments): Yes  Toilet Every 2 Hours-In Advance of Need: Yes  Hourly Visual Checks: Awake, In bed  Fall Visual Posted: Socks  Room Door Open: Deferred for droplet isolation  Alarm On: Bed  Patient Moved Closer to Nursing Station: No    Active Consults:   IP CONSULT TO GI    Length of Stay:  Expected LOS: 9  Actual LOS: 6    Bailee Solorio RN

## 2025-02-16 LAB
ANION GAP SERPL CALC-SCNC: 6 MMOL/L (ref 2–12)
BUN SERPL-MCNC: 8 MG/DL (ref 6–20)
BUN/CREAT SERPL: 10 (ref 12–20)
CALCIUM SERPL-MCNC: 9.3 MG/DL (ref 8.5–10.1)
CHLORIDE SERPL-SCNC: 109 MMOL/L (ref 97–108)
CO2 SERPL-SCNC: 21 MMOL/L (ref 21–32)
CREAT SERPL-MCNC: 0.78 MG/DL (ref 0.7–1.3)
GLUCOSE SERPL-MCNC: 85 MG/DL (ref 65–100)
MAGNESIUM SERPL-MCNC: 1.5 MG/DL (ref 1.6–2.4)
POTASSIUM SERPL-SCNC: 3.1 MMOL/L (ref 3.5–5.1)
SODIUM SERPL-SCNC: 136 MMOL/L (ref 136–145)

## 2025-02-16 PROCEDURE — 36415 COLL VENOUS BLD VENIPUNCTURE: CPT

## 2025-02-16 PROCEDURE — 6360000002 HC RX W HCPCS: Performed by: INTERNAL MEDICINE

## 2025-02-16 PROCEDURE — 80048 BASIC METABOLIC PNL TOTAL CA: CPT

## 2025-02-16 PROCEDURE — 6370000000 HC RX 637 (ALT 250 FOR IP): Performed by: INTERNAL MEDICINE

## 2025-02-16 PROCEDURE — 1100000000 HC RM PRIVATE

## 2025-02-16 PROCEDURE — 83735 ASSAY OF MAGNESIUM: CPT

## 2025-02-16 PROCEDURE — 2500000003 HC RX 250 WO HCPCS: Performed by: INTERNAL MEDICINE

## 2025-02-16 RX ORDER — POTASSIUM CHLORIDE 1500 MG/1
40 TABLET, EXTENDED RELEASE ORAL ONCE
Status: DISCONTINUED | OUTPATIENT
Start: 2025-02-16 | End: 2025-02-16

## 2025-02-16 RX ADMIN — METOPROLOL TARTRATE 25 MG: 25 TABLET, FILM COATED ORAL at 10:12

## 2025-02-16 RX ADMIN — SODIUM CHLORIDE, PRESERVATIVE FREE 10 ML: 5 INJECTION INTRAVENOUS at 22:11

## 2025-02-16 RX ADMIN — MORPHINE SULFATE 2 MG: 2 INJECTION, SOLUTION INTRAMUSCULAR; INTRAVENOUS at 19:34

## 2025-02-16 RX ADMIN — DICYCLOMINE HYDROCHLORIDE 10 MG: 10 CAPSULE ORAL at 06:36

## 2025-02-16 RX ADMIN — DICYCLOMINE HYDROCHLORIDE 10 MG: 10 CAPSULE ORAL at 17:49

## 2025-02-16 RX ADMIN — DICYCLOMINE HYDROCHLORIDE 10 MG: 10 CAPSULE ORAL at 10:12

## 2025-02-16 RX ADMIN — Medication 1 LOZENGE: at 06:33

## 2025-02-16 RX ADMIN — METOPROLOL TARTRATE 25 MG: 25 TABLET, FILM COATED ORAL at 19:35

## 2025-02-16 RX ADMIN — LOSARTAN POTASSIUM 25 MG: 25 TABLET, FILM COATED ORAL at 10:12

## 2025-02-16 RX ADMIN — ASPIRIN 81 MG: 81 TABLET, CHEWABLE ORAL at 10:12

## 2025-02-16 RX ADMIN — MORPHINE SULFATE 2 MG: 2 INJECTION, SOLUTION INTRAMUSCULAR; INTRAVENOUS at 10:12

## 2025-02-16 RX ADMIN — PANTOPRAZOLE SODIUM 40 MG: 40 TABLET, DELAYED RELEASE ORAL at 10:12

## 2025-02-16 ASSESSMENT — PAIN SCALES - GENERAL
PAINLEVEL_OUTOF10: 6
PAINLEVEL_OUTOF10: 2
PAINLEVEL_OUTOF10: 9
PAINLEVEL_OUTOF10: 2
PAINLEVEL_OUTOF10: 4

## 2025-02-16 ASSESSMENT — PAIN DESCRIPTION - DESCRIPTORS: DESCRIPTORS: ACHING;SORE;DISCOMFORT

## 2025-02-16 ASSESSMENT — PAIN DESCRIPTION - LOCATION
LOCATION: THROAT
LOCATION: THROAT;SHOULDER

## 2025-02-16 ASSESSMENT — PAIN DESCRIPTION - ORIENTATION: ORIENTATION: LEFT

## 2025-02-16 NOTE — PLAN OF CARE
Problem: Safety - Adult  Goal: Free from fall injury  Outcome: Progressing     Problem: Skin/Tissue Integrity  Goal: Skin integrity remains intact  Description: 1.  Monitor for areas of redness and/or skin breakdown  2.  Assess vascular access sites hourly  3.  Every 4-6 hours minimum:  Change oxygen saturation probe site  4.  Every 4-6 hours:  If on nasal continuous positive airway pressure, respiratory therapy assess nares and determine need for appliance change or resting period  Outcome: Progressing  Flowsheets  Taken 2/15/2025 2315  Skin Integrity Remains Intact: Monitor for areas of redness and/or skin breakdown  Taken 2/15/2025 1919  Skin Integrity Remains Intact: Monitor for areas of redness and/or skin breakdown

## 2025-02-16 NOTE — PLAN OF CARE
Problem: Safety - Adult  Goal: Free from fall injury  2/16/2025 1108 by Adela Delgadillo, RN  Outcome: Progressing  Flowsheets (Taken 2/16/2025 1108)  Free From Fall Injury:   Instruct family/caregiver on patient safety   Based on caregiver fall risk screen, instruct family/caregiver to ask for assistance with transferring infant if caregiver noted to have fall risk factors  2/16/2025 0012 by Bailee Solorio, RN  Outcome: Progressing     Problem: Chronic Conditions and Co-morbidities  Goal: Patient's chronic conditions and co-morbidity symptoms are monitored and maintained or improved  Outcome: Progressing  Flowsheets (Taken 2/16/2025 1108)  Care Plan - Patient's Chronic Conditions and Co-Morbidity Symptoms are Monitored and Maintained or Improved:   Monitor and assess patient's chronic conditions and comorbid symptoms for stability, deterioration, or improvement   Collaborate with multidisciplinary team to address chronic and comorbid conditions and prevent exacerbation or deterioration   Update acute care plan with appropriate goals if chronic or comorbid symptoms are exacerbated and prevent overall improvement and discharge     Problem: Pain  Goal: Verbalizes/displays adequate comfort level or baseline comfort level  Outcome: Progressing  Flowsheets (Taken 2/16/2025 1108)  Verbalizes/displays adequate comfort level or baseline comfort level:   Encourage patient to monitor pain and request assistance   Assess pain using appropriate pain scale   Administer analgesics based on type and severity of pain and evaluate response   Implement non-pharmacological measures as appropriate and evaluate response     Problem: Skin/Tissue Integrity  Goal: Skin integrity remains intact  Description: 1.  Monitor for areas of redness and/or skin breakdown  2.  Assess vascular access sites hourly  3.  Every 4-6 hours minimum:  Change oxygen saturation probe site  4.  Every 4-6 hours:  If on nasal continuous positive airway pressure,

## 2025-02-16 NOTE — PROGRESS NOTES
End of Shift Note    Bedside shift change report given to KAILA Chapman (oncoming nurse) by Bailee Solorio RN (offgoing nurse).  Report included the following information SBAR, Kardex, MAR, and Recent Results    Shift worked:  2092-9272     Shift summary and any significant changes:     Pt took meds per MAR, including prn pain meds for sore throat pain. Pt's IV fluids continue. Safety rounding and toileting needs were met.          Skin:  Bernard Scale Score: 18  Interventions: Wound Offloading (Prevention Methods): Pillows, Repositioning    Patient Safety:  Fall Risk: Nursing Judgement-Fall Risk High(Add Comments): Yes  Fall Risk Interventions  Nursing Judgement-Fall Risk High(Add Comments): Yes  Toilet Every 2 Hours-In Advance of Need: Yes  Hourly Visual Checks: Awake, In bed  Fall Visual Posted: Socks  Room Door Open: Deferred for droplet isolation  Alarm On: Bed  Patient Moved Closer to Nursing Station: No    Active Consults:   IP CONSULT TO GI    Length of Stay:  Expected LOS: 9  Actual LOS: 7    Bailee Solorio RN

## 2025-02-16 NOTE — PROGRESS NOTES
Hospitalist Progress Note    NAME:   Roge Lobato   : 1950   MRN: [id number]     Date/Time: 2025 1:52 PM  Patient PCP: No primary care provider on file.    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  Will need referral to outpatient ENT at discharge for biopsy and also further evaluation.  Robitussin prn  Check StrepA Ag, Covid, Flu  neg  Procal 0.44  Supportive care  02/15: COVID-19 came back positive, patient is on room air therefore no need for Decadron  Again updated on the result of the CT scan of the neck showing a neck mass concerning for throat cancer    Acute transaminitis POA- improved  Elevated lipase, chronic pancreatitis  Total hyperbilirubinemia  -CT abdomen shows calcifications in the head of pancreas compatible with chronic pancreatitis.  -MRI a/p:Extrahepatic biliary dilatation. No focal obstructing lesion is identified Pancreatic ductal dilatation in the neck of the pancreas with probable side branch ectasia in the tail of pancreas. Valuation is limited. Findings may be secondary to chronic pancreatitis.   -on CLD, advance as tolerated  DC IVF  - GI evaluated on .  Recommend outpt f/u  -Transaminases are down trending    Hypokalemia  -replaced    History of CVA  Hypertension  GERD  -Continue aspirin and will hold Plavix  -Continue PTA metoprolol and losartan.  Aldactone on hold  -Continue PTA PPI    Medical Decision Making:  I personally reviewed labs: CBC BMP  I personally reviewed imaging: CT abdomen pelvis  I personally reviewed EKG  Toxic drug monitoring  Discussed case with: Pt, RN, CM, plan of care discussed during round.         Code Status: Full code  DVT  Prophylaxis: SCDs    Subjective:   Follow-up COVID-19 infection  Complain of pain when swallowing and on his shoulder  Objective:     VITALS:   Last 24hrs VS reviewed since prior progress note. Most recent are:  Patient Vitals for the past 24 hrs:   BP Temp Temp src Pulse Resp SpO2   02/16/25 1042 -- -- -- -- 16 --   02/16/25 0945 138/80 98.8 °F (37.1 °C) Oral 93 16 94 %   02/15/25 2030 (!) 151/85 99 °F (37.2 °C) Oral 88 16 95 %       No intake or output data in the 24 hours ending 02/16/25 1352       I had a face to face encounter and independently examined this patient on 2/16/2025, as outlined below:  PHYSICAL EXAM:  General: Alert, cooperative  EENT:  EOMI. Anicteric sclerae.  Resp:  CTA bilaterally, no wheezing or rales.  No accessory muscle use  CV:  Regular  rhythm,  No edema  GI:  Soft, Non distended, Non tender.  +Bowel sounds  Neurologic:  Alert and awake, normal speech  Psych:   Pleasant  Skin:  No rashes.  No jaundice    Reviewed most current lab test results and cultures  YES  Reviewed most current radiology test results   YES  Review and summation of old records today    NO  Reviewed patient's current orders and MAR    YES  PMH/SH reviewed - no change compared to H&P    Procedures: see electronic medical records for all procedures/Xrays and details which were not copied into this note but were reviewed prior to creation of Plan.      LABS:  I reviewed today's most current labs and imaging studies.  Pertinent labs include:  Recent Labs     02/14/25  0435 02/15/25  0506   WBC 7.3 5.2   HGB 11.9* 11.1*   HCT 35.0* 33.2*    241     Recent Labs     02/14/25  0435 02/15/25  0506    139   K 3.2* 3.3*   * 113*   CO2 20* 20*   GLUCOSE 63* 77   BUN 9 8   CREATININE 0.77 0.74   CALCIUM 9.6 9.4   BILITOT 0.9  --    AST 31  --    ALT 27  --        Signed: Ela Rolon MD

## 2025-02-17 PROBLEM — J38.7 LARYNGEAL MASS: Status: ACTIVE | Noted: 2025-02-17

## 2025-02-17 LAB
ANION GAP SERPL CALC-SCNC: 8 MMOL/L (ref 2–12)
BUN SERPL-MCNC: 6 MG/DL (ref 6–20)
BUN/CREAT SERPL: 9 (ref 12–20)
CALCIUM SERPL-MCNC: 9.3 MG/DL (ref 8.5–10.1)
CHLORIDE SERPL-SCNC: 105 MMOL/L (ref 97–108)
CO2 SERPL-SCNC: 22 MMOL/L (ref 21–32)
CREAT SERPL-MCNC: 0.7 MG/DL (ref 0.7–1.3)
GLUCOSE SERPL-MCNC: 93 MG/DL (ref 65–100)
MAGNESIUM SERPL-MCNC: 1.5 MG/DL (ref 1.6–2.4)
POTASSIUM SERPL-SCNC: 2.7 MMOL/L (ref 3.5–5.1)
SODIUM SERPL-SCNC: 135 MMOL/L (ref 136–145)

## 2025-02-17 PROCEDURE — 99223 1ST HOSP IP/OBS HIGH 75: CPT

## 2025-02-17 PROCEDURE — 6370000000 HC RX 637 (ALT 250 FOR IP): Performed by: INTERNAL MEDICINE

## 2025-02-17 PROCEDURE — 92610 EVALUATE SWALLOWING FUNCTION: CPT

## 2025-02-17 PROCEDURE — 2500000003 HC RX 250 WO HCPCS: Performed by: INTERNAL MEDICINE

## 2025-02-17 PROCEDURE — 83735 ASSAY OF MAGNESIUM: CPT

## 2025-02-17 PROCEDURE — 80048 BASIC METABOLIC PNL TOTAL CA: CPT

## 2025-02-17 PROCEDURE — 6360000002 HC RX W HCPCS: Performed by: INTERNAL MEDICINE

## 2025-02-17 PROCEDURE — 36415 COLL VENOUS BLD VENIPUNCTURE: CPT

## 2025-02-17 PROCEDURE — 99221 1ST HOSP IP/OBS SF/LOW 40: CPT | Performed by: INTERNAL MEDICINE

## 2025-02-17 PROCEDURE — 1100000000 HC RM PRIVATE

## 2025-02-17 PROCEDURE — 99497 ADVNCD CARE PLAN 30 MIN: CPT

## 2025-02-17 RX ORDER — MAGNESIUM SULFATE IN WATER 40 MG/ML
2000 INJECTION, SOLUTION INTRAVENOUS ONCE
Status: COMPLETED | OUTPATIENT
Start: 2025-02-17 | End: 2025-02-17

## 2025-02-17 RX ORDER — POTASSIUM CHLORIDE 7.45 MG/ML
10 INJECTION INTRAVENOUS
Status: COMPLETED | OUTPATIENT
Start: 2025-02-17 | End: 2025-02-17

## 2025-02-17 RX ADMIN — MORPHINE SULFATE 2 MG: 2 INJECTION, SOLUTION INTRAMUSCULAR; INTRAVENOUS at 03:24

## 2025-02-17 RX ADMIN — METOPROLOL TARTRATE 25 MG: 25 TABLET, FILM COATED ORAL at 09:22

## 2025-02-17 RX ADMIN — SODIUM CHLORIDE, PRESERVATIVE FREE 10 ML: 5 INJECTION INTRAVENOUS at 21:04

## 2025-02-17 RX ADMIN — MORPHINE SULFATE 2 MG: 2 INJECTION, SOLUTION INTRAMUSCULAR; INTRAVENOUS at 23:21

## 2025-02-17 RX ADMIN — LOSARTAN POTASSIUM 25 MG: 25 TABLET, FILM COATED ORAL at 09:22

## 2025-02-17 RX ADMIN — DICYCLOMINE HYDROCHLORIDE 10 MG: 10 CAPSULE ORAL at 06:31

## 2025-02-17 RX ADMIN — POTASSIUM CHLORIDE 10 MEQ: 10 INJECTION, SOLUTION INTRAVENOUS at 16:30

## 2025-02-17 RX ADMIN — POTASSIUM CHLORIDE 10 MEQ: 10 INJECTION, SOLUTION INTRAVENOUS at 14:08

## 2025-02-17 RX ADMIN — METOPROLOL TARTRATE 25 MG: 25 TABLET, FILM COATED ORAL at 21:03

## 2025-02-17 RX ADMIN — DICYCLOMINE HYDROCHLORIDE 10 MG: 10 CAPSULE ORAL at 12:23

## 2025-02-17 RX ADMIN — SODIUM CHLORIDE, PRESERVATIVE FREE 10 ML: 5 INJECTION INTRAVENOUS at 09:22

## 2025-02-17 RX ADMIN — DICYCLOMINE HYDROCHLORIDE 10 MG: 10 CAPSULE ORAL at 16:30

## 2025-02-17 RX ADMIN — MAGNESIUM SULFATE HEPTAHYDRATE 2000 MG: 40 INJECTION, SOLUTION INTRAVENOUS at 12:29

## 2025-02-17 RX ADMIN — ASPIRIN 81 MG: 81 TABLET, CHEWABLE ORAL at 09:22

## 2025-02-17 RX ADMIN — POTASSIUM CHLORIDE 10 MEQ: 10 INJECTION, SOLUTION INTRAVENOUS at 12:28

## 2025-02-17 RX ADMIN — PANTOPRAZOLE SODIUM 40 MG: 40 TABLET, DELAYED RELEASE ORAL at 09:22

## 2025-02-17 RX ADMIN — POTASSIUM CHLORIDE 10 MEQ: 10 INJECTION, SOLUTION INTRAVENOUS at 15:32

## 2025-02-17 RX ADMIN — POTASSIUM BICARBONATE 40 MEQ: 782 TABLET, EFFERVESCENT ORAL at 12:23

## 2025-02-17 RX ADMIN — MORPHINE SULFATE 2 MG: 2 INJECTION, SOLUTION INTRAMUSCULAR; INTRAVENOUS at 12:32

## 2025-02-17 ASSESSMENT — PAIN DESCRIPTION - LOCATION
LOCATION: SHOULDER;OTHER (COMMENT)
LOCATION: NECK;THROAT
LOCATION: NECK;THROAT

## 2025-02-17 ASSESSMENT — PAIN DESCRIPTION - DESCRIPTORS
DESCRIPTORS: ACHING;DISCOMFORT;SORE
DESCRIPTORS: SHARP
DESCRIPTORS: ACHING;DISCOMFORT

## 2025-02-17 ASSESSMENT — PAIN SCALES - GENERAL
PAINLEVEL_OUTOF10: 9
PAINLEVEL_OUTOF10: 2
PAINLEVEL_OUTOF10: 0
PAINLEVEL_OUTOF10: 6
PAINLEVEL_OUTOF10: 3
PAINLEVEL_OUTOF10: 0
PAINLEVEL_OUTOF10: 6
PAINLEVEL_OUTOF10: 10

## 2025-02-17 ASSESSMENT — PAIN DESCRIPTION - ORIENTATION
ORIENTATION: LEFT;RIGHT
ORIENTATION: LEFT

## 2025-02-17 NOTE — PLAN OF CARE
Problem: Safety - Adult  Goal: Free from fall injury  Outcome: Progressing     Problem: Skin/Tissue Integrity  Goal: Skin integrity remains intact  Description: 1.  Monitor for areas of redness and/or skin breakdown  2.  Assess vascular access sites hourly  3.  Every 4-6 hours minimum:  Change oxygen saturation probe site  4.  Every 4-6 hours:  If on nasal continuous positive airway pressure, respiratory therapy assess nares and determine need for appliance change or resting period  Outcome: Progressing  Flowsheets (Taken 2/16/2025 8883)  Skin Integrity Remains Intact: Monitor for areas of redness and/or skin breakdown     Problem: Discharge Planning  Goal: Discharge to home or other facility with appropriate resources  Outcome: Progressing

## 2025-02-17 NOTE — PROGRESS NOTES
End of Shift Note    Bedside shift change report given to KAILA Obrien (oncoming nurse) by Adela Delgadillo RN (offgoing nurse).  Report included the following information SBAR    Shift worked:  7a-7p     Shift summary and any significant changes:    SLP consult completed, pt needs MBS outpatient as well as ENT. Pt continues to have poor intake due to pain. PRN morphine given per MAR. Pt declined working with PT/OT because pt feeling too weak. K level critical at 2.7. 4 potassium runs given. Palliative and oncology following patient. PCP phone number given to Dr Rolon to discuss patient.     Concerns for physician to address:       Zone phone for oncoming shift:          Activity:  Level of Assistance: Dependent, patient does less than 25%  Number times ambulated in hallways past shift: 0  Number of times OOB to chair past shift: 0    Cardiac:   Cardiac Monitoring: n/a   Cardiac Rhythm:  (not on tele)    Access:  Current line(s): PIV     Genitourinary:   Urinary Status: Voiding    Respiratory:   O2 Device: None (Room air)  Chronic home O2 use?: NO  Incentive spirometer at bedside: NO    GI:  Last BM (including prior to admit): 02/12/25  Current diet:  ADULT ORAL NUTRITION SUPPLEMENT; Breakfast, Lunch, Dinner; Standard High Calorie/High Protein Oral Supplement  ADULT DIET; Regular  Passing flatus: YES    Pain Management:   Patient states pain is manageable on current regimen: YES    Skin:  Bernard Scale Score: 15  Interventions: Wound Offloading (Prevention Methods): Pillows, Repositioning    Patient Safety:  Fall Risk: Nursing Judgement-Fall Risk High(Add Comments): Yes  Fall Risk Interventions  Nursing Judgement-Fall Risk High(Add Comments): Yes  Toilet Every 2 Hours-In Advance of Need: Yes  Hourly Visual Checks: Awake, In bed  Fall Visual Posted: Socks  Room Door Open: Deferred for droplet isolation  Alarm On: Bed  Patient Moved Closer to Nursing Station: No    Active Consults:   IP CONSULT TO GI  IP CONSULT TO

## 2025-02-17 NOTE — CARE COORDINATION
Transition of Care Plan:    RUR: 12%  Prior Level of Functioning: Independent   Disposition: SNF-[facility]  POWER: 2/18/25  If SNF or IPR: Date FOC offered: 2/14/25  Date FOC received: 2/14/25  Accepting facility: [facility]  Date authorization started with reference number: 2/14/[id number]  Date authorization received and expires: 2/17 - 2/19 auth number is being generated  Follow up appointments: Defer to facility   DME needed: Defer to facility   Transportation at discharge: Family to transport  IM/IMM Medicare/ letter given: 2nd IMM letter   Is patient a [place] and connected with [facility]? No   If yes, was [place] transfer form completed and [facility] notified? No  Caregiver Contact: Andre Lobato   Discharge Caregiver contacted prior to discharge? Pt will be contacted   Care Conference needed? No  Barriers to discharge: Medical clearance    CM reviewed pt's chart and pt is not medically stable for d/c due to K is low.    CM has received auth for placement at [facility].    CM will continue to follow and assist with d/c planning.    Amie Boykin

## 2025-02-17 NOTE — PROGRESS NOTES
Spiritual Health History and Assessment/Progress Note  [facility]    Initial Encounter, Loneliness/Social Isolation,  , Life Adjustments, Adjustment to illness,      Name: Roge Lobato MRN: [id number]    Age: 74 y.o.     Sex: male   Language: English   Mormonism: Episcopalian   Transaminitis     Date: 2/17/2025            Total Time Calculated: 19 min              Spiritual Assessment began in [facility] MULTI-SPECIALTY TELEMETRY        Referral/Consult From: Rounding   Encounter Overview/Reason: Initial Encounter, Loneliness/Social Isolation  Service Provided For: Patient    Cindi, Belief, Meaning:   Patient has beliefs or practices that help with coping during difficult times  Family/Friends No family/friends present      Importance and Influence:  Patient has spiritual/personal beliefs that influence decisions regarding their health  Family/Friends No family/friends present    Community:  Patient feels well-supported. Support system includes: Children, [place] Community, Friends, and Extended family  Family/Friends No family/friends present    Assessment and Plan of Care:     Patient Interventions include: Facilitated expression of thoughts and feelings, Explored spiritual coping/struggle/distress, Engaged in theological reflection, Affirmed coping skills/support systems, and Facilitated life review and/ or legacy  Family/Friends Interventions include: No family/friends present    Patient Plan of Care: Spiritual Care available upon further referral  Family/Friends Plan of Care: No family/friends present    Electronically signed by CIRILO Laguna on 2/17/2025 at 4:04 PM

## 2025-02-17 NOTE — PROGRESS NOTES
End of Shift Note    Bedside shift change report given to KAILA Morocho (oncoming nurse) by Adela Delgadillo RN (offgoing nurse).  Report included the following information SBAR    Shift worked:  7a-7p     Shift summary and any significant changes:     Pt's appetite and able to swallow better while taking morphine for pain.Waiting for SLP consult. Pt encouraged to move more but refused to get in the chair because of weakness. IV Fluids D/C'd     Concerns for physician to address:       Zone phone for oncoming shift:          Activity:  Level of Assistance: Dependent, patient does less than 25%  Number times ambulated in hallways past shift: 0  Number of times OOB to chair past shift: 0    Cardiac:   Cardiac Monitoring: n/a   Cardiac Rhythm:  (not on tele)    Access:  Current line(s): PIV     Genitourinary:   Urinary Status: Voiding    Respiratory:   O2 Device: None (Room air)  Chronic home O2 use?: NO  Incentive spirometer at bedside: NO    GI:  Last BM (including prior to admit): 02/12/25  Current diet:  ADULT ORAL NUTRITION SUPPLEMENT; Breakfast, Lunch, Dinner; Standard High Calorie/High Protein Oral Supplement  ADULT DIET; Regular  Passing flatus: YES    Pain Management:   Patient states pain is manageable on current regimen: YES    Skin:  Bernard Scale Score: 18  Interventions: Wound Offloading (Prevention Methods): Pillows, Repositioning    Patient Safety:  Fall Risk: Nursing Judgement-Fall Risk High(Add Comments): Yes  Fall Risk Interventions  Nursing Judgement-Fall Risk High(Add Comments): Yes  Toilet Every 2 Hours-In Advance of Need: Yes  Hourly Visual Checks: Awake, In bed  Fall Visual Posted: Socks  Room Door Open: Deferred for droplet isolation  Alarm On: Bed  Patient Moved Closer to Nursing Station: No    Active Consults:   IP CONSULT TO GI    Length of Stay:  Expected LOS: 9  Actual LOS: 7    Adela Delgadillo, RN

## 2025-02-17 NOTE — PLAN OF CARE
Speech LAnguage Pathology EVALUATION    Patient: Roge Lobato (74 y.o. male)  Date: 2/17/2025  Primary Diagnosis: Abdominal pain, epigastric [R10.13]  Hypokalemia [E87.6]  Sore throat [J02.9]  Transaminitis [R74.01]       Precautions:  Fall Risk                  ASSESSMENT :  Swallow evaluation was limited due to continued throat pain per patient report (RN aware but patient refusing pain medication). Patient did have intermittent coughing/throat clearing during and after PO intake, and given new piriform sinus/laryngeal mass, patient would benefit from instrumental swallowing evaluation. However, patient refused FEES due to possible pain associated as he is already in so much pain with his sore throat. Patient is unable to complete MBS right now due to droplet plus precautions, and given CXR showed no acute disease and WBC normal, MBS is not urgent to complete as low indicators of sequelae of aspiration.    Patient will benefit from skilled intervention to address the above impairments.     PLAN :  Recommendations and Planned Interventions:  Diet: Regular and thin liquids to allow the most options as patient is self-limiting due to odynophagia  MBS when off droplet plus precautions to further evaluate swallow function in light of new piriform sinus/laryngeal mass         Acute SLP Services: SLP Plan of Care: 1 times/week. Patient's rehabilitation potential is considered to be Fair.  Discharge Recommendations: Yes, recommend SLP treatment at next level of care     SUBJECTIVE:   SLP consulted due to Difficulty swallowing/ Throat Pain. Patient presented with pharyngitis x3 months, abdominal pain, and chest pain when coughing. Patient reports poor PO intake due to severe throat pain and abdominal pain. CT neck showed 2.6 x 2.4 cm right piriform sinus/laryngeal mass concerning for malignancy. CXR showed no acute disease. Patient positive for COVID-19. PMH includes previous stroke, however no prior SLP notes in  chart.    OBJECTIVE:     Past Medical History:   Diagnosis Date    History of CVA (cerebrovascular accident) 9/29/2021    HTN (hypertension)     Stroke (HCC)      Past Surgical History:   Procedure Laterality Date    EP DEVICE PROCEDURE N/A 9/18/2024    Insert PPM dual performed by Fortino Mcfadden MD at [facility] CARDIAC CATH LAB     Prior Level of Function/Home Situation:   Social/Functional History  Lives With: Alone (has a GF that is there \"sometimes\")  Type of Home: House  Home Layout: Two level, Able to Live on Main level with bedroom/bathroom  Home Access: Stairs to enter with rails  Entrance Stairs - Number of Steps: 5  Entrance Stairs - Rails: Right  Bathroom Shower/Tub: Walk-in shower  Bathroom Equipment: Shower chair  Home Equipment: Walker - Rolling, Rollator, Wheelchair - Manual    Baseline Assessment:  Current Diet : Regular  Current Liquid Diet : Thin          Cognitive and Communication Status:  Neurologic State: Alert  Orientation Level: Oriented x4  Cognition: Follows commands    Dysphagia:  Oral Assessment:  Oral Motor   Labial: No impairment  Dentition: Edentulous  Oral Hygiene: Moist  Lingual: No impairment  Velum: Unable to visualize  Mandible: No impairment  P.O. Trials:  PO Trials  Assessment Method(s): Observation  Patient Position: up in bed  Vocal Quality: Hoarse;Low volume  Consistency Presented: Thin;Pureed;Regular  How Presented: Self-fed/presented;SLP-fed/Presented;Straw;Successive Swallows;Spoon  Bolus Acceptance: No impairment  Bolus Formation/Control: No impairment (WFL for edentulous status)  Propulsion: No impairment  Oral Residue: None  Aspiration Signs/Symptoms: Throat clear;Strong cough;Delayed cough/throat clear (inconsistent s/s aspiration (throat clear with initial sip, cough with sip x1, and delayed cough after all PO trials completed))  Pharyngeal Phase Characteristics: Multiple swallows;Suspected pharyngeal residue            Motor Speech:         Language Comprehension and

## 2025-02-17 NOTE — PROGRESS NOTES
End of Shift Note    Bedside shift change report given to Chaparro Chapman (oncoming nurse) by Bailee Solorio RN (offgoing nurse).  Report included the following information SBAR, Kardex, MAR, and Recent Results    Shift worked:  6134-0609     Shift summary and any significant changes:     Pt took prn Morphine for pain x2. Pt stated pain is in his throat and neck. Pt encourage to turn and move himself in bed. Pt had a CHG bath and gown changed. Safety rounding completed throughout shift. Pt had poor intake over night, encouraged to drink small sips more frequently.      Concerns for physician to address:  Pt's Potassium was noted at 3.1 as of yesterday's labs, should we get a recollect for today?          Skin:  Bernard Scale Score: 17  Interventions: Wound Offloading (Prevention Methods): Pillows, Repositioning    Patient Safety:  Fall Risk: Nursing Judgement-Fall Risk High(Add Comments): Yes  Fall Risk Interventions  Nursing Judgement-Fall Risk High(Add Comments): Yes  Toilet Every 2 Hours-In Advance of Need: Yes  Hourly Visual Checks: Awake, In bed  Fall Visual Posted: Socks  Room Door Open: Deferred for droplet isolation  Alarm On: Bed  Patient Moved Closer to Nursing Station: No    Active Consults:   IP CONSULT TO GI    Length of Stay:  Expected LOS: 9  Actual LOS: 8    Bailee Solorio RN

## 2025-02-17 NOTE — PROGRESS NOTES
Physical Therapy    Chart reviewed. Pt cleared for therapy. Received patient sitting up in bed, politely declining therapy today stating he thought he was going home today and didn't feel up to moving. Provided education on importance of getting up each day to maintain mobility and prevent further decline, patient still politely declining therapy. Notified nurse who states patient's potassium is very low, at 2.7, which is close to critical level and will be getting several rounds of potassium today. Will defer and continue to follow.    Carolina Harris, PT, DPT

## 2025-02-17 NOTE — CONSULTS
Palliative Medicine  Patient Name: Roge Lobato  YOB: 1950  MRN: [id number]  Age: 74 y.o.  Gender: male    Date of Initial Consult: 2/17/2025  Date of Service: 2/17/2025  Time: 3:56 PM  Provider: RUSS Real CNP  Hospital Day: 10  Admit Date: 2/8/2025  Referring Provider: Ela Rolon MD      Reasons for Consultation:  Goals of Care    HISTORY OF PRESENT ILLNESS (HPI):   Roge Lobato is a 74 y.o. male with a past medical history of CVA, hypertension, who was admitted on 2/8/2025 from home with complaints of abdominal pain, generalized weakness, sore throat and cough .     Psychosocial: Patient single with       PALLIATIVE DIAGNOSES:    Abdominal Pain  Throat Pain/Mass  Hypoalbuminemia  Generalized weakness  Physical debility  Goals of care  Palliative care encounter    ASSESSMENT AND PLAN:   Palliative team has been asked to meet with Marie Lobato to address goals of care.  Reviewed medical chart including admit H&P, consultant notes, MAR, and recent labs/imaging.  Mr. Lobato was seen and evaluated, no family at bedside. Assigned RN present during the visit. Introduced self and role of palliative.   At time of my arrival, he was resting in bed in no acute distress.  He is alert and oriented x 3 and able to participate in meaningful discussion.  Patient reported that he is frustrated, as he keeps getting mixed messages - first that he will be discharged today, and now he will not.    He also reported that he was told that he possibly has cancer, but cannot get biopsy here, then some one told him that he would get biopsy done before he leaves.  RN advised that as his potassium is low, he will not be leaving today as he has to receive IV - K.  Patient confirmed that he is aware that he is COVID +, he denies chest pain or shortness of breath.    Understanding of Illness/Discussion: We discussed the patient's condition in detail.   Patient confirmed that he has had difficulty

## 2025-02-17 NOTE — CONSULTS
Cancer [place] at [facility]  [address] [facility] Medical Office Building [address] [address]  W: [phone] F: [phone]  Hematology/ Oncology Consult Note      Reason for consult:     Roge Lobato is a 74 y.o. male who we have been asked to see by Dr. Rolon for concern for new malignancy.    Subjective:     Roge Lobato is a 74-year-old male patient admitted with COVID-19 infection, chronic throat pain acute transaminitis and elevated LFTs.  CT imaging is concerning for 2.6 x 2.4 cm soft tissue neck mass suspicious for malignancy.  Patient has a history of CVA with residual deficits, hypertension.    Patient seen at the bedside, he is a poor historian and was unaware of CT imaging results.  H appears frail and chronically ill.  He reports that he lives with his long-term girlfriend who helps him with healthcare decisions, gave permission for NP to speak with girlfriend directly.    Spoke with patient's girlfriend.  She reports he has significant difficulty ambulating around their  house due to its small size.  Overall his mobility is quite limited. He requires Medicaid transport to make all appointments.  She reports that he does not allow her to assist in arranging his health care, but his primary care physician arranges transport all with his outpatient visits.  She is worried that he will not be able to make recommended outpatient follow-up to meet with ENT to obtain biopsy of soft tissue mass.    Review of Systems:  Pertinent items are noted in the History of Present Illness.       Past Medical History:   Diagnosis Date    History of CVA (cerebrovascular accident) 9/29/2021    HTN (hypertension)     Stroke (HCC)      Past Surgical History:   Procedure Laterality Date    EP DEVICE PROCEDURE N/A 9/18/2024    Insert PPM dual performed by Fortino Mcfadden MD at [facility] CARDIAC CATH LAB      Family History   Problem Relation Age of Onset    Alcohol Abuse Father

## 2025-02-17 NOTE — ACP (ADVANCE CARE PLANNING)
Advance Care Planning      Palliative Medicine Provider (MD/NP)  Advance Care Planning (ACP) Conversation      Date of Conversation: 02/17/25  The patient and/or authorized decision maker consented to a voluntary Advance Care Planning conversation.   Individuals present for the conversation:   Patient with decision making capacity    Legal Healthcare Agent(s):      ACP documents available in EMR prior to discussion:  -None    Primary Palliative Diagnosis(es):  Difficulty swallowing   Throat mass    Conversation Summary:  Assisted patient with completion of an AMD. We discussed in detail his wishes. The pt verbalized feeling better today. He was hoping to be discharged today, and reports being frustrated.    Pt appointed his brother, Andre Lobato as his primary decision maker.     He does not want any measures to prolong his life at end of life.    We discussed code status in detail. He wants to remain full code, and be given one chance/attempt at resuscitation.    All questions and concerns addressed.    Per patient he is not able to see the white paper to sign, not even with his glasses. 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  Patient was able to place a X for his signature.  Bedside RN, Adela ESPINOZA Present during the conversation and witnessed to him placing X as his signature.    Resuscitation Status:    Code Status: Full Code    Outcomes / Completed Documentation:  An explanation of advance directives and their importance was provided and the following forms completed:    -Power of  for Healthcare and -Living Will    If new document completed, original was provided to patient and/or family member.    Copy was placed for scanning into the [facility] EMR.      I spent 30 minutes providing separately identifiable ACP services with the patient and/or surrogate decision maker in a voluntary, in-person conversation discussing the patient's wishes and goals as detailed in the

## 2025-02-18 VITALS
BODY MASS INDEX: 21.94 KG/M2 | HEIGHT: 67 IN | DIASTOLIC BLOOD PRESSURE: 80 MMHG | RESPIRATION RATE: 22 BRPM | HEART RATE: 81 BPM | WEIGHT: 139.77 LBS | SYSTOLIC BLOOD PRESSURE: 139 MMHG | OXYGEN SATURATION: 94 % | TEMPERATURE: 99.9 F

## 2025-02-18 LAB
ANION GAP SERPL CALC-SCNC: 9 MMOL/L (ref 2–12)
BUN SERPL-MCNC: 6 MG/DL (ref 6–20)
BUN/CREAT SERPL: 9 (ref 12–20)
CALCIUM SERPL-MCNC: 9.2 MG/DL (ref 8.5–10.1)
CHLORIDE SERPL-SCNC: 103 MMOL/L (ref 97–108)
CO2 SERPL-SCNC: 22 MMOL/L (ref 21–32)
CREAT SERPL-MCNC: 0.65 MG/DL (ref 0.7–1.3)
GLUCOSE SERPL-MCNC: 81 MG/DL (ref 65–100)
MAGNESIUM SERPL-MCNC: 1.8 MG/DL (ref 1.6–2.4)
POTASSIUM SERPL-SCNC: 3.4 MMOL/L (ref 3.5–5.1)
SODIUM SERPL-SCNC: 134 MMOL/L (ref 136–145)

## 2025-02-18 PROCEDURE — 97530 THERAPEUTIC ACTIVITIES: CPT

## 2025-02-18 PROCEDURE — 6370000000 HC RX 637 (ALT 250 FOR IP): Performed by: INTERNAL MEDICINE

## 2025-02-18 PROCEDURE — 80048 BASIC METABOLIC PNL TOTAL CA: CPT

## 2025-02-18 PROCEDURE — 36415 COLL VENOUS BLD VENIPUNCTURE: CPT

## 2025-02-18 PROCEDURE — 97535 SELF CARE MNGMENT TRAINING: CPT

## 2025-02-18 PROCEDURE — 6360000002 HC RX W HCPCS: Performed by: INTERNAL MEDICINE

## 2025-02-18 PROCEDURE — 83735 ASSAY OF MAGNESIUM: CPT

## 2025-02-18 PROCEDURE — 2500000003 HC RX 250 WO HCPCS: Performed by: INTERNAL MEDICINE

## 2025-02-18 RX ORDER — OXYCODONE HYDROCHLORIDE 5 MG/1
5 TABLET ORAL EVERY 6 HOURS PRN
Qty: 12 TABLET | Refills: 0 | Status: SHIPPED | OUTPATIENT
Start: 2025-02-18 | End: 2025-02-21

## 2025-02-18 RX ORDER — POTASSIUM CHLORIDE 1.5 G/1.58G
20 POWDER, FOR SOLUTION ORAL ONCE
Status: COMPLETED | OUTPATIENT
Start: 2025-02-18 | End: 2025-02-18

## 2025-02-18 RX ADMIN — LOSARTAN POTASSIUM 25 MG: 25 TABLET, FILM COATED ORAL at 08:08

## 2025-02-18 RX ADMIN — POLYETHYLENE GLYCOL 3350 17 G: 17 POWDER, FOR SOLUTION ORAL at 08:15

## 2025-02-18 RX ADMIN — SODIUM CHLORIDE, PRESERVATIVE FREE 10 ML: 5 INJECTION INTRAVENOUS at 08:00

## 2025-02-18 RX ADMIN — MORPHINE SULFATE 2 MG: 2 INJECTION, SOLUTION INTRAMUSCULAR; INTRAVENOUS at 07:58

## 2025-02-18 RX ADMIN — METOPROLOL TARTRATE 25 MG: 25 TABLET, FILM COATED ORAL at 08:06

## 2025-02-18 RX ADMIN — PANTOPRAZOLE SODIUM 40 MG: 40 TABLET, DELAYED RELEASE ORAL at 08:07

## 2025-02-18 RX ADMIN — DICYCLOMINE HYDROCHLORIDE 10 MG: 10 CAPSULE ORAL at 12:23

## 2025-02-18 RX ADMIN — POTASSIUM CHLORIDE 20 MEQ: 1.5 FOR SOLUTION ORAL at 12:23

## 2025-02-18 RX ADMIN — DICYCLOMINE HYDROCHLORIDE 10 MG: 10 CAPSULE ORAL at 08:09

## 2025-02-18 RX ADMIN — ASPIRIN 81 MG: 81 TABLET, CHEWABLE ORAL at 08:10

## 2025-02-18 ASSESSMENT — PAIN DESCRIPTION - LOCATION: LOCATION: SHOULDER;CHEST

## 2025-02-18 ASSESSMENT — PAIN DESCRIPTION - ORIENTATION
ORIENTATION: LEFT;LOWER
ORIENTATION: LEFT;LOWER;ANTERIOR

## 2025-02-18 ASSESSMENT — PAIN DESCRIPTION - DESCRIPTORS
DESCRIPTORS: ACHING;BURNING
DESCRIPTORS: ACHING;BURNING

## 2025-02-18 ASSESSMENT — PAIN SCALES - GENERAL
PAINLEVEL_OUTOF10: 10
PAINLEVEL_OUTOF10: 10

## 2025-02-18 NOTE — PROGRESS NOTES
Patient determined to be stable for discharge by attending provider. I have reviewed the discharge instructions and follow-up appointments with the pt. They verbalized understanding and all questions were answered to their satisfaction. No complaints or further questions were expressed.       New medications: Appropriate educational materials and medication side effect teaching were provided.       PIV were removed prior to discharge.     All personal items collected during admission were returned to the patient prior to discharge.    Marylou Penn RN

## 2025-02-18 NOTE — PLAN OF CARE
Problem: Occupational Therapy - Adult  Goal: By Discharge: Performs self-care activities at highest level of function for planned discharge setting.  See evaluation for individualized goals.  Description: FUNCTIONAL STATUS PRIOR TO ADMISSION:  Pt reports independence with ADLs and functional mobility with RW at baseline. Lives alone \"most of the time\", but has a s/o that stays with him at times.   ,  ,  ,  ,  ,  ,  ,  ,  ,  ,       HOME SUPPORT: Patient lived alone, s/o stays with him sometimes.    Occupational Therapy Goals:  Initiated 2/13/2025  1.  Patient will perform supine<>sit with Stand by Assist within 7 day(s).  2.  Patient will perform lower body dressing with Moderate Assist within 7 day(s).  3.  Patient will perform toileting with Minimal Assist within 7 day(s).  4.  Patient will perform toilet transfers with Contact Guard Assist  within 7 day(s).  5.  Patient will perform functional ambulation with least restrictive AD with Contact Guard Assist within 7 day(s).      Outcome: Not Progressing   OCCUPATIONAL THERAPY TREATMENT  Patient: Roge Lobato (74 y.o. male)  Date: 2/18/2025  Primary Diagnosis: Abdominal pain, epigastric [R10.13]  Hypokalemia [E87.6]  Sore throat [J02.9]  Transaminitis [R74.01]       Precautions: Fall Risk, General Precautions, Bed Alarm, Contact Precautions (poor vision; covid+)                Chart, occupational therapy assessment, plan of care, and goals were reviewed.    ASSESSMENT  Patient continues to benefit from skilled OT services and is not progressing towards goals. Marked functional decline with poor PO noted; patients breakfast present in the room at 1300 session: \"I can't eat that- my throat hurts too much.\" Ate ~ 25% lunch per RN. Recommend I&O/calorie count/dietary consult and change to soft diet until MBS/SLP provides  safest PO recommendations as patient has too much pain for current \"liberalized\" regular diet by his report; possible candidate for tube feed?

## 2025-02-18 NOTE — PROGRESS NOTES
End of Shift Note    Bedside shift change report given to Marylou BRIGHT, (oncoming nurse) by Luna Pimentel LPN (offgoing nurse).  Report included the following information SBAR, Kardex, and MAR    Shift worked: 1900 to 0700     Shift summary and any significant changes:     Patient received from of going shift stable alert in no distress. Patient took all medication on shift as per orders. Patient complained of some pain on shift PRN morphine was given to help with pain. Bed is at its lowest position for safety. Safety precaution maintained for the patient.      Concerns for physician to address:    none   Zone phone for oncoming shift:     [phone]       Luna Pimentel LPN

## 2025-02-18 NOTE — PLAN OF CARE
Problem: Safety - Adult  Goal: Free from fall injury  Outcome: Progressing     Problem: Chronic Conditions and Co-morbidities  Goal: Patient's chronic conditions and co-morbidity symptoms are monitored and maintained or improved  Outcome: Progressing  Flowsheets (Taken 2/17/2025 1940 by Micah Hood, RN)  Care Plan - Patient's Chronic Conditions and Co-Morbidity Symptoms are Monitored and Maintained or Improved: Monitor and assess patient's chronic conditions and comorbid symptoms for stability, deterioration, or improvement     Problem: Pain  Goal: Verbalizes/displays adequate comfort level or baseline comfort level  Outcome: Progressing  Flowsheets (Taken 2/17/2025 1940 by Micah Hood, RN)  Verbalizes/displays adequate comfort level or baseline comfort level: Assess pain using appropriate pain scale     Problem: Skin/Tissue Integrity  Goal: Skin integrity remains intact  Description: 1.  Monitor for areas of redness and/or skin breakdown  2.  Assess vascular access sites hourly  3.  Every 4-6 hours minimum:  Change oxygen saturation probe site  4.  Every 4-6 hours:  If on nasal continuous positive airway pressure, respiratory therapy assess nares and determine need for appliance change or resting period  Outcome: Progressing  Flowsheets (Taken 2/17/2025 1940 by Micah Hood, RN)  Skin Integrity Remains Intact: Monitor for areas of redness and/or skin breakdown     Problem: Nutrition Deficit:  Goal: Optimize nutritional status  Outcome: Progressing     Problem: Discharge Planning  Goal: Discharge to home or other facility with appropriate resources  Outcome: Progressing  Flowsheets (Taken 2/17/2025 1940 by Micah Hood, RN)  Discharge to home or other facility with appropriate resources: Identify barriers to discharge with patient and caregiver     Problem: SLP Adult - Impaired Swallowing  Goal: By Discharge: Advance to least restrictive diet without signs or symptoms of aspiration for planned discharge setting.

## 2025-02-18 NOTE — DISCHARGE SUMMARY
Discharge Summary    Name: Roge Lobato  [id number]  YOB: 1950 (Age: 74 y.o.)   Date of Admission: 2/8/2025  Date of Discharge: 2/18/2025  Attending Physician: Ela Rolon MD    Discharge Diagnosis:   COVID-19 infection  Hypokalemia  Hypomagnesemia   Persistent/chronic throat pain  Cough/URTI  Suspected new diagnosis of throat cancer  CT soft tissue neck shows evidence of 2.6 x 2.4 cm right pyriform mass   Acute transaminitis POA- improved  Elevated lipase, chronic pancreatitis  Total hyperbilirubinemia  Hypokalemia  History of CVA  Hypertension  GERD  Consultations:  IP CONSULT TO GI  IP CONSULT TO PALLIATIVE CARE  IP CONSULT TO ONCOLOGY  IP CONSULT TO PALLIATIVE CARE  IP CONSULT TO CASE MANAGEMENT      Brief Admission History/Reason for Admission Per Emeka Campa MD:   CHIEF COMPLAINT: Abdominal pain, generalized weakness     HISTORY OF PRESENT ILLNESS:     Roge Lobato is a 74 y.o.  male with PMHx significant for CVA, hypertension is coming the hospital chief complaints of abdominal pain, generalized weakness, sore throat and also cough.  Patient reports being in his usual state of health until about 3 months ago when he started having some sore throat and has used multiple medications without much relief.  He reports generalized bodyaches and also weakness.  Started having some abdominal pain in the last 3 days along with some nausea but no vomiting.  Does report some cough and chest pain upon coughing as well.  Does not report any diarrhea or constipation.  No fever or chills.     On arrival to ED, he was tachycardic, blood pressure was 128/83.  On labs potassium is 2.6, creatinine 1.1, AST is 354, total bilirubin 1.7, lipase is 163.  CBC is within normal limits.  CT abdomen shows no acute process.  We were asked to admit for work up and evaluation of the above problems.        Brief Hospital Course by Main Problems:   COVID-19  immediate release tablet             DISPOSITION:    Home with Family:       Home with HH/PT/OT/RN:    SNF/LTC: x   YASMIN:    OTHER:            Code status: full  Recommended diet: regular diet , will tolerate  liquid diet better   when pt having pain , nutritional supplements     Recommended activity: activity as tolerated  Wound care: None      Follow up with:   PCP : No primary care provider on file.    [facility]  [facility] That Comes To Your Home  [email or web address]  [place]  [phone]  Follow up  As needed - [facility] is a mobile urgent care provider that comes to your home. You may call them if you would like to set up an appt to be seen for a follow up while waiting to be seen by your PCP.          Total time in minutes spent coordinating this discharge (includes going over instructions, follow-up, prescriptions, and preparing report for sign off to her PCP) :  35 minutes

## 2025-02-18 NOTE — CARE COORDINATION
Pt is clear from CM standpoint for d/c.    [facility] to transport at 5 pm.    Report number to [facility] is [phone].    Going to room 409      Transition of Care Plan:     RUR: 12%  Prior Level of Functioning: Independent   Disposition: SNF-[facility]  POWER: 2/18/25  If SNF or IPR: Date FOC offered: 2/14/25  Date FOC received: 2/14/25  Accepting facility: [facility]  Date authorization started with reference number: 2/14/[id number]  Date authorization received and expires: 2/17 - 2/19 auth number is being generated  Follow up appointments: Defer to facility   DME needed: Defer to facility   Transportation at discharge: BLS  IM/IMM Medicare/ letter given: 2/18/25  Is patient a [facility] and connected with [facility]? No              If yes, was [facility] transfer form completed and [facility] notified? No  Caregiver Contact: Andre Lobato   Discharge Caregiver contacted prior to discharge? Pt wascontacted   Care Conference needed? No  Barriers to discharge: None         02/18/25 0931   Services At/After Discharge   Transition of Care Consult (CM Consult) SNF   Services At/After Discharge Skilled Nursing Facility (SNF)   [facility] Resource Information Provided? No   Mode of Transport at Discharge BLS   Confirm Follow Up Transport Self   Condition of Participation: Discharge Planning   The Plan for Transition of Care is related to the following treatment goals: Goal is to go to [facility] for skilled services   The Patient and/or Patient Representative was provided with a Choice of Provider? Patient;Patient Representative   Name of the Patient Representative who was provided with the Choice of Provider and agrees with the Discharge Plan?  Pt's brother Andre Mcgeeyster   The Patient and/Or Patient Representative agree with the Discharge Plan? Yes   Freedom of Choice list was provided with basic dialogue that supports the patient's individualized plan of care/goals, treatment preferences, and shares the quality data associated

## 2025-02-18 NOTE — PROGRESS NOTES
Patient determined to be stable for discharge by attending provider. I have reviewed the discharge instructions and follow-up appointments with the patient. They verbalized understanding and all questions were answered to their satisfaction. No complaints or further questions were expressed.       New medications: Appropriate educational materials and medication side effect teaching were provided.       PIV were removed prior to discharge.     All personal items collected during admission were returned to the patient prior to discharge.    Josué Meeks RN

## 2025-02-18 NOTE — DISCHARGE INSTRUCTIONS
HOSPITALIST DISCHARGE INSTRUCTIONS    NAME: Roge Lobato   :  1950   MRN:  [id number]     Date/Time:  2025 9:11 AM    ADMIT DATE: 2025   DISCHARGE DATE: 2025     Attending Physician: Ela Rolon MD  COVID-19 infection  Hypokalemia  Hypomagnesemia   Persistent/chronic throat pain  Cough/URTI  Suspected new diagnosis of throat cancer  CT soft tissue neck shows evidence of 2.6 x 2.4 cm right pyriform mass   Acute transaminitis POA- improved  Elevated lipase, chronic pancreatitis  Total hyperbilirubinemia  Hypokalemia  History of CVA  Hypertension  GERD  -Medications: Per above medication reconciliation.    Pain Management: per above medications    Recommended diet: regular diet,full liquid diet preferred  when pt having pain , nutritional supplements     Recommended activity: activity as tolerated    Wound care: None    Indwelling devices:  None    Supplemental Oxygen: None    Required Lab work: Per SNF routine    Glucose management:  None    Code status: FUll

## 2025-02-18 NOTE — PROGRESS NOTES
Physical Therapy    PT cleared to see pt. Pt sitting up in chair stating he is in too much pain and needs as much energy as possible for rehab today. Pt will hold session at this time.

## 2025-02-27 ENCOUNTER — TELEPHONE (OUTPATIENT)
Age: 75
End: 2025-02-27

## 2025-02-27 DIAGNOSIS — J38.7 LARYNGEAL MASS: Primary | ICD-10-CM

## 2025-02-27 NOTE — TELEPHONE ENCOUNTER
Called pt to inquire on if he has seen ENT and he has not yet.  He is currently at [facility].  Pt thinks he will be discharged 3/4/25.  He said he has to pay for any transportation that isn't to [facility]. He wasn't sure if his insurance pays for that transportation or if it is handled through [facility] directly.    Case d/w .  Pt should be rescheduled until after he see's ENT. VORB from  Referral to ENT.Once we know that date we will work on getting him scheduled here.    Will include our office , [place], to follow up on patient insurance about transportation.    Pt says he lives with his girlfriend.

## 2025-03-11 ENCOUNTER — TELEPHONE (OUTPATIENT)
Age: 75
End: 2025-03-11

## 2025-03-11 NOTE — TELEPHONE ENCOUNTER
Called pt.  HIPAA verified by two patient identifiers.   No answer.  Left VM asking for a call back.

## 2025-05-08 ENCOUNTER — TRANSCRIBE ORDERS (OUTPATIENT)
Facility: HOSPITAL | Age: 75
End: 2025-05-08

## 2025-05-08 DIAGNOSIS — J39.2 THROAT MASS: Primary | ICD-10-CM

## 2025-05-16 ENCOUNTER — APPOINTMENT (OUTPATIENT)
Facility: HOSPITAL | Age: 75
End: 2025-05-16
Payer: MEDICARE

## 2025-05-16 ENCOUNTER — HOSPITAL ENCOUNTER (EMERGENCY)
Facility: HOSPITAL | Age: 75
Discharge: ANOTHER HOSPITAL | End: 2025-05-17
Attending: STUDENT IN AN ORGANIZED HEALTH CARE EDUCATION/TRAINING PROGRAM
Payer: MEDICARE

## 2025-05-16 DIAGNOSIS — J38.7 LARYNGEAL MASS: Primary | ICD-10-CM

## 2025-05-16 LAB
ALBUMIN SERPL-MCNC: 2.1 G/DL (ref 3.5–5)
ALBUMIN/GLOB SERPL: 0.4 (ref 1.1–2.2)
ALP SERPL-CCNC: 95 U/L (ref 45–117)
ALT SERPL-CCNC: 10 U/L (ref 12–78)
ANION GAP SERPL CALC-SCNC: 8 MMOL/L (ref 2–12)
AST SERPL-CCNC: 42 U/L (ref 15–37)
BASOPHILS # BLD: 0.05 K/UL (ref 0–0.1)
BASOPHILS NFR BLD: 0.5 % (ref 0–1)
BILIRUB SERPL-MCNC: 0.4 MG/DL (ref 0.2–1)
BUN SERPL-MCNC: 11 MG/DL (ref 6–20)
BUN/CREAT SERPL: 13 (ref 12–20)
CALCIUM SERPL-MCNC: 9 MG/DL (ref 8.5–10.1)
CHLORIDE SERPL-SCNC: 108 MMOL/L (ref 97–108)
CO2 SERPL-SCNC: 26 MMOL/L (ref 21–32)
COMMENT:: NORMAL
CREAT SERPL-MCNC: 0.87 MG/DL (ref 0.7–1.3)
DIFFERENTIAL METHOD BLD: ABNORMAL
EOSINOPHIL # BLD: 0.81 K/UL (ref 0–0.4)
EOSINOPHIL NFR BLD: 7.4 % (ref 0–7)
ERYTHROCYTE [DISTWIDTH] IN BLOOD BY AUTOMATED COUNT: 17.1 % (ref 11.5–14.5)
GLOBULIN SER CALC-MCNC: 5.6 G/DL (ref 2–4)
GLUCOSE SERPL-MCNC: 86 MG/DL (ref 65–100)
HCT VFR BLD AUTO: 30 % (ref 36.6–50.3)
HGB BLD-MCNC: 9.3 G/DL (ref 12.1–17)
IMM GRANULOCYTES # BLD AUTO: 0.05 K/UL (ref 0–0.04)
IMM GRANULOCYTES NFR BLD AUTO: 0.5 % (ref 0–0.5)
LYMPHOCYTES # BLD: 1.88 K/UL (ref 0.8–3.5)
LYMPHOCYTES NFR BLD: 17.1 % (ref 12–49)
MCH RBC QN AUTO: 29.2 PG (ref 26–34)
MCHC RBC AUTO-ENTMCNC: 31 G/DL (ref 30–36.5)
MCV RBC AUTO: 94.3 FL (ref 80–99)
MONOCYTES # BLD: 1.03 K/UL (ref 0–1)
MONOCYTES NFR BLD: 9.4 % (ref 5–13)
NEUTS SEG # BLD: 7.16 K/UL (ref 1.8–8)
NEUTS SEG NFR BLD: 65.1 % (ref 32–75)
NRBC # BLD: 0 K/UL (ref 0–0.01)
NRBC BLD-RTO: 0 PER 100 WBC
PLATELET # BLD AUTO: 422 K/UL (ref 150–400)
PMV BLD AUTO: 10.2 FL (ref 8.9–12.9)
POTASSIUM SERPL-SCNC: 4.2 MMOL/L (ref 3.5–5.1)
PROT SERPL-MCNC: 7.7 G/DL (ref 6.4–8.2)
RBC # BLD AUTO: 3.18 M/UL (ref 4.1–5.7)
SODIUM SERPL-SCNC: 142 MMOL/L (ref 136–145)
SPECIMEN HOLD: NORMAL
WBC # BLD AUTO: 11 K/UL (ref 4.1–11.1)

## 2025-05-16 PROCEDURE — 93005 ELECTROCARDIOGRAM TRACING: CPT | Performed by: STUDENT IN AN ORGANIZED HEALTH CARE EDUCATION/TRAINING PROGRAM

## 2025-05-16 PROCEDURE — 85025 COMPLETE CBC W/AUTO DIFF WBC: CPT

## 2025-05-16 PROCEDURE — 6370000000 HC RX 637 (ALT 250 FOR IP): Performed by: STUDENT IN AN ORGANIZED HEALTH CARE EDUCATION/TRAINING PROGRAM

## 2025-05-16 PROCEDURE — 80053 COMPREHEN METABOLIC PANEL: CPT

## 2025-05-16 PROCEDURE — 6360000004 HC RX CONTRAST MEDICATION: Performed by: RADIOLOGY

## 2025-05-16 PROCEDURE — 99285 EMERGENCY DEPT VISIT HI MDM: CPT

## 2025-05-16 PROCEDURE — 94640 AIRWAY INHALATION TREATMENT: CPT

## 2025-05-16 PROCEDURE — 71045 X-RAY EXAM CHEST 1 VIEW: CPT

## 2025-05-16 PROCEDURE — 70491 CT SOFT TISSUE NECK W/DYE: CPT

## 2025-05-16 RX ORDER — IPRATROPIUM BROMIDE AND ALBUTEROL SULFATE 2.5; .5 MG/3ML; MG/3ML
1 SOLUTION RESPIRATORY (INHALATION)
Status: COMPLETED | OUTPATIENT
Start: 2025-05-16 | End: 2025-05-16

## 2025-05-16 RX ORDER — IOPAMIDOL 755 MG/ML
100 INJECTION, SOLUTION INTRAVASCULAR
Status: COMPLETED | OUTPATIENT
Start: 2025-05-16 | End: 2025-05-16

## 2025-05-16 RX ADMIN — IOPAMIDOL 100 ML: 755 INJECTION, SOLUTION INTRAVENOUS at 22:08

## 2025-05-16 RX ADMIN — IPRATROPIUM BROMIDE AND ALBUTEROL SULFATE 1 DOSE: .5; 3 SOLUTION RESPIRATORY (INHALATION) at 22:34

## 2025-05-16 NOTE — ED TRIAGE NOTES
BIBEMS from home c/o mass on right neck causing SOB and voice changes. Arrives 100% on RA. Pt reports upcoming appointment for this issue.

## 2025-05-16 NOTE — ED NOTES
Brother is POA. This RN offered to contact him, but the patient said he would do it. I encouraged the patient to contact him soon.

## 2025-05-17 ENCOUNTER — ANESTHESIA EVENT (OUTPATIENT)
Facility: HOSPITAL | Age: 75
End: 2025-05-17
Payer: MEDICARE

## 2025-05-17 ENCOUNTER — ANESTHESIA (OUTPATIENT)
Facility: HOSPITAL | Age: 75
End: 2025-05-17
Payer: MEDICARE

## 2025-05-17 ENCOUNTER — HOSPITAL ENCOUNTER (INPATIENT)
Facility: HOSPITAL | Age: 75
LOS: 25 days | Discharge: HOSPICE | DRG: 011 | End: 2025-06-11
Attending: STUDENT IN AN ORGANIZED HEALTH CARE EDUCATION/TRAINING PROGRAM | Admitting: INTERNAL MEDICINE
Payer: MEDICARE

## 2025-05-17 VITALS
WEIGHT: 139 LBS | HEART RATE: 70 BPM | TEMPERATURE: 98 F | SYSTOLIC BLOOD PRESSURE: 158 MMHG | RESPIRATION RATE: 18 BRPM | OXYGEN SATURATION: 100 % | DIASTOLIC BLOOD PRESSURE: 79 MMHG | BODY MASS INDEX: 21.77 KG/M2

## 2025-05-17 DIAGNOSIS — R22.1 NECK MASS: Primary | ICD-10-CM

## 2025-05-17 LAB
ALBUMIN SERPL-MCNC: 2.1 G/DL (ref 3.5–5)
ALBUMIN/GLOB SERPL: 0.4 (ref 1.1–2.2)
ALP SERPL-CCNC: 105 U/L (ref 45–117)
ALT SERPL-CCNC: 8 U/L (ref 12–78)
ANION GAP SERPL CALC-SCNC: 6 MMOL/L (ref 2–12)
AST SERPL-CCNC: 11 U/L (ref 15–37)
BASOPHILS # BLD: 0.05 K/UL (ref 0–0.1)
BASOPHILS NFR BLD: 0.4 % (ref 0–1)
BILIRUB SERPL-MCNC: 0.5 MG/DL (ref 0.2–1)
BUN SERPL-MCNC: 10 MG/DL (ref 6–20)
BUN/CREAT SERPL: 13 (ref 12–20)
CALCIUM SERPL-MCNC: 9.2 MG/DL (ref 8.5–10.1)
CHLORIDE SERPL-SCNC: 108 MMOL/L (ref 97–108)
CO2 SERPL-SCNC: 28 MMOL/L (ref 21–32)
COMMENT:: NORMAL
CREAT SERPL-MCNC: 0.79 MG/DL (ref 0.7–1.3)
DIFFERENTIAL METHOD BLD: ABNORMAL
EKG ATRIAL RATE: 79 BPM
EKG DIAGNOSIS: NORMAL
EKG P AXIS: 42 DEGREES
EKG P-R INTERVAL: 222 MS
EKG Q-T INTERVAL: 452 MS
EKG QRS DURATION: 170 MS
EKG QTC CALCULATION (BAZETT): 518 MS
EKG R AXIS: 70 DEGREES
EKG T AXIS: 57 DEGREES
EKG VENTRICULAR RATE: 79 BPM
EOSINOPHIL # BLD: 0.59 K/UL (ref 0–0.4)
EOSINOPHIL NFR BLD: 5 % (ref 0–7)
ERYTHROCYTE [DISTWIDTH] IN BLOOD BY AUTOMATED COUNT: 16.5 % (ref 11.5–14.5)
GLOBULIN SER CALC-MCNC: 5.3 G/DL (ref 2–4)
GLUCOSE SERPL-MCNC: 91 MG/DL (ref 65–100)
HCT VFR BLD AUTO: 30 % (ref 36.6–50.3)
HGB BLD-MCNC: 9.6 G/DL (ref 12.1–17)
IMM GRANULOCYTES # BLD AUTO: 0.04 K/UL (ref 0–0.04)
IMM GRANULOCYTES NFR BLD AUTO: 0.3 % (ref 0–0.5)
LYMPHOCYTES # BLD: 1.98 K/UL (ref 0.8–3.5)
LYMPHOCYTES NFR BLD: 16.7 % (ref 12–49)
MCH RBC QN AUTO: 29.3 PG (ref 26–34)
MCHC RBC AUTO-ENTMCNC: 32 G/DL (ref 30–36.5)
MCV RBC AUTO: 91.5 FL (ref 80–99)
MONOCYTES # BLD: 1.26 K/UL (ref 0–1)
MONOCYTES NFR BLD: 10.7 % (ref 5–13)
NEUTS SEG # BLD: 7.91 K/UL (ref 1.8–8)
NEUTS SEG NFR BLD: 66.9 % (ref 32–75)
NRBC # BLD: 0 K/UL (ref 0–0.01)
NRBC BLD-RTO: 0 PER 100 WBC
PLATELET # BLD AUTO: 426 K/UL (ref 150–400)
PMV BLD AUTO: 9.7 FL (ref 8.9–12.9)
POTASSIUM SERPL-SCNC: 3.4 MMOL/L (ref 3.5–5.1)
PROT SERPL-MCNC: 7.4 G/DL (ref 6.4–8.2)
RBC # BLD AUTO: 3.28 M/UL (ref 4.1–5.7)
SODIUM SERPL-SCNC: 142 MMOL/L (ref 136–145)
SPECIMEN HOLD: NORMAL
WBC # BLD AUTO: 11.8 K/UL (ref 4.1–11.1)

## 2025-05-17 PROCEDURE — 2500000003 HC RX 250 WO HCPCS: Performed by: INTERNAL MEDICINE

## 2025-05-17 PROCEDURE — 88342 IMHCHEM/IMCYTCHM 1ST ANTB: CPT

## 2025-05-17 PROCEDURE — 6360000002 HC RX W HCPCS: Performed by: ANESTHESIOLOGY

## 2025-05-17 PROCEDURE — 7100000000 HC PACU RECOVERY - FIRST 15 MIN: Performed by: OTOLARYNGOLOGY

## 2025-05-17 PROCEDURE — 6360000002 HC RX W HCPCS: Performed by: NURSE ANESTHETIST, CERTIFIED REGISTERED

## 2025-05-17 PROCEDURE — 2580000003 HC RX 258: Performed by: NURSE ANESTHETIST, CERTIFIED REGISTERED

## 2025-05-17 PROCEDURE — 6370000000 HC RX 637 (ALT 250 FOR IP): Performed by: EMERGENCY MEDICINE

## 2025-05-17 PROCEDURE — 2709999900 HC NON-CHARGEABLE SUPPLY: Performed by: OTOLARYNGOLOGY

## 2025-05-17 PROCEDURE — 2500000003 HC RX 250 WO HCPCS

## 2025-05-17 PROCEDURE — 2500000003 HC RX 250 WO HCPCS: Performed by: NURSE ANESTHETIST, CERTIFIED REGISTERED

## 2025-05-17 PROCEDURE — 31535 LARYNGOSCOPY W/BIOPSY: CPT | Performed by: OTOLARYNGOLOGY

## 2025-05-17 PROCEDURE — 85025 COMPLETE CBC W/AUTO DIFF WBC: CPT

## 2025-05-17 PROCEDURE — 6370000000 HC RX 637 (ALT 250 FOR IP): Performed by: STUDENT IN AN ORGANIZED HEALTH CARE EDUCATION/TRAINING PROGRAM

## 2025-05-17 PROCEDURE — 99285 EMERGENCY DEPT VISIT HI MDM: CPT

## 2025-05-17 PROCEDURE — 6370000000 HC RX 637 (ALT 250 FOR IP): Performed by: OTOLARYNGOLOGY

## 2025-05-17 PROCEDURE — 6360000002 HC RX W HCPCS: Performed by: OTOLARYNGOLOGY

## 2025-05-17 PROCEDURE — 2000000000 HC ICU R&B

## 2025-05-17 PROCEDURE — 3600000012 HC SURGERY LEVEL 2 ADDTL 15MIN: Performed by: OTOLARYNGOLOGY

## 2025-05-17 PROCEDURE — 31603 EMER TRACHEOSTOMY TTRACH: CPT | Performed by: OTOLARYNGOLOGY

## 2025-05-17 PROCEDURE — 3700000000 HC ANESTHESIA ATTENDED CARE: Performed by: OTOLARYNGOLOGY

## 2025-05-17 PROCEDURE — 7100000001 HC PACU RECOVERY - ADDTL 15 MIN: Performed by: OTOLARYNGOLOGY

## 2025-05-17 PROCEDURE — 88305 TISSUE EXAM BY PATHOLOGIST: CPT

## 2025-05-17 PROCEDURE — 0CBS8ZX EXCISION OF LARYNX, VIA NATURAL OR ARTIFICIAL OPENING ENDOSCOPIC, DIAGNOSTIC: ICD-10-PCS | Performed by: OTOLARYNGOLOGY

## 2025-05-17 PROCEDURE — 2500000003 HC RX 250 WO HCPCS: Performed by: OTOLARYNGOLOGY

## 2025-05-17 PROCEDURE — 3700000001 HC ADD 15 MINUTES (ANESTHESIA): Performed by: OTOLARYNGOLOGY

## 2025-05-17 PROCEDURE — 3600000002 HC SURGERY LEVEL 2 BASE: Performed by: OTOLARYNGOLOGY

## 2025-05-17 PROCEDURE — 0B110F4 BYPASS TRACHEA TO CUTANEOUS WITH TRACHEOSTOMY DEVICE, OPEN APPROACH: ICD-10-PCS | Performed by: OTOLARYNGOLOGY

## 2025-05-17 PROCEDURE — 80053 COMPREHEN METABOLIC PANEL: CPT

## 2025-05-17 RX ORDER — PANTOPRAZOLE SODIUM 40 MG/1
40 TABLET, DELAYED RELEASE ORAL
Status: DISCONTINUED | OUTPATIENT
Start: 2025-05-18 | End: 2025-05-18

## 2025-05-17 RX ORDER — PROCHLORPERAZINE EDISYLATE 5 MG/ML
5 INJECTION INTRAMUSCULAR; INTRAVENOUS
Status: DISCONTINUED | OUTPATIENT
Start: 2025-05-17 | End: 2025-05-17 | Stop reason: HOSPADM

## 2025-05-17 RX ORDER — MORPHINE SULFATE 4 MG/ML
4 INJECTION, SOLUTION INTRAMUSCULAR; INTRAVENOUS
Refills: 0 | Status: DISCONTINUED | OUTPATIENT
Start: 2025-05-17 | End: 2025-05-17

## 2025-05-17 RX ORDER — ACETAMINOPHEN 325 MG/1
650 TABLET ORAL EVERY 4 HOURS PRN
Status: DISCONTINUED | OUTPATIENT
Start: 2025-05-17 | End: 2025-05-17 | Stop reason: HOSPADM

## 2025-05-17 RX ORDER — ONDANSETRON 2 MG/ML
4 INJECTION INTRAMUSCULAR; INTRAVENOUS ONCE
Status: DISCONTINUED | OUTPATIENT
Start: 2025-05-17 | End: 2025-06-03 | Stop reason: ALTCHOICE

## 2025-05-17 RX ORDER — SODIUM CHLORIDE 0.9 % (FLUSH) 0.9 %
5-40 SYRINGE (ML) INJECTION EVERY 12 HOURS SCHEDULED
Status: DISCONTINUED | OUTPATIENT
Start: 2025-05-17 | End: 2025-05-17 | Stop reason: HOSPADM

## 2025-05-17 RX ORDER — NALOXONE HYDROCHLORIDE 0.4 MG/ML
0.4 INJECTION, SOLUTION INTRAMUSCULAR; INTRAVENOUS; SUBCUTANEOUS PRN
Status: DISCONTINUED | OUTPATIENT
Start: 2025-05-17 | End: 2025-06-10

## 2025-05-17 RX ORDER — EPINEPHRINE 1 MG/ML(1)
AMPUL (ML) INJECTION PRN
Status: DISCONTINUED | OUTPATIENT
Start: 2025-05-17 | End: 2025-05-17 | Stop reason: HOSPADM

## 2025-05-17 RX ORDER — LIDOCAINE HYDROCHLORIDE 20 MG/ML
INJECTION, SOLUTION EPIDURAL; INFILTRATION; INTRACAUDAL; PERINEURAL
Status: DISCONTINUED | OUTPATIENT
Start: 2025-05-17 | End: 2025-05-17 | Stop reason: SDUPTHER

## 2025-05-17 RX ORDER — HYDRALAZINE HYDROCHLORIDE 20 MG/ML
10 INJECTION INTRAMUSCULAR; INTRAVENOUS ONCE
Status: DISCONTINUED | OUTPATIENT
Start: 2025-05-17 | End: 2025-05-17 | Stop reason: HOSPADM

## 2025-05-17 RX ORDER — SODIUM CHLORIDE 9 MG/ML
INJECTION, SOLUTION INTRAVENOUS PRN
Status: DISCONTINUED | OUTPATIENT
Start: 2025-05-17 | End: 2025-06-10

## 2025-05-17 RX ORDER — OXYCODONE HYDROCHLORIDE 5 MG/1
5 TABLET ORAL
Status: DISCONTINUED | OUTPATIENT
Start: 2025-05-17 | End: 2025-05-17 | Stop reason: HOSPADM

## 2025-05-17 RX ORDER — DEXAMETHASONE SODIUM PHOSPHATE 4 MG/ML
INJECTION, SOLUTION INTRA-ARTICULAR; INTRALESIONAL; INTRAMUSCULAR; INTRAVENOUS; SOFT TISSUE
Status: DISCONTINUED | OUTPATIENT
Start: 2025-05-17 | End: 2025-05-17 | Stop reason: SDUPTHER

## 2025-05-17 RX ORDER — FENTANYL CITRATE 50 UG/ML
25 INJECTION, SOLUTION INTRAMUSCULAR; INTRAVENOUS EVERY 5 MIN PRN
Status: DISCONTINUED | OUTPATIENT
Start: 2025-05-17 | End: 2025-05-17 | Stop reason: HOSPADM

## 2025-05-17 RX ORDER — MAGNESIUM SULFATE IN WATER 40 MG/ML
INJECTION, SOLUTION INTRAVENOUS
Status: DISCONTINUED
Start: 2025-05-17 | End: 2025-05-17 | Stop reason: HOSPADM

## 2025-05-17 RX ORDER — ONDANSETRON 2 MG/ML
INJECTION INTRAMUSCULAR; INTRAVENOUS
Status: DISCONTINUED | OUTPATIENT
Start: 2025-05-17 | End: 2025-05-17 | Stop reason: SDUPTHER

## 2025-05-17 RX ORDER — HYDROMORPHONE HYDROCHLORIDE 1 MG/ML
0.5 INJECTION, SOLUTION INTRAMUSCULAR; INTRAVENOUS; SUBCUTANEOUS EVERY 5 MIN PRN
Status: DISCONTINUED | OUTPATIENT
Start: 2025-05-17 | End: 2025-05-17 | Stop reason: HOSPADM

## 2025-05-17 RX ORDER — ONDANSETRON 2 MG/ML
4 INJECTION INTRAMUSCULAR; INTRAVENOUS
Status: DISCONTINUED | OUTPATIENT
Start: 2025-05-17 | End: 2025-05-17 | Stop reason: HOSPADM

## 2025-05-17 RX ORDER — PHENYLEPHRINE HCL IN 0.9% NACL 0.4MG/10ML
SYRINGE (ML) INTRAVENOUS
Status: DISCONTINUED | OUTPATIENT
Start: 2025-05-17 | End: 2025-05-17 | Stop reason: SDUPTHER

## 2025-05-17 RX ORDER — NALOXONE HYDROCHLORIDE 0.4 MG/ML
INJECTION, SOLUTION INTRAMUSCULAR; INTRAVENOUS; SUBCUTANEOUS PRN
Status: DISCONTINUED | OUTPATIENT
Start: 2025-05-17 | End: 2025-05-17 | Stop reason: HOSPADM

## 2025-05-17 RX ORDER — BUPIVACAINE HYDROCHLORIDE AND EPINEPHRINE 2.5; 5 MG/ML; UG/ML
INJECTION, SOLUTION EPIDURAL; INFILTRATION; INTRACAUDAL; PERINEURAL PRN
Status: DISCONTINUED | OUTPATIENT
Start: 2025-05-17 | End: 2025-05-17 | Stop reason: HOSPADM

## 2025-05-17 RX ORDER — SODIUM CHLORIDE 9 MG/ML
INJECTION, SOLUTION INTRAVENOUS PRN
Status: DISCONTINUED | OUTPATIENT
Start: 2025-05-17 | End: 2025-05-17 | Stop reason: HOSPADM

## 2025-05-17 RX ORDER — LOSARTAN POTASSIUM 50 MG/1
25 TABLET ORAL DAILY
Status: DISCONTINUED | OUTPATIENT
Start: 2025-05-18 | End: 2025-05-19

## 2025-05-17 RX ORDER — ROCURONIUM BROMIDE 10 MG/ML
INJECTION, SOLUTION INTRAVENOUS
Status: DISCONTINUED | OUTPATIENT
Start: 2025-05-17 | End: 2025-05-17 | Stop reason: SDUPTHER

## 2025-05-17 RX ORDER — SODIUM CHLORIDE 0.9 % (FLUSH) 0.9 %
5-40 SYRINGE (ML) INJECTION EVERY 12 HOURS SCHEDULED
Status: DISCONTINUED | OUTPATIENT
Start: 2025-05-17 | End: 2025-06-11 | Stop reason: HOSPADM

## 2025-05-17 RX ORDER — SODIUM CHLORIDE 0.9 % (FLUSH) 0.9 %
5-40 SYRINGE (ML) INJECTION PRN
Status: DISCONTINUED | OUTPATIENT
Start: 2025-05-17 | End: 2025-06-11 | Stop reason: HOSPADM

## 2025-05-17 RX ORDER — SODIUM CHLORIDE 0.9 % (FLUSH) 0.9 %
5-40 SYRINGE (ML) INJECTION PRN
Status: DISCONTINUED | OUTPATIENT
Start: 2025-05-17 | End: 2025-05-17 | Stop reason: HOSPADM

## 2025-05-17 RX ORDER — METOPROLOL TARTRATE 25 MG/1
25 TABLET, FILM COATED ORAL 2 TIMES DAILY
Status: DISCONTINUED | OUTPATIENT
Start: 2025-05-17 | End: 2025-05-19

## 2025-05-17 RX ORDER — GINSENG 100 MG
CAPSULE ORAL PRN
Status: DISCONTINUED | OUTPATIENT
Start: 2025-05-17 | End: 2025-05-17 | Stop reason: HOSPADM

## 2025-05-17 RX ORDER — ONDANSETRON 2 MG/ML
4 INJECTION INTRAMUSCULAR; INTRAVENOUS EVERY 6 HOURS PRN
Status: DISCONTINUED | OUTPATIENT
Start: 2025-05-17 | End: 2025-06-11 | Stop reason: HOSPADM

## 2025-05-17 RX ORDER — SUCCINYLCHOLINE/SOD CL,ISO/PF 200MG/10ML
SYRINGE (ML) INTRAVENOUS
Status: DISCONTINUED | OUTPATIENT
Start: 2025-05-17 | End: 2025-05-17 | Stop reason: SDUPTHER

## 2025-05-17 RX ORDER — HYDRALAZINE HYDROCHLORIDE 20 MG/ML
10 INJECTION INTRAMUSCULAR; INTRAVENOUS EVERY 6 HOURS PRN
Status: DISCONTINUED | OUTPATIENT
Start: 2025-05-17 | End: 2025-05-30

## 2025-05-17 RX ORDER — IPRATROPIUM BROMIDE AND ALBUTEROL SULFATE 2.5; .5 MG/3ML; MG/3ML
1 SOLUTION RESPIRATORY (INHALATION) EVERY 4 HOURS PRN
Status: DISCONTINUED | OUTPATIENT
Start: 2025-05-17 | End: 2025-05-17 | Stop reason: HOSPADM

## 2025-05-17 RX ORDER — MORPHINE SULFATE 2 MG/ML
2 INJECTION, SOLUTION INTRAMUSCULAR; INTRAVENOUS
Refills: 0 | Status: DISCONTINUED | OUTPATIENT
Start: 2025-05-17 | End: 2025-05-17

## 2025-05-17 RX ORDER — HYDROXYZINE HYDROCHLORIDE 25 MG/1
25 TABLET, FILM COATED ORAL ONCE
Status: COMPLETED | OUTPATIENT
Start: 2025-05-17 | End: 2025-05-17

## 2025-05-17 RX ORDER — HYDROCODONE POLISTIREX AND CHLORPHENIRAMINE POLISTIREX 10; 8 MG/5ML; MG/5ML
5 SUSPENSION, EXTENDED RELEASE ORAL
Refills: 0 | Status: COMPLETED | OUTPATIENT
Start: 2025-05-17 | End: 2025-05-17

## 2025-05-17 RX ORDER — EPINEPHRINE 1 MG/ML
INJECTION, SOLUTION, CONCENTRATE INTRAVENOUS
Status: DISCONTINUED
Start: 2025-05-17 | End: 2025-05-17 | Stop reason: HOSPADM

## 2025-05-17 RX ORDER — HYDROMORPHONE HYDROCHLORIDE 1 MG/ML
0.5 INJECTION, SOLUTION INTRAMUSCULAR; INTRAVENOUS; SUBCUTANEOUS EVERY 4 HOURS PRN
Status: DISCONTINUED | OUTPATIENT
Start: 2025-05-17 | End: 2025-05-30

## 2025-05-17 RX ORDER — POLYETHYLENE GLYCOL 3350 17 G/17G
17 POWDER, FOR SOLUTION ORAL DAILY PRN
Status: DISCONTINUED | OUTPATIENT
Start: 2025-05-17 | End: 2025-06-10

## 2025-05-17 RX ORDER — MIDAZOLAM HYDROCHLORIDE 1 MG/ML
INJECTION, SOLUTION INTRAMUSCULAR; INTRAVENOUS
Status: DISCONTINUED | OUTPATIENT
Start: 2025-05-17 | End: 2025-05-17 | Stop reason: SDUPTHER

## 2025-05-17 RX ORDER — SPIRONOLACTONE 25 MG/1
25 TABLET ORAL DAILY
Status: DISCONTINUED | OUTPATIENT
Start: 2025-05-18 | End: 2025-05-26

## 2025-05-17 RX ORDER — SODIUM CHLORIDE, SODIUM LACTATE, POTASSIUM CHLORIDE, CALCIUM CHLORIDE 600; 310; 30; 20 MG/100ML; MG/100ML; MG/100ML; MG/100ML
INJECTION, SOLUTION INTRAVENOUS
Status: DISCONTINUED | OUTPATIENT
Start: 2025-05-17 | End: 2025-05-17 | Stop reason: SDUPTHER

## 2025-05-17 RX ADMIN — SODIUM CHLORIDE, POTASSIUM CHLORIDE, SODIUM LACTATE AND CALCIUM CHLORIDE: 600; 310; 30; 20 INJECTION, SOLUTION INTRAVENOUS at 13:21

## 2025-05-17 RX ADMIN — PROPOFOL 50 MG: 10 INJECTION, EMULSION INTRAVENOUS at 14:05

## 2025-05-17 RX ADMIN — DEXAMETHASONE SODIUM PHOSPHATE 4 MG: 4 INJECTION, SOLUTION INTRAMUSCULAR; INTRAVENOUS at 14:02

## 2025-05-17 RX ADMIN — PROPOFOL 50 MG: 10 INJECTION, EMULSION INTRAVENOUS at 14:07

## 2025-05-17 RX ADMIN — HYDROMORPHONE HYDROCHLORIDE 0.5 MG: 1 INJECTION, SOLUTION INTRAMUSCULAR; INTRAVENOUS; SUBCUTANEOUS at 22:51

## 2025-05-17 RX ADMIN — SODIUM CHLORIDE 2000 MG: 9 INJECTION, SOLUTION INTRAVENOUS at 13:53

## 2025-05-17 RX ADMIN — Medication 120 MCG: at 14:15

## 2025-05-17 RX ADMIN — HYDROMORPHONE HYDROCHLORIDE 0.5 MG: 1 INJECTION, SOLUTION INTRAMUSCULAR; INTRAVENOUS; SUBCUTANEOUS at 19:04

## 2025-05-17 RX ADMIN — ONDANSETRON 4 MG: 2 INJECTION INTRAMUSCULAR; INTRAVENOUS at 14:02

## 2025-05-17 RX ADMIN — HYDROCODONE POLISTIREX AND CHLORPHENIRAMINE POLISTIREX 5 ML: 10; 8 SUSPENSION, EXTENDED RELEASE ORAL at 01:11

## 2025-05-17 RX ADMIN — Medication 20 MG: at 13:44

## 2025-05-17 RX ADMIN — Medication 200 MCG: at 14:17

## 2025-05-17 RX ADMIN — ROCURONIUM BROMIDE 40 MG: 10 INJECTION, SOLUTION INTRAVENOUS at 13:54

## 2025-05-17 RX ADMIN — SUGAMMADEX 200 MG: 100 INJECTION, SOLUTION INTRAVENOUS at 15:00

## 2025-05-17 RX ADMIN — PROPOFOL 50 MG: 10 INJECTION, EMULSION INTRAVENOUS at 13:44

## 2025-05-17 RX ADMIN — HYDROXYZINE HYDROCHLORIDE 25 MG: 25 TABLET, FILM COATED ORAL at 04:44

## 2025-05-17 RX ADMIN — ACETAMINOPHEN 650 MG: 325 TABLET ORAL at 09:45

## 2025-05-17 RX ADMIN — Medication 10 ML: at 22:55

## 2025-05-17 RX ADMIN — FENTANYL CITRATE 25 MCG: 50 INJECTION INTRAMUSCULAR; INTRAVENOUS at 15:35

## 2025-05-17 RX ADMIN — LIDOCAINE HYDROCHLORIDE 40 MG: 20 INJECTION, SOLUTION EPIDURAL; INFILTRATION; INTRACAUDAL; PERINEURAL at 13:44

## 2025-05-17 RX ADMIN — Medication 100 MG: at 13:44

## 2025-05-17 RX ADMIN — MIDAZOLAM 2 MG: 1 INJECTION INTRAMUSCULAR; INTRAVENOUS at 13:59

## 2025-05-17 ASSESSMENT — PAIN SCALES - GENERAL
PAINLEVEL_OUTOF10: 0
PAINLEVEL_OUTOF10: 0
PAINLEVEL_OUTOF10: 2
PAINLEVEL_OUTOF10: 2

## 2025-05-17 ASSESSMENT — PAIN DESCRIPTION - LOCATION
LOCATION: CHEST;NECK
LOCATION: NECK

## 2025-05-17 ASSESSMENT — PAIN - FUNCTIONAL ASSESSMENT: PAIN_FUNCTIONAL_ASSESSMENT: ADULT NONVERBAL PAIN SCALE (NPVS)

## 2025-05-17 ASSESSMENT — PAIN DESCRIPTION - DESCRIPTORS: DESCRIPTORS: ACHING

## 2025-05-17 NOTE — ED NOTES
Pt decontaminated by this RN and Jeffery Parra. Pt calm, cooperative, alert. Personal belongings double bagged except for eye glasses, which were cleaned and placed back on the patient's face. Pt re-roomed from ER21 to ER20.     Three unsuccessful IV access attempts by this RN and Jeffery Mckeon. US-guided IV ultimately obtained.

## 2025-05-17 NOTE — ED NOTES
Pt sleeping. Semi-to-low fowlers per request one hour ago. No acute distress reported or observed at this time. Remains on RA with % sats.

## 2025-05-17 NOTE — ANESTHESIA PRE PROCEDURE
Department of Anesthesiology  Preprocedure Note       Name:  Roge Lobato   Age:  74 y.o.  :  1950                                          MRN:  [id number]         Date:  2025      Surgeon: Surgeon(s):  Deepak Plascencia DO    Procedure: Procedure(s):  TRACHEOTOMY  DIREACT LARYNGOSCOPY    Medications prior to admission:   Prior to Admission medications    Medication Sig Start Date End Date Taking? Authorizing Provider   phenol (CHLORASEPTIC MOUTH PAIN) 1.4 % LIQD mouth spray Take 1 drop by mouth every 2 hours as needed for Sore Throat 25   Sahara Wade MD   dicyclomine (BENTYL) 10 MG capsule Take 1 capsule by mouth 3 times daily (before meals) 24   Agustin Wade MD   losartan (COZAAR) 25 MG tablet Take 1 tablet by mouth daily 24   Agustin Wade MD   aspirin 81 MG chewable tablet Take 1 tablet by mouth daily    ProviderBen MD   clopidogrel (PLAVIX) 75 MG tablet Take 1 tablet by mouth daily    Ben Cooley MD   spironolactone (ALDACTONE) 25 MG tablet Take 1 tablet by mouth daily    ProviderBen MD   metoprolol tartrate (LOPRESSOR) 25 MG tablet Take 1 tablet by mouth 2 times daily    Ben Cooley MD   pantoprazole (PROTONIX) 40 MG tablet Take 1 tablet by mouth daily    ProviderBen MD       Current medications:    Current Facility-Administered Medications   Medication Dose Route Frequency Provider Last Rate Last Admin   • ondansetron (ZOFRAN) injection 4 mg  4 mg IntraVENous Once Thang Real DO       • morphine (PF) injection 4 mg  4 mg IntraVENous NOW Thang Real DO       • sodium chloride flush 0.9 % injection 5-40 mL  5-40 mL IntraVENous 2 times per day Lexus Valle MD       • sodium chloride flush 0.9 % injection 5-40 mL  5-40 mL IntraVENous PRN Lexus Valle MD       • 0.9 % sodium chloride infusion   IntraVENous PRN Lexus Valle MD       • ondansetron (ZOFRAN) injection 4 mg  4 mg IntraVENous Q6H PRN

## 2025-05-17 NOTE — ED NOTES
Surgeon met the patient at the bedside on arrival, performed nasal scope to visualize tumor. Admitting MD also at the bedside. Current plan is to send patient to the OR and give trach. Patient alert and oriented, has understanding of procedure. Gave verbal permission to surgeon to contact brother after surgery.

## 2025-05-17 NOTE — ED NOTES
Rounded on patient. NAD. Physiological needs met. Patient resting in stretcher calmly. Call bell within reach. Patient remains on continuous monitoring x3. Patient able to speak in full sentences at this time with no obvious distress

## 2025-05-17 NOTE — ED NOTES
Pt was reporting restlessness of legs and mind and asked if there was something we could give him to help put him at ease. MD Bliss made aware. Pt given atarax crushed in vanilla pudding. No distress during administration.

## 2025-05-17 NOTE — ED NOTES
Rounded on patient. NAD. Physiological needs met. Patient resting in stretcher calmly. Call bell within reach. Patient remains on continuous monitoring x3. Patient's airway remains open and intact at this time - patient able to speak in full sentences with no difficulty.

## 2025-05-17 NOTE — CONSULTS
Otolaryngology On-Call Note:    I received a call from Dr. Amezquita from [facility] regarding this patient with a known history of laryngeal mass but for various reasons he has not been able to follow up. He now presents with worsening hoarseness. I reviewed the CT neck images and do agree that he could be transferred to [facility] Hospitalist service and kept NPO for likely tracheostomy. He will discuss the plan with the patient and his brother (POA).     Thank you for allowing me to participate in the care of your patient.    Deepak Plascencia D.O.  [facility] Otolaryngology

## 2025-05-17 NOTE — ED PROVIDER NOTES
MG TABLET    Take 1 tablet by mouth daily    METOPROLOL TARTRATE (LOPRESSOR) 25 MG TABLET    Take 1 tablet by mouth 2 times daily    PANTOPRAZOLE (PROTONIX) 40 MG TABLET    Take 1 tablet by mouth daily    PHENOL (CHLORASEPTIC MOUTH PAIN) 1.4 % LIQD MOUTH SPRAY    Take 1 drop by mouth every 2 hours as needed for Sore Throat    SPIRONOLACTONE (ALDACTONE) 25 MG TABLET    Take 1 tablet by mouth daily       ALLERGIES     Patient has no known allergies.    FAMILY HISTORY       Family History   Problem Relation Age of Onset    Alcohol Abuse Father     Kidney Disease Mother           SOCIAL HISTORY       Social History     Socioeconomic History    Marital status: Single   Tobacco Use    Smoking status: Former     Current packs/day: 0.50     Types: Cigarettes    Smokeless tobacco: Never   Substance and Sexual Activity    Alcohol use: Yes    Drug use: Yes     Types: Marijuana (Weed)   Social History Narrative     with two daughters in good health.    10th-grade education from [employer]         ** Merged History Encounter **    [place] native. In [place] for 55 years.     Social Drivers of Health     Food Insecurity: No Food Insecurity (2/9/2025)    Hunger Vital Sign     Worried About Running Out of Food in the Last Year: Never true     Ran Out of Food in the Last Year: Never true   Transportation Needs: Unmet Transportation Needs (2/9/2025)    PRAPARE - Transportation     Lack of Transportation (Medical): Yes     Lack of Transportation (Non-Medical): Yes   Housing Stability: Low Risk  (2/9/2025)    Housing Stability Vital Sign     Unable to Pay for Housing in the Last Year: No     Number of Times Moved in the Last Year: 0     Homeless in the Last Year: No           PHYSICAL EXAM    (up to 7 for level 4, 8 or more for level 5)     ED Triage Vitals   BP Systolic BP Percentile Diastolic BP Percentile Temp Temp src Pulse Resp SpO2   -- -- -- -- -- -- -- --      Height Weight         -- --

## 2025-05-17 NOTE — ED TRIAGE NOTES
Pt arrived via [facility] from [facility] with CC sore throat and SOB. Diagnosed with a laryngeal mass per [facility] report. Pt in no acute distress upon arrival. Dr. Plascencia at bedside upon arrival.

## 2025-05-17 NOTE — ED PROVIDER NOTES
ASPIRIN 81 MG CHEWABLE TABLET    Take 1 tablet by mouth daily    CLOPIDOGREL (PLAVIX) 75 MG TABLET    Take 1 tablet by mouth daily    DICYCLOMINE (BENTYL) 10 MG CAPSULE    Take 1 capsule by mouth 3 times daily (before meals)    LOSARTAN (COZAAR) 25 MG TABLET    Take 1 tablet by mouth daily    METOPROLOL TARTRATE (LOPRESSOR) 25 MG TABLET    Take 1 tablet by mouth 2 times daily    PANTOPRAZOLE (PROTONIX) 40 MG TABLET    Take 1 tablet by mouth daily    PHENOL (CHLORASEPTIC MOUTH PAIN) 1.4 % LIQD MOUTH SPRAY    Take 1 drop by mouth every 2 hours as needed for Sore Throat    SPIRONOLACTONE (ALDACTONE) 25 MG TABLET    Take 1 tablet by mouth daily       SCREENINGS               No data recorded        PHYSICAL EXAM      ED Triage Vitals   Encounter Vitals Group      BP 05/16/25 1935 (!) 172/82      Systolic BP Percentile --       Diastolic BP Percentile --       Pulse 05/16/25 1935 81      Respirations 05/16/25 1935 12      Temp 05/16/25 1937 98.3 °F (36.8 °C)      Temp Source 05/16/25 1937 Oral      SpO2 05/16/25 1935 97 %      Weight - Scale 05/16/25 2130 63 kg (139 lb)      Height --       Head Circumference --       Peak Flow --       Pain Score --       Pain Loc --       Pain Education --       Exclude from Growth Chart --               Physical Exam  Vitals reviewed.   Constitutional:       General: He is not in acute distress.     Appearance: Normal appearance. He is not toxic-appearing.   HENT:      Head: Normocephalic and atraumatic.      Mouth/Throat:      Mouth: Mucous membranes are dry.      Comments: No drooling, airway is patent  Eyes:      Pupils: Pupils are equal, round, and reactive to light.   Cardiovascular:      Rate and Rhythm: Normal rate and regular rhythm.   Pulmonary:      Effort: Pulmonary effort is normal. No respiratory distress.      Comments: Speaks in a hoarse voice, dry cough on exam, no respiratory distress  Musculoskeletal:         General: No deformity.   Neurological:      Mental Status:  05/17/25 0015 05/17/25 0045   BP: (!) 180/75 (!) 141/76 (!) 144/76 (!) 168/86   Pulse:  85  94   Resp:    19   Temp:  98.1 °F (36.7 °C)     TempSrc:  Oral     SpO2: 97% 98% 98% 99%   Weight:                Patient was given the following medications:  Medications   HYDROcodone-chlorpheniramine (TUSSIONEX) 10-8 MG/5ML oral suspension 5 mL (has no administration in time range)   ipratropium 0.5 mg-albuterol 2.5 mg (DUONEB) nebulizer solution 1 Dose (has no administration in time range)   acetaminophen (TYLENOL) tablet 650 mg (has no administration in time range)   ipratropium 0.5 mg-albuterol 2.5 mg (DUONEB) nebulizer solution 1 Dose (1 Dose Inhalation Given 5/16/25 2234)   iopamidol (ISOVUE-370) 76 % injection 100 mL (100 mLs IntraVENous Given 5/16/25 2208)       CONSULTS: (Who and What was discussed)  None        Chronic Conditions:   Past Medical History:   Diagnosis Date    History of CVA (cerebrovascular accident) 9/29/2021    HTN (hypertension)     Stroke (HCC)        Social Determinants affecting Dx or Tx: Patient lacks support at home or lives alone.    Records Reviewed (source and summary of external notes): Old Medical Records, Nursing Notes, Prior ED/Hospital Visits    CC/HPI Summary, DDx, ED Course, and Reassessment:   MDM  74-year-old male who presents for worsening shortness of breath, difficulty swallowing and pain to the neck.  He reports he was told he had a neck mass a few months ago but has not follow-up due to multiple constraints.  Here he is in no respiratory distress, no eminent threat to his airway.  He does speak in a hoarse voice.  No drooling.  Poor historian, does have a history of stroke, hypertension.  Will need to obtain repeat blood work as well as chest x-ray and repeat imaging of the neck.  Will offer him a breathing treatment the meantime for his reports of recent cough and suspected COPD but has not been diagnosed with COPD.      ED Course as of 05/17/25 0109   Fri May 16, 2025

## 2025-05-17 NOTE — PERIOP NOTE
TRANSFER - OUT REPORT:    Verbal report given to KAILA Alvarez (name) on Roge Lobato  being transferred to [facility] (unit) for routine post-op       Report consisted of patient’s Situation, Background, Assessment and   Recommendations(SBAR).     Time Pre op antibiotic given:none   Anesthesia Stop time: 1505    Information from the following report(s) OR Summary and Procedure Summary was reviewed with the receiving nurse.    Opportunity for questions and clarification was provided.     Is the patient on 02? Yes       L/Min 4       Other Trach collar    Is the patient on a monitor? Yes    Is the nurse transporting with the patient? Yes    At transfer, are there Patient Belongings with the patient?  If Yes, please note/list:    Surgical Waiting Area notified of patient's transfer from PACU? No

## 2025-05-17 NOTE — ED NOTES
Bedside report given to KAILA Norris by KAILA Reyes. Nurse was informed of reason for arrival, vitals, labs, medications, orders, procedures, results, cardiac rhythm, any outstanding and pending orders and plan of care. Opportunity for questions were provided for receiving RN at this time.

## 2025-05-17 NOTE — OP NOTE
was shortened using 2-0 Nylon. Also the trach was secured to the skin of the neck using 2-0 Nylon. Trach drain sponge and bacitracin ointment and posey Velcro tie were applied.  The patient was then turned 90 degrees for the DL portion. Using a Dido laryngoscope the epiglottis was lifted. The right aryepiglottic mass was visualized and multiple biopsies were obtained and sent off to pathology for permanent sections. Bleeding was controlled using topic epinephrine and cottonoids. This marked the end of the procedure. Patient tolerated it well.  The patient was then turned over to anesthesia in stable condition then to PACU. He will be going to ICU after.  Sponge count was correct.  Discussed the case with the patient's POA (his brother @ [phone])    Recommendations: DO NOT CHANGE DRAIN SPONGE.   Routine trach care/suctioning per ICU protocol.   Okay with NG tube if absolutely necessary, but he'll need a PEG tube (discussed this with his brother post op).  Okay to deflate cuff and wean off the vent.  Discussed with [facility].  Will follow with you.    Electronically signed by Deepak Plascencia DO on 5/17/2025 at 2:59 PM

## 2025-05-17 NOTE — ED NOTES
Comprehensive verbal and bedside SBAR given to receiving RN (Jr). Care transitioned at the bedside. Nurse was also informed of tasks and interventions left pending. I provided the opportunity to ask all questions.       - Pt is AO3-4 on RA, in gown  - Side rails x2, stretcher locked in low position, Pt repositioned to comfort by us  - Connected to monitor x3 with emergency equipment at bedside  - Call light within reach; cell phone on lap   - No acute distress reported or observed though he continues to complain of chronic knee pain and difficulty breathing

## 2025-05-17 NOTE — H&P
History and Physical    Date of Service:  5/17/2025  Primary Care Provider: No primary care provider on file.  Source of information: The patient and Chart review    Chief Complaint: Pharyngitis      History of Presenting Illness:   Roge Lobato is a 74 y.o. male with HTN, h/o stroke, SSS s/p PPM, h/o COVID-19 infection (2/2025), chronic pancreatitis, GERD who was transferred from [facility] ED to [facility] ED with enlarging R neck mass and SOB. Pt presented to [facility] ED last night. ENT was consulted and requested transfer to [facility]. [facility] was consulted for admission but due to lack of bed availability, ENT requested ED to ED transfer for emergent evaluation and surgery. Pt reported neck mass x 3months. He was hospitalized then discharged to SNF and was unable to see ENT. He reports difficulty swallowing pills in pudding yesterday and also developed SOB, bilateral ear pain. He has had CP with coughing x2 days. He had some weight loss initially but recently gained some of the weight back. He also reports some trouble walking. He last took ASA yesterday morning. He denies f/c/n/v, abdominal pain, diarrhea, constipation, dysuria, headache, falls, injuries, LOC.      REVIEW OF SYSTEMS:  Pertinent items are noted in the History of Present Illness.     Past Medical History:   Diagnosis Date    History of CVA (cerebrovascular accident) 9/29/2021    HTN (hypertension)     Stroke (HCC)       Past Surgical History:   Procedure Laterality Date    EP DEVICE PROCEDURE N/A 9/18/2024    Insert PPM dual performed by Fortino Mcfadden MD at [facility] CARDIAC CATH LAB     Prior to Admission medications    Medication Sig Start Date End Date Taking? Authorizing Provider   phenol (CHLORASEPTIC MOUTH PAIN) 1.4 % LIQD mouth spray Take 1 drop by mouth every 2 hours as needed for Sore Throat 2/13/25   Sahara Wade MD   dicyclomine (BENTYL) 10 MG capsule Take 1 capsule by mouth 3 times daily (before meals) 9/23/24   Agustin Wade MD

## 2025-05-17 NOTE — ED NOTES
Pt sleeping sats 94-86%, rouses easily to alert status and 100% O2. He reports improved chest congestion and discomfort since tussionex administered). No acute distress reported or observed. Head of bed remains elevated.

## 2025-05-17 NOTE — ED NOTES
Patient report to PACU staff, pt prepared for transport to PACU. Last po intake of food was 1pm 5/16/25 and he stated that he had a small sip of water with his medication this morning.

## 2025-05-18 ENCOUNTER — APPOINTMENT (OUTPATIENT)
Facility: HOSPITAL | Age: 75
DRG: 011 | End: 2025-05-18
Payer: MEDICARE

## 2025-05-18 LAB
ANION GAP SERPL CALC-SCNC: 5 MMOL/L (ref 2–12)
BASOPHILS # BLD: 0.01 K/UL (ref 0–0.1)
BASOPHILS NFR BLD: 0.1 % (ref 0–1)
BUN SERPL-MCNC: 13 MG/DL (ref 6–20)
BUN/CREAT SERPL: 16 (ref 12–20)
CALCIUM SERPL-MCNC: 9.2 MG/DL (ref 8.5–10.1)
CALCIUM SERPL-MCNC: 9.4 MG/DL (ref 8.5–10.1)
CHLORIDE SERPL-SCNC: 109 MMOL/L (ref 97–108)
CO2 SERPL-SCNC: 26 MMOL/L (ref 21–32)
CREAT SERPL-MCNC: 0.79 MG/DL (ref 0.7–1.3)
DIFFERENTIAL METHOD BLD: ABNORMAL
EOSINOPHIL # BLD: 0 K/UL (ref 0–0.4)
EOSINOPHIL NFR BLD: 0 % (ref 0–7)
ERYTHROCYTE [DISTWIDTH] IN BLOOD BY AUTOMATED COUNT: 16.4 % (ref 11.5–14.5)
FERRITIN SERPL-MCNC: 61 NG/ML (ref 26–388)
FOLATE SERPL-MCNC: 14.3 NG/ML (ref 5–21)
GLUCOSE SERPL-MCNC: 121 MG/DL (ref 65–100)
HCT VFR BLD AUTO: 30.1 % (ref 36.6–50.3)
HGB BLD-MCNC: 9.5 G/DL (ref 12.1–17)
IMM GRANULOCYTES # BLD AUTO: 0.04 K/UL (ref 0–0.04)
IMM GRANULOCYTES NFR BLD AUTO: 0.4 % (ref 0–0.5)
IRON SATN MFR SERPL: 10 % (ref 20–50)
IRON SERPL-MCNC: 21 UG/DL (ref 35–150)
LYMPHOCYTES # BLD: 0.92 K/UL (ref 0.8–3.5)
LYMPHOCYTES NFR BLD: 9.5 % (ref 12–49)
MAGNESIUM SERPL-MCNC: 1.8 MG/DL (ref 1.6–2.4)
MCH RBC QN AUTO: 29.3 PG (ref 26–34)
MCHC RBC AUTO-ENTMCNC: 31.6 G/DL (ref 30–36.5)
MCV RBC AUTO: 92.9 FL (ref 80–99)
MONOCYTES # BLD: 0.31 K/UL (ref 0–1)
MONOCYTES NFR BLD: 3.2 % (ref 5–13)
NEUTS SEG # BLD: 8.36 K/UL (ref 1.8–8)
NEUTS SEG NFR BLD: 86.8 % (ref 32–75)
NRBC # BLD: 0 K/UL (ref 0–0.01)
NRBC BLD-RTO: 0 PER 100 WBC
PHOSPHATE SERPL-MCNC: 5 MG/DL (ref 2.6–4.7)
PLATELET # BLD AUTO: 446 K/UL (ref 150–400)
PMV BLD AUTO: 10 FL (ref 8.9–12.9)
POTASSIUM SERPL-SCNC: 3.7 MMOL/L (ref 3.5–5.1)
PTH-INTACT SERPL-MCNC: 13.4 PG/ML (ref 18.4–88)
RBC # BLD AUTO: 3.24 M/UL (ref 4.1–5.7)
SODIUM SERPL-SCNC: 140 MMOL/L (ref 136–145)
TIBC SERPL-MCNC: 216 UG/DL (ref 250–450)
VIT B12 SERPL-MCNC: 1700 PG/ML (ref 193–986)
WBC # BLD AUTO: 9.6 K/UL (ref 4.1–11.1)

## 2025-05-18 PROCEDURE — 71045 X-RAY EXAM CHEST 1 VIEW: CPT

## 2025-05-18 PROCEDURE — 2500000003 HC RX 250 WO HCPCS

## 2025-05-18 PROCEDURE — 83540 ASSAY OF IRON: CPT

## 2025-05-18 PROCEDURE — 82746 ASSAY OF FOLIC ACID SERUM: CPT

## 2025-05-18 PROCEDURE — 94761 N-INVAS EAR/PLS OXIMETRY MLT: CPT

## 2025-05-18 PROCEDURE — 99232 SBSQ HOSP IP/OBS MODERATE 35: CPT | Performed by: OTOLARYNGOLOGY

## 2025-05-18 PROCEDURE — 87070 CULTURE OTHR SPECIMN AEROBIC: CPT

## 2025-05-18 PROCEDURE — 83735 ASSAY OF MAGNESIUM: CPT

## 2025-05-18 PROCEDURE — 87205 SMEAR GRAM STAIN: CPT

## 2025-05-18 PROCEDURE — 82728 ASSAY OF FERRITIN: CPT

## 2025-05-18 PROCEDURE — 85025 COMPLETE CBC W/AUTO DIFF WBC: CPT

## 2025-05-18 PROCEDURE — 2060000000 HC ICU INTERMEDIATE R&B

## 2025-05-18 PROCEDURE — 92597 ORAL SPEECH DEVICE EVAL: CPT

## 2025-05-18 PROCEDURE — 2580000003 HC RX 258: Performed by: INTERNAL MEDICINE

## 2025-05-18 PROCEDURE — 94760 N-INVAS EAR/PLS OXIMETRY 1: CPT

## 2025-05-18 PROCEDURE — 2700000000 HC OXYGEN THERAPY PER DAY

## 2025-05-18 PROCEDURE — 80048 BASIC METABOLIC PNL TOTAL CA: CPT

## 2025-05-18 PROCEDURE — 2500000003 HC RX 250 WO HCPCS: Performed by: INTERNAL MEDICINE

## 2025-05-18 PROCEDURE — 92610 EVALUATE SWALLOWING FUNCTION: CPT

## 2025-05-18 PROCEDURE — 2580000003 HC RX 258

## 2025-05-18 PROCEDURE — 6370000000 HC RX 637 (ALT 250 FOR IP)

## 2025-05-18 PROCEDURE — 83550 IRON BINDING TEST: CPT

## 2025-05-18 PROCEDURE — 83970 ASSAY OF PARATHORMONE: CPT

## 2025-05-18 PROCEDURE — 82607 VITAMIN B-12: CPT

## 2025-05-18 PROCEDURE — 6360000002 HC RX W HCPCS

## 2025-05-18 PROCEDURE — 84100 ASSAY OF PHOSPHORUS: CPT

## 2025-05-18 PROCEDURE — 6360000002 HC RX W HCPCS: Performed by: FAMILY MEDICINE

## 2025-05-18 RX ORDER — ENOXAPARIN SODIUM 100 MG/ML
40 INJECTION SUBCUTANEOUS DAILY
Status: DISCONTINUED | OUTPATIENT
Start: 2025-05-19 | End: 2025-06-10

## 2025-05-18 RX ORDER — LINEZOLID 2 MG/ML
600 INJECTION, SOLUTION INTRAVENOUS EVERY 12 HOURS
Status: DISCONTINUED | OUTPATIENT
Start: 2025-05-18 | End: 2025-05-21

## 2025-05-18 RX ORDER — ACETAMINOPHEN 325 MG/1
650 TABLET ORAL EVERY 4 HOURS PRN
Status: DISCONTINUED | OUTPATIENT
Start: 2025-05-18 | End: 2025-06-11 | Stop reason: HOSPADM

## 2025-05-18 RX ORDER — OXYCODONE HYDROCHLORIDE 5 MG/1
5 TABLET ORAL EVERY 4 HOURS PRN
Refills: 0 | Status: DISCONTINUED | OUTPATIENT
Start: 2025-05-18 | End: 2025-05-30

## 2025-05-18 RX ADMIN — HYDROMORPHONE HYDROCHLORIDE 0.5 MG: 1 INJECTION, SOLUTION INTRAMUSCULAR; INTRAVENOUS; SUBCUTANEOUS at 20:53

## 2025-05-18 RX ADMIN — SODIUM CHLORIDE, PRESERVATIVE FREE 40 MG: 5 INJECTION INTRAVENOUS at 11:08

## 2025-05-18 RX ADMIN — LOSARTAN POTASSIUM 25 MG: 50 TABLET, FILM COATED ORAL at 10:58

## 2025-05-18 RX ADMIN — HYDROMORPHONE HYDROCHLORIDE 0.5 MG: 1 INJECTION, SOLUTION INTRAMUSCULAR; INTRAVENOUS; SUBCUTANEOUS at 06:16

## 2025-05-18 RX ADMIN — METOPROLOL TARTRATE 25 MG: 25 TABLET, FILM COATED ORAL at 10:58

## 2025-05-18 RX ADMIN — HYDROMORPHONE HYDROCHLORIDE 0.5 MG: 1 INJECTION, SOLUTION INTRAMUSCULAR; INTRAVENOUS; SUBCUTANEOUS at 11:04

## 2025-05-18 RX ADMIN — LINEZOLID 600 MG: 2 INJECTION, SOLUTION INTRAVENOUS at 21:58

## 2025-05-18 RX ADMIN — SODIUM CHLORIDE: 0.9 INJECTION, SOLUTION INTRAVENOUS at 21:56

## 2025-05-18 RX ADMIN — HYDROMORPHONE HYDROCHLORIDE 0.5 MG: 1 INJECTION, SOLUTION INTRAMUSCULAR; INTRAVENOUS; SUBCUTANEOUS at 02:37

## 2025-05-18 RX ADMIN — Medication 10 ML: at 10:59

## 2025-05-18 RX ADMIN — Medication 10 ML: at 20:45

## 2025-05-18 ASSESSMENT — PAIN SCALES - GENERAL
PAINLEVEL_OUTOF10: 0
PAINLEVEL_OUTOF10: 6
PAINLEVEL_OUTOF10: 10
PAINLEVEL_OUTOF10: 3
PAINLEVEL_OUTOF10: 0
PAINLEVEL_OUTOF10: 0

## 2025-05-18 ASSESSMENT — PAIN DESCRIPTION - LOCATION
LOCATION: THROAT
LOCATION: GENERALIZED

## 2025-05-18 ASSESSMENT — PAIN DESCRIPTION - DESCRIPTORS: DESCRIPTORS: SORE

## 2025-05-18 ASSESSMENT — PAIN DESCRIPTION - ORIENTATION: ORIENTATION: ANTERIOR

## 2025-05-18 ASSESSMENT — PAIN DESCRIPTION - PAIN TYPE: TYPE: ACUTE PAIN

## 2025-05-19 ENCOUNTER — HOSPITAL ENCOUNTER (OUTPATIENT)
Facility: HOSPITAL | Age: 75
Discharge: HOME | End: 2025-05-22

## 2025-05-19 ENCOUNTER — APPOINTMENT (OUTPATIENT)
Facility: HOSPITAL | Age: 75
DRG: 011 | End: 2025-05-19
Payer: MEDICARE

## 2025-05-19 DIAGNOSIS — C32.9 LARYNGEAL CANCER (HCC): Primary | ICD-10-CM

## 2025-05-19 PROBLEM — Z93.0 TRACHEOSTOMY IN PLACE (HCC): Status: ACTIVE | Noted: 2025-05-19

## 2025-05-19 LAB
ANION GAP SERPL CALC-SCNC: 4 MMOL/L (ref 2–12)
BACTERIA SPEC CULT: NORMAL
BACTERIA SPEC CULT: NORMAL
BASOPHILS # BLD: 0.02 K/UL (ref 0–0.1)
BASOPHILS NFR BLD: 0.1 % (ref 0–1)
BUN SERPL-MCNC: 16 MG/DL (ref 6–20)
BUN/CREAT SERPL: 24 (ref 12–20)
CALCIUM SERPL-MCNC: 8.4 MG/DL (ref 8.5–10.1)
CHLORIDE SERPL-SCNC: 115 MMOL/L (ref 97–108)
CO2 SERPL-SCNC: 27 MMOL/L (ref 21–32)
CREAT SERPL-MCNC: 0.68 MG/DL (ref 0.7–1.3)
DIFFERENTIAL METHOD BLD: ABNORMAL
EOSINOPHIL # BLD: 0.01 K/UL (ref 0–0.4)
EOSINOPHIL NFR BLD: 0.1 % (ref 0–7)
ERYTHROCYTE [DISTWIDTH] IN BLOOD BY AUTOMATED COUNT: 16.5 % (ref 11.5–14.5)
GLUCOSE SERPL-MCNC: 86 MG/DL (ref 65–100)
HCT VFR BLD AUTO: 29.3 % (ref 36.6–50.3)
HGB BLD-MCNC: 9.2 G/DL (ref 12.1–17)
IMM GRANULOCYTES # BLD AUTO: 0.11 K/UL (ref 0–0.04)
IMM GRANULOCYTES NFR BLD AUTO: 0.7 % (ref 0–0.5)
LYMPHOCYTES # BLD: 1.48 K/UL (ref 0.8–3.5)
LYMPHOCYTES NFR BLD: 9.5 % (ref 12–49)
MAGNESIUM SERPL-MCNC: 1.4 MG/DL (ref 1.6–2.4)
MCH RBC QN AUTO: 28.9 PG (ref 26–34)
MCHC RBC AUTO-ENTMCNC: 31.4 G/DL (ref 30–36.5)
MCV RBC AUTO: 92.1 FL (ref 80–99)
MONOCYTES # BLD: 1.28 K/UL (ref 0–1)
MONOCYTES NFR BLD: 8.2 % (ref 5–13)
NEUTS SEG # BLD: 12.62 K/UL (ref 1.8–8)
NEUTS SEG NFR BLD: 81.4 % (ref 32–75)
NRBC # BLD: 0 K/UL (ref 0–0.01)
NRBC BLD-RTO: 0 PER 100 WBC
PHOSPHATE SERPL-MCNC: 3.3 MG/DL (ref 2.6–4.7)
PLATELET # BLD AUTO: 446 K/UL (ref 150–400)
PMV BLD AUTO: 9.6 FL (ref 8.9–12.9)
POTASSIUM SERPL-SCNC: 3.2 MMOL/L (ref 3.5–5.1)
RBC # BLD AUTO: 3.18 M/UL (ref 4.1–5.7)
SERVICE CMNT-IMP: NORMAL
SODIUM SERPL-SCNC: 146 MMOL/L (ref 136–145)
WBC # BLD AUTO: 15.5 K/UL (ref 4.1–11.1)

## 2025-05-19 PROCEDURE — 2060000000 HC ICU INTERMEDIATE R&B

## 2025-05-19 PROCEDURE — 74230 X-RAY XM SWLNG FUNCJ C+: CPT

## 2025-05-19 PROCEDURE — 6370000000 HC RX 637 (ALT 250 FOR IP)

## 2025-05-19 PROCEDURE — 92507 TX SP LANG VOICE COMM INDIV: CPT

## 2025-05-19 PROCEDURE — 2580000003 HC RX 258: Performed by: FAMILY MEDICINE

## 2025-05-19 PROCEDURE — 83735 ASSAY OF MAGNESIUM: CPT

## 2025-05-19 PROCEDURE — 2500000003 HC RX 250 WO HCPCS: Performed by: INTERNAL MEDICINE

## 2025-05-19 PROCEDURE — 85025 COMPLETE CBC W/AUTO DIFF WBC: CPT

## 2025-05-19 PROCEDURE — 99222 1ST HOSP IP/OBS MODERATE 55: CPT | Performed by: INTERNAL MEDICINE

## 2025-05-19 PROCEDURE — 6360000002 HC RX W HCPCS: Performed by: NURSE PRACTITIONER

## 2025-05-19 PROCEDURE — 80048 BASIC METABOLIC PNL TOTAL CA: CPT

## 2025-05-19 PROCEDURE — 2500000003 HC RX 250 WO HCPCS: Performed by: HOSPITALIST

## 2025-05-19 PROCEDURE — 84100 ASSAY OF PHOSPHORUS: CPT

## 2025-05-19 PROCEDURE — 6360000002 HC RX W HCPCS

## 2025-05-19 PROCEDURE — 2580000003 HC RX 258

## 2025-05-19 PROCEDURE — 94761 N-INVAS EAR/PLS OXIMETRY MLT: CPT

## 2025-05-19 PROCEDURE — 2500000003 HC RX 250 WO HCPCS

## 2025-05-19 PROCEDURE — 92611 MOTION FLUOROSCOPY/SWALLOW: CPT

## 2025-05-19 PROCEDURE — 6360000002 HC RX W HCPCS: Performed by: FAMILY MEDICINE

## 2025-05-19 PROCEDURE — 2700000000 HC OXYGEN THERAPY PER DAY

## 2025-05-19 PROCEDURE — 94760 N-INVAS EAR/PLS OXIMETRY 1: CPT

## 2025-05-19 RX ORDER — POTASSIUM CHLORIDE 7.45 MG/ML
10 INJECTION INTRAVENOUS
Status: DISCONTINUED | OUTPATIENT
Start: 2025-05-19 | End: 2025-05-19

## 2025-05-19 RX ORDER — METOPROLOL TARTRATE 1 MG/ML
5 INJECTION, SOLUTION INTRAVENOUS EVERY 6 HOURS
Status: DISCONTINUED | OUTPATIENT
Start: 2025-05-19 | End: 2025-05-24

## 2025-05-19 RX ORDER — MAGNESIUM SULFATE IN WATER 40 MG/ML
2000 INJECTION, SOLUTION INTRAVENOUS ONCE
Status: COMPLETED | OUTPATIENT
Start: 2025-05-19 | End: 2025-05-19

## 2025-05-19 RX ORDER — POTASSIUM CHLORIDE 7.45 MG/ML
10 INJECTION INTRAVENOUS
Status: COMPLETED | OUTPATIENT
Start: 2025-05-19 | End: 2025-05-19

## 2025-05-19 RX ADMIN — ENOXAPARIN SODIUM 40 MG: 100 INJECTION SUBCUTANEOUS at 09:08

## 2025-05-19 RX ADMIN — Medication 10 ML: at 21:54

## 2025-05-19 RX ADMIN — PIPERACILLIN AND TAZOBACTAM 4500 MG: 4; .5 INJECTION, POWDER, LYOPHILIZED, FOR SOLUTION INTRAVENOUS at 01:27

## 2025-05-19 RX ADMIN — Medication 10 ML: at 09:06

## 2025-05-19 RX ADMIN — METOPROLOL TARTRATE 5 MG: 5 INJECTION INTRAVENOUS at 15:14

## 2025-05-19 RX ADMIN — HYDROMORPHONE HYDROCHLORIDE 0.5 MG: 1 INJECTION, SOLUTION INTRAMUSCULAR; INTRAVENOUS; SUBCUTANEOUS at 06:44

## 2025-05-19 RX ADMIN — PIPERACILLIN AND TAZOBACTAM 3375 MG: 3; .375 INJECTION, POWDER, LYOPHILIZED, FOR SOLUTION INTRAVENOUS at 12:49

## 2025-05-19 RX ADMIN — HYDROMORPHONE HYDROCHLORIDE 0.5 MG: 1 INJECTION, SOLUTION INTRAMUSCULAR; INTRAVENOUS; SUBCUTANEOUS at 11:02

## 2025-05-19 RX ADMIN — LINEZOLID 600 MG: 2 INJECTION, SOLUTION INTRAVENOUS at 23:45

## 2025-05-19 RX ADMIN — MAGNESIUM SULFATE HEPTAHYDRATE 2000 MG: 40 INJECTION, SOLUTION INTRAVENOUS at 08:09

## 2025-05-19 RX ADMIN — METOPROLOL TARTRATE 5 MG: 5 INJECTION INTRAVENOUS at 21:53

## 2025-05-19 RX ADMIN — HYDROMORPHONE HYDROCHLORIDE 0.5 MG: 1 INJECTION, SOLUTION INTRAMUSCULAR; INTRAVENOUS; SUBCUTANEOUS at 15:13

## 2025-05-19 RX ADMIN — HYDROMORPHONE HYDROCHLORIDE 0.5 MG: 1 INJECTION, SOLUTION INTRAMUSCULAR; INTRAVENOUS; SUBCUTANEOUS at 22:30

## 2025-05-19 RX ADMIN — SODIUM CHLORIDE, PRESERVATIVE FREE 40 MG: 5 INJECTION INTRAVENOUS at 09:05

## 2025-05-19 RX ADMIN — LOSARTAN POTASSIUM 25 MG: 50 TABLET, FILM COATED ORAL at 09:08

## 2025-05-19 RX ADMIN — HYDROMORPHONE HYDROCHLORIDE 0.5 MG: 1 INJECTION, SOLUTION INTRAMUSCULAR; INTRAVENOUS; SUBCUTANEOUS at 01:33

## 2025-05-19 RX ADMIN — PIPERACILLIN AND TAZOBACTAM 3375 MG: 3; .375 INJECTION, POWDER, LYOPHILIZED, FOR SOLUTION INTRAVENOUS at 03:47

## 2025-05-19 RX ADMIN — POTASSIUM CHLORIDE 10 MEQ: 7.46 INJECTION, SOLUTION INTRAVENOUS at 09:03

## 2025-05-19 RX ADMIN — PIPERACILLIN AND TAZOBACTAM 3375 MG: 3; .375 INJECTION, POWDER, LYOPHILIZED, FOR SOLUTION INTRAVENOUS at 19:06

## 2025-05-19 RX ADMIN — METOPROLOL TARTRATE 25 MG: 25 TABLET, FILM COATED ORAL at 09:08

## 2025-05-19 RX ADMIN — LINEZOLID 600 MG: 2 INJECTION, SOLUTION INTRAVENOUS at 10:20

## 2025-05-19 RX ADMIN — POTASSIUM CHLORIDE 10 MEQ: 7.46 INJECTION, SOLUTION INTRAVENOUS at 08:04

## 2025-05-19 RX ADMIN — POTASSIUM CHLORIDE 10 MEQ: 7.46 INJECTION, SOLUTION INTRAVENOUS at 10:16

## 2025-05-19 ASSESSMENT — PAIN SCALES - GENERAL
PAINLEVEL_OUTOF10: 10
PAINLEVEL_OUTOF10: 8
PAINLEVEL_OUTOF10: 7
PAINLEVEL_OUTOF10: 3
PAINLEVEL_OUTOF10: 0
PAINLEVEL_OUTOF10: 10
PAINLEVEL_OUTOF10: 5
PAINLEVEL_OUTOF10: 4
PAINLEVEL_OUTOF10: 8

## 2025-05-19 ASSESSMENT — PAIN DESCRIPTION - LOCATION
LOCATION: NECK
LOCATION: NECK;CHEST
LOCATION: NECK
LOCATION: CHEST;NECK

## 2025-05-19 ASSESSMENT — PAIN DESCRIPTION - ORIENTATION
ORIENTATION: MID

## 2025-05-19 ASSESSMENT — PAIN DESCRIPTION - DESCRIPTORS
DESCRIPTORS: ACHING
DESCRIPTORS: DISCOMFORT

## 2025-05-19 NOTE — ANESTHESIA POSTPROCEDURE EVALUATION
Department of Anesthesiology  Postprocedure Note    Patient: Roge Lobato  MRN: [id number]  YOB: 1950  Date of evaluation: 5/19/2025    Procedure Summary       Date: 05/17/25 Room / Location: [facility] MAIN OR  / [facility] MAIN OR    Anesthesia Start: 1337 Anesthesia Stop: 1505    Procedures:       TRACHEOTOMY (Neck)      DIREACT LARYNGOSCOPY (Neck) Diagnosis:       Laryngeal mass      (Laryngeal mass [J38.7])    Providers: Deepak Plascencia DO Responsible Provider: Sanford Chand MD    Anesthesia Type: general ASA Status: 4 - Emergent            Anesthesia Type: No value filed.    Alton Phase I: Alton Score: 9    Alton Phase II:      Anesthesia Post Evaluation    Patient location during evaluation: PACU  Patient participation: complete - patient participated  Level of consciousness: responsive to verbal stimuli and sleepy but conscious  Pain score: 2  Airway patency: patent  Cardiovascular status: blood pressure returned to baseline  Respiratory status: acceptable  Hydration status: stable  Comments: +Post-Anesthesia Evaluation and Assessment    Patient: Roge Lobato MRN: [id number]  SSN: [id number]   YOB: 1950  Age: 74 y.o.  Sex: male          Cardiovascular Function/Vital Signs    BP (!) 121/58   Pulse 83   Temp 98.6 °F (37 °C) (Oral)   Resp 16   Ht 1.702 m (5' 7\")   Wt 59 kg (130 lb 1.1 oz)   SpO2 100%   BMI 20.37 kg/m²     Patient is status post Procedure(s) with comments:  TRACHEOTOMY - Tracheostomy  DIREACT LARYNGOSCOPY.    Nausea/Vomiting: Controlled.    Postoperative hydration reviewed and adequate.    Pain:      Managed.    Neurological Status:       At baseline.    Mental Status and Level of Consciousness: Arousable.    Pulmonary Status:       Adequate oxygenation and airway patent.    Complications related to anesthesia: None    Post-anesthesia assessment completed. No concerns.    I have evaluated the patient and the patient is stable and ready to be discharged from

## 2025-05-19 NOTE — CONSULTS
[facility]  [facility]    Radiation Oncology Inpatient Consultation    Roge Lobato  [id number]  1950     Diagnosis   1.   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 Laryngeal cancer (HCC)  C32.9         AJCC Staging has been reviewed.  History of Present Illness   Mr. Lobato is a 74 y.o. male seen in consultation at the request of Dr. Hobbs to assess the role of radiation for his above diagnosis.    I was asked to see Mr. Lobato for his supraglottic laryngeal cancer.    2/9/25 CT:  2.6 x 2.4 cm right piriform sinus/laryngeal mass concerning for malignancy.  Direct visualization is recommended    Now he presented to [facility] ED for enlarging R neck mass and SOB. It sounds like he was hospitalized then discharged to SNF and was unable to see ENT. He has been having trouble swallowing pills in pudding and then developed SOB as well as CP with coughing x 2 days as well as some weight loss but then by report recently gained some weight back.    He was seen in the [facility] ED and ENT was consulted and request transfer to [facility] for emergent evaluation and possible surgery.    CT 5/16/25:  FINDINGS:  Paranasal sinuses: Partially imaged, grossly clear.  NASOPHARYNX: Normal.  SUPRAHYOID NECK: Normal tonsils and epiglottis. Laryngeal mass extends into the  right supraglottic soft tissues.  INFRAHYOID NECK: Heterogeneously enhancing mass centered in the right  aryepiglottic fold previously measured up to 2.6 cm and now measures 5.1 x 3.7 x  4.9 cm. There is involvement of the right thyroid cartilage. Extension to near  the base of the epiglottis. No involvement of the hyoid bone. Close  approximation to the right vocal cord. Extension to the left of midline..  Narrowed airway.  PAROTID GLANDS:

## 2025-05-20 ENCOUNTER — APPOINTMENT (OUTPATIENT)
Facility: HOSPITAL | Age: 75
DRG: 011 | End: 2025-05-20
Payer: MEDICARE

## 2025-05-20 PROBLEM — Z51.5 PALLIATIVE CARE ENCOUNTER: Status: ACTIVE | Noted: 2025-05-20

## 2025-05-20 PROBLEM — M54.2 NECK PAIN: Status: ACTIVE | Noted: 2025-05-20

## 2025-05-20 PROBLEM — R53.81 DEBILITY: Status: ACTIVE | Noted: 2025-05-20

## 2025-05-20 LAB
ANION GAP SERPL CALC-SCNC: 10 MMOL/L (ref 2–12)
BACTERIA SPEC CULT: ABNORMAL
BACTERIA SPEC CULT: ABNORMAL
BASOPHILS # BLD: 0.04 K/UL (ref 0–0.1)
BASOPHILS NFR BLD: 0.4 % (ref 0–1)
BUN SERPL-MCNC: 13 MG/DL (ref 6–20)
BUN/CREAT SERPL: 15 (ref 12–20)
CALCIUM SERPL-MCNC: 9.8 MG/DL (ref 8.5–10.1)
CHLORIDE SERPL-SCNC: 110 MMOL/L (ref 97–108)
CO2 SERPL-SCNC: 23 MMOL/L (ref 21–32)
CREAT SERPL-MCNC: 0.84 MG/DL (ref 0.7–1.3)
DIFFERENTIAL METHOD BLD: ABNORMAL
EOSINOPHIL # BLD: 0.22 K/UL (ref 0–0.4)
EOSINOPHIL NFR BLD: 2 % (ref 0–7)
ERYTHROCYTE [DISTWIDTH] IN BLOOD BY AUTOMATED COUNT: 16.2 % (ref 11.5–14.5)
GLUCOSE BLD STRIP.AUTO-MCNC: 75 MG/DL (ref 65–117)
GLUCOSE BLD STRIP.AUTO-MCNC: 82 MG/DL (ref 65–117)
GLUCOSE SERPL-MCNC: 69 MG/DL (ref 65–100)
GRAM STN SPEC: ABNORMAL
HCT VFR BLD AUTO: 27.8 % (ref 36.6–50.3)
HGB BLD-MCNC: 8.6 G/DL (ref 12.1–17)
IMM GRANULOCYTES # BLD AUTO: 0.07 K/UL (ref 0–0.04)
IMM GRANULOCYTES NFR BLD AUTO: 0.6 % (ref 0–0.5)
LYMPHOCYTES # BLD: 1.56 K/UL (ref 0.8–3.5)
LYMPHOCYTES NFR BLD: 14.2 % (ref 12–49)
MAGNESIUM SERPL-MCNC: 2 MG/DL (ref 1.6–2.4)
MCH RBC QN AUTO: 29.1 PG (ref 26–34)
MCHC RBC AUTO-ENTMCNC: 30.9 G/DL (ref 30–36.5)
MCV RBC AUTO: 93.9 FL (ref 80–99)
MONOCYTES # BLD: 1.14 K/UL (ref 0–1)
MONOCYTES NFR BLD: 10.3 % (ref 5–13)
NEUTS SEG # BLD: 7.99 K/UL (ref 1.8–8)
NEUTS SEG NFR BLD: 72.5 % (ref 32–75)
NRBC # BLD: 0 K/UL (ref 0–0.01)
NRBC BLD-RTO: 0 PER 100 WBC
PHOSPHATE SERPL-MCNC: 2.9 MG/DL (ref 2.6–4.7)
PLATELET # BLD AUTO: 374 K/UL (ref 150–400)
PMV BLD AUTO: 9.9 FL (ref 8.9–12.9)
POTASSIUM SERPL-SCNC: 3.3 MMOL/L (ref 3.5–5.1)
RBC # BLD AUTO: 2.96 M/UL (ref 4.1–5.7)
SERVICE CMNT-IMP: ABNORMAL
SERVICE CMNT-IMP: NORMAL
SERVICE CMNT-IMP: NORMAL
SODIUM SERPL-SCNC: 143 MMOL/L (ref 136–145)
WBC # BLD AUTO: 11 K/UL (ref 4.1–11.1)

## 2025-05-20 PROCEDURE — 2580000003 HC RX 258

## 2025-05-20 PROCEDURE — 2500000003 HC RX 250 WO HCPCS

## 2025-05-20 PROCEDURE — 2060000000 HC ICU INTERMEDIATE R&B

## 2025-05-20 PROCEDURE — 80048 BASIC METABOLIC PNL TOTAL CA: CPT

## 2025-05-20 PROCEDURE — 6360000002 HC RX W HCPCS

## 2025-05-20 PROCEDURE — 2500000003 HC RX 250 WO HCPCS: Performed by: HOSPITALIST

## 2025-05-20 PROCEDURE — 97161 PT EVAL LOW COMPLEX 20 MIN: CPT

## 2025-05-20 PROCEDURE — 82962 GLUCOSE BLOOD TEST: CPT

## 2025-05-20 PROCEDURE — 97530 THERAPEUTIC ACTIVITIES: CPT

## 2025-05-20 PROCEDURE — 92526 ORAL FUNCTION THERAPY: CPT

## 2025-05-20 PROCEDURE — 92507 TX SP LANG VOICE COMM INDIV: CPT

## 2025-05-20 PROCEDURE — 97165 OT EVAL LOW COMPLEX 30 MIN: CPT

## 2025-05-20 PROCEDURE — 2500000003 HC RX 250 WO HCPCS: Performed by: INTERNAL MEDICINE

## 2025-05-20 PROCEDURE — 84100 ASSAY OF PHOSPHORUS: CPT

## 2025-05-20 PROCEDURE — 94760 N-INVAS EAR/PLS OXIMETRY 1: CPT

## 2025-05-20 PROCEDURE — 85025 COMPLETE CBC W/AUTO DIFF WBC: CPT

## 2025-05-20 PROCEDURE — 6360000002 HC RX W HCPCS: Performed by: FAMILY MEDICINE

## 2025-05-20 PROCEDURE — 2700000000 HC OXYGEN THERAPY PER DAY

## 2025-05-20 PROCEDURE — 99223 1ST HOSP IP/OBS HIGH 75: CPT | Performed by: PHYSICAL MEDICINE & REHABILITATION

## 2025-05-20 PROCEDURE — 83735 ASSAY OF MAGNESIUM: CPT

## 2025-05-20 PROCEDURE — 2580000003 HC RX 258: Performed by: FAMILY MEDICINE

## 2025-05-20 RX ORDER — DEXTROSE MONOHYDRATE, SODIUM CHLORIDE, AND POTASSIUM CHLORIDE 50; 2.98; 4.5 G/1000ML; G/1000ML; G/1000ML
INJECTION, SOLUTION INTRAVENOUS CONTINUOUS
Status: DISCONTINUED | OUTPATIENT
Start: 2025-05-20 | End: 2025-05-25

## 2025-05-20 RX ORDER — IOPAMIDOL 755 MG/ML
100 INJECTION, SOLUTION INTRAVASCULAR
Status: DISCONTINUED | OUTPATIENT
Start: 2025-05-20 | End: 2025-05-22

## 2025-05-20 RX ORDER — ALPRAZOLAM 0.5 MG
0.5 TABLET ORAL
Status: DISCONTINUED | OUTPATIENT
Start: 2025-05-20 | End: 2025-05-30

## 2025-05-20 RX ADMIN — METOPROLOL TARTRATE 5 MG: 5 INJECTION INTRAVENOUS at 03:47

## 2025-05-20 RX ADMIN — PIPERACILLIN AND TAZOBACTAM 3375 MG: 3; .375 INJECTION, POWDER, LYOPHILIZED, FOR SOLUTION INTRAVENOUS at 12:10

## 2025-05-20 RX ADMIN — HYDROMORPHONE HYDROCHLORIDE 0.5 MG: 1 INJECTION, SOLUTION INTRAMUSCULAR; INTRAVENOUS; SUBCUTANEOUS at 09:25

## 2025-05-20 RX ADMIN — HYDROMORPHONE HYDROCHLORIDE 0.5 MG: 1 INJECTION, SOLUTION INTRAMUSCULAR; INTRAVENOUS; SUBCUTANEOUS at 03:46

## 2025-05-20 RX ADMIN — SODIUM CHLORIDE, PRESERVATIVE FREE 40 MG: 5 INJECTION INTRAVENOUS at 09:13

## 2025-05-20 RX ADMIN — HYDROMORPHONE HYDROCHLORIDE 0.5 MG: 1 INJECTION, SOLUTION INTRAMUSCULAR; INTRAVENOUS; SUBCUTANEOUS at 15:00

## 2025-05-20 RX ADMIN — PIPERACILLIN AND TAZOBACTAM 3375 MG: 3; .375 INJECTION, POWDER, LYOPHILIZED, FOR SOLUTION INTRAVENOUS at 19:07

## 2025-05-20 RX ADMIN — Medication 10 ML: at 09:13

## 2025-05-20 RX ADMIN — METOPROLOL TARTRATE 5 MG: 5 INJECTION INTRAVENOUS at 09:12

## 2025-05-20 RX ADMIN — DEXTROSE MONOHYDRATE, SODIUM CHLORIDE, AND POTASSIUM CHLORIDE: 50; 4.5; 2.98 INJECTION, SOLUTION INTRAVENOUS at 21:15

## 2025-05-20 RX ADMIN — HYDROMORPHONE HYDROCHLORIDE 0.5 MG: 1 INJECTION, SOLUTION INTRAMUSCULAR; INTRAVENOUS; SUBCUTANEOUS at 20:47

## 2025-05-20 RX ADMIN — METOPROLOL TARTRATE 5 MG: 5 INJECTION INTRAVENOUS at 20:47

## 2025-05-20 RX ADMIN — METOPROLOL TARTRATE 5 MG: 5 INJECTION INTRAVENOUS at 15:00

## 2025-05-20 RX ADMIN — LINEZOLID 600 MG: 2 INJECTION, SOLUTION INTRAVENOUS at 09:12

## 2025-05-20 RX ADMIN — PIPERACILLIN AND TAZOBACTAM 3375 MG: 3; .375 INJECTION, POWDER, LYOPHILIZED, FOR SOLUTION INTRAVENOUS at 03:52

## 2025-05-20 ASSESSMENT — PAIN SCALES - GENERAL
PAINLEVEL_OUTOF10: 5
PAINLEVEL_OUTOF10: 4
PAINLEVEL_OUTOF10: 8
PAINLEVEL_OUTOF10: 7
PAINLEVEL_OUTOF10: 7
PAINLEVEL_OUTOF10: 5
PAINLEVEL_OUTOF10: 7

## 2025-05-20 ASSESSMENT — PAIN DESCRIPTION - LOCATION
LOCATION: NECK
LOCATION: NECK

## 2025-05-20 ASSESSMENT — PAIN DESCRIPTION - DESCRIPTORS
DESCRIPTORS: DISCOMFORT
DESCRIPTORS: DISCOMFORT

## 2025-05-20 ASSESSMENT — PAIN DESCRIPTION - ORIENTATION
ORIENTATION: MID
ORIENTATION: MID

## 2025-05-21 LAB
ANION GAP SERPL CALC-SCNC: 9 MMOL/L (ref 2–12)
BASOPHILS # BLD: 0.04 K/UL (ref 0–0.1)
BASOPHILS NFR BLD: 0.4 % (ref 0–1)
BUN SERPL-MCNC: 10 MG/DL (ref 6–20)
BUN/CREAT SERPL: 13 (ref 12–20)
CALCIUM SERPL-MCNC: 9.8 MG/DL (ref 8.5–10.1)
CHLORIDE SERPL-SCNC: 112 MMOL/L (ref 97–108)
CO2 SERPL-SCNC: 22 MMOL/L (ref 21–32)
CREAT SERPL-MCNC: 0.8 MG/DL (ref 0.7–1.3)
DIFFERENTIAL METHOD BLD: ABNORMAL
EOSINOPHIL # BLD: 0.53 K/UL (ref 0–0.4)
EOSINOPHIL NFR BLD: 5.6 % (ref 0–7)
ERYTHROCYTE [DISTWIDTH] IN BLOOD BY AUTOMATED COUNT: 16.1 % (ref 11.5–14.5)
GLUCOSE BLD STRIP.AUTO-MCNC: 103 MG/DL (ref 65–117)
GLUCOSE BLD STRIP.AUTO-MCNC: 62 MG/DL (ref 65–117)
GLUCOSE BLD STRIP.AUTO-MCNC: 70 MG/DL (ref 65–117)
GLUCOSE BLD STRIP.AUTO-MCNC: 79 MG/DL (ref 65–117)
GLUCOSE BLD STRIP.AUTO-MCNC: 81 MG/DL (ref 65–117)
GLUCOSE BLD STRIP.AUTO-MCNC: 83 MG/DL (ref 65–117)
GLUCOSE SERPL-MCNC: 84 MG/DL (ref 65–100)
HCT VFR BLD AUTO: 28.9 % (ref 36.6–50.3)
HGB BLD-MCNC: 8.9 G/DL (ref 12.1–17)
IMM GRANULOCYTES # BLD AUTO: 0.04 K/UL (ref 0–0.04)
IMM GRANULOCYTES NFR BLD AUTO: 0.4 % (ref 0–0.5)
LYMPHOCYTES # BLD: 1.65 K/UL (ref 0.8–3.5)
LYMPHOCYTES NFR BLD: 17.4 % (ref 12–49)
MCH RBC QN AUTO: 29.1 PG (ref 26–34)
MCHC RBC AUTO-ENTMCNC: 30.8 G/DL (ref 30–36.5)
MCV RBC AUTO: 94.4 FL (ref 80–99)
MONOCYTES # BLD: 1.17 K/UL (ref 0–1)
MONOCYTES NFR BLD: 12.3 % (ref 5–13)
NEUTS SEG # BLD: 6.07 K/UL (ref 1.8–8)
NEUTS SEG NFR BLD: 63.9 % (ref 32–75)
NRBC # BLD: 0 K/UL (ref 0–0.01)
NRBC BLD-RTO: 0 PER 100 WBC
PLATELET # BLD AUTO: 323 K/UL (ref 150–400)
PMV BLD AUTO: 9.8 FL (ref 8.9–12.9)
POTASSIUM SERPL-SCNC: 3.9 MMOL/L (ref 3.5–5.1)
RBC # BLD AUTO: 3.06 M/UL (ref 4.1–5.7)
SERVICE CMNT-IMP: ABNORMAL
SERVICE CMNT-IMP: NORMAL
SODIUM SERPL-SCNC: 143 MMOL/L (ref 136–145)
WBC # BLD AUTO: 9.5 K/UL (ref 4.1–11.1)

## 2025-05-21 PROCEDURE — 97530 THERAPEUTIC ACTIVITIES: CPT

## 2025-05-21 PROCEDURE — 2500000003 HC RX 250 WO HCPCS

## 2025-05-21 PROCEDURE — 2060000000 HC ICU INTERMEDIATE R&B

## 2025-05-21 PROCEDURE — 82962 GLUCOSE BLOOD TEST: CPT

## 2025-05-21 PROCEDURE — 2500000003 HC RX 250 WO HCPCS: Performed by: HOSPITALIST

## 2025-05-21 PROCEDURE — 2580000003 HC RX 258: Performed by: FAMILY MEDICINE

## 2025-05-21 PROCEDURE — 6360000002 HC RX W HCPCS

## 2025-05-21 PROCEDURE — 6360000002 HC RX W HCPCS: Performed by: FAMILY MEDICINE

## 2025-05-21 PROCEDURE — 2580000003 HC RX 258

## 2025-05-21 PROCEDURE — 2580000003 HC RX 258: Performed by: NURSE PRACTITIONER

## 2025-05-21 PROCEDURE — 80048 BASIC METABOLIC PNL TOTAL CA: CPT

## 2025-05-21 PROCEDURE — 97535 SELF CARE MNGMENT TRAINING: CPT

## 2025-05-21 PROCEDURE — 85025 COMPLETE CBC W/AUTO DIFF WBC: CPT

## 2025-05-21 PROCEDURE — 2500000003 HC RX 250 WO HCPCS: Performed by: INTERNAL MEDICINE

## 2025-05-21 RX ORDER — AMLODIPINE BESYLATE 5 MG/1
5 TABLET ORAL DAILY
Status: ON HOLD | COMMUNITY
End: 2025-06-01 | Stop reason: HOSPADM

## 2025-05-21 RX ORDER — DEXTROSE MONOHYDRATE 100 MG/ML
INJECTION, SOLUTION INTRAVENOUS CONTINUOUS PRN
Status: DISCONTINUED | OUTPATIENT
Start: 2025-05-21 | End: 2025-06-10 | Stop reason: SDUPTHER

## 2025-05-21 RX ADMIN — METOPROLOL TARTRATE 5 MG: 5 INJECTION INTRAVENOUS at 20:42

## 2025-05-21 RX ADMIN — DEXTROSE MONOHYDRATE, SODIUM CHLORIDE, AND POTASSIUM CHLORIDE: 50; 4.5; 2.98 INJECTION, SOLUTION INTRAVENOUS at 15:42

## 2025-05-21 RX ADMIN — HYDROMORPHONE HYDROCHLORIDE 0.5 MG: 1 INJECTION, SOLUTION INTRAMUSCULAR; INTRAVENOUS; SUBCUTANEOUS at 08:58

## 2025-05-21 RX ADMIN — HYDROMORPHONE HYDROCHLORIDE 0.5 MG: 1 INJECTION, SOLUTION INTRAMUSCULAR; INTRAVENOUS; SUBCUTANEOUS at 20:42

## 2025-05-21 RX ADMIN — PIPERACILLIN AND TAZOBACTAM 3375 MG: 3; .375 INJECTION, POWDER, LYOPHILIZED, FOR SOLUTION INTRAVENOUS at 19:06

## 2025-05-21 RX ADMIN — SODIUM CHLORIDE, PRESERVATIVE FREE 40 MG: 5 INJECTION INTRAVENOUS at 08:57

## 2025-05-21 RX ADMIN — PIPERACILLIN AND TAZOBACTAM 3375 MG: 3; .375 INJECTION, POWDER, LYOPHILIZED, FOR SOLUTION INTRAVENOUS at 04:46

## 2025-05-21 RX ADMIN — HYDROMORPHONE HYDROCHLORIDE 0.5 MG: 1 INJECTION, SOLUTION INTRAMUSCULAR; INTRAVENOUS; SUBCUTANEOUS at 16:08

## 2025-05-21 RX ADMIN — HYDROMORPHONE HYDROCHLORIDE 0.5 MG: 1 INJECTION, SOLUTION INTRAMUSCULAR; INTRAVENOUS; SUBCUTANEOUS at 00:58

## 2025-05-21 RX ADMIN — PIPERACILLIN AND TAZOBACTAM 3375 MG: 3; .375 INJECTION, POWDER, LYOPHILIZED, FOR SOLUTION INTRAVENOUS at 12:21

## 2025-05-21 RX ADMIN — ENOXAPARIN SODIUM 40 MG: 100 INJECTION SUBCUTANEOUS at 08:57

## 2025-05-21 RX ADMIN — METOPROLOL TARTRATE 5 MG: 5 INJECTION INTRAVENOUS at 04:42

## 2025-05-21 RX ADMIN — DEXTROSE MONOHYDRATE, SODIUM CHLORIDE, AND POTASSIUM CHLORIDE: 50; 4.5; 2.98 INJECTION, SOLUTION INTRAVENOUS at 09:34

## 2025-05-21 RX ADMIN — METOPROLOL TARTRATE 5 MG: 5 INJECTION INTRAVENOUS at 08:58

## 2025-05-21 RX ADMIN — DEXTROSE 125 ML: 10 SOLUTION INTRAVENOUS at 06:25

## 2025-05-21 RX ADMIN — METOPROLOL TARTRATE 5 MG: 5 INJECTION INTRAVENOUS at 15:40

## 2025-05-21 RX ADMIN — LINEZOLID 600 MG: 2 INJECTION, SOLUTION INTRAVENOUS at 00:17

## 2025-05-21 RX ADMIN — Medication 10 ML: at 08:59

## 2025-05-21 ASSESSMENT — PAIN SCALES - GENERAL
PAINLEVEL_OUTOF10: 7
PAINLEVEL_OUTOF10: 4
PAINLEVEL_OUTOF10: 7
PAINLEVEL_OUTOF10: 3

## 2025-05-21 ASSESSMENT — PAIN DESCRIPTION - ORIENTATION
ORIENTATION: RIGHT;LEFT
ORIENTATION: MID
ORIENTATION: LOWER

## 2025-05-21 ASSESSMENT — PAIN DESCRIPTION - LOCATION
LOCATION: BACK
LOCATION: BACK;LEG
LOCATION: THROAT

## 2025-05-21 ASSESSMENT — PAIN DESCRIPTION - DESCRIPTORS
DESCRIPTORS: SORE
DESCRIPTORS: SORE

## 2025-05-22 ENCOUNTER — APPOINTMENT (OUTPATIENT)
Facility: HOSPITAL | Age: 75
DRG: 011 | End: 2025-05-22
Payer: MEDICARE

## 2025-05-22 ENCOUNTER — HOSPITAL ENCOUNTER (INPATIENT)
Facility: HOSPITAL | Age: 75
DRG: 011 | End: 2025-05-22
Payer: MEDICARE

## 2025-05-22 LAB
ANION GAP SERPL CALC-SCNC: 6 MMOL/L (ref 2–12)
BUN SERPL-MCNC: 8 MG/DL (ref 6–20)
BUN/CREAT SERPL: 12 (ref 12–20)
CALCIUM SERPL-MCNC: 9.9 MG/DL (ref 8.5–10.1)
CHLORIDE SERPL-SCNC: 112 MMOL/L (ref 97–108)
CO2 SERPL-SCNC: 24 MMOL/L (ref 21–32)
CREAT SERPL-MCNC: 0.69 MG/DL (ref 0.7–1.3)
GLUCOSE BLD STRIP.AUTO-MCNC: 74 MG/DL (ref 65–117)
GLUCOSE BLD STRIP.AUTO-MCNC: 88 MG/DL (ref 65–117)
GLUCOSE BLD STRIP.AUTO-MCNC: 94 MG/DL (ref 65–117)
GLUCOSE BLD STRIP.AUTO-MCNC: 96 MG/DL (ref 65–117)
GLUCOSE BLD STRIP.AUTO-MCNC: 97 MG/DL (ref 65–117)
GLUCOSE SERPL-MCNC: 80 MG/DL (ref 65–100)
MAGNESIUM SERPL-MCNC: 1.6 MG/DL (ref 1.6–2.4)
PHOSPHATE SERPL-MCNC: 2.7 MG/DL (ref 2.6–4.7)
POTASSIUM SERPL-SCNC: 4.3 MMOL/L (ref 3.5–5.1)
SERVICE CMNT-IMP: NORMAL
SODIUM SERPL-SCNC: 142 MMOL/L (ref 136–145)

## 2025-05-22 PROCEDURE — 80048 BASIC METABOLIC PNL TOTAL CA: CPT

## 2025-05-22 PROCEDURE — 2500000003 HC RX 250 WO HCPCS: Performed by: INTERNAL MEDICINE

## 2025-05-22 PROCEDURE — 84100 ASSAY OF PHOSPHORUS: CPT

## 2025-05-22 PROCEDURE — 2500000003 HC RX 250 WO HCPCS: Performed by: HOSPITALIST

## 2025-05-22 PROCEDURE — 6360000002 HC RX W HCPCS: Performed by: FAMILY MEDICINE

## 2025-05-22 PROCEDURE — 2500000003 HC RX 250 WO HCPCS

## 2025-05-22 PROCEDURE — 94760 N-INVAS EAR/PLS OXIMETRY 1: CPT

## 2025-05-22 PROCEDURE — 83735 ASSAY OF MAGNESIUM: CPT

## 2025-05-22 PROCEDURE — 6360000002 HC RX W HCPCS: Performed by: HOSPITALIST

## 2025-05-22 PROCEDURE — 2580000003 HC RX 258: Performed by: FAMILY MEDICINE

## 2025-05-22 PROCEDURE — 92526 ORAL FUNCTION THERAPY: CPT

## 2025-05-22 PROCEDURE — 2060000000 HC ICU INTERMEDIATE R&B

## 2025-05-22 PROCEDURE — 2700000000 HC OXYGEN THERAPY PER DAY

## 2025-05-22 PROCEDURE — 71260 CT THORAX DX C+: CPT

## 2025-05-22 PROCEDURE — 6360000004 HC RX CONTRAST MEDICATION: Performed by: HOSPITALIST

## 2025-05-22 PROCEDURE — 92507 TX SP LANG VOICE COMM INDIV: CPT

## 2025-05-22 PROCEDURE — 2580000003 HC RX 258

## 2025-05-22 PROCEDURE — 6360000002 HC RX W HCPCS

## 2025-05-22 PROCEDURE — 2580000003 HC RX 258: Performed by: HOSPITALIST

## 2025-05-22 PROCEDURE — 82962 GLUCOSE BLOOD TEST: CPT

## 2025-05-22 RX ORDER — IOPAMIDOL 612 MG/ML
100 INJECTION, SOLUTION INTRAVASCULAR
Status: COMPLETED | OUTPATIENT
Start: 2025-05-22 | End: 2025-05-22

## 2025-05-22 RX ORDER — LORAZEPAM 2 MG/ML
0.5 INJECTION INTRAMUSCULAR EVERY 6 HOURS PRN
Status: DISCONTINUED | OUTPATIENT
Start: 2025-05-22 | End: 2025-05-30

## 2025-05-22 RX ADMIN — METOPROLOL TARTRATE 5 MG: 5 INJECTION INTRAVENOUS at 15:51

## 2025-05-22 RX ADMIN — DEXTROSE MONOHYDRATE, SODIUM CHLORIDE, AND POTASSIUM CHLORIDE: 50; 4.5; 2.98 INJECTION, SOLUTION INTRAVENOUS at 18:00

## 2025-05-22 RX ADMIN — DEXTROSE MONOHYDRATE, SODIUM CHLORIDE, AND POTASSIUM CHLORIDE: 50; 4.5; 2.98 INJECTION, SOLUTION INTRAVENOUS at 03:28

## 2025-05-22 RX ADMIN — SODIUM CHLORIDE, PRESERVATIVE FREE 40 MG: 5 INJECTION INTRAVENOUS at 08:05

## 2025-05-22 RX ADMIN — LORAZEPAM 0.5 MG: 2 INJECTION INTRAMUSCULAR; INTRAVENOUS at 14:32

## 2025-05-22 RX ADMIN — HYDROMORPHONE HYDROCHLORIDE 0.5 MG: 1 INJECTION, SOLUTION INTRAMUSCULAR; INTRAVENOUS; SUBCUTANEOUS at 08:05

## 2025-05-22 RX ADMIN — METOPROLOL TARTRATE 5 MG: 5 INJECTION INTRAVENOUS at 08:06

## 2025-05-22 RX ADMIN — HYDROMORPHONE HYDROCHLORIDE 0.5 MG: 1 INJECTION, SOLUTION INTRAMUSCULAR; INTRAVENOUS; SUBCUTANEOUS at 01:52

## 2025-05-22 RX ADMIN — METOPROLOL TARTRATE 5 MG: 5 INJECTION INTRAVENOUS at 01:52

## 2025-05-22 RX ADMIN — PIPERACILLIN AND TAZOBACTAM 3375 MG: 3; .375 INJECTION, POWDER, LYOPHILIZED, FOR SOLUTION INTRAVENOUS at 11:08

## 2025-05-22 RX ADMIN — PIPERACILLIN AND TAZOBACTAM 3375 MG: 3; .375 INJECTION, POWDER, LYOPHILIZED, FOR SOLUTION INTRAVENOUS at 03:31

## 2025-05-22 RX ADMIN — METOPROLOL TARTRATE 5 MG: 5 INJECTION INTRAVENOUS at 20:32

## 2025-05-22 RX ADMIN — IOPAMIDOL 100 ML: 612 INJECTION, SOLUTION INTRAVENOUS at 15:24

## 2025-05-22 RX ADMIN — Medication 10 ML: at 20:33

## 2025-05-22 RX ADMIN — Medication 10 ML: at 08:08

## 2025-05-22 RX ADMIN — PIPERACILLIN AND TAZOBACTAM 3375 MG: 3; .375 INJECTION, POWDER, LYOPHILIZED, FOR SOLUTION INTRAVENOUS at 18:52

## 2025-05-22 RX ADMIN — HYDROMORPHONE HYDROCHLORIDE 0.5 MG: 1 INJECTION, SOLUTION INTRAMUSCULAR; INTRAVENOUS; SUBCUTANEOUS at 15:55

## 2025-05-22 RX ADMIN — HYDROMORPHONE HYDROCHLORIDE 0.5 MG: 1 INJECTION, SOLUTION INTRAMUSCULAR; INTRAVENOUS; SUBCUTANEOUS at 20:40

## 2025-05-22 ASSESSMENT — PAIN DESCRIPTION - LOCATION
LOCATION: NECK
LOCATION: THROAT
LOCATION: NECK

## 2025-05-22 ASSESSMENT — PAIN SCALES - GENERAL
PAINLEVEL_OUTOF10: 3
PAINLEVEL_OUTOF10: 8
PAINLEVEL_OUTOF10: 7
PAINLEVEL_OUTOF10: 4
PAINLEVEL_OUTOF10: 10
PAINLEVEL_OUTOF10: 7

## 2025-05-22 ASSESSMENT — PAIN DESCRIPTION - DESCRIPTORS
DESCRIPTORS: DISCOMFORT
DESCRIPTORS: DISCOMFORT

## 2025-05-22 ASSESSMENT — PAIN DESCRIPTION - ORIENTATION
ORIENTATION: MID
ORIENTATION: MID

## 2025-05-22 ASSESSMENT — PAIN SCALES - WONG BAKER: WONGBAKER_NUMERICALRESPONSE: HURTS A LITTLE BIT

## 2025-05-22 NOTE — INTERDISCIPLINARY ROUNDS
Interdisciplinary Trach Team Rounds    Patient discussed in interdisciplinary trach team rounds on this date. Present for rounds: Brenda Spence, Interdisciplinary Tracheostomy , Speech-Language Pathologist, Dalila Fofana, Respiratory Therapy Clinical Educator, and Dr. James Clayton, Thoracic Surgeon    Trach Info:   Trach Size: 6   Trach Type: Shiley   Cuffed/Cuffless: Cuffed   Placing Surgeon: Dr. Plascencia   Placement Date: 5/17/2025   Trach Change: \"Anticipate changing to 6 Shiley Cuffless as early as tomorrow” per Dr. Hobbs   Reason for Trach: R supraglottic mass     Airway:  Surgical Airway  05/17/25 (Active)   Status Secured 05/22/25 1418   Site Assessment Clean;Dry 05/22/25 1418   Site Care Other (comment) 05/21/25 1600   Inner Cannula Care Changed/new 05/22/25 1418   Ties Assessment Clean;Dry;Intact 05/22/25 0800   Spare Trach at Bedside Yes 05/22/25 1418   Spare Trach Tube One Size Smaller at Bedside Yes 05/22/25 1418   Ambu Bag With Mask at Bedside Yes 05/22/25 1418       Surgical Airway  05/17/25 Shiley Cuffed (Active)   Status Secured 05/21/25 2000   Site Assessment Clean;No Bleeding 05/21/25 2000   Site Care Dressing applied 05/21/25 2000   Inner Cannula Care Changed/new 05/21/25 2000   Ties Assessment Clean;Intact 05/21/25 2000   Cuff Pressure 0 cm H2O 05/18/25 2000   Spare Trach at Bedside Yes 05/21/25 2000   Spare Trach Tube One Size Smaller at Bedside Yes 05/21/25 2000   Ambu Bag With Mask at Bedside Yes 05/21/25 2000     Surgical Airway  05/17/25 (Active)   Placement Date/Time: 05/17/25 1407   Present on Admission/Arrival: No  Placement Verified By: Capnometry  Surgical Airway Type: Tracheostomy  Discharged with Line/Drain/Airway: Yes       Surgical Airway  05/17/25 Shiley Cuffed (Active)   Placement Date/Time: 05/17/25 1438   Present on Admission/Arrival: No  Placed By: In surgery  Placement Verified By: Direct visualization  Surgical Airway Type: Tracheostomy  Brand: [employer]

## 2025-05-23 ENCOUNTER — HOSPITAL ENCOUNTER (INPATIENT)
Facility: HOSPITAL | Age: 75
Discharge: HOME | DRG: 011 | End: 2025-05-26
Payer: MEDICARE

## 2025-05-23 VITALS
HEART RATE: 94 BPM | SYSTOLIC BLOOD PRESSURE: 144 MMHG | RESPIRATION RATE: 17 BRPM | TEMPERATURE: 98.9 F | DIASTOLIC BLOOD PRESSURE: 91 MMHG | OXYGEN SATURATION: 100 %

## 2025-05-23 LAB
ANION GAP SERPL CALC-SCNC: 6 MMOL/L (ref 2–12)
BUN SERPL-MCNC: 5 MG/DL (ref 6–20)
BUN/CREAT SERPL: 7 (ref 12–20)
CALCIUM SERPL-MCNC: 9.4 MG/DL (ref 8.5–10.1)
CHLORIDE SERPL-SCNC: 110 MMOL/L (ref 97–108)
CO2 SERPL-SCNC: 24 MMOL/L (ref 21–32)
CREAT SERPL-MCNC: 0.71 MG/DL (ref 0.7–1.3)
GLUCOSE BLD STRIP.AUTO-MCNC: 75 MG/DL (ref 65–117)
GLUCOSE BLD STRIP.AUTO-MCNC: 81 MG/DL (ref 65–117)
GLUCOSE BLD STRIP.AUTO-MCNC: 86 MG/DL (ref 65–117)
GLUCOSE BLD STRIP.AUTO-MCNC: 87 MG/DL (ref 65–117)
GLUCOSE SERPL-MCNC: 92 MG/DL (ref 65–100)
MAGNESIUM SERPL-MCNC: 1.5 MG/DL (ref 1.6–2.4)
PHOSPHATE SERPL-MCNC: 2.4 MG/DL (ref 2.6–4.7)
POTASSIUM SERPL-SCNC: 3.8 MMOL/L (ref 3.5–5.1)
SERVICE CMNT-IMP: NORMAL
SODIUM SERPL-SCNC: 140 MMOL/L (ref 136–145)

## 2025-05-23 PROCEDURE — 0DH63UZ INSERTION OF FEEDING DEVICE INTO STOMACH, PERCUTANEOUS APPROACH: ICD-10-PCS | Performed by: STUDENT IN AN ORGANIZED HEALTH CARE EDUCATION/TRAINING PROGRAM

## 2025-05-23 PROCEDURE — 6360000002 HC RX W HCPCS: Performed by: INTERNAL MEDICINE

## 2025-05-23 PROCEDURE — 6360000002 HC RX W HCPCS

## 2025-05-23 PROCEDURE — 2700000000 HC OXYGEN THERAPY PER DAY

## 2025-05-23 PROCEDURE — 2060000000 HC ICU INTERMEDIATE R&B

## 2025-05-23 PROCEDURE — 6360000002 HC RX W HCPCS: Performed by: STUDENT IN AN ORGANIZED HEALTH CARE EDUCATION/TRAINING PROGRAM

## 2025-05-23 PROCEDURE — 84100 ASSAY OF PHOSPHORUS: CPT

## 2025-05-23 PROCEDURE — 82962 GLUCOSE BLOOD TEST: CPT

## 2025-05-23 PROCEDURE — 2500000003 HC RX 250 WO HCPCS: Performed by: INTERNAL MEDICINE

## 2025-05-23 PROCEDURE — 83735 ASSAY OF MAGNESIUM: CPT

## 2025-05-23 PROCEDURE — 3E0G76Z INTRODUCTION OF NUTRITIONAL SUBSTANCE INTO UPPER GI, VIA NATURAL OR ARTIFICIAL OPENING: ICD-10-PCS | Performed by: STUDENT IN AN ORGANIZED HEALTH CARE EDUCATION/TRAINING PROGRAM

## 2025-05-23 PROCEDURE — 2500000003 HC RX 250 WO HCPCS

## 2025-05-23 PROCEDURE — 6360000002 HC RX W HCPCS: Performed by: HOSPITALIST

## 2025-05-23 PROCEDURE — 94760 N-INVAS EAR/PLS OXIMETRY 1: CPT

## 2025-05-23 PROCEDURE — 2580000003 HC RX 258: Performed by: HOSPITALIST

## 2025-05-23 PROCEDURE — 2500000003 HC RX 250 WO HCPCS: Performed by: HOSPITALIST

## 2025-05-23 PROCEDURE — 2709999900 IR FLUORO GUIDED GASTROSTOMY TUBE INSERTION PERC W CONTRAST

## 2025-05-23 PROCEDURE — 80048 BASIC METABOLIC PNL TOTAL CA: CPT

## 2025-05-23 PROCEDURE — 31502 CHANGE OF WINDPIPE AIRWAY: CPT

## 2025-05-23 PROCEDURE — 2580000003 HC RX 258

## 2025-05-23 RX ORDER — LORAZEPAM 2 MG/ML
2 INJECTION INTRAMUSCULAR ONCE
Status: COMPLETED | OUTPATIENT
Start: 2025-05-23 | End: 2025-05-23

## 2025-05-23 RX ORDER — THIAMINE HYDROCHLORIDE 100 MG/ML
100 INJECTION, SOLUTION INTRAMUSCULAR; INTRAVENOUS DAILY
Status: DISCONTINUED | OUTPATIENT
Start: 2025-05-23 | End: 2025-06-02 | Stop reason: ALTCHOICE

## 2025-05-23 RX ORDER — FENTANYL CITRATE 50 UG/ML
INJECTION, SOLUTION INTRAMUSCULAR; INTRAVENOUS PRN
Status: COMPLETED | OUTPATIENT
Start: 2025-05-23 | End: 2025-05-23

## 2025-05-23 RX ORDER — MIDAZOLAM HYDROCHLORIDE 2 MG/2ML
INJECTION, SOLUTION INTRAMUSCULAR; INTRAVENOUS PRN
Status: COMPLETED | OUTPATIENT
Start: 2025-05-23 | End: 2025-05-23

## 2025-05-23 RX ADMIN — HYDROMORPHONE HYDROCHLORIDE 0.5 MG: 1 INJECTION, SOLUTION INTRAMUSCULAR; INTRAVENOUS; SUBCUTANEOUS at 10:52

## 2025-05-23 RX ADMIN — PIPERACILLIN AND TAZOBACTAM 3375 MG: 3; .375 INJECTION, POWDER, LYOPHILIZED, FOR SOLUTION INTRAVENOUS at 03:59

## 2025-05-23 RX ADMIN — FENTANYL CITRATE 25 MCG: 50 INJECTION INTRAMUSCULAR; INTRAVENOUS at 15:59

## 2025-05-23 RX ADMIN — Medication 10 ML: at 20:52

## 2025-05-23 RX ADMIN — MIDAZOLAM HYDROCHLORIDE 0.5 MG: 1 INJECTION, SOLUTION INTRAMUSCULAR; INTRAVENOUS at 15:51

## 2025-05-23 RX ADMIN — HYDROMORPHONE HYDROCHLORIDE 0.5 MG: 1 INJECTION, SOLUTION INTRAMUSCULAR; INTRAVENOUS; SUBCUTANEOUS at 04:54

## 2025-05-23 RX ADMIN — THIAMINE HYDROCHLORIDE 100 MG: 100 INJECTION, SOLUTION INTRAMUSCULAR; INTRAVENOUS at 13:52

## 2025-05-23 RX ADMIN — DEXTROSE MONOHYDRATE, SODIUM CHLORIDE, AND POTASSIUM CHLORIDE: 50; 4.5; 2.98 INJECTION, SOLUTION INTRAVENOUS at 04:12

## 2025-05-23 RX ADMIN — DEXTROSE MONOHYDRATE, SODIUM CHLORIDE, AND POTASSIUM CHLORIDE: 50; 4.5; 2.98 INJECTION, SOLUTION INTRAVENOUS at 18:15

## 2025-05-23 RX ADMIN — LORAZEPAM 0.5 MG: 2 INJECTION INTRAMUSCULAR; INTRAVENOUS at 17:34

## 2025-05-23 RX ADMIN — METOPROLOL TARTRATE 5 MG: 5 INJECTION INTRAVENOUS at 08:32

## 2025-05-23 RX ADMIN — LORAZEPAM 2 MG: 2 INJECTION INTRAMUSCULAR; INTRAVENOUS at 17:36

## 2025-05-23 RX ADMIN — FENTANYL CITRATE 50 MCG: 50 INJECTION INTRAMUSCULAR; INTRAVENOUS at 15:45

## 2025-05-23 RX ADMIN — FENTANYL CITRATE 25 MCG: 50 INJECTION INTRAMUSCULAR; INTRAVENOUS at 15:51

## 2025-05-23 RX ADMIN — SODIUM CHLORIDE, PRESERVATIVE FREE 40 MG: 5 INJECTION INTRAVENOUS at 08:31

## 2025-05-23 RX ADMIN — PIPERACILLIN AND TAZOBACTAM 3375 MG: 3; .375 INJECTION, POWDER, LYOPHILIZED, FOR SOLUTION INTRAVENOUS at 11:17

## 2025-05-23 RX ADMIN — PIPERACILLIN AND TAZOBACTAM 3375 MG: 3; .375 INJECTION, POWDER, LYOPHILIZED, FOR SOLUTION INTRAVENOUS at 19:54

## 2025-05-23 RX ADMIN — HYDROMORPHONE HYDROCHLORIDE 0.5 MG: 1 INJECTION, SOLUTION INTRAMUSCULAR; INTRAVENOUS; SUBCUTANEOUS at 20:49

## 2025-05-23 RX ADMIN — MIDAZOLAM HYDROCHLORIDE 0.5 MG: 1 INJECTION, SOLUTION INTRAMUSCULAR; INTRAVENOUS at 15:59

## 2025-05-23 RX ADMIN — METOPROLOL TARTRATE 5 MG: 5 INJECTION INTRAVENOUS at 17:57

## 2025-05-23 RX ADMIN — MIDAZOLAM HYDROCHLORIDE 1 MG: 1 INJECTION, SOLUTION INTRAMUSCULAR; INTRAVENOUS at 15:45

## 2025-05-23 RX ADMIN — Medication 10 ML: at 08:32

## 2025-05-23 ASSESSMENT — PAIN SCALES - GENERAL
PAINLEVEL_OUTOF10: 8
PAINLEVEL_OUTOF10: 0
PAINLEVEL_OUTOF10: 9
PAINLEVEL_OUTOF10: 7

## 2025-05-23 ASSESSMENT — PAIN DESCRIPTION - LOCATION
LOCATION: NECK;THROAT
LOCATION: LEG;THROAT

## 2025-05-23 ASSESSMENT — PULMONARY FUNCTION TESTS
PIF_VALUE: 0

## 2025-05-23 ASSESSMENT — PAIN SCALES - WONG BAKER: WONGBAKER_NUMERICALRESPONSE: NO HURT

## 2025-05-23 ASSESSMENT — PAIN DESCRIPTION - DESCRIPTORS: DESCRIPTORS: ACHING

## 2025-05-23 ASSESSMENT — PAIN DESCRIPTION - ORIENTATION: ORIENTATION: RIGHT;LEFT

## 2025-05-23 NOTE — PROGRESS NOTES
TRANSFER - OUT REPORT:    Verbal report given to KAILA Mancuso on Roge Lobato  being transferred to  for routine post-op       Report consisted of patient's Situation, Background, Assessment and   Recommendations(SBAR).     Information from the following report(s) Nurse Handoff Report and Surgery Report was reviewed with the receiving nurse.           Lines:   Peripheral IV 05/19/25 Right;Anterior Arm (Active)   Site Assessment Clean, dry & intact 05/23/25 1115   Line Status Infusing 05/23/25 1115   Line Care Connections checked and tightened;Cap changed 05/23/25 1115   Phlebitis Assessment No symptoms 05/23/25 1115   Infiltration Assessment 0 05/23/25 1115   Alcohol Cap Used Yes 05/23/25 1115   Dressing Status Clean, dry & intact 05/23/25 1115   Dressing Type Transparent 05/23/25 1115   Dressing Intervention New 05/19/25 0015       Peripheral IV 05/22/25 Left;Anterior Forearm (Active)   Site Assessment Clean, dry & intact 05/23/25 1115   Line Status Infusing 05/23/25 1115   Line Care Connections checked and tightened 05/23/25 1115   Phlebitis Assessment No symptoms 05/23/25 1115   Infiltration Assessment 0 05/23/25 1115   Alcohol Cap Used Yes 05/23/25 1115   Dressing Status Clean, dry & intact 05/23/25 1115   Dressing Type Transparent 05/23/25 1115        Opportunity for questions and clarification was provided.      Patient transported with:  Monitor, O2 @ 10 lpm, and Registered Nurse

## 2025-05-23 NOTE — SIGNIFICANT EVENT
Rapid Response Note       05/23/25 at 1714    A rapid response was called on this patient for a dislodged tracheostomy tube.    Response    Rapid Response Team present for evaluation.    Dr. Bianchi responding at the bedside.    Jamee BRIGHT is the primary nurse during this episode.    Situation    1714- The patient was in the care of staff when his trach tube became dislodged.  He is alert with the arrival of the Rapid Response Team.  He is not in distress. Oxygen saturations are normal with TC oxygen applied.  Call placed to Dr. Rose (Intensivist). She will come to the bedside. ASAP.  1720- Dr. Rose at the bedside. Gathering materials for procedure.  1736- Ativan given.  1740- Successful placement of the trach tube performed by Dr. Rose via the previous stoma using the aid of a bronchoscope. Copious secretions noted. The patient appears stable.      The patient was left in the care of his primary nursing team.    Rapid Response end time approximately 1745      Rapid Response Team Sepsis Screening  Is the patient's history suggestive of a new infection? No    Are two or more SIRS criteria present? No    Is there evidence of Organ Dysfunction? [facility] Sepsis OD: None    Communication with provider: No    Was a Code Sepsis called at this encounter? No. Why? Not indicated.

## 2025-05-23 NOTE — PROGRESS NOTES
TRANSFER - IN REPORT:    Verbal report received from Jamee pena Roge Lobato  being received from CCU for ordered procedure      Report consisted of patient's Situation, Background, Assessment and   Recommendations(SBAR).     Information from the following report(s) Nurse Handoff Report was reviewed with the receiving nurse.    Opportunity for questions and clarification was provided.      Assessment completed upon patient's arrival to unit and care assumed.

## 2025-05-24 ENCOUNTER — APPOINTMENT (OUTPATIENT)
Facility: HOSPITAL | Age: 75
DRG: 011 | End: 2025-05-24
Payer: MEDICARE

## 2025-05-24 LAB
ANION GAP SERPL CALC-SCNC: 6 MMOL/L (ref 2–12)
BUN SERPL-MCNC: 5 MG/DL (ref 6–20)
BUN/CREAT SERPL: 6 (ref 12–20)
CALCIUM SERPL-MCNC: 10.1 MG/DL (ref 8.5–10.1)
CHLORIDE SERPL-SCNC: 106 MMOL/L (ref 97–108)
CO2 SERPL-SCNC: 24 MMOL/L (ref 21–32)
CREAT SERPL-MCNC: 0.83 MG/DL (ref 0.7–1.3)
GLUCOSE BLD STRIP.AUTO-MCNC: 72 MG/DL (ref 65–117)
GLUCOSE BLD STRIP.AUTO-MCNC: 73 MG/DL (ref 65–117)
GLUCOSE BLD STRIP.AUTO-MCNC: 75 MG/DL (ref 65–117)
GLUCOSE BLD STRIP.AUTO-MCNC: 89 MG/DL (ref 65–117)
GLUCOSE SERPL-MCNC: 110 MG/DL (ref 65–100)
MAGNESIUM SERPL-MCNC: 1.5 MG/DL (ref 1.6–2.4)
PHOSPHATE SERPL-MCNC: 2.6 MG/DL (ref 2.6–4.7)
POTASSIUM SERPL-SCNC: 4.6 MMOL/L (ref 3.5–5.1)
SERVICE CMNT-IMP: NORMAL
SODIUM SERPL-SCNC: 136 MMOL/L (ref 136–145)

## 2025-05-24 PROCEDURE — 83735 ASSAY OF MAGNESIUM: CPT

## 2025-05-24 PROCEDURE — 2060000000 HC ICU INTERMEDIATE R&B

## 2025-05-24 PROCEDURE — 94760 N-INVAS EAR/PLS OXIMETRY 1: CPT

## 2025-05-24 PROCEDURE — 2500000003 HC RX 250 WO HCPCS: Performed by: HOSPITALIST

## 2025-05-24 PROCEDURE — 84100 ASSAY OF PHOSPHORUS: CPT

## 2025-05-24 PROCEDURE — 2580000003 HC RX 258

## 2025-05-24 PROCEDURE — 94761 N-INVAS EAR/PLS OXIMETRY MLT: CPT

## 2025-05-24 PROCEDURE — 71045 X-RAY EXAM CHEST 1 VIEW: CPT

## 2025-05-24 PROCEDURE — 2500000003 HC RX 250 WO HCPCS: Performed by: INTERNAL MEDICINE

## 2025-05-24 PROCEDURE — 80048 BASIC METABOLIC PNL TOTAL CA: CPT

## 2025-05-24 PROCEDURE — 6360000002 HC RX W HCPCS: Performed by: HOSPITALIST

## 2025-05-24 PROCEDURE — 82962 GLUCOSE BLOOD TEST: CPT

## 2025-05-24 PROCEDURE — 6370000000 HC RX 637 (ALT 250 FOR IP): Performed by: HOSPITALIST

## 2025-05-24 PROCEDURE — 2500000003 HC RX 250 WO HCPCS: Performed by: NURSE PRACTITIONER

## 2025-05-24 PROCEDURE — 2700000000 HC OXYGEN THERAPY PER DAY

## 2025-05-24 PROCEDURE — 6360000002 HC RX W HCPCS

## 2025-05-24 PROCEDURE — 2580000003 HC RX 258: Performed by: HOSPITALIST

## 2025-05-24 PROCEDURE — 2500000003 HC RX 250 WO HCPCS

## 2025-05-24 RX ORDER — HALOPERIDOL 5 MG/ML
2 INJECTION INTRAMUSCULAR EVERY 6 HOURS PRN
Status: DISCONTINUED | OUTPATIENT
Start: 2025-05-24 | End: 2025-05-30

## 2025-05-24 RX ORDER — SODIUM CHLORIDE 0.9 % (FLUSH) 0.9 %
5-40 SYRINGE (ML) INJECTION EVERY 12 HOURS SCHEDULED
Status: DISCONTINUED | OUTPATIENT
Start: 2025-05-24 | End: 2025-06-11 | Stop reason: HOSPADM

## 2025-05-24 RX ORDER — SODIUM CHLORIDE 9 MG/ML
INJECTION, SOLUTION INTRAVENOUS PRN
Status: DISCONTINUED | OUTPATIENT
Start: 2025-05-24 | End: 2025-06-11 | Stop reason: HOSPADM

## 2025-05-24 RX ORDER — QUETIAPINE FUMARATE 25 MG/1
25 TABLET, FILM COATED ORAL NIGHTLY
Status: DISCONTINUED | OUTPATIENT
Start: 2025-05-24 | End: 2025-06-01

## 2025-05-24 RX ORDER — MAGNESIUM SULFATE IN WATER 40 MG/ML
2000 INJECTION, SOLUTION INTRAVENOUS ONCE
Status: COMPLETED | OUTPATIENT
Start: 2025-05-24 | End: 2025-05-24

## 2025-05-24 RX ORDER — LIDOCAINE HYDROCHLORIDE 20 MG/ML
100 INJECTION, SOLUTION INFILTRATION; PERINEURAL ONCE
Status: DISCONTINUED | OUTPATIENT
Start: 2025-05-24 | End: 2025-06-01

## 2025-05-24 RX ORDER — METOPROLOL TARTRATE 25 MG/1
25 TABLET, FILM COATED ORAL 2 TIMES DAILY
Status: DISCONTINUED | OUTPATIENT
Start: 2025-05-24 | End: 2025-05-26

## 2025-05-24 RX ORDER — SODIUM CHLORIDE 0.9 % (FLUSH) 0.9 %
5-40 SYRINGE (ML) INJECTION PRN
Status: DISCONTINUED | OUTPATIENT
Start: 2025-05-24 | End: 2025-06-11 | Stop reason: HOSPADM

## 2025-05-24 RX ADMIN — SODIUM CHLORIDE, PRESERVATIVE FREE 10 ML: 5 INJECTION INTRAVENOUS at 09:01

## 2025-05-24 RX ADMIN — HYDROMORPHONE HYDROCHLORIDE 0.5 MG: 1 INJECTION, SOLUTION INTRAMUSCULAR; INTRAVENOUS; SUBCUTANEOUS at 12:54

## 2025-05-24 RX ADMIN — THIAMINE HYDROCHLORIDE 100 MG: 100 INJECTION, SOLUTION INTRAMUSCULAR; INTRAVENOUS at 08:59

## 2025-05-24 RX ADMIN — HYDROMORPHONE HYDROCHLORIDE 0.5 MG: 1 INJECTION, SOLUTION INTRAMUSCULAR; INTRAVENOUS; SUBCUTANEOUS at 04:30

## 2025-05-24 RX ADMIN — METOPROLOL TARTRATE 5 MG: 5 INJECTION INTRAVENOUS at 11:04

## 2025-05-24 RX ADMIN — Medication 10 ML: at 21:00

## 2025-05-24 RX ADMIN — PIPERACILLIN AND TAZOBACTAM 3375 MG: 3; .375 INJECTION, POWDER, LYOPHILIZED, FOR SOLUTION INTRAVENOUS at 04:45

## 2025-05-24 RX ADMIN — METOPROLOL TARTRATE 25 MG: 25 TABLET, FILM COATED ORAL at 15:23

## 2025-05-24 RX ADMIN — QUETIAPINE FUMARATE 25 MG: 25 TABLET ORAL at 20:55

## 2025-05-24 RX ADMIN — ENOXAPARIN SODIUM 40 MG: 100 INJECTION SUBCUTANEOUS at 08:58

## 2025-05-24 RX ADMIN — PIPERACILLIN AND TAZOBACTAM 3375 MG: 3; .375 INJECTION, POWDER, LYOPHILIZED, FOR SOLUTION INTRAVENOUS at 18:46

## 2025-05-24 RX ADMIN — HYDROMORPHONE HYDROCHLORIDE 0.5 MG: 1 INJECTION, SOLUTION INTRAMUSCULAR; INTRAVENOUS; SUBCUTANEOUS at 19:19

## 2025-05-24 RX ADMIN — SODIUM CHLORIDE, PRESERVATIVE FREE 10 ML: 5 INJECTION INTRAVENOUS at 20:59

## 2025-05-24 RX ADMIN — Medication 10 ML: at 08:58

## 2025-05-24 RX ADMIN — SODIUM CHLORIDE, PRESERVATIVE FREE 40 MG: 5 INJECTION INTRAVENOUS at 08:58

## 2025-05-24 RX ADMIN — DEXTROSE MONOHYDRATE, SODIUM CHLORIDE, AND POTASSIUM CHLORIDE: 50; 4.5; 2.98 INJECTION, SOLUTION INTRAVENOUS at 05:23

## 2025-05-24 RX ADMIN — LORAZEPAM 0.5 MG: 2 INJECTION INTRAMUSCULAR; INTRAVENOUS at 03:00

## 2025-05-24 RX ADMIN — PIPERACILLIN AND TAZOBACTAM 3375 MG: 3; .375 INJECTION, POWDER, LYOPHILIZED, FOR SOLUTION INTRAVENOUS at 11:05

## 2025-05-24 RX ADMIN — MAGNESIUM SULFATE HEPTAHYDRATE 2000 MG: 40 INJECTION, SOLUTION INTRAVENOUS at 09:01

## 2025-05-24 RX ADMIN — DEXTROSE MONOHYDRATE, SODIUM CHLORIDE, AND POTASSIUM CHLORIDE: 50; 4.5; 2.98 INJECTION, SOLUTION INTRAVENOUS at 16:39

## 2025-05-24 ASSESSMENT — PAIN DESCRIPTION - DESCRIPTORS
DESCRIPTORS: ACHING
DESCRIPTORS: ACHING
DESCRIPTORS: ACHING;SORE

## 2025-05-24 ASSESSMENT — PAIN DESCRIPTION - LOCATION
LOCATION: FOOT
LOCATION: BACK
LOCATION: LEG;THROAT
LOCATION: BACK

## 2025-05-24 ASSESSMENT — PAIN SCALES - GENERAL
PAINLEVEL_OUTOF10: 7
PAINLEVEL_OUTOF10: 3
PAINLEVEL_OUTOF10: 7
PAINLEVEL_OUTOF10: 3
PAINLEVEL_OUTOF10: 10
PAINLEVEL_OUTOF10: 0

## 2025-05-24 ASSESSMENT — PAIN SCALES - WONG BAKER: WONGBAKER_NUMERICALRESPONSE: NO HURT

## 2025-05-24 ASSESSMENT — PAIN DESCRIPTION - ORIENTATION
ORIENTATION: MID;RIGHT;LEFT
ORIENTATION: LOWER;LEFT;RIGHT
ORIENTATION: LOWER;MID

## 2025-05-25 LAB
ALBUMIN SERPL-MCNC: 1.9 G/DL (ref 3.5–5)
ALBUMIN/GLOB SERPL: 0.4 (ref 1.1–2.2)
ALP SERPL-CCNC: 65 U/L (ref 45–117)
ALT SERPL-CCNC: <6 U/L (ref 12–78)
ANION GAP SERPL CALC-SCNC: 6 MMOL/L (ref 2–12)
AST SERPL-CCNC: 14 U/L (ref 15–37)
BASOPHILS # BLD: 0.04 K/UL (ref 0–0.1)
BASOPHILS NFR BLD: 0.4 % (ref 0–1)
BILIRUB SERPL-MCNC: 0.4 MG/DL (ref 0.2–1)
BUN SERPL-MCNC: 4 MG/DL (ref 6–20)
BUN/CREAT SERPL: 5 (ref 12–20)
CALCIUM SERPL-MCNC: 10.5 MG/DL (ref 8.5–10.1)
CHLORIDE SERPL-SCNC: 107 MMOL/L (ref 97–108)
CO2 SERPL-SCNC: 23 MMOL/L (ref 21–32)
CREAT SERPL-MCNC: 0.79 MG/DL (ref 0.7–1.3)
DIFFERENTIAL METHOD BLD: ABNORMAL
EOSINOPHIL # BLD: 0.55 K/UL (ref 0–0.4)
EOSINOPHIL NFR BLD: 5.7 % (ref 0–7)
ERYTHROCYTE [DISTWIDTH] IN BLOOD BY AUTOMATED COUNT: 14.9 % (ref 11.5–14.5)
GLOBULIN SER CALC-MCNC: 5.4 G/DL (ref 2–4)
GLUCOSE BLD STRIP.AUTO-MCNC: 67 MG/DL (ref 65–117)
GLUCOSE BLD STRIP.AUTO-MCNC: 71 MG/DL (ref 65–117)
GLUCOSE BLD STRIP.AUTO-MCNC: 87 MG/DL (ref 65–117)
GLUCOSE BLD STRIP.AUTO-MCNC: 94 MG/DL (ref 65–117)
GLUCOSE BLD STRIP.AUTO-MCNC: 96 MG/DL (ref 65–117)
GLUCOSE SERPL-MCNC: 77 MG/DL (ref 65–100)
HCT VFR BLD AUTO: 34.4 % (ref 36.6–50.3)
HGB BLD-MCNC: 10.3 G/DL (ref 12.1–17)
IMM GRANULOCYTES # BLD AUTO: 0.05 K/UL (ref 0–0.04)
IMM GRANULOCYTES NFR BLD AUTO: 0.5 % (ref 0–0.5)
LYMPHOCYTES # BLD: 1.78 K/UL (ref 0.8–3.5)
LYMPHOCYTES NFR BLD: 18.4 % (ref 12–49)
MAGNESIUM SERPL-MCNC: 1.9 MG/DL (ref 1.6–2.4)
MCH RBC QN AUTO: 28.6 PG (ref 26–34)
MCHC RBC AUTO-ENTMCNC: 29.9 G/DL (ref 30–36.5)
MCV RBC AUTO: 95.6 FL (ref 80–99)
MONOCYTES # BLD: 1.37 K/UL (ref 0–1)
MONOCYTES NFR BLD: 14.2 % (ref 5–13)
NEUTS SEG # BLD: 5.89 K/UL (ref 1.8–8)
NEUTS SEG NFR BLD: 60.8 % (ref 32–75)
NRBC # BLD: 0 K/UL (ref 0–0.01)
NRBC BLD-RTO: 0 PER 100 WBC
PHOSPHATE SERPL-MCNC: 2.5 MG/DL (ref 2.6–4.7)
PLATELET # BLD AUTO: 329 K/UL (ref 150–400)
PMV BLD AUTO: 10.3 FL (ref 8.9–12.9)
POTASSIUM SERPL-SCNC: 4.8 MMOL/L (ref 3.5–5.1)
PROT SERPL-MCNC: 7.3 G/DL (ref 6.4–8.2)
RBC # BLD AUTO: 3.6 M/UL (ref 4.1–5.7)
SERVICE CMNT-IMP: NORMAL
SODIUM SERPL-SCNC: 136 MMOL/L (ref 136–145)
WBC # BLD AUTO: 9.7 K/UL (ref 4.1–11.1)

## 2025-05-25 PROCEDURE — 2060000000 HC ICU INTERMEDIATE R&B

## 2025-05-25 PROCEDURE — 6360000002 HC RX W HCPCS: Performed by: HOSPITALIST

## 2025-05-25 PROCEDURE — 6370000000 HC RX 637 (ALT 250 FOR IP)

## 2025-05-25 PROCEDURE — 94760 N-INVAS EAR/PLS OXIMETRY 1: CPT

## 2025-05-25 PROCEDURE — 6370000000 HC RX 637 (ALT 250 FOR IP): Performed by: HOSPITALIST

## 2025-05-25 PROCEDURE — 2580000003 HC RX 258: Performed by: HOSPITALIST

## 2025-05-25 PROCEDURE — 6360000002 HC RX W HCPCS

## 2025-05-25 PROCEDURE — 83735 ASSAY OF MAGNESIUM: CPT

## 2025-05-25 PROCEDURE — 82962 GLUCOSE BLOOD TEST: CPT

## 2025-05-25 PROCEDURE — 2500000003 HC RX 250 WO HCPCS

## 2025-05-25 PROCEDURE — 80053 COMPREHEN METABOLIC PANEL: CPT

## 2025-05-25 PROCEDURE — 84100 ASSAY OF PHOSPHORUS: CPT

## 2025-05-25 PROCEDURE — 2500000003 HC RX 250 WO HCPCS: Performed by: NURSE PRACTITIONER

## 2025-05-25 PROCEDURE — 2580000003 HC RX 258: Performed by: NURSE PRACTITIONER

## 2025-05-25 PROCEDURE — 2500000003 HC RX 250 WO HCPCS: Performed by: INTERNAL MEDICINE

## 2025-05-25 PROCEDURE — 2580000003 HC RX 258

## 2025-05-25 PROCEDURE — 85025 COMPLETE CBC W/AUTO DIFF WBC: CPT

## 2025-05-25 RX ORDER — DEXTROSE MONOHYDRATE, SODIUM CHLORIDE, AND POTASSIUM CHLORIDE 50; 2.98; 4.5 G/1000ML; G/1000ML; G/1000ML
INJECTION, SOLUTION INTRAVENOUS CONTINUOUS
Status: DISCONTINUED | OUTPATIENT
Start: 2025-05-25 | End: 2025-05-25

## 2025-05-25 RX ADMIN — POTASSIUM & SODIUM PHOSPHATES POWDER PACK 280-160-250 MG 250 MG: 280-160-250 PACK at 21:05

## 2025-05-25 RX ADMIN — QUETIAPINE FUMARATE 25 MG: 25 TABLET ORAL at 21:05

## 2025-05-25 RX ADMIN — POTASSIUM & SODIUM PHOSPHATES POWDER PACK 280-160-250 MG 250 MG: 280-160-250 PACK at 12:02

## 2025-05-25 RX ADMIN — HYDROMORPHONE HYDROCHLORIDE 0.5 MG: 1 INJECTION, SOLUTION INTRAMUSCULAR; INTRAVENOUS; SUBCUTANEOUS at 07:08

## 2025-05-25 RX ADMIN — SODIUM CHLORIDE, PRESERVATIVE FREE 40 MG: 5 INJECTION INTRAVENOUS at 09:01

## 2025-05-25 RX ADMIN — METOPROLOL TARTRATE 25 MG: 25 TABLET, FILM COATED ORAL at 21:04

## 2025-05-25 RX ADMIN — POTASSIUM & SODIUM PHOSPHATES POWDER PACK 280-160-250 MG 250 MG: 280-160-250 PACK at 17:58

## 2025-05-25 RX ADMIN — METOPROLOL TARTRATE 25 MG: 25 TABLET, FILM COATED ORAL at 09:01

## 2025-05-25 RX ADMIN — THIAMINE HYDROCHLORIDE 100 MG: 100 INJECTION, SOLUTION INTRAMUSCULAR; INTRAVENOUS at 09:01

## 2025-05-25 RX ADMIN — ENOXAPARIN SODIUM 40 MG: 100 INJECTION SUBCUTANEOUS at 09:01

## 2025-05-25 RX ADMIN — PIPERACILLIN AND TAZOBACTAM 3375 MG: 3; .375 INJECTION, POWDER, LYOPHILIZED, FOR SOLUTION INTRAVENOUS at 18:01

## 2025-05-25 RX ADMIN — OXYCODONE 5 MG: 5 TABLET ORAL at 21:26

## 2025-05-25 RX ADMIN — Medication 10 ML: at 21:05

## 2025-05-25 RX ADMIN — SODIUM CHLORIDE, PRESERVATIVE FREE 10 ML: 5 INJECTION INTRAVENOUS at 21:05

## 2025-05-25 RX ADMIN — DEXTROSE 125 ML: 10 SOLUTION INTRAVENOUS at 12:57

## 2025-05-25 RX ADMIN — HYDROMORPHONE HYDROCHLORIDE 0.5 MG: 1 INJECTION, SOLUTION INTRAMUSCULAR; INTRAVENOUS; SUBCUTANEOUS at 17:59

## 2025-05-25 RX ADMIN — PIPERACILLIN AND TAZOBACTAM 3375 MG: 3; .375 INJECTION, POWDER, LYOPHILIZED, FOR SOLUTION INTRAVENOUS at 12:04

## 2025-05-25 RX ADMIN — POTASSIUM & SODIUM PHOSPHATES POWDER PACK 280-160-250 MG 250 MG: 280-160-250 PACK at 09:01

## 2025-05-25 RX ADMIN — Medication 10 ML: at 09:02

## 2025-05-25 RX ADMIN — HYDROMORPHONE HYDROCHLORIDE 0.5 MG: 1 INJECTION, SOLUTION INTRAMUSCULAR; INTRAVENOUS; SUBCUTANEOUS at 00:12

## 2025-05-25 RX ADMIN — PIPERACILLIN AND TAZOBACTAM 3375 MG: 3; .375 INJECTION, POWDER, LYOPHILIZED, FOR SOLUTION INTRAVENOUS at 03:00

## 2025-05-25 RX ADMIN — HALOPERIDOL LACTATE 2 MG: 5 INJECTION, SOLUTION INTRAMUSCULAR at 02:08

## 2025-05-25 RX ADMIN — SODIUM CHLORIDE, PRESERVATIVE FREE 10 ML: 5 INJECTION INTRAVENOUS at 09:01

## 2025-05-25 ASSESSMENT — PAIN DESCRIPTION - LOCATION
LOCATION: ABDOMEN
LOCATION: BACK
LOCATION: ABDOMEN
LOCATION: INCISION;BACK

## 2025-05-25 ASSESSMENT — PAIN SCALES - GENERAL
PAINLEVEL_OUTOF10: 10
PAINLEVEL_OUTOF10: 0
PAINLEVEL_OUTOF10: 9
PAINLEVEL_OUTOF10: 0
PAINLEVEL_OUTOF10: 10
PAINLEVEL_OUTOF10: 3
PAINLEVEL_OUTOF10: 2
PAINLEVEL_OUTOF10: 7
PAINLEVEL_OUTOF10: 3
PAINLEVEL_OUTOF10: 0
PAINLEVEL_OUTOF10: 0
PAINLEVEL_OUTOF10: 3
PAINLEVEL_OUTOF10: 9

## 2025-05-25 ASSESSMENT — PAIN DESCRIPTION - DESCRIPTORS
DESCRIPTORS: ACHING
DESCRIPTORS: ACHING;DISCOMFORT
DESCRIPTORS: SHARP;SHOOTING
DESCRIPTORS: ACHING

## 2025-05-25 ASSESSMENT — PAIN DESCRIPTION - ORIENTATION: ORIENTATION: MID

## 2025-05-25 ASSESSMENT — PAIN DESCRIPTION - PAIN TYPE: TYPE: ACUTE PAIN

## 2025-05-26 LAB
ALBUMIN SERPL-MCNC: 1.8 G/DL (ref 3.5–5)
ALBUMIN/GLOB SERPL: 0.4 (ref 1.1–2.2)
ALP SERPL-CCNC: 60 U/L (ref 45–117)
ALT SERPL-CCNC: 8 U/L (ref 12–78)
ANION GAP SERPL CALC-SCNC: 4 MMOL/L (ref 2–12)
AST SERPL-CCNC: 14 U/L (ref 15–37)
BASOPHILS # BLD: 0.05 K/UL (ref 0–0.1)
BASOPHILS NFR BLD: 0.6 % (ref 0–1)
BILIRUB SERPL-MCNC: 0.3 MG/DL (ref 0.2–1)
BUN SERPL-MCNC: 4 MG/DL (ref 6–20)
BUN/CREAT SERPL: 4 (ref 12–20)
CALCIUM SERPL-MCNC: 10.2 MG/DL (ref 8.5–10.1)
CHLORIDE SERPL-SCNC: 106 MMOL/L (ref 97–108)
CO2 SERPL-SCNC: 25 MMOL/L (ref 21–32)
CREAT SERPL-MCNC: 0.9 MG/DL (ref 0.7–1.3)
DIFFERENTIAL METHOD BLD: ABNORMAL
EOSINOPHIL # BLD: 0.51 K/UL (ref 0–0.4)
EOSINOPHIL NFR BLD: 5.9 % (ref 0–7)
ERYTHROCYTE [DISTWIDTH] IN BLOOD BY AUTOMATED COUNT: 14.7 % (ref 11.5–14.5)
GLOBULIN SER CALC-MCNC: 4.7 G/DL (ref 2–4)
GLUCOSE BLD STRIP.AUTO-MCNC: 105 MG/DL (ref 65–117)
GLUCOSE BLD STRIP.AUTO-MCNC: 89 MG/DL (ref 65–117)
GLUCOSE BLD STRIP.AUTO-MCNC: 89 MG/DL (ref 65–117)
GLUCOSE BLD STRIP.AUTO-MCNC: 90 MG/DL (ref 65–117)
GLUCOSE BLD STRIP.AUTO-MCNC: 95 MG/DL (ref 65–117)
GLUCOSE BLD STRIP.AUTO-MCNC: 99 MG/DL (ref 65–117)
GLUCOSE SERPL-MCNC: 78 MG/DL (ref 65–100)
HCT VFR BLD AUTO: 31.8 % (ref 36.6–50.3)
HGB BLD-MCNC: 9.6 G/DL (ref 12.1–17)
IMM GRANULOCYTES # BLD AUTO: 0.05 K/UL (ref 0–0.04)
IMM GRANULOCYTES NFR BLD AUTO: 0.6 % (ref 0–0.5)
LYMPHOCYTES # BLD: 1.62 K/UL (ref 0.8–3.5)
LYMPHOCYTES NFR BLD: 18.6 % (ref 12–49)
MAGNESIUM SERPL-MCNC: 1.7 MG/DL (ref 1.6–2.4)
MCH RBC QN AUTO: 28.9 PG (ref 26–34)
MCHC RBC AUTO-ENTMCNC: 30.2 G/DL (ref 30–36.5)
MCV RBC AUTO: 95.8 FL (ref 80–99)
MONOCYTES # BLD: 1.23 K/UL (ref 0–1)
MONOCYTES NFR BLD: 14.1 % (ref 5–13)
NEUTS SEG # BLD: 5.24 K/UL (ref 1.8–8)
NEUTS SEG NFR BLD: 60.2 % (ref 32–75)
NRBC # BLD: 0 K/UL (ref 0–0.01)
NRBC BLD-RTO: 0 PER 100 WBC
PHOSPHATE SERPL-MCNC: 4 MG/DL (ref 2.6–4.7)
PLATELET # BLD AUTO: 325 K/UL (ref 150–400)
POTASSIUM SERPL-SCNC: 4.4 MMOL/L (ref 3.5–5.1)
PROT SERPL-MCNC: 6.5 G/DL (ref 6.4–8.2)
RBC # BLD AUTO: 3.32 M/UL (ref 4.1–5.7)
RBC MORPH BLD: ABNORMAL
SERVICE CMNT-IMP: NORMAL
SODIUM SERPL-SCNC: 135 MMOL/L (ref 136–145)
WBC # BLD AUTO: 8.7 K/UL (ref 4.1–11.1)

## 2025-05-26 PROCEDURE — 2500000003 HC RX 250 WO HCPCS

## 2025-05-26 PROCEDURE — 2500000003 HC RX 250 WO HCPCS: Performed by: NURSE PRACTITIONER

## 2025-05-26 PROCEDURE — 2060000000 HC ICU INTERMEDIATE R&B

## 2025-05-26 PROCEDURE — 84100 ASSAY OF PHOSPHORUS: CPT

## 2025-05-26 PROCEDURE — 83735 ASSAY OF MAGNESIUM: CPT

## 2025-05-26 PROCEDURE — 80053 COMPREHEN METABOLIC PANEL: CPT

## 2025-05-26 PROCEDURE — 6360000002 HC RX W HCPCS

## 2025-05-26 PROCEDURE — 2580000003 HC RX 258: Performed by: HOSPITALIST

## 2025-05-26 PROCEDURE — 6370000000 HC RX 637 (ALT 250 FOR IP): Performed by: HOSPITALIST

## 2025-05-26 PROCEDURE — 6360000002 HC RX W HCPCS: Performed by: HOSPITALIST

## 2025-05-26 PROCEDURE — 82962 GLUCOSE BLOOD TEST: CPT

## 2025-05-26 PROCEDURE — 85025 COMPLETE CBC W/AUTO DIFF WBC: CPT

## 2025-05-26 PROCEDURE — 2580000003 HC RX 258

## 2025-05-26 PROCEDURE — 2580000003 HC RX 258: Performed by: NURSE PRACTITIONER

## 2025-05-26 PROCEDURE — 2500000003 HC RX 250 WO HCPCS: Performed by: INTERNAL MEDICINE

## 2025-05-26 RX ORDER — SODIUM CHLORIDE, SODIUM LACTATE, POTASSIUM CHLORIDE, AND CALCIUM CHLORIDE .6; .31; .03; .02 G/100ML; G/100ML; G/100ML; G/100ML
500 INJECTION, SOLUTION INTRAVENOUS ONCE
Status: COMPLETED | OUTPATIENT
Start: 2025-05-26 | End: 2025-05-26

## 2025-05-26 RX ORDER — LANOLIN ALCOHOL/MO/W.PET/CERES
400 CREAM (GRAM) TOPICAL DAILY
Status: DISCONTINUED | OUTPATIENT
Start: 2025-05-26 | End: 2025-06-04

## 2025-05-26 RX ORDER — ASPIRIN 81 MG/1
81 TABLET, CHEWABLE ORAL DAILY
Status: DISCONTINUED | OUTPATIENT
Start: 2025-05-26 | End: 2025-06-11 | Stop reason: HOSPADM

## 2025-05-26 RX ADMIN — SODIUM CHLORIDE, PRESERVATIVE FREE 10 ML: 5 INJECTION INTRAVENOUS at 20:06

## 2025-05-26 RX ADMIN — THIAMINE HYDROCHLORIDE 100 MG: 100 INJECTION, SOLUTION INTRAMUSCULAR; INTRAVENOUS at 08:33

## 2025-05-26 RX ADMIN — PIPERACILLIN AND TAZOBACTAM 3375 MG: 3; .375 INJECTION, POWDER, LYOPHILIZED, FOR SOLUTION INTRAVENOUS at 11:44

## 2025-05-26 RX ADMIN — Medication 10 ML: at 08:35

## 2025-05-26 RX ADMIN — PIPERACILLIN AND TAZOBACTAM 3375 MG: 3; .375 INJECTION, POWDER, LYOPHILIZED, FOR SOLUTION INTRAVENOUS at 01:57

## 2025-05-26 RX ADMIN — POTASSIUM & SODIUM PHOSPHATES POWDER PACK 280-160-250 MG 250 MG: 280-160-250 PACK at 08:37

## 2025-05-26 RX ADMIN — HYDROMORPHONE HYDROCHLORIDE 0.5 MG: 1 INJECTION, SOLUTION INTRAMUSCULAR; INTRAVENOUS; SUBCUTANEOUS at 09:56

## 2025-05-26 RX ADMIN — SODIUM CHLORIDE, PRESERVATIVE FREE 10 ML: 5 INJECTION INTRAVENOUS at 08:35

## 2025-05-26 RX ADMIN — MAGNESIUM OXIDE TAB 400 MG (241.3 MG ELEMENTAL MG) 400 MG: 400 (241.3 MG) TAB at 08:37

## 2025-05-26 RX ADMIN — SODIUM CHLORIDE, PRESERVATIVE FREE 40 MG: 5 INJECTION INTRAVENOUS at 08:34

## 2025-05-26 RX ADMIN — HYDROMORPHONE HYDROCHLORIDE 0.5 MG: 1 INJECTION, SOLUTION INTRAMUSCULAR; INTRAVENOUS; SUBCUTANEOUS at 15:14

## 2025-05-26 RX ADMIN — SODIUM CHLORIDE, SODIUM LACTATE, POTASSIUM CHLORIDE, AND CALCIUM CHLORIDE 500 ML: .6; .31; .03; .02 INJECTION, SOLUTION INTRAVENOUS at 02:36

## 2025-05-26 RX ADMIN — ASPIRIN 81 MG CHEWABLE TABLET 81 MG: 81 TABLET CHEWABLE at 15:22

## 2025-05-26 RX ADMIN — QUETIAPINE FUMARATE 25 MG: 25 TABLET ORAL at 20:06

## 2025-05-26 RX ADMIN — HYDROMORPHONE HYDROCHLORIDE 0.5 MG: 1 INJECTION, SOLUTION INTRAMUSCULAR; INTRAVENOUS; SUBCUTANEOUS at 20:08

## 2025-05-26 RX ADMIN — HYDROMORPHONE HYDROCHLORIDE 0.5 MG: 1 INJECTION, SOLUTION INTRAMUSCULAR; INTRAVENOUS; SUBCUTANEOUS at 01:53

## 2025-05-26 RX ADMIN — Medication 10 ML: at 20:06

## 2025-05-26 ASSESSMENT — PAIN SCALES - GENERAL
PAINLEVEL_OUTOF10: 4
PAINLEVEL_OUTOF10: 7
PAINLEVEL_OUTOF10: 4
PAINLEVEL_OUTOF10: 3
PAINLEVEL_OUTOF10: 9
PAINLEVEL_OUTOF10: 7
PAINLEVEL_OUTOF10: 9
PAINLEVEL_OUTOF10: 10

## 2025-05-26 ASSESSMENT — PAIN DESCRIPTION - LOCATION
LOCATION: ABDOMEN;THROAT
LOCATION: ABDOMEN;BACK
LOCATION: ABDOMEN;LEG

## 2025-05-26 ASSESSMENT — PAIN DESCRIPTION - DESCRIPTORS
DESCRIPTORS: ACHING;DISCOMFORT
DESCRIPTORS: SORE
DESCRIPTORS: ACHING

## 2025-05-26 ASSESSMENT — PAIN SCALES - WONG BAKER: WONGBAKER_NUMERICALRESPONSE: HURTS LITTLE MORE

## 2025-05-26 ASSESSMENT — PAIN - FUNCTIONAL ASSESSMENT: PAIN_FUNCTIONAL_ASSESSMENT: ACTIVITIES ARE NOT PREVENTED

## 2025-05-26 ASSESSMENT — PAIN DESCRIPTION - ORIENTATION: ORIENTATION: RIGHT

## 2025-05-27 LAB
ALBUMIN SERPL-MCNC: 1.9 G/DL (ref 3.5–5)
ALBUMIN/GLOB SERPL: 0.4 (ref 1.1–2.2)
ALP SERPL-CCNC: 69 U/L (ref 45–117)
ALT SERPL-CCNC: <6 U/L (ref 12–78)
ANION GAP SERPL CALC-SCNC: 6 MMOL/L (ref 2–12)
AST SERPL-CCNC: 17 U/L (ref 15–37)
BASOPHILS # BLD: 0.04 K/UL (ref 0–0.1)
BASOPHILS NFR BLD: 0.4 % (ref 0–1)
BILIRUB SERPL-MCNC: 0.3 MG/DL (ref 0.2–1)
BUN SERPL-MCNC: 8 MG/DL (ref 6–20)
BUN/CREAT SERPL: 9 (ref 12–20)
CALCIUM SERPL-MCNC: 10.6 MG/DL (ref 8.5–10.1)
CHLORIDE SERPL-SCNC: 107 MMOL/L (ref 97–108)
CO2 SERPL-SCNC: 25 MMOL/L (ref 21–32)
CREAT SERPL-MCNC: 0.87 MG/DL (ref 0.7–1.3)
DIFFERENTIAL METHOD BLD: ABNORMAL
EKG ATRIAL RATE: 77 BPM
EKG DIAGNOSIS: NORMAL
EKG P AXIS: 21 DEGREES
EKG P-R INTERVAL: 202 MS
EKG Q-T INTERVAL: 458 MS
EKG QRS DURATION: 172 MS
EKG QTC CALCULATION (BAZETT): 518 MS
EKG R AXIS: -27 DEGREES
EKG T AXIS: 110 DEGREES
EKG VENTRICULAR RATE: 77 BPM
EOSINOPHIL # BLD: 0.63 K/UL (ref 0–0.4)
EOSINOPHIL NFR BLD: 6 % (ref 0–7)
ERYTHROCYTE [DISTWIDTH] IN BLOOD BY AUTOMATED COUNT: 15 % (ref 11.5–14.5)
GLOBULIN SER CALC-MCNC: 4.6 G/DL (ref 2–4)
GLUCOSE BLD STRIP.AUTO-MCNC: 101 MG/DL (ref 65–117)
GLUCOSE BLD STRIP.AUTO-MCNC: 102 MG/DL (ref 65–117)
GLUCOSE BLD STRIP.AUTO-MCNC: 106 MG/DL (ref 65–117)
GLUCOSE BLD STRIP.AUTO-MCNC: 115 MG/DL (ref 65–117)
GLUCOSE BLD STRIP.AUTO-MCNC: 96 MG/DL (ref 65–117)
GLUCOSE SERPL-MCNC: 94 MG/DL (ref 65–100)
HCT VFR BLD AUTO: 31.7 % (ref 36.6–50.3)
HGB BLD-MCNC: 9.7 G/DL (ref 12.1–17)
IMM GRANULOCYTES # BLD AUTO: 0.05 K/UL (ref 0–0.04)
IMM GRANULOCYTES NFR BLD AUTO: 0.5 % (ref 0–0.5)
LYMPHOCYTES # BLD: 2.06 K/UL (ref 0.8–3.5)
LYMPHOCYTES NFR BLD: 19.5 % (ref 12–49)
MAGNESIUM SERPL-MCNC: 1.9 MG/DL (ref 1.6–2.4)
MCH RBC QN AUTO: 28.8 PG (ref 26–34)
MCHC RBC AUTO-ENTMCNC: 30.6 G/DL (ref 30–36.5)
MCV RBC AUTO: 94.1 FL (ref 80–99)
MONOCYTES # BLD: 1.75 K/UL (ref 0–1)
MONOCYTES NFR BLD: 16.6 % (ref 5–13)
NEUTS SEG # BLD: 6.03 K/UL (ref 1.8–8)
NEUTS SEG NFR BLD: 57 % (ref 32–75)
NRBC # BLD: 0 K/UL (ref 0–0.01)
NRBC BLD-RTO: 0 PER 100 WBC
PHOSPHATE SERPL-MCNC: 3.3 MG/DL (ref 2.6–4.7)
PLATELET # BLD AUTO: 326 K/UL (ref 150–400)
PMV BLD AUTO: 9.7 FL (ref 8.9–12.9)
POTASSIUM SERPL-SCNC: 4.1 MMOL/L (ref 3.5–5.1)
PROT SERPL-MCNC: 6.5 G/DL (ref 6.4–8.2)
RBC # BLD AUTO: 3.37 M/UL (ref 4.1–5.7)
SERVICE CMNT-IMP: NORMAL
SODIUM SERPL-SCNC: 138 MMOL/L (ref 136–145)
WBC # BLD AUTO: 10.6 K/UL (ref 4.1–11.1)

## 2025-05-27 PROCEDURE — 80053 COMPREHEN METABOLIC PANEL: CPT

## 2025-05-27 PROCEDURE — 2060000000 HC ICU INTERMEDIATE R&B

## 2025-05-27 PROCEDURE — 85025 COMPLETE CBC W/AUTO DIFF WBC: CPT

## 2025-05-27 PROCEDURE — 2500000003 HC RX 250 WO HCPCS: Performed by: INTERNAL MEDICINE

## 2025-05-27 PROCEDURE — 2500000003 HC RX 250 WO HCPCS

## 2025-05-27 PROCEDURE — 2700000000 HC OXYGEN THERAPY PER DAY

## 2025-05-27 PROCEDURE — 82962 GLUCOSE BLOOD TEST: CPT

## 2025-05-27 PROCEDURE — 84100 ASSAY OF PHOSPHORUS: CPT

## 2025-05-27 PROCEDURE — 92507 TX SP LANG VOICE COMM INDIV: CPT

## 2025-05-27 PROCEDURE — 97530 THERAPEUTIC ACTIVITIES: CPT

## 2025-05-27 PROCEDURE — 6370000000 HC RX 637 (ALT 250 FOR IP): Performed by: HOSPITALIST

## 2025-05-27 PROCEDURE — 31502 CHANGE OF WINDPIPE AIRWAY: CPT

## 2025-05-27 PROCEDURE — 94760 N-INVAS EAR/PLS OXIMETRY 1: CPT

## 2025-05-27 PROCEDURE — 2500000003 HC RX 250 WO HCPCS: Performed by: NURSE PRACTITIONER

## 2025-05-27 PROCEDURE — 83735 ASSAY OF MAGNESIUM: CPT

## 2025-05-27 PROCEDURE — 92526 ORAL FUNCTION THERAPY: CPT

## 2025-05-27 PROCEDURE — 6360000002 HC RX W HCPCS

## 2025-05-27 PROCEDURE — 2580000003 HC RX 258

## 2025-05-27 PROCEDURE — 6360000002 HC RX W HCPCS: Performed by: HOSPITALIST

## 2025-05-27 PROCEDURE — 97535 SELF CARE MNGMENT TRAINING: CPT

## 2025-05-27 RX ORDER — CLOPIDOGREL BISULFATE 75 MG/1
75 TABLET ORAL DAILY
Status: DISCONTINUED | OUTPATIENT
Start: 2025-05-28 | End: 2025-06-11 | Stop reason: HOSPADM

## 2025-05-27 RX ADMIN — POTASSIUM & SODIUM PHOSPHATES POWDER PACK 280-160-250 MG 250 MG: 280-160-250 PACK at 08:39

## 2025-05-27 RX ADMIN — Medication 10 ML: at 20:01

## 2025-05-27 RX ADMIN — MAGNESIUM OXIDE TAB 400 MG (241.3 MG ELEMENTAL MG) 400 MG: 400 (241.3 MG) TAB at 08:39

## 2025-05-27 RX ADMIN — HYDROMORPHONE HYDROCHLORIDE 0.5 MG: 1 INJECTION, SOLUTION INTRAMUSCULAR; INTRAVENOUS; SUBCUTANEOUS at 20:01

## 2025-05-27 RX ADMIN — QUETIAPINE FUMARATE 25 MG: 25 TABLET ORAL at 20:00

## 2025-05-27 RX ADMIN — Medication 10 ML: at 08:37

## 2025-05-27 RX ADMIN — THIAMINE HYDROCHLORIDE 100 MG: 100 INJECTION, SOLUTION INTRAMUSCULAR; INTRAVENOUS at 08:36

## 2025-05-27 RX ADMIN — HYDROMORPHONE HYDROCHLORIDE 0.5 MG: 1 INJECTION, SOLUTION INTRAMUSCULAR; INTRAVENOUS; SUBCUTANEOUS at 05:45

## 2025-05-27 RX ADMIN — SODIUM CHLORIDE, PRESERVATIVE FREE 40 MG: 5 INJECTION INTRAVENOUS at 08:35

## 2025-05-27 RX ADMIN — ENOXAPARIN SODIUM 40 MG: 100 INJECTION SUBCUTANEOUS at 08:39

## 2025-05-27 RX ADMIN — ASPIRIN 81 MG CHEWABLE TABLET 81 MG: 81 TABLET CHEWABLE at 08:39

## 2025-05-27 RX ADMIN — SODIUM CHLORIDE, PRESERVATIVE FREE 10 ML: 5 INJECTION INTRAVENOUS at 08:37

## 2025-05-27 RX ADMIN — SODIUM CHLORIDE, PRESERVATIVE FREE 10 ML: 5 INJECTION INTRAVENOUS at 20:01

## 2025-05-27 ASSESSMENT — PAIN DESCRIPTION - DESCRIPTORS
DESCRIPTORS: ACHING;DISCOMFORT
DESCRIPTORS: ACHING;DISCOMFORT

## 2025-05-27 ASSESSMENT — PAIN DESCRIPTION - LOCATION
LOCATION: BACK;ABDOMEN
LOCATION: BACK;ABDOMEN

## 2025-05-27 ASSESSMENT — PAIN SCALES - WONG BAKER
WONGBAKER_NUMERICALRESPONSE: HURTS A LITTLE BIT
WONGBAKER_NUMERICALRESPONSE: HURTS A LITTLE BIT

## 2025-05-27 ASSESSMENT — PAIN SCALES - GENERAL
PAINLEVEL_OUTOF10: 8
PAINLEVEL_OUTOF10: 2
PAINLEVEL_OUTOF10: 8
PAINLEVEL_OUTOF10: 2

## 2025-05-28 LAB
ANION GAP SERPL CALC-SCNC: 4 MMOL/L (ref 2–12)
BUN SERPL-MCNC: 11 MG/DL (ref 6–20)
BUN/CREAT SERPL: 11 (ref 12–20)
CALCIUM SERPL-MCNC: 11.4 MG/DL (ref 8.5–10.1)
CHLORIDE SERPL-SCNC: 107 MMOL/L (ref 97–108)
CO2 SERPL-SCNC: 28 MMOL/L (ref 21–32)
CREAT SERPL-MCNC: 0.99 MG/DL (ref 0.7–1.3)
GLUCOSE BLD STRIP.AUTO-MCNC: 93 MG/DL (ref 65–117)
GLUCOSE BLD STRIP.AUTO-MCNC: 94 MG/DL (ref 65–117)
GLUCOSE SERPL-MCNC: 116 MG/DL (ref 65–100)
MAGNESIUM SERPL-MCNC: 2 MG/DL (ref 1.6–2.4)
PHOSPHATE SERPL-MCNC: 3 MG/DL (ref 2.6–4.7)
POTASSIUM SERPL-SCNC: 3.5 MMOL/L (ref 3.5–5.1)
SERVICE CMNT-IMP: NORMAL
SERVICE CMNT-IMP: NORMAL
SODIUM SERPL-SCNC: 139 MMOL/L (ref 136–145)

## 2025-05-28 PROCEDURE — 2580000003 HC RX 258

## 2025-05-28 PROCEDURE — 2500000003 HC RX 250 WO HCPCS: Performed by: NURSE PRACTITIONER

## 2025-05-28 PROCEDURE — 6360000002 HC RX W HCPCS: Performed by: HOSPITALIST

## 2025-05-28 PROCEDURE — 6370000000 HC RX 637 (ALT 250 FOR IP)

## 2025-05-28 PROCEDURE — 80048 BASIC METABOLIC PNL TOTAL CA: CPT

## 2025-05-28 PROCEDURE — 99232 SBSQ HOSP IP/OBS MODERATE 35: CPT | Performed by: INTERNAL MEDICINE

## 2025-05-28 PROCEDURE — 94760 N-INVAS EAR/PLS OXIMETRY 1: CPT

## 2025-05-28 PROCEDURE — 2700000000 HC OXYGEN THERAPY PER DAY

## 2025-05-28 PROCEDURE — 6360000002 HC RX W HCPCS

## 2025-05-28 PROCEDURE — 31502 CHANGE OF WINDPIPE AIRWAY: CPT

## 2025-05-28 PROCEDURE — 92526 ORAL FUNCTION THERAPY: CPT

## 2025-05-28 PROCEDURE — 2500000003 HC RX 250 WO HCPCS: Performed by: INTERNAL MEDICINE

## 2025-05-28 PROCEDURE — 84100 ASSAY OF PHOSPHORUS: CPT

## 2025-05-28 PROCEDURE — 2060000000 HC ICU INTERMEDIATE R&B

## 2025-05-28 PROCEDURE — 82962 GLUCOSE BLOOD TEST: CPT

## 2025-05-28 PROCEDURE — 6370000000 HC RX 637 (ALT 250 FOR IP): Performed by: HOSPITALIST

## 2025-05-28 PROCEDURE — 83735 ASSAY OF MAGNESIUM: CPT

## 2025-05-28 PROCEDURE — 92507 TX SP LANG VOICE COMM INDIV: CPT

## 2025-05-28 RX ADMIN — ASPIRIN 81 MG CHEWABLE TABLET 81 MG: 81 TABLET CHEWABLE at 09:45

## 2025-05-28 RX ADMIN — QUETIAPINE FUMARATE 25 MG: 25 TABLET ORAL at 21:48

## 2025-05-28 RX ADMIN — SODIUM CHLORIDE, PRESERVATIVE FREE 40 MG: 5 INJECTION INTRAVENOUS at 09:44

## 2025-05-28 RX ADMIN — OXYCODONE 5 MG: 5 TABLET ORAL at 09:55

## 2025-05-28 RX ADMIN — SODIUM CHLORIDE, PRESERVATIVE FREE 10 ML: 5 INJECTION INTRAVENOUS at 09:45

## 2025-05-28 RX ADMIN — THIAMINE HYDROCHLORIDE 100 MG: 100 INJECTION, SOLUTION INTRAMUSCULAR; INTRAVENOUS at 09:44

## 2025-05-28 RX ADMIN — CLOPIDOGREL BISULFATE 75 MG: 75 TABLET, FILM COATED ORAL at 09:44

## 2025-05-28 RX ADMIN — POTASSIUM & SODIUM PHOSPHATES POWDER PACK 280-160-250 MG 250 MG: 280-160-250 PACK at 09:44

## 2025-05-28 RX ADMIN — LORAZEPAM 0.5 MG: 2 INJECTION INTRAMUSCULAR; INTRAVENOUS at 23:42

## 2025-05-28 RX ADMIN — OXYCODONE 5 MG: 5 TABLET ORAL at 17:13

## 2025-05-28 RX ADMIN — MAGNESIUM OXIDE TAB 400 MG (241.3 MG ELEMENTAL MG) 400 MG: 400 (241.3 MG) TAB at 09:45

## 2025-05-28 RX ADMIN — Medication 10 ML: at 09:46

## 2025-05-28 RX ADMIN — ENOXAPARIN SODIUM 40 MG: 100 INJECTION SUBCUTANEOUS at 09:44

## 2025-05-28 RX ADMIN — SODIUM CHLORIDE, PRESERVATIVE FREE 10 ML: 5 INJECTION INTRAVENOUS at 21:00

## 2025-05-28 ASSESSMENT — PAIN SCALES - GENERAL
PAINLEVEL_OUTOF10: 9
PAINLEVEL_OUTOF10: 8

## 2025-05-28 NOTE — INTERDISCIPLINARY ROUNDS
Interdisciplinary Trach Team Rounds    Patient discussed in interdisciplinary trach team rounds on this date. Present for rounds: Lucille Gutierrez, Interdisciplinary Tracheostomy , Speech-Language Pathologist, Brenda Spence, Interdisciplinary Tracheostomy , Speech-Language Pathologist, Dalila Fofana, Respiratory Therapy Clinical Educator, Dr. James Clayton, Thoracic Surgeon, and Barbara Cage, Case Management    Trach Info:   Trach Size: 4   Trach Type: Shiley   Cuffed/Cuffless: Cuffless   Placing Surgeon: Dr. Plascencia   Placement Date: 5/17/2025    Trach Change: 5/23 by Dr. Rose   Reason for Trach: Supraglottic mass     Airway:  Surgical Airway  05/17/25 (Active)   $ TRACH CHANGE $Yes 05/27/25 0400   Status Secured 05/27/25 2000   Site Assessment Dry;Clean 05/27/25 2000   Site Care Open to air 05/27/25 2000   Inner Cannula Care Changed/new 05/27/25 0400   Ties Assessment Clean;Dry;Intact 05/27/25 2000   Cuff Pressure 0 cm H2O 05/25/25 0200   Spare Trach at Bedside Yes 05/27/25 2000   Spare Trach Tube One Size Smaller at Bedside Yes 05/27/25 2000   Ambu Bag With Mask at Bedside Yes 05/27/25 2000       Surgical Airway  05/17/25 Shiley Cuffed (Active)   Status Secured 05/28/25 0825   Site Assessment Clean;Dry;No Bleeding 05/28/25 0825   Site Care Cleansed;Dried 05/28/25 0825   Inner Cannula Care Cleansed/dried 05/27/25 2000   Ties Assessment Intact 05/28/25 0825   Cuff Pressure 0 cm H2O 05/18/25 2000   Spare Trach at Bedside Yes 05/26/25 2000   Spare Trach Tube One Size Smaller at Bedside Yes 05/26/25 2000   Ambu Bag With Mask at Bedside Yes 05/28/25 0825     Surgical Airway  05/17/25 (Active)   Placement Date/Time: 05/17/25 1407   Present on Admission/Arrival: No  Placement Verified By: Capnometry  Surgical Airway Type: Tracheostomy  Discharged with Line/Drain/Airway: Yes       Surgical Airway  05/17/25 Shiley Cuffed (Active)   Placement Date/Time: 05/17/25 1438   Present on

## 2025-05-29 ENCOUNTER — APPOINTMENT (OUTPATIENT)
Facility: HOSPITAL | Age: 75
DRG: 011 | End: 2025-05-29
Payer: MEDICARE

## 2025-05-29 LAB
ALBUMIN SERPL-MCNC: 2 G/DL (ref 3.5–5)
ALBUMIN/GLOB SERPL: 0.4 (ref 1.1–2.2)
ALP SERPL-CCNC: 71 U/L (ref 45–117)
ALT SERPL-CCNC: 6 U/L (ref 12–78)
ANION GAP SERPL CALC-SCNC: 9 MMOL/L (ref 2–12)
ARTERIAL PATENCY WRIST A: YES
AST SERPL-CCNC: 17 U/L (ref 15–37)
BASE EXCESS BLDA CALC-SCNC: 4.1 MMOL/L
BASOPHILS # BLD: 0.07 K/UL (ref 0–0.1)
BASOPHILS NFR BLD: 0.5 % (ref 0–1)
BDY SITE: ABNORMAL
BILIRUB SERPL-MCNC: 0.3 MG/DL (ref 0.2–1)
BUN SERPL-MCNC: 13 MG/DL (ref 6–20)
BUN/CREAT SERPL: 14 (ref 12–20)
CALCIUM SERPL-MCNC: 11.8 MG/DL (ref 8.5–10.1)
CHLORIDE SERPL-SCNC: 106 MMOL/L (ref 97–108)
CO2 SERPL-SCNC: 25 MMOL/L (ref 21–32)
CREAT SERPL-MCNC: 0.95 MG/DL (ref 0.7–1.3)
DIFFERENTIAL METHOD BLD: ABNORMAL
EOSINOPHIL # BLD: 0.43 K/UL (ref 0–0.4)
EOSINOPHIL NFR BLD: 3.2 % (ref 0–7)
ERYTHROCYTE [DISTWIDTH] IN BLOOD BY AUTOMATED COUNT: 15.1 % (ref 11.5–14.5)
GLOBULIN SER CALC-MCNC: 5 G/DL (ref 2–4)
GLUCOSE BLD STRIP.AUTO-MCNC: 129 MG/DL (ref 65–117)
GLUCOSE BLD STRIP.AUTO-MCNC: 134 MG/DL (ref 65–117)
GLUCOSE SERPL-MCNC: 106 MG/DL (ref 65–100)
HCO3 BLDA-SCNC: 27 MMOL/L (ref 22–26)
HCT VFR BLD AUTO: 31.6 % (ref 36.6–50.3)
HGB BLD-MCNC: 10 G/DL (ref 12.1–17)
IMM GRANULOCYTES # BLD AUTO: 0.05 K/UL (ref 0–0.04)
IMM GRANULOCYTES NFR BLD AUTO: 0.4 % (ref 0–0.5)
LYMPHOCYTES # BLD: 1.43 K/UL (ref 0.8–3.5)
LYMPHOCYTES NFR BLD: 10.6 % (ref 12–49)
MAGNESIUM SERPL-MCNC: 2.1 MG/DL (ref 1.6–2.4)
MCH RBC QN AUTO: 28.7 PG (ref 26–34)
MCHC RBC AUTO-ENTMCNC: 31.6 G/DL (ref 30–36.5)
MCV RBC AUTO: 90.5 FL (ref 80–99)
MONOCYTES # BLD: 1.78 K/UL (ref 0–1)
MONOCYTES NFR BLD: 13.2 % (ref 5–13)
NEUTS SEG # BLD: 9.76 K/UL (ref 1.8–8)
NEUTS SEG NFR BLD: 72.1 % (ref 32–75)
NRBC # BLD: 0 K/UL (ref 0–0.01)
NRBC BLD-RTO: 0 PER 100 WBC
PCO2 BLDA: 35 MMHG (ref 35–45)
PH BLDA: 7.5 (ref 7.35–7.45)
PHOSPHATE SERPL-MCNC: 2.4 MG/DL (ref 2.6–4.7)
PLATELET # BLD AUTO: 306 K/UL (ref 150–400)
PMV BLD AUTO: 10.1 FL (ref 8.9–12.9)
PO2 BLDA: 76 MMHG (ref 80–100)
POTASSIUM SERPL-SCNC: 3.3 MMOL/L (ref 3.5–5.1)
PROT SERPL-MCNC: 7 G/DL (ref 6.4–8.2)
RBC # BLD AUTO: 3.49 M/UL (ref 4.1–5.7)
SAO2 % BLD: 96 % (ref 92–97)
SAO2% DEVICE SAO2% SENSOR NAME: ABNORMAL
SERVICE CMNT-IMP: ABNORMAL
SERVICE CMNT-IMP: ABNORMAL
SODIUM SERPL-SCNC: 140 MMOL/L (ref 136–145)
SPECIMEN SITE: ABNORMAL
WBC # BLD AUTO: 13.5 K/UL (ref 4.1–11.1)

## 2025-05-29 PROCEDURE — 2500000003 HC RX 250 WO HCPCS: Performed by: INTERNAL MEDICINE

## 2025-05-29 PROCEDURE — 2580000003 HC RX 258

## 2025-05-29 PROCEDURE — 82962 GLUCOSE BLOOD TEST: CPT

## 2025-05-29 PROCEDURE — 6370000000 HC RX 637 (ALT 250 FOR IP): Performed by: HOSPITALIST

## 2025-05-29 PROCEDURE — 36600 WITHDRAWAL OF ARTERIAL BLOOD: CPT

## 2025-05-29 PROCEDURE — 84100 ASSAY OF PHOSPHORUS: CPT

## 2025-05-29 PROCEDURE — 6360000002 HC RX W HCPCS: Performed by: HOSPITALIST

## 2025-05-29 PROCEDURE — 0B21XFZ CHANGE TRACHEOSTOMY DEVICE IN TRACHEA, EXTERNAL APPROACH: ICD-10-PCS | Performed by: ANESTHESIOLOGY

## 2025-05-29 PROCEDURE — 71045 X-RAY EXAM CHEST 1 VIEW: CPT

## 2025-05-29 PROCEDURE — 2500000003 HC RX 250 WO HCPCS: Performed by: NURSE PRACTITIONER

## 2025-05-29 PROCEDURE — 85025 COMPLETE CBC W/AUTO DIFF WBC: CPT

## 2025-05-29 PROCEDURE — P9047 ALBUMIN (HUMAN), 25%, 50ML: HCPCS | Performed by: HOSPITALIST

## 2025-05-29 PROCEDURE — 82803 BLOOD GASES ANY COMBINATION: CPT

## 2025-05-29 PROCEDURE — 83735 ASSAY OF MAGNESIUM: CPT

## 2025-05-29 PROCEDURE — 6360000002 HC RX W HCPCS

## 2025-05-29 PROCEDURE — 2500000003 HC RX 250 WO HCPCS

## 2025-05-29 PROCEDURE — 80053 COMPREHEN METABOLIC PANEL: CPT

## 2025-05-29 PROCEDURE — 6360000002 HC RX W HCPCS: Performed by: INTERNAL MEDICINE

## 2025-05-29 PROCEDURE — 2580000003 HC RX 258: Performed by: HOSPITALIST

## 2025-05-29 PROCEDURE — 2000000000 HC ICU R&B

## 2025-05-29 PROCEDURE — 6370000000 HC RX 637 (ALT 250 FOR IP)

## 2025-05-29 RX ORDER — ALBUMIN (HUMAN) 12.5 G/50ML
25 SOLUTION INTRAVENOUS ONCE
Status: COMPLETED | OUTPATIENT
Start: 2025-05-29 | End: 2025-05-29

## 2025-05-29 RX ORDER — METOPROLOL TARTRATE 25 MG/1
25 TABLET, FILM COATED ORAL 2 TIMES DAILY
Status: DISCONTINUED | OUTPATIENT
Start: 2025-05-29 | End: 2025-05-31

## 2025-05-29 RX ORDER — 0.9 % SODIUM CHLORIDE 0.9 %
500 INTRAVENOUS SOLUTION INTRAVENOUS ONCE
Status: COMPLETED | OUTPATIENT
Start: 2025-05-29 | End: 2025-05-29

## 2025-05-29 RX ORDER — 0.9 % SODIUM CHLORIDE 0.9 %
250 INTRAVENOUS SOLUTION INTRAVENOUS ONCE
Status: COMPLETED | OUTPATIENT
Start: 2025-05-29 | End: 2025-05-29

## 2025-05-29 RX ADMIN — Medication 10 ML: at 20:50

## 2025-05-29 RX ADMIN — POTASSIUM & SODIUM PHOSPHATES POWDER PACK 280-160-250 MG 250 MG: 280-160-250 PACK at 08:59

## 2025-05-29 RX ADMIN — NALXONE HYDROCHLORIDE 0.4 MG: 0.4 INJECTION INTRAMUSCULAR; INTRAVENOUS; SUBCUTANEOUS at 19:08

## 2025-05-29 RX ADMIN — Medication 10 ML: at 08:59

## 2025-05-29 RX ADMIN — POTASSIUM BICARBONATE 40 MEQ: 782 TABLET, EFFERVESCENT ORAL at 08:59

## 2025-05-29 RX ADMIN — ENOXAPARIN SODIUM 40 MG: 100 INJECTION SUBCUTANEOUS at 08:59

## 2025-05-29 RX ADMIN — CLOPIDOGREL BISULFATE 75 MG: 75 TABLET, FILM COATED ORAL at 08:59

## 2025-05-29 RX ADMIN — SODIUM CHLORIDE 250 ML: 0.9 INJECTION, SOLUTION INTRAVENOUS at 17:43

## 2025-05-29 RX ADMIN — SODIUM CHLORIDE, PRESERVATIVE FREE 10 ML: 5 INJECTION INTRAVENOUS at 20:50

## 2025-05-29 RX ADMIN — ALBUMIN (HUMAN) 25 G: 0.25 INJECTION, SOLUTION INTRAVENOUS at 16:31

## 2025-05-29 RX ADMIN — MAGNESIUM OXIDE TAB 400 MG (241.3 MG ELEMENTAL MG) 400 MG: 400 (241.3 MG) TAB at 08:58

## 2025-05-29 RX ADMIN — OXYCODONE 5 MG: 5 TABLET ORAL at 05:12

## 2025-05-29 RX ADMIN — HYDROMORPHONE HYDROCHLORIDE 0.5 MG: 1 INJECTION, SOLUTION INTRAMUSCULAR; INTRAVENOUS; SUBCUTANEOUS at 10:04

## 2025-05-29 RX ADMIN — SODIUM CHLORIDE 500 ML: 0.9 INJECTION, SOLUTION INTRAVENOUS at 18:48

## 2025-05-29 RX ADMIN — SODIUM CHLORIDE, PRESERVATIVE FREE 40 MG: 5 INJECTION INTRAVENOUS at 08:55

## 2025-05-29 RX ADMIN — LORAZEPAM 0.5 MG: 2 INJECTION INTRAMUSCULAR; INTRAVENOUS at 15:13

## 2025-05-29 RX ADMIN — ASPIRIN 81 MG CHEWABLE TABLET 81 MG: 81 TABLET CHEWABLE at 08:58

## 2025-05-29 RX ADMIN — THIAMINE HYDROCHLORIDE 100 MG: 100 INJECTION, SOLUTION INTRAMUSCULAR; INTRAVENOUS at 08:55

## 2025-05-29 RX ADMIN — SODIUM CHLORIDE, PRESERVATIVE FREE 10 ML: 5 INJECTION INTRAVENOUS at 08:59

## 2025-05-29 RX ADMIN — HYDROMORPHONE HYDROCHLORIDE 0.5 MG: 1 INJECTION, SOLUTION INTRAMUSCULAR; INTRAVENOUS; SUBCUTANEOUS at 15:12

## 2025-05-29 ASSESSMENT — PAIN DESCRIPTION - LOCATION: LOCATION: LEG

## 2025-05-29 ASSESSMENT — PAIN SCALES - GENERAL
PAINLEVEL_OUTOF10: 5
PAINLEVEL_OUTOF10: 8
PAINLEVEL_OUTOF10: 3
PAINLEVEL_OUTOF10: 0
PAINLEVEL_OUTOF10: 6

## 2025-05-29 ASSESSMENT — PAIN SCALES - WONG BAKER: WONGBAKER_NUMERICALRESPONSE: NO HURT

## 2025-05-29 ASSESSMENT — PAIN DESCRIPTION - ORIENTATION: ORIENTATION: RIGHT;LEFT

## 2025-05-29 ASSESSMENT — PAIN DESCRIPTION - DESCRIPTORS: DESCRIPTORS: SORE

## 2025-05-29 NOTE — PROCEDURES
PROCEDURE NOTE  Date: 5/29/2025   Name: Roge Lobato  YOB: 1950    Procedures  Trach re-insertion via bronchoscopy.    Pt had #4 trach that he coughed out and was unable to be re-inserted. No desaturation of dyspnea. Hemodynamics acceptable. Attempted myself to re-insert and only able to get in about 3/4.    - used bronch to look at airway trach and no obstruction seen. When pt coughed he had almost full collapse of airway so suspect when coughing he forces trach out.  - inserted new #4 trach over the bronchoscope and inserted without difficulty.  - bronchoscope confirmed position.  - pt tolerated procedure well.

## 2025-05-30 LAB
ANION GAP SERPL CALC-SCNC: 5 MMOL/L (ref 2–12)
BASOPHILS # BLD: 0.06 K/UL (ref 0–0.1)
BASOPHILS NFR BLD: 0.6 % (ref 0–1)
BUN SERPL-MCNC: 16 MG/DL (ref 6–20)
BUN/CREAT SERPL: 19 (ref 12–20)
CALCIUM SERPL-MCNC: 11 MG/DL (ref 8.5–10.1)
CHLORIDE SERPL-SCNC: 111 MMOL/L (ref 97–108)
CO2 SERPL-SCNC: 26 MMOL/L (ref 21–32)
CREAT SERPL-MCNC: 0.85 MG/DL (ref 0.7–1.3)
DIFFERENTIAL METHOD BLD: ABNORMAL
EOSINOPHIL # BLD: 0.27 K/UL (ref 0–0.4)
EOSINOPHIL NFR BLD: 2.5 % (ref 0–7)
ERYTHROCYTE [DISTWIDTH] IN BLOOD BY AUTOMATED COUNT: 14.9 % (ref 11.5–14.5)
GLUCOSE BLD STRIP.AUTO-MCNC: 102 MG/DL (ref 65–117)
GLUCOSE BLD STRIP.AUTO-MCNC: 110 MG/DL (ref 65–117)
GLUCOSE BLD STRIP.AUTO-MCNC: 122 MG/DL (ref 65–117)
GLUCOSE SERPL-MCNC: 102 MG/DL (ref 65–100)
HCT VFR BLD AUTO: 26.6 % (ref 36.6–50.3)
HGB BLD-MCNC: 8.4 G/DL (ref 12.1–17)
IMM GRANULOCYTES # BLD AUTO: 0.04 K/UL (ref 0–0.04)
IMM GRANULOCYTES NFR BLD AUTO: 0.4 % (ref 0–0.5)
LYMPHOCYTES # BLD: 1.71 K/UL (ref 0.8–3.5)
LYMPHOCYTES NFR BLD: 16.1 % (ref 12–49)
MAGNESIUM SERPL-MCNC: 2 MG/DL (ref 1.6–2.4)
MCH RBC QN AUTO: 28.8 PG (ref 26–34)
MCHC RBC AUTO-ENTMCNC: 31.6 G/DL (ref 30–36.5)
MCV RBC AUTO: 91.1 FL (ref 80–99)
MONOCYTES # BLD: 1.61 K/UL (ref 0–1)
MONOCYTES NFR BLD: 15.2 % (ref 5–13)
NEUTS SEG # BLD: 6.93 K/UL (ref 1.8–8)
NEUTS SEG NFR BLD: 65.2 % (ref 32–75)
NRBC # BLD: 0 K/UL (ref 0–0.01)
NRBC BLD-RTO: 0 PER 100 WBC
PHOSPHATE SERPL-MCNC: 2 MG/DL (ref 2.6–4.7)
PLATELET # BLD AUTO: 252 K/UL (ref 150–400)
PMV BLD AUTO: 10 FL (ref 8.9–12.9)
POTASSIUM SERPL-SCNC: 3.6 MMOL/L (ref 3.5–5.1)
RBC # BLD AUTO: 2.92 M/UL (ref 4.1–5.7)
SERVICE CMNT-IMP: ABNORMAL
SERVICE CMNT-IMP: NORMAL
SERVICE CMNT-IMP: NORMAL
SODIUM SERPL-SCNC: 142 MMOL/L (ref 136–145)
WBC # BLD AUTO: 10.6 K/UL (ref 4.1–11.1)

## 2025-05-30 PROCEDURE — 2500000003 HC RX 250 WO HCPCS: Performed by: NURSE PRACTITIONER

## 2025-05-30 PROCEDURE — 6360000002 HC RX W HCPCS

## 2025-05-30 PROCEDURE — 85025 COMPLETE CBC W/AUTO DIFF WBC: CPT

## 2025-05-30 PROCEDURE — 6370000000 HC RX 637 (ALT 250 FOR IP)

## 2025-05-30 PROCEDURE — 2000000000 HC ICU R&B

## 2025-05-30 PROCEDURE — 92507 TX SP LANG VOICE COMM INDIV: CPT

## 2025-05-30 PROCEDURE — 2580000003 HC RX 258

## 2025-05-30 PROCEDURE — 6370000000 HC RX 637 (ALT 250 FOR IP): Performed by: INTERNAL MEDICINE

## 2025-05-30 PROCEDURE — 84100 ASSAY OF PHOSPHORUS: CPT

## 2025-05-30 PROCEDURE — 6370000000 HC RX 637 (ALT 250 FOR IP): Performed by: HOSPITALIST

## 2025-05-30 PROCEDURE — 6360000002 HC RX W HCPCS: Performed by: HOSPITALIST

## 2025-05-30 PROCEDURE — 2500000003 HC RX 250 WO HCPCS: Performed by: INTERNAL MEDICINE

## 2025-05-30 PROCEDURE — 82962 GLUCOSE BLOOD TEST: CPT

## 2025-05-30 PROCEDURE — 83735 ASSAY OF MAGNESIUM: CPT

## 2025-05-30 PROCEDURE — 92526 ORAL FUNCTION THERAPY: CPT

## 2025-05-30 PROCEDURE — 80048 BASIC METABOLIC PNL TOTAL CA: CPT

## 2025-05-30 RX ORDER — OXYCODONE HYDROCHLORIDE 5 MG/1
5 TABLET ORAL EVERY 4 HOURS PRN
Refills: 0 | Status: DISCONTINUED | OUTPATIENT
Start: 2025-05-30 | End: 2025-06-02

## 2025-05-30 RX ORDER — MIDODRINE HYDROCHLORIDE 5 MG/1
15 TABLET ORAL EVERY 6 HOURS
Status: DISCONTINUED | OUTPATIENT
Start: 2025-05-30 | End: 2025-06-10

## 2025-05-30 RX ADMIN — CLOPIDOGREL BISULFATE 75 MG: 75 TABLET, FILM COATED ORAL at 08:59

## 2025-05-30 RX ADMIN — ASPIRIN 81 MG CHEWABLE TABLET 81 MG: 81 TABLET CHEWABLE at 08:59

## 2025-05-30 RX ADMIN — THIAMINE HYDROCHLORIDE 100 MG: 100 INJECTION, SOLUTION INTRAMUSCULAR; INTRAVENOUS at 09:05

## 2025-05-30 RX ADMIN — MIDODRINE HYDROCHLORIDE 15 MG: 5 TABLET ORAL at 23:30

## 2025-05-30 RX ADMIN — SODIUM CHLORIDE, PRESERVATIVE FREE 10 ML: 5 INJECTION INTRAVENOUS at 20:50

## 2025-05-30 RX ADMIN — MIDODRINE HYDROCHLORIDE 15 MG: 5 TABLET ORAL at 18:22

## 2025-05-30 RX ADMIN — MAGNESIUM OXIDE TAB 400 MG (241.3 MG ELEMENTAL MG) 400 MG: 400 (241.3 MG) TAB at 08:59

## 2025-05-30 RX ADMIN — OXYCODONE 5 MG: 5 TABLET ORAL at 12:47

## 2025-05-30 RX ADMIN — SODIUM CHLORIDE, PRESERVATIVE FREE 40 MG: 5 INJECTION INTRAVENOUS at 08:59

## 2025-05-30 RX ADMIN — QUETIAPINE FUMARATE 25 MG: 25 TABLET ORAL at 20:49

## 2025-05-30 RX ADMIN — ACETAMINOPHEN 650 MG: 325 TABLET ORAL at 12:47

## 2025-05-30 RX ADMIN — Medication 10 ML: at 09:00

## 2025-05-30 RX ADMIN — Medication 10 ML: at 20:50

## 2025-05-30 RX ADMIN — SODIUM CHLORIDE, PRESERVATIVE FREE 10 ML: 5 INJECTION INTRAVENOUS at 09:00

## 2025-05-30 RX ADMIN — POTASSIUM BICARBONATE 40 MEQ: 782 TABLET, EFFERVESCENT ORAL at 08:59

## 2025-05-30 RX ADMIN — ENOXAPARIN SODIUM 40 MG: 100 INJECTION SUBCUTANEOUS at 08:59

## 2025-05-30 RX ADMIN — POTASSIUM & SODIUM PHOSPHATES POWDER PACK 280-160-250 MG 250 MG: 280-160-250 PACK at 08:59

## 2025-05-30 ASSESSMENT — PAIN DESCRIPTION - DESCRIPTORS: DESCRIPTORS: ACHING

## 2025-05-30 ASSESSMENT — PAIN SCALES - GENERAL
PAINLEVEL_OUTOF10: 0
PAINLEVEL_OUTOF10: 10
PAINLEVEL_OUTOF10: 0

## 2025-05-30 ASSESSMENT — PAIN DESCRIPTION - LOCATION: LOCATION: HIP

## 2025-05-30 ASSESSMENT — PAIN - FUNCTIONAL ASSESSMENT: PAIN_FUNCTIONAL_ASSESSMENT: PREVENTS OR INTERFERES SOME ACTIVE ACTIVITIES AND ADLS

## 2025-05-30 ASSESSMENT — PAIN DESCRIPTION - PAIN TYPE: TYPE: CHRONIC PAIN

## 2025-05-30 ASSESSMENT — PAIN DESCRIPTION - ORIENTATION: ORIENTATION: RIGHT

## 2025-05-31 ENCOUNTER — APPOINTMENT (OUTPATIENT)
Facility: HOSPITAL | Age: 75
DRG: 011 | End: 2025-05-31
Payer: MEDICARE

## 2025-05-31 LAB
ANION GAP SERPL CALC-SCNC: 5 MMOL/L (ref 2–12)
BASOPHILS # BLD: 0.06 K/UL (ref 0–0.1)
BASOPHILS NFR BLD: 0.6 % (ref 0–1)
BUN SERPL-MCNC: 21 MG/DL (ref 6–20)
BUN/CREAT SERPL: 24 (ref 12–20)
CALCIUM SERPL-MCNC: 11.1 MG/DL (ref 8.5–10.1)
CHLORIDE SERPL-SCNC: 109 MMOL/L (ref 97–108)
CO2 SERPL-SCNC: 26 MMOL/L (ref 21–32)
CREAT SERPL-MCNC: 0.88 MG/DL (ref 0.7–1.3)
DIFFERENTIAL METHOD BLD: ABNORMAL
EOSINOPHIL # BLD: 0.53 K/UL (ref 0–0.4)
EOSINOPHIL NFR BLD: 5 % (ref 0–7)
ERYTHROCYTE [DISTWIDTH] IN BLOOD BY AUTOMATED COUNT: 15 % (ref 11.5–14.5)
GLUCOSE BLD STRIP.AUTO-MCNC: 101 MG/DL (ref 65–117)
GLUCOSE BLD STRIP.AUTO-MCNC: 110 MG/DL (ref 65–117)
GLUCOSE BLD STRIP.AUTO-MCNC: 133 MG/DL (ref 65–117)
GLUCOSE SERPL-MCNC: 100 MG/DL (ref 65–100)
HCT VFR BLD AUTO: 28.7 % (ref 36.6–50.3)
HGB BLD-MCNC: 8.7 G/DL (ref 12.1–17)
IMM GRANULOCYTES # BLD AUTO: 0.06 K/UL (ref 0–0.04)
IMM GRANULOCYTES NFR BLD AUTO: 0.6 % (ref 0–0.5)
LYMPHOCYTES # BLD: 1.67 K/UL (ref 0.8–3.5)
LYMPHOCYTES NFR BLD: 15.7 % (ref 12–49)
MAGNESIUM SERPL-MCNC: 1.8 MG/DL (ref 1.6–2.4)
MCH RBC QN AUTO: 28.4 PG (ref 26–34)
MCHC RBC AUTO-ENTMCNC: 30.3 G/DL (ref 30–36.5)
MCV RBC AUTO: 93.8 FL (ref 80–99)
MONOCYTES # BLD: 1.6 K/UL (ref 0–1)
MONOCYTES NFR BLD: 15 % (ref 5–13)
NEUTS SEG # BLD: 6.72 K/UL (ref 1.8–8)
NEUTS SEG NFR BLD: 63.1 % (ref 32–75)
NRBC # BLD: 0 K/UL (ref 0–0.01)
NRBC BLD-RTO: 0 PER 100 WBC
PHOSPHATE SERPL-MCNC: 2.3 MG/DL (ref 2.6–4.7)
PLATELET # BLD AUTO: 271 K/UL (ref 150–400)
PMV BLD AUTO: 10.4 FL (ref 8.9–12.9)
POTASSIUM SERPL-SCNC: 4 MMOL/L (ref 3.5–5.1)
RBC # BLD AUTO: 3.06 M/UL (ref 4.1–5.7)
SERVICE CMNT-IMP: ABNORMAL
SERVICE CMNT-IMP: NORMAL
SERVICE CMNT-IMP: NORMAL
SODIUM SERPL-SCNC: 140 MMOL/L (ref 136–145)
WBC # BLD AUTO: 10.6 K/UL (ref 4.1–11.1)

## 2025-05-31 PROCEDURE — 2580000003 HC RX 258

## 2025-05-31 PROCEDURE — 6360000002 HC RX W HCPCS: Performed by: INTERNAL MEDICINE

## 2025-05-31 PROCEDURE — 6360000002 HC RX W HCPCS: Performed by: HOSPITALIST

## 2025-05-31 PROCEDURE — 74018 RADEX ABDOMEN 1 VIEW: CPT

## 2025-05-31 PROCEDURE — 6360000002 HC RX W HCPCS

## 2025-05-31 PROCEDURE — 2500000003 HC RX 250 WO HCPCS: Performed by: NURSE PRACTITIONER

## 2025-05-31 PROCEDURE — 85025 COMPLETE CBC W/AUTO DIFF WBC: CPT

## 2025-05-31 PROCEDURE — 6370000000 HC RX 637 (ALT 250 FOR IP): Performed by: INTERNAL MEDICINE

## 2025-05-31 PROCEDURE — 94761 N-INVAS EAR/PLS OXIMETRY MLT: CPT

## 2025-05-31 PROCEDURE — 2500000003 HC RX 250 WO HCPCS: Performed by: INTERNAL MEDICINE

## 2025-05-31 PROCEDURE — 94640 AIRWAY INHALATION TREATMENT: CPT

## 2025-05-31 PROCEDURE — 82962 GLUCOSE BLOOD TEST: CPT

## 2025-05-31 PROCEDURE — 80048 BASIC METABOLIC PNL TOTAL CA: CPT

## 2025-05-31 PROCEDURE — 2060000000 HC ICU INTERMEDIATE R&B

## 2025-05-31 PROCEDURE — 83735 ASSAY OF MAGNESIUM: CPT

## 2025-05-31 PROCEDURE — 71045 X-RAY EXAM CHEST 1 VIEW: CPT

## 2025-05-31 PROCEDURE — 84100 ASSAY OF PHOSPHORUS: CPT

## 2025-05-31 PROCEDURE — 94760 N-INVAS EAR/PLS OXIMETRY 1: CPT

## 2025-05-31 PROCEDURE — 6370000000 HC RX 637 (ALT 250 FOR IP): Performed by: HOSPITALIST

## 2025-05-31 RX ORDER — ALBUTEROL SULFATE 0.83 MG/ML
2.5 SOLUTION RESPIRATORY (INHALATION)
Status: DISCONTINUED | OUTPATIENT
Start: 2025-05-31 | End: 2025-06-09

## 2025-05-31 RX ORDER — PANTOPRAZOLE SODIUM 40 MG/1
40 TABLET, DELAYED RELEASE ORAL
Status: DISCONTINUED | OUTPATIENT
Start: 2025-05-31 | End: 2025-06-02 | Stop reason: ALTCHOICE

## 2025-05-31 RX ORDER — ACETYLCYSTEINE 200 MG/ML
600 SOLUTION ORAL; RESPIRATORY (INHALATION)
Status: DISCONTINUED | OUTPATIENT
Start: 2025-05-31 | End: 2025-06-07

## 2025-05-31 RX ADMIN — ALBUTEROL SULFATE 2.5 MG: 2.5 SOLUTION RESPIRATORY (INHALATION) at 11:17

## 2025-05-31 RX ADMIN — THIAMINE HYDROCHLORIDE 100 MG: 100 INJECTION, SOLUTION INTRAMUSCULAR; INTRAVENOUS at 08:35

## 2025-05-31 RX ADMIN — MIDODRINE HYDROCHLORIDE 15 MG: 5 TABLET ORAL at 10:30

## 2025-05-31 RX ADMIN — ACETYLCYSTEINE 600 MG: 200 SOLUTION ORAL; RESPIRATORY (INHALATION) at 11:17

## 2025-05-31 RX ADMIN — ENOXAPARIN SODIUM 40 MG: 100 INJECTION SUBCUTANEOUS at 08:41

## 2025-05-31 RX ADMIN — QUETIAPINE FUMARATE 25 MG: 25 TABLET ORAL at 20:32

## 2025-05-31 RX ADMIN — MIDODRINE HYDROCHLORIDE 15 MG: 5 TABLET ORAL at 17:37

## 2025-05-31 RX ADMIN — Medication 10 ML: at 10:40

## 2025-05-31 RX ADMIN — SODIUM CHLORIDE, PRESERVATIVE FREE 10 ML: 5 INJECTION INTRAVENOUS at 20:00

## 2025-05-31 RX ADMIN — ACETYLCYSTEINE 600 MG: 200 SOLUTION ORAL; RESPIRATORY (INHALATION) at 19:43

## 2025-05-31 RX ADMIN — IPRATROPIUM BROMIDE 0.5 MG: 0.5 SOLUTION RESPIRATORY (INHALATION) at 11:19

## 2025-05-31 RX ADMIN — MAGNESIUM OXIDE TAB 400 MG (241.3 MG ELEMENTAL MG) 400 MG: 400 (241.3 MG) TAB at 08:41

## 2025-05-31 RX ADMIN — ASPIRIN 81 MG CHEWABLE TABLET 81 MG: 81 TABLET CHEWABLE at 08:41

## 2025-05-31 RX ADMIN — SODIUM CHLORIDE, PRESERVATIVE FREE 40 MG: 5 INJECTION INTRAVENOUS at 08:40

## 2025-05-31 RX ADMIN — Medication 10 ML: at 20:00

## 2025-05-31 RX ADMIN — CLOPIDOGREL BISULFATE 75 MG: 75 TABLET, FILM COATED ORAL at 10:31

## 2025-05-31 RX ADMIN — POTASSIUM & SODIUM PHOSPHATES POWDER PACK 280-160-250 MG 250 MG: 280-160-250 PACK at 08:41

## 2025-05-31 RX ADMIN — SODIUM CHLORIDE, PRESERVATIVE FREE 10 ML: 5 INJECTION INTRAVENOUS at 10:40

## 2025-05-31 RX ADMIN — MIDODRINE HYDROCHLORIDE 15 MG: 5 TABLET ORAL at 06:18

## 2025-05-31 RX ADMIN — MIDODRINE HYDROCHLORIDE 15 MG: 5 TABLET ORAL at 23:15

## 2025-05-31 RX ADMIN — IPRATROPIUM BROMIDE 0.5 MG: 0.5 SOLUTION RESPIRATORY (INHALATION) at 19:43

## 2025-05-31 ASSESSMENT — PAIN SCALES - GENERAL
PAINLEVEL_OUTOF10: 0

## 2025-06-01 PROBLEM — Z95.0 CARDIAC PACEMAKER IN SITU: Status: ACTIVE | Noted: 2025-06-01

## 2025-06-01 PROBLEM — I47.19 ATRIAL TACHYCARDIA: Status: ACTIVE | Noted: 2025-06-01

## 2025-06-01 LAB
ANION GAP SERPL CALC-SCNC: 7 MMOL/L (ref 2–12)
BASOPHILS # BLD: 0.07 K/UL (ref 0–0.1)
BASOPHILS NFR BLD: 0.5 % (ref 0–1)
BUN SERPL-MCNC: 24 MG/DL (ref 6–20)
BUN/CREAT SERPL: 28 (ref 12–20)
CALCIUM SERPL-MCNC: 11 MG/DL (ref 8.5–10.1)
CHLORIDE SERPL-SCNC: 107 MMOL/L (ref 97–108)
CO2 SERPL-SCNC: 24 MMOL/L (ref 21–32)
CREAT SERPL-MCNC: 0.87 MG/DL (ref 0.7–1.3)
DIFFERENTIAL METHOD BLD: ABNORMAL
EOSINOPHIL # BLD: 0.39 K/UL (ref 0–0.4)
EOSINOPHIL NFR BLD: 2.5 % (ref 0–7)
ERYTHROCYTE [DISTWIDTH] IN BLOOD BY AUTOMATED COUNT: 14.7 % (ref 11.5–14.5)
GLUCOSE BLD STRIP.AUTO-MCNC: 119 MG/DL (ref 65–117)
GLUCOSE SERPL-MCNC: 111 MG/DL (ref 65–100)
HCT VFR BLD AUTO: 29.3 % (ref 36.6–50.3)
HGB BLD-MCNC: 9 G/DL (ref 12.1–17)
IMM GRANULOCYTES # BLD AUTO: 0.09 K/UL (ref 0–0.04)
IMM GRANULOCYTES NFR BLD AUTO: 0.6 % (ref 0–0.5)
LYMPHOCYTES # BLD: 1.8 K/UL (ref 0.8–3.5)
LYMPHOCYTES NFR BLD: 11.6 % (ref 12–49)
MAGNESIUM SERPL-MCNC: 1.6 MG/DL (ref 1.6–2.4)
MCH RBC QN AUTO: 28.9 PG (ref 26–34)
MCHC RBC AUTO-ENTMCNC: 30.7 G/DL (ref 30–36.5)
MCV RBC AUTO: 94.2 FL (ref 80–99)
MONOCYTES # BLD: 1.61 K/UL (ref 0–1)
MONOCYTES NFR BLD: 10.4 % (ref 5–13)
NEUTS SEG # BLD: 11.5 K/UL (ref 1.8–8)
NEUTS SEG NFR BLD: 74.4 % (ref 32–75)
NRBC # BLD: 0 K/UL (ref 0–0.01)
NRBC BLD-RTO: 0 PER 100 WBC
PHOSPHATE SERPL-MCNC: 1.6 MG/DL (ref 2.6–4.7)
PLATELET # BLD AUTO: 316 K/UL (ref 150–400)
PMV BLD AUTO: 10.6 FL (ref 8.9–12.9)
POTASSIUM SERPL-SCNC: 3.8 MMOL/L (ref 3.5–5.1)
RBC # BLD AUTO: 3.11 M/UL (ref 4.1–5.7)
SERVICE CMNT-IMP: ABNORMAL
SODIUM SERPL-SCNC: 138 MMOL/L (ref 136–145)
WBC # BLD AUTO: 15.5 K/UL (ref 4.1–11.1)

## 2025-06-01 PROCEDURE — 6370000000 HC RX 637 (ALT 250 FOR IP): Performed by: INTERNAL MEDICINE

## 2025-06-01 PROCEDURE — 85025 COMPLETE CBC W/AUTO DIFF WBC: CPT

## 2025-06-01 PROCEDURE — 2500000003 HC RX 250 WO HCPCS: Performed by: NURSE PRACTITIONER

## 2025-06-01 PROCEDURE — 83735 ASSAY OF MAGNESIUM: CPT

## 2025-06-01 PROCEDURE — 99222 1ST HOSP IP/OBS MODERATE 55: CPT | Performed by: HOSPITALIST

## 2025-06-01 PROCEDURE — 2060000000 HC ICU INTERMEDIATE R&B

## 2025-06-01 PROCEDURE — 6360000002 HC RX W HCPCS: Performed by: INTERNAL MEDICINE

## 2025-06-01 PROCEDURE — 94640 AIRWAY INHALATION TREATMENT: CPT

## 2025-06-01 PROCEDURE — 84100 ASSAY OF PHOSPHORUS: CPT

## 2025-06-01 PROCEDURE — 6360000002 HC RX W HCPCS: Performed by: HOSPITALIST

## 2025-06-01 PROCEDURE — 2500000003 HC RX 250 WO HCPCS: Performed by: INTERNAL MEDICINE

## 2025-06-01 PROCEDURE — 6360000002 HC RX W HCPCS

## 2025-06-01 PROCEDURE — 6370000000 HC RX 637 (ALT 250 FOR IP): Performed by: HOSPITALIST

## 2025-06-01 PROCEDURE — 82962 GLUCOSE BLOOD TEST: CPT

## 2025-06-01 PROCEDURE — 80048 BASIC METABOLIC PNL TOTAL CA: CPT

## 2025-06-01 PROCEDURE — 94760 N-INVAS EAR/PLS OXIMETRY 1: CPT

## 2025-06-01 PROCEDURE — 2700000000 HC OXYGEN THERAPY PER DAY

## 2025-06-01 PROCEDURE — 6370000000 HC RX 637 (ALT 250 FOR IP)

## 2025-06-01 RX ORDER — OXYCODONE AND ACETAMINOPHEN 5; 325 MG/1; MG/1
1 TABLET ORAL EVERY 8 HOURS PRN
Refills: 0 | Status: DISCONTINUED | OUTPATIENT
Start: 2025-06-01 | End: 2025-06-02

## 2025-06-01 RX ORDER — ALBUTEROL SULFATE 0.83 MG/ML
2.5 SOLUTION RESPIRATORY (INHALATION)
Qty: 120 EACH | Refills: 3 | Status: SHIPPED | OUTPATIENT
Start: 2025-06-01

## 2025-06-01 RX ORDER — ACETYLCYSTEINE 200 MG/ML
600 SOLUTION ORAL; RESPIRATORY (INHALATION) 2 TIMES DAILY PRN
Qty: 42 ML | Refills: 0 | Status: SHIPPED | OUTPATIENT
Start: 2025-06-01 | End: 2025-06-15

## 2025-06-01 RX ADMIN — OXYCODONE HYDROCHLORIDE AND ACETAMINOPHEN 1 TABLET: 5; 325 TABLET ORAL at 17:52

## 2025-06-01 RX ADMIN — MIDODRINE HYDROCHLORIDE 15 MG: 5 TABLET ORAL at 17:51

## 2025-06-01 RX ADMIN — IPRATROPIUM BROMIDE 0.5 MG: 0.5 SOLUTION RESPIRATORY (INHALATION) at 07:46

## 2025-06-01 RX ADMIN — ALBUTEROL SULFATE 2.5 MG: 2.5 SOLUTION RESPIRATORY (INHALATION) at 21:23

## 2025-06-01 RX ADMIN — ASPIRIN 81 MG CHEWABLE TABLET 81 MG: 81 TABLET CHEWABLE at 09:51

## 2025-06-01 RX ADMIN — MIDODRINE HYDROCHLORIDE 15 MG: 5 TABLET ORAL at 10:04

## 2025-06-01 RX ADMIN — Medication 10 ML: at 22:44

## 2025-06-01 RX ADMIN — POTASSIUM & SODIUM PHOSPHATES POWDER PACK 280-160-250 MG 250 MG: 280-160-250 PACK at 09:51

## 2025-06-01 RX ADMIN — PANTOPRAZOLE SODIUM 40 MG: 40 TABLET, DELAYED RELEASE ORAL at 09:51

## 2025-06-01 RX ADMIN — MIDODRINE HYDROCHLORIDE 15 MG: 5 TABLET ORAL at 05:19

## 2025-06-01 RX ADMIN — ACETYLCYSTEINE 600 MG: 200 SOLUTION ORAL; RESPIRATORY (INHALATION) at 07:46

## 2025-06-01 RX ADMIN — MIDODRINE HYDROCHLORIDE 15 MG: 5 TABLET ORAL at 22:44

## 2025-06-01 RX ADMIN — IPRATROPIUM BROMIDE 0.5 MG: 0.5 SOLUTION RESPIRATORY (INHALATION) at 21:23

## 2025-06-01 RX ADMIN — CLOPIDOGREL BISULFATE 75 MG: 75 TABLET, FILM COATED ORAL at 09:51

## 2025-06-01 RX ADMIN — MAGNESIUM OXIDE TAB 400 MG (241.3 MG ELEMENTAL MG) 400 MG: 400 (241.3 MG) TAB at 09:51

## 2025-06-01 RX ADMIN — SODIUM CHLORIDE, PRESERVATIVE FREE 10 ML: 5 INJECTION INTRAVENOUS at 10:03

## 2025-06-01 RX ADMIN — ENOXAPARIN SODIUM 40 MG: 100 INJECTION SUBCUTANEOUS at 09:51

## 2025-06-01 RX ADMIN — IPRATROPIUM BROMIDE 0.5 MG: 0.5 SOLUTION RESPIRATORY (INHALATION) at 12:45

## 2025-06-01 RX ADMIN — SODIUM CHLORIDE, PRESERVATIVE FREE 10 ML: 5 INJECTION INTRAVENOUS at 22:44

## 2025-06-01 RX ADMIN — THIAMINE HYDROCHLORIDE 100 MG: 100 INJECTION, SOLUTION INTRAMUSCULAR; INTRAVENOUS at 09:51

## 2025-06-01 RX ADMIN — ACETAMINOPHEN 650 MG: 325 TABLET ORAL at 05:19

## 2025-06-01 RX ADMIN — ACETYLCYSTEINE 600 MG: 200 SOLUTION ORAL; RESPIRATORY (INHALATION) at 21:23

## 2025-06-01 ASSESSMENT — PAIN SCALES - GENERAL
PAINLEVEL_OUTOF10: 0
PAINLEVEL_OUTOF10: 0
PAINLEVEL_OUTOF10: 6
PAINLEVEL_OUTOF10: 0
PAINLEVEL_OUTOF10: 0

## 2025-06-01 ASSESSMENT — PAIN DESCRIPTION - LOCATION: LOCATION: CHEST

## 2025-06-01 ASSESSMENT — PAIN DESCRIPTION - ORIENTATION: ORIENTATION: MID

## 2025-06-01 NOTE — DISCHARGE INSTRUCTIONS
chills, nausea, vomiting, diarrhea, change in mentation, falling, weakness, bleeding. Severe pain or pain not relieved by medications.  Or, any other signs or symptoms that you may have questions about.    DISPOSITION:    Home With:   OT  PT  HH  RN       Long term SNF/Inpatient Rehab    Independent/assisted living    Hospice    Other:       PATIENT CONDITION AT DISCHARGE:     Functional status    Poor     Deconditioned     Independent      Cognition     Lucid     Forgetful     Dementia      Catheters/lines (plus indication)    Tyler     PICC     PEG     None      Code status     Full code     DNR      PHYSICAL EXAMINATION AT DISCHARGE:    General : alert x 3, awake, no acute distress,   HEENT: PEERL, EOMI, moist mucus membrane, TM clear  Neck: supple, no JVD, no meningeal signs  Chest: Clear to auscultation bilaterally   CVS: S1 S2 heard, Capillary refill less than 2 seconds  Abd: soft/ Non tender, non distended, BS physiological,   Ext: no clubbing, no cyanosis, no edema, brisk 2+ DP pulses  Neuro/Psych: pleasant mood and affect, CN 2-12 grossly intact, sensory grossly within normal limit, Strength 5/5 in all extremities, DTR 1+ x 4  Skin: warm     CHRONIC MEDICAL DIAGNOSES:  [unfilled]    Greater than 31 minutes were spent with the patient on counseling and coordination of care    Signed:   Ros Rod MD  6/1/2025  5:53 PM

## 2025-06-01 NOTE — DISCHARGE INSTR - LAB
Follow up with ENT at [facility] for total layngectomy once nutritional status and pulmonary status stable

## 2025-06-02 LAB
ANION GAP SERPL CALC-SCNC: 5 MMOL/L (ref 2–12)
BASOPHILS # BLD: 0.09 K/UL (ref 0–0.1)
BASOPHILS NFR BLD: 0.5 % (ref 0–1)
BUN SERPL-MCNC: 23 MG/DL (ref 6–20)
BUN/CREAT SERPL: 28 (ref 12–20)
CALCIUM SERPL-MCNC: 11.5 MG/DL (ref 8.5–10.1)
CHLORIDE SERPL-SCNC: 102 MMOL/L (ref 97–108)
CO2 SERPL-SCNC: 29 MMOL/L (ref 21–32)
CREAT SERPL-MCNC: 0.81 MG/DL (ref 0.7–1.3)
DIFFERENTIAL METHOD BLD: ABNORMAL
EOSINOPHIL # BLD: 0.73 K/UL (ref 0–0.4)
EOSINOPHIL NFR BLD: 4.4 % (ref 0–7)
ERYTHROCYTE [DISTWIDTH] IN BLOOD BY AUTOMATED COUNT: 14.3 % (ref 11.5–14.5)
GLUCOSE BLD STRIP.AUTO-MCNC: 101 MG/DL (ref 65–117)
GLUCOSE BLD STRIP.AUTO-MCNC: 103 MG/DL (ref 65–117)
GLUCOSE BLD STRIP.AUTO-MCNC: 107 MG/DL (ref 65–117)
GLUCOSE BLD STRIP.AUTO-MCNC: 111 MG/DL (ref 65–117)
GLUCOSE BLD STRIP.AUTO-MCNC: 121 MG/DL (ref 65–117)
GLUCOSE SERPL-MCNC: 112 MG/DL (ref 65–100)
HCT VFR BLD AUTO: 29.5 % (ref 36.6–50.3)
HGB BLD-MCNC: 9.2 G/DL (ref 12.1–17)
IMM GRANULOCYTES # BLD AUTO: 0.07 K/UL (ref 0–0.04)
IMM GRANULOCYTES NFR BLD AUTO: 0.4 % (ref 0–0.5)
LYMPHOCYTES # BLD: 1.39 K/UL (ref 0.8–3.5)
LYMPHOCYTES NFR BLD: 8.3 % (ref 12–49)
MCH RBC QN AUTO: 28.4 PG (ref 26–34)
MCHC RBC AUTO-ENTMCNC: 31.2 G/DL (ref 30–36.5)
MCV RBC AUTO: 91 FL (ref 80–99)
MONOCYTES # BLD: 1.79 K/UL (ref 0–1)
MONOCYTES NFR BLD: 10.7 % (ref 5–13)
NEUTS SEG # BLD: 12.67 K/UL (ref 1.8–8)
NEUTS SEG NFR BLD: 75.7 % (ref 32–75)
NRBC # BLD: 0 K/UL (ref 0–0.01)
NRBC BLD-RTO: 0 PER 100 WBC
PLATELET # BLD AUTO: 378 K/UL (ref 150–400)
PMV BLD AUTO: 10.7 FL (ref 8.9–12.9)
POTASSIUM SERPL-SCNC: 3.5 MMOL/L (ref 3.5–5.1)
RBC # BLD AUTO: 3.24 M/UL (ref 4.1–5.7)
SERVICE CMNT-IMP: ABNORMAL
SERVICE CMNT-IMP: NORMAL
SODIUM SERPL-SCNC: 136 MMOL/L (ref 136–145)
WBC # BLD AUTO: 16.7 K/UL (ref 4.1–11.1)

## 2025-06-02 PROCEDURE — 2700000000 HC OXYGEN THERAPY PER DAY

## 2025-06-02 PROCEDURE — 2500000003 HC RX 250 WO HCPCS: Performed by: NURSE PRACTITIONER

## 2025-06-02 PROCEDURE — 6360000002 HC RX W HCPCS: Performed by: INTERNAL MEDICINE

## 2025-06-02 PROCEDURE — 2060000000 HC ICU INTERMEDIATE R&B

## 2025-06-02 PROCEDURE — 6370000000 HC RX 637 (ALT 250 FOR IP): Performed by: INTERNAL MEDICINE

## 2025-06-02 PROCEDURE — 6360000002 HC RX W HCPCS

## 2025-06-02 PROCEDURE — 94760 N-INVAS EAR/PLS OXIMETRY 1: CPT

## 2025-06-02 PROCEDURE — 80048 BASIC METABOLIC PNL TOTAL CA: CPT

## 2025-06-02 PROCEDURE — 6360000002 HC RX W HCPCS: Performed by: HOSPITALIST

## 2025-06-02 PROCEDURE — 94640 AIRWAY INHALATION TREATMENT: CPT

## 2025-06-02 PROCEDURE — 6370000000 HC RX 637 (ALT 250 FOR IP): Performed by: HOSPITALIST

## 2025-06-02 PROCEDURE — 82962 GLUCOSE BLOOD TEST: CPT

## 2025-06-02 PROCEDURE — 2500000003 HC RX 250 WO HCPCS: Performed by: INTERNAL MEDICINE

## 2025-06-02 PROCEDURE — 85025 COMPLETE CBC W/AUTO DIFF WBC: CPT

## 2025-06-02 RX ORDER — LANSOPRAZOLE 30 MG/1
15 TABLET, ORALLY DISINTEGRATING, DELAYED RELEASE ORAL
Status: DISCONTINUED | OUTPATIENT
Start: 2025-06-03 | End: 2025-06-10

## 2025-06-02 RX ORDER — OXYCODONE HYDROCHLORIDE 5 MG/1
5 TABLET ORAL EVERY 4 HOURS PRN
Refills: 0 | Status: DISCONTINUED | OUTPATIENT
Start: 2025-06-02 | End: 2025-06-10

## 2025-06-02 RX ORDER — OXYCODONE AND ACETAMINOPHEN 5; 325 MG/1; MG/1
1 TABLET ORAL EVERY 8 HOURS PRN
Refills: 0 | Status: DISCONTINUED | OUTPATIENT
Start: 2025-06-02 | End: 2025-06-10

## 2025-06-02 RX ORDER — LANOLIN ALCOHOL/MO/W.PET/CERES
100 CREAM (GRAM) TOPICAL DAILY
Status: DISCONTINUED | OUTPATIENT
Start: 2025-06-03 | End: 2025-06-04 | Stop reason: ALTCHOICE

## 2025-06-02 RX ADMIN — ACETYLCYSTEINE 600 MG: 200 SOLUTION ORAL; RESPIRATORY (INHALATION) at 20:52

## 2025-06-02 RX ADMIN — SODIUM CHLORIDE, PRESERVATIVE FREE 10 ML: 5 INJECTION INTRAVENOUS at 09:49

## 2025-06-02 RX ADMIN — MIDODRINE HYDROCHLORIDE 15 MG: 5 TABLET ORAL at 23:55

## 2025-06-02 RX ADMIN — THIAMINE HYDROCHLORIDE 100 MG: 100 INJECTION, SOLUTION INTRAMUSCULAR; INTRAVENOUS at 09:48

## 2025-06-02 RX ADMIN — MIDODRINE HYDROCHLORIDE 15 MG: 5 TABLET ORAL at 18:23

## 2025-06-02 RX ADMIN — MIDODRINE HYDROCHLORIDE 15 MG: 5 TABLET ORAL at 09:48

## 2025-06-02 RX ADMIN — CLOPIDOGREL BISULFATE 75 MG: 75 TABLET, FILM COATED ORAL at 09:48

## 2025-06-02 RX ADMIN — ASPIRIN 81 MG CHEWABLE TABLET 81 MG: 81 TABLET CHEWABLE at 09:48

## 2025-06-02 RX ADMIN — ACETYLCYSTEINE 600 MG: 200 SOLUTION ORAL; RESPIRATORY (INHALATION) at 08:28

## 2025-06-02 RX ADMIN — ENOXAPARIN SODIUM 40 MG: 100 INJECTION SUBCUTANEOUS at 09:48

## 2025-06-02 RX ADMIN — MIDODRINE HYDROCHLORIDE 15 MG: 5 TABLET ORAL at 04:39

## 2025-06-02 RX ADMIN — OXYCODONE 5 MG: 5 TABLET ORAL at 04:39

## 2025-06-02 RX ADMIN — Medication 10 ML: at 22:00

## 2025-06-02 RX ADMIN — IPRATROPIUM BROMIDE 0.5 MG: 0.5 SOLUTION RESPIRATORY (INHALATION) at 20:52

## 2025-06-02 RX ADMIN — POTASSIUM & SODIUM PHOSPHATES POWDER PACK 280-160-250 MG 250 MG: 280-160-250 PACK at 09:48

## 2025-06-02 RX ADMIN — SODIUM CHLORIDE, PRESERVATIVE FREE 10 ML: 5 INJECTION INTRAVENOUS at 22:00

## 2025-06-02 RX ADMIN — OXYCODONE 5 MG: 5 TABLET ORAL at 09:48

## 2025-06-02 RX ADMIN — IPRATROPIUM BROMIDE 0.5 MG: 0.5 SOLUTION RESPIRATORY (INHALATION) at 08:28

## 2025-06-02 RX ADMIN — MAGNESIUM OXIDE TAB 400 MG (241.3 MG ELEMENTAL MG) 400 MG: 400 (241.3 MG) TAB at 09:48

## 2025-06-02 RX ADMIN — PANTOPRAZOLE SODIUM 40 MG: 40 TABLET, DELAYED RELEASE ORAL at 06:55

## 2025-06-02 ASSESSMENT — PAIN DESCRIPTION - ORIENTATION
ORIENTATION: LEFT
ORIENTATION: RIGHT

## 2025-06-02 ASSESSMENT — PAIN DESCRIPTION - LOCATION
LOCATION: LEG
LOCATION: ABDOMEN

## 2025-06-02 ASSESSMENT — PAIN SCALES - GENERAL
PAINLEVEL_OUTOF10: 7
PAINLEVEL_OUTOF10: 0

## 2025-06-03 LAB
ANION GAP SERPL CALC-SCNC: 4 MMOL/L (ref 2–12)
BUN SERPL-MCNC: 24 MG/DL (ref 6–20)
BUN/CREAT SERPL: 32 (ref 12–20)
CALCIUM SERPL-MCNC: 11.9 MG/DL (ref 8.5–10.1)
CHLORIDE SERPL-SCNC: 99 MMOL/L (ref 97–108)
CO2 SERPL-SCNC: 30 MMOL/L (ref 21–32)
CREAT SERPL-MCNC: 0.74 MG/DL (ref 0.7–1.3)
ERYTHROCYTE [DISTWIDTH] IN BLOOD BY AUTOMATED COUNT: 14 % (ref 11.5–14.5)
GLUCOSE BLD STRIP.AUTO-MCNC: 107 MG/DL (ref 65–117)
GLUCOSE BLD STRIP.AUTO-MCNC: 116 MG/DL (ref 65–117)
GLUCOSE BLD STRIP.AUTO-MCNC: 116 MG/DL (ref 65–117)
GLUCOSE SERPL-MCNC: 107 MG/DL (ref 65–100)
HCT VFR BLD AUTO: 28.6 % (ref 36.6–50.3)
HGB BLD-MCNC: 8.8 G/DL (ref 12.1–17)
MCH RBC QN AUTO: 27.8 PG (ref 26–34)
MCHC RBC AUTO-ENTMCNC: 30.8 G/DL (ref 30–36.5)
MCV RBC AUTO: 90.2 FL (ref 80–99)
NRBC # BLD: 0 K/UL (ref 0–0.01)
NRBC BLD-RTO: 0 PER 100 WBC
PLATELET # BLD AUTO: 370 K/UL (ref 150–400)
PMV BLD AUTO: 10.8 FL (ref 8.9–12.9)
POTASSIUM SERPL-SCNC: 3.8 MMOL/L (ref 3.5–5.1)
RBC # BLD AUTO: 3.17 M/UL (ref 4.1–5.7)
SERVICE CMNT-IMP: NORMAL
SODIUM SERPL-SCNC: 133 MMOL/L (ref 136–145)
WBC # BLD AUTO: 12.6 K/UL (ref 4.1–11.1)

## 2025-06-03 PROCEDURE — 6370000000 HC RX 637 (ALT 250 FOR IP): Performed by: INTERNAL MEDICINE

## 2025-06-03 PROCEDURE — 2500000003 HC RX 250 WO HCPCS: Performed by: INTERNAL MEDICINE

## 2025-06-03 PROCEDURE — 92526 ORAL FUNCTION THERAPY: CPT

## 2025-06-03 PROCEDURE — 6370000000 HC RX 637 (ALT 250 FOR IP): Performed by: HOSPITALIST

## 2025-06-03 PROCEDURE — 97530 THERAPEUTIC ACTIVITIES: CPT

## 2025-06-03 PROCEDURE — 2700000000 HC OXYGEN THERAPY PER DAY

## 2025-06-03 PROCEDURE — 82962 GLUCOSE BLOOD TEST: CPT

## 2025-06-03 PROCEDURE — 6360000002 HC RX W HCPCS: Performed by: HOSPITALIST

## 2025-06-03 PROCEDURE — 80048 BASIC METABOLIC PNL TOTAL CA: CPT

## 2025-06-03 PROCEDURE — 6370000000 HC RX 637 (ALT 250 FOR IP): Performed by: NURSE PRACTITIONER

## 2025-06-03 PROCEDURE — 94640 AIRWAY INHALATION TREATMENT: CPT

## 2025-06-03 PROCEDURE — 6360000002 HC RX W HCPCS

## 2025-06-03 PROCEDURE — 92507 TX SP LANG VOICE COMM INDIV: CPT

## 2025-06-03 PROCEDURE — 85027 COMPLETE CBC AUTOMATED: CPT

## 2025-06-03 PROCEDURE — 2060000000 HC ICU INTERMEDIATE R&B

## 2025-06-03 PROCEDURE — 2500000003 HC RX 250 WO HCPCS: Performed by: NURSE PRACTITIONER

## 2025-06-03 PROCEDURE — 6360000002 HC RX W HCPCS: Performed by: INTERNAL MEDICINE

## 2025-06-03 PROCEDURE — 97535 SELF CARE MNGMENT TRAINING: CPT

## 2025-06-03 PROCEDURE — 94760 N-INVAS EAR/PLS OXIMETRY 1: CPT

## 2025-06-03 RX ADMIN — CLOPIDOGREL BISULFATE 75 MG: 75 TABLET, FILM COATED ORAL at 08:17

## 2025-06-03 RX ADMIN — ENOXAPARIN SODIUM 40 MG: 100 INJECTION SUBCUTANEOUS at 08:18

## 2025-06-03 RX ADMIN — ALBUTEROL SULFATE 2.5 MG: 2.5 SOLUTION RESPIRATORY (INHALATION) at 21:26

## 2025-06-03 RX ADMIN — OXYCODONE 5 MG: 5 TABLET ORAL at 23:23

## 2025-06-03 RX ADMIN — IPRATROPIUM BROMIDE 0.5 MG: 0.5 SOLUTION RESPIRATORY (INHALATION) at 07:16

## 2025-06-03 RX ADMIN — OXYCODONE 5 MG: 5 TABLET ORAL at 02:11

## 2025-06-03 RX ADMIN — MIDODRINE HYDROCHLORIDE 15 MG: 5 TABLET ORAL at 11:29

## 2025-06-03 RX ADMIN — MIDODRINE HYDROCHLORIDE 15 MG: 5 TABLET ORAL at 23:31

## 2025-06-03 RX ADMIN — ASPIRIN 81 MG CHEWABLE TABLET 81 MG: 81 TABLET CHEWABLE at 08:17

## 2025-06-03 RX ADMIN — Medication 10 ML: at 08:17

## 2025-06-03 RX ADMIN — OXYCODONE 5 MG: 5 TABLET ORAL at 14:36

## 2025-06-03 RX ADMIN — POTASSIUM & SODIUM PHOSPHATES POWDER PACK 280-160-250 MG 250 MG: 280-160-250 PACK at 08:17

## 2025-06-03 RX ADMIN — ACETYLCYSTEINE 600 MG: 200 SOLUTION ORAL; RESPIRATORY (INHALATION) at 07:16

## 2025-06-03 RX ADMIN — ACETYLCYSTEINE 600 MG: 200 SOLUTION ORAL; RESPIRATORY (INHALATION) at 21:26

## 2025-06-03 RX ADMIN — IPRATROPIUM BROMIDE 0.5 MG: 0.5 SOLUTION RESPIRATORY (INHALATION) at 21:26

## 2025-06-03 RX ADMIN — SODIUM CHLORIDE, PRESERVATIVE FREE 10 ML: 5 INJECTION INTRAVENOUS at 08:17

## 2025-06-03 RX ADMIN — LANSOPRAZOLE 15 MG: 30 TABLET, ORALLY DISINTEGRATING ORAL at 06:59

## 2025-06-03 RX ADMIN — MIDODRINE HYDROCHLORIDE 15 MG: 5 TABLET ORAL at 04:53

## 2025-06-03 RX ADMIN — Medication 100 MG: at 08:17

## 2025-06-03 RX ADMIN — MIDODRINE HYDROCHLORIDE 15 MG: 5 TABLET ORAL at 17:24

## 2025-06-03 RX ADMIN — MAGNESIUM OXIDE TAB 400 MG (241.3 MG ELEMENTAL MG) 400 MG: 400 (241.3 MG) TAB at 08:17

## 2025-06-03 ASSESSMENT — PAIN DESCRIPTION - DESCRIPTORS
DESCRIPTORS: ITCHING;ACHING
DESCRIPTORS: ACHING;OTHER (COMMENT)

## 2025-06-03 ASSESSMENT — PAIN - FUNCTIONAL ASSESSMENT: PAIN_FUNCTIONAL_ASSESSMENT: ACTIVITIES ARE NOT PREVENTED

## 2025-06-03 ASSESSMENT — PAIN SCALES - GENERAL
PAINLEVEL_OUTOF10: 10
PAINLEVEL_OUTOF10: 5
PAINLEVEL_OUTOF10: 10
PAINLEVEL_OUTOF10: 10
PAINLEVEL_OUTOF10: 8
PAINLEVEL_OUTOF10: 10
PAINLEVEL_OUTOF10: 10
PAINLEVEL_OUTOF10: 8

## 2025-06-03 ASSESSMENT — PAIN DESCRIPTION - LOCATION
LOCATION: THROAT;KNEE
LOCATION: HIP
LOCATION: THROAT

## 2025-06-03 ASSESSMENT — PAIN DESCRIPTION - ORIENTATION: ORIENTATION: RIGHT

## 2025-06-04 LAB
ANION GAP SERPL CALC-SCNC: 5 MMOL/L (ref 2–12)
BUN SERPL-MCNC: 23 MG/DL (ref 6–20)
BUN/CREAT SERPL: 32 (ref 12–20)
CALCIUM SERPL-MCNC: 11.7 MG/DL (ref 8.5–10.1)
CHLORIDE SERPL-SCNC: 100 MMOL/L (ref 97–108)
CO2 SERPL-SCNC: 28 MMOL/L (ref 21–32)
CREAT SERPL-MCNC: 0.71 MG/DL (ref 0.7–1.3)
EKG ATRIAL RATE: 88 BPM
EKG DIAGNOSIS: NORMAL
EKG P AXIS: 67 DEGREES
EKG P-R INTERVAL: 218 MS
EKG Q-T INTERVAL: 410 MS
EKG QRS DURATION: 168 MS
EKG QTC CALCULATION (BAZETT): 496 MS
EKG R AXIS: -39 DEGREES
EKG T AXIS: 120 DEGREES
EKG VENTRICULAR RATE: 88 BPM
ERYTHROCYTE [DISTWIDTH] IN BLOOD BY AUTOMATED COUNT: 13.8 % (ref 11.5–14.5)
GLUCOSE BLD STRIP.AUTO-MCNC: 106 MG/DL (ref 65–117)
GLUCOSE BLD STRIP.AUTO-MCNC: 111 MG/DL (ref 65–117)
GLUCOSE BLD STRIP.AUTO-MCNC: 120 MG/DL (ref 65–117)
GLUCOSE SERPL-MCNC: 123 MG/DL (ref 65–100)
HCT VFR BLD AUTO: 28.9 % (ref 36.6–50.3)
HGB BLD-MCNC: 9.1 G/DL (ref 12.1–17)
MAGNESIUM SERPL-MCNC: 1.5 MG/DL (ref 1.6–2.4)
MCH RBC QN AUTO: 27.9 PG (ref 26–34)
MCHC RBC AUTO-ENTMCNC: 31.5 G/DL (ref 30–36.5)
MCV RBC AUTO: 88.7 FL (ref 80–99)
NRBC # BLD: 0 K/UL (ref 0–0.01)
NRBC BLD-RTO: 0 PER 100 WBC
PHOSPHATE SERPL-MCNC: 2.5 MG/DL (ref 2.6–4.7)
PLATELET # BLD AUTO: 384 K/UL (ref 150–400)
PMV BLD AUTO: 10.8 FL (ref 8.9–12.9)
POTASSIUM SERPL-SCNC: 3.8 MMOL/L (ref 3.5–5.1)
RBC # BLD AUTO: 3.26 M/UL (ref 4.1–5.7)
SERVICE CMNT-IMP: ABNORMAL
SERVICE CMNT-IMP: NORMAL
SERVICE CMNT-IMP: NORMAL
SODIUM SERPL-SCNC: 133 MMOL/L (ref 136–145)
TROPONIN I SERPL HS-MCNC: 7 NG/L (ref 0–76)
WBC # BLD AUTO: 12.5 K/UL (ref 4.1–11.1)

## 2025-06-04 PROCEDURE — 93005 ELECTROCARDIOGRAM TRACING: CPT | Performed by: INTERNAL MEDICINE

## 2025-06-04 PROCEDURE — 92507 TX SP LANG VOICE COMM INDIV: CPT

## 2025-06-04 PROCEDURE — 84484 ASSAY OF TROPONIN QUANT: CPT

## 2025-06-04 PROCEDURE — 6370000000 HC RX 637 (ALT 250 FOR IP): Performed by: HOSPITALIST

## 2025-06-04 PROCEDURE — 2500000003 HC RX 250 WO HCPCS: Performed by: NURSE PRACTITIONER

## 2025-06-04 PROCEDURE — 2500000003 HC RX 250 WO HCPCS: Performed by: INTERNAL MEDICINE

## 2025-06-04 PROCEDURE — 6360000002 HC RX W HCPCS: Performed by: INTERNAL MEDICINE

## 2025-06-04 PROCEDURE — 84100 ASSAY OF PHOSPHORUS: CPT

## 2025-06-04 PROCEDURE — 82962 GLUCOSE BLOOD TEST: CPT

## 2025-06-04 PROCEDURE — 85027 COMPLETE CBC AUTOMATED: CPT

## 2025-06-04 PROCEDURE — 2700000000 HC OXYGEN THERAPY PER DAY

## 2025-06-04 PROCEDURE — 6370000000 HC RX 637 (ALT 250 FOR IP): Performed by: INTERNAL MEDICINE

## 2025-06-04 PROCEDURE — 2060000000 HC ICU INTERMEDIATE R&B

## 2025-06-04 PROCEDURE — 83735 ASSAY OF MAGNESIUM: CPT

## 2025-06-04 PROCEDURE — 6360000002 HC RX W HCPCS: Performed by: HOSPITALIST

## 2025-06-04 PROCEDURE — 94640 AIRWAY INHALATION TREATMENT: CPT

## 2025-06-04 PROCEDURE — 80048 BASIC METABOLIC PNL TOTAL CA: CPT

## 2025-06-04 PROCEDURE — 94760 N-INVAS EAR/PLS OXIMETRY 1: CPT

## 2025-06-04 RX ORDER — LANOLIN ALCOHOL/MO/W.PET/CERES
400 CREAM (GRAM) TOPICAL 2 TIMES DAILY
Status: DISCONTINUED | OUTPATIENT
Start: 2025-06-04 | End: 2025-06-11 | Stop reason: HOSPADM

## 2025-06-04 RX ADMIN — MAGNESIUM OXIDE TAB 400 MG (241.3 MG ELEMENTAL MG) 400 MG: 400 (241.3 MG) TAB at 10:31

## 2025-06-04 RX ADMIN — Medication 5 ML: at 10:47

## 2025-06-04 RX ADMIN — ACETYLCYSTEINE 600 MG: 200 SOLUTION ORAL; RESPIRATORY (INHALATION) at 07:39

## 2025-06-04 RX ADMIN — OXYCODONE HYDROCHLORIDE AND ACETAMINOPHEN 1 TABLET: 5; 325 TABLET ORAL at 21:45

## 2025-06-04 RX ADMIN — ACETYLCYSTEINE 600 MG: 200 SOLUTION ORAL; RESPIRATORY (INHALATION) at 21:12

## 2025-06-04 RX ADMIN — MIDODRINE HYDROCHLORIDE 15 MG: 5 TABLET ORAL at 10:31

## 2025-06-04 RX ADMIN — ASPIRIN 81 MG CHEWABLE TABLET 81 MG: 81 TABLET CHEWABLE at 10:31

## 2025-06-04 RX ADMIN — POTASSIUM & SODIUM PHOSPHATES POWDER PACK 280-160-250 MG 250 MG: 280-160-250 PACK at 21:45

## 2025-06-04 RX ADMIN — IPRATROPIUM BROMIDE 0.5 MG: 0.5 SOLUTION RESPIRATORY (INHALATION) at 07:39

## 2025-06-04 RX ADMIN — OXYCODONE 5 MG: 5 TABLET ORAL at 10:31

## 2025-06-04 RX ADMIN — SODIUM CHLORIDE, PRESERVATIVE FREE 10 ML: 5 INJECTION INTRAVENOUS at 21:00

## 2025-06-04 RX ADMIN — ALBUTEROL SULFATE 2.5 MG: 2.5 SOLUTION RESPIRATORY (INHALATION) at 07:40

## 2025-06-04 RX ADMIN — MIDODRINE HYDROCHLORIDE 15 MG: 5 TABLET ORAL at 05:04

## 2025-06-04 RX ADMIN — SODIUM CHLORIDE, PRESERVATIVE FREE 5 ML: 5 INJECTION INTRAVENOUS at 10:47

## 2025-06-04 RX ADMIN — ALBUTEROL SULFATE 2.5 MG: 2.5 SOLUTION RESPIRATORY (INHALATION) at 21:13

## 2025-06-04 RX ADMIN — Medication 400 MG: at 21:45

## 2025-06-04 RX ADMIN — Medication 100 MG: at 10:31

## 2025-06-04 RX ADMIN — IPRATROPIUM BROMIDE 0.5 MG: 0.5 SOLUTION RESPIRATORY (INHALATION) at 21:13

## 2025-06-04 RX ADMIN — LANSOPRAZOLE 15 MG: 30 TABLET, ORALLY DISINTEGRATING ORAL at 05:03

## 2025-06-04 RX ADMIN — CLOPIDOGREL BISULFATE 75 MG: 75 TABLET, FILM COATED ORAL at 10:31

## 2025-06-04 RX ADMIN — Medication 10 ML: at 21:00

## 2025-06-04 RX ADMIN — POTASSIUM & SODIUM PHOSPHATES POWDER PACK 280-160-250 MG 250 MG: 280-160-250 PACK at 10:31

## 2025-06-04 RX ADMIN — MIDODRINE HYDROCHLORIDE 15 MG: 5 TABLET ORAL at 18:30

## 2025-06-04 ASSESSMENT — PAIN SCALES - WONG BAKER: WONGBAKER_NUMERICALRESPONSE: NO HURT

## 2025-06-04 ASSESSMENT — PAIN DESCRIPTION - DESCRIPTORS
DESCRIPTORS: DISCOMFORT
DESCRIPTORS: ACHING
DESCRIPTORS: ACHING

## 2025-06-04 ASSESSMENT — PAIN DESCRIPTION - LOCATION
LOCATION: ABDOMEN
LOCATION: ABDOMEN
LOCATION: CHEST

## 2025-06-04 ASSESSMENT — PAIN SCALES - GENERAL
PAINLEVEL_OUTOF10: 10
PAINLEVEL_OUTOF10: 10
PAINLEVEL_OUTOF10: 7

## 2025-06-04 NOTE — SIGNIFICANT EVENT
Rapid Response  Rapid response room 402 called overhead at 0921. RRT responding.    Rapid response called for chest pain and shortness of breath    Pt reporting CP around his pacemaker and SOB. Staff setting up for EKG upon RRT arrival. SpO2 97% on trach collar. Pt was just suctioned, mild secretions noted per primary RN. No increased WOB noted.    Dr. Baires at bedside. No further orders at this time. Recommended to primary RN to give PRN pain medication based off pain scale and reassess.    Checked in with primary RN prior to leaving. Opportunity for questions and concerns provided.      Rapid Response Team Sepsis Screening  Is the patient's history suggestive of a new infection? No    Are two or more SIRS criteria present? No    Is there evidence of Organ Dysfunction? [facility] Sepsis OD: None    Communication with provider: No    Was a Code Sepsis called at this encounter? No. Why? Not indicated at this time.

## 2025-06-05 PROBLEM — R06.02 SHORTNESS OF BREATH: Status: ACTIVE | Noted: 2025-06-05

## 2025-06-05 PROBLEM — E46 MALNUTRITION: Status: ACTIVE | Noted: 2025-06-05

## 2025-06-05 LAB
ANION GAP SERPL CALC-SCNC: 9 MMOL/L (ref 2–12)
BUN SERPL-MCNC: 25 MG/DL (ref 6–20)
BUN/CREAT SERPL: 34 (ref 12–20)
CALCIUM SERPL-MCNC: 11.3 MG/DL (ref 8.5–10.1)
CHLORIDE SERPL-SCNC: 99 MMOL/L (ref 97–108)
CO2 SERPL-SCNC: 26 MMOL/L (ref 21–32)
CREAT SERPL-MCNC: 0.73 MG/DL (ref 0.7–1.3)
ERYTHROCYTE [DISTWIDTH] IN BLOOD BY AUTOMATED COUNT: 13.7 % (ref 11.5–14.5)
GLUCOSE BLD STRIP.AUTO-MCNC: 121 MG/DL (ref 65–117)
GLUCOSE BLD STRIP.AUTO-MCNC: 123 MG/DL (ref 65–117)
GLUCOSE BLD STRIP.AUTO-MCNC: 129 MG/DL (ref 65–117)
GLUCOSE BLD STRIP.AUTO-MCNC: 130 MG/DL (ref 65–117)
GLUCOSE SERPL-MCNC: 105 MG/DL (ref 65–100)
HCT VFR BLD AUTO: 26.4 % (ref 36.6–50.3)
HGB BLD-MCNC: 8.5 G/DL (ref 12.1–17)
MAGNESIUM SERPL-MCNC: 1.6 MG/DL (ref 1.6–2.4)
MCH RBC QN AUTO: 28.1 PG (ref 26–34)
MCHC RBC AUTO-ENTMCNC: 32.2 G/DL (ref 30–36.5)
MCV RBC AUTO: 87.4 FL (ref 80–99)
NRBC # BLD: 0 K/UL (ref 0–0.01)
NRBC BLD-RTO: 0 PER 100 WBC
PHOSPHATE SERPL-MCNC: 3.4 MG/DL (ref 2.6–4.7)
PLATELET # BLD AUTO: 359 K/UL (ref 150–400)
PMV BLD AUTO: 10.7 FL (ref 8.9–12.9)
POTASSIUM SERPL-SCNC: 4.2 MMOL/L (ref 3.5–5.1)
RBC # BLD AUTO: 3.02 M/UL (ref 4.1–5.7)
SERVICE CMNT-IMP: ABNORMAL
SODIUM SERPL-SCNC: 134 MMOL/L (ref 136–145)
WBC # BLD AUTO: 11.5 K/UL (ref 4.1–11.1)

## 2025-06-05 PROCEDURE — 6360000002 HC RX W HCPCS: Performed by: INTERNAL MEDICINE

## 2025-06-05 PROCEDURE — 43762 RPLC GTUBE NO REVJ TRC: CPT

## 2025-06-05 PROCEDURE — 80048 BASIC METABOLIC PNL TOTAL CA: CPT

## 2025-06-05 PROCEDURE — 94640 AIRWAY INHALATION TREATMENT: CPT

## 2025-06-05 PROCEDURE — 94760 N-INVAS EAR/PLS OXIMETRY 1: CPT

## 2025-06-05 PROCEDURE — 99233 SBSQ HOSP IP/OBS HIGH 50: CPT | Performed by: PHYSICAL MEDICINE & REHABILITATION

## 2025-06-05 PROCEDURE — 82962 GLUCOSE BLOOD TEST: CPT

## 2025-06-05 PROCEDURE — 92526 ORAL FUNCTION THERAPY: CPT

## 2025-06-05 PROCEDURE — 6370000000 HC RX 637 (ALT 250 FOR IP): Performed by: HOSPITALIST

## 2025-06-05 PROCEDURE — 2500000003 HC RX 250 WO HCPCS: Performed by: INTERNAL MEDICINE

## 2025-06-05 PROCEDURE — 84100 ASSAY OF PHOSPHORUS: CPT

## 2025-06-05 PROCEDURE — 92507 TX SP LANG VOICE COMM INDIV: CPT

## 2025-06-05 PROCEDURE — 6370000000 HC RX 637 (ALT 250 FOR IP): Performed by: INTERNAL MEDICINE

## 2025-06-05 PROCEDURE — 2700000000 HC OXYGEN THERAPY PER DAY

## 2025-06-05 PROCEDURE — 83735 ASSAY OF MAGNESIUM: CPT

## 2025-06-05 PROCEDURE — 85027 COMPLETE CBC AUTOMATED: CPT

## 2025-06-05 PROCEDURE — 94761 N-INVAS EAR/PLS OXIMETRY MLT: CPT

## 2025-06-05 PROCEDURE — 2500000003 HC RX 250 WO HCPCS: Performed by: NURSE PRACTITIONER

## 2025-06-05 PROCEDURE — 99231 SBSQ HOSP IP/OBS SF/LOW 25: CPT | Performed by: INTERNAL MEDICINE

## 2025-06-05 PROCEDURE — 1100000000 HC RM PRIVATE

## 2025-06-05 PROCEDURE — 6360000002 HC RX W HCPCS: Performed by: HOSPITALIST

## 2025-06-05 RX ADMIN — ACETYLCYSTEINE 600 MG: 200 SOLUTION ORAL; RESPIRATORY (INHALATION) at 21:26

## 2025-06-05 RX ADMIN — Medication 10 ML: at 10:45

## 2025-06-05 RX ADMIN — MIDODRINE HYDROCHLORIDE 15 MG: 5 TABLET ORAL at 23:33

## 2025-06-05 RX ADMIN — POTASSIUM & SODIUM PHOSPHATES POWDER PACK 280-160-250 MG 250 MG: 280-160-250 PACK at 10:44

## 2025-06-05 RX ADMIN — SODIUM CHLORIDE, PRESERVATIVE FREE 10 ML: 5 INJECTION INTRAVENOUS at 10:45

## 2025-06-05 RX ADMIN — ASPIRIN 81 MG CHEWABLE TABLET 81 MG: 81 TABLET CHEWABLE at 10:45

## 2025-06-05 RX ADMIN — CLOPIDOGREL BISULFATE 75 MG: 75 TABLET, FILM COATED ORAL at 10:45

## 2025-06-05 RX ADMIN — LANSOPRAZOLE 15 MG: 30 TABLET, ORALLY DISINTEGRATING ORAL at 06:28

## 2025-06-05 RX ADMIN — IPRATROPIUM BROMIDE 0.5 MG: 0.5 SOLUTION RESPIRATORY (INHALATION) at 21:26

## 2025-06-05 RX ADMIN — Medication 400 MG: at 20:41

## 2025-06-05 RX ADMIN — MIDODRINE HYDROCHLORIDE 15 MG: 5 TABLET ORAL at 06:27

## 2025-06-05 RX ADMIN — MIDODRINE HYDROCHLORIDE 15 MG: 5 TABLET ORAL at 17:35

## 2025-06-05 RX ADMIN — POTASSIUM & SODIUM PHOSPHATES POWDER PACK 280-160-250 MG 250 MG: 280-160-250 PACK at 20:41

## 2025-06-05 RX ADMIN — SODIUM CHLORIDE, PRESERVATIVE FREE 10 ML: 5 INJECTION INTRAVENOUS at 20:36

## 2025-06-05 RX ADMIN — ACETYLCYSTEINE 600 MG: 200 SOLUTION ORAL; RESPIRATORY (INHALATION) at 08:24

## 2025-06-05 RX ADMIN — MIDODRINE HYDROCHLORIDE 15 MG: 5 TABLET ORAL at 10:45

## 2025-06-05 RX ADMIN — ALBUTEROL SULFATE 2.5 MG: 2.5 SOLUTION RESPIRATORY (INHALATION) at 08:24

## 2025-06-05 RX ADMIN — Medication 400 MG: at 10:45

## 2025-06-05 RX ADMIN — IPRATROPIUM BROMIDE 0.5 MG: 0.5 SOLUTION RESPIRATORY (INHALATION) at 08:24

## 2025-06-05 RX ADMIN — MIDODRINE HYDROCHLORIDE 15 MG: 5 TABLET ORAL at 00:12

## 2025-06-05 RX ADMIN — ALBUTEROL SULFATE 2.5 MG: 2.5 SOLUTION RESPIRATORY (INHALATION) at 21:26

## 2025-06-05 RX ADMIN — OXYCODONE HYDROCHLORIDE AND ACETAMINOPHEN 1 TABLET: 5; 325 TABLET ORAL at 20:40

## 2025-06-05 ASSESSMENT — PAIN DESCRIPTION - ORIENTATION: ORIENTATION: LEFT

## 2025-06-05 ASSESSMENT — PAIN SCALES - GENERAL
PAINLEVEL_OUTOF10: 9
PAINLEVEL_OUTOF10: 9

## 2025-06-05 ASSESSMENT — PAIN DESCRIPTION - LOCATION: LOCATION: SHOULDER;THROAT

## 2025-06-05 NOTE — INTERDISCIPLINARY ROUNDS
Interdisciplinary Trach Team Rounds    Patient discussed in interdisciplinary trach team rounds on this date.     Trach Info:   Trach Size: 6   Trach Type: Shiley   Cuffed/Cuffless: Cuffless   Placing Surgeon: Dr. Plascencia   Placement Date: 5/17/2025    Trach Change: 5/23 by Dr. Rose   Reason for Trach: Supraglottic Mass      Discharge Plans: Transfer to [facility] denied. [employer] denied [facility]- they're appealing.     Airway:  Surgical Airway  05/29/25 Heavenlyley Uncuffed (Active)   Status Secured 06/05/25 1000   Site Assessment Drainage 06/05/25 1000   Site Care Cleansed;Dressing applied 06/05/25 1000   Inner Cannula Care Changed/new 06/05/25 0825   Ties Assessment Secure 06/05/25 1000   Cuff Pressure 0 cm H2O 05/31/25 1943   Spare Trach at Bedside Yes 06/05/25 1000   Spare Trach Tube One Size Smaller at Bedside Yes 06/05/25 1000   Ambu Bag With Mask at Bedside Yes 06/05/25 1000     Surgical Airway  05/29/25 Aashish Uncuffed (Active)   Placement Date/Time: 05/29/25 1600   Present on Admission/Arrival: No  Placed By: Licensed provider  Placement Verified By: Direct visualization;Auscultation  Surgical Airway Type: Tracheostomy  Brand: Aashish  Style: Uncuffed  Size: 6       Oxygen Therapy:  Oxygen Therapy  SpO2: 90 %  Pulse Oximetry Type: Continuous  Pulse via Oximetry: 84 beats per minute  Pulse Oximeter Device Mode: Continuous  O2 Device: Trach collar  FiO2 : 28 %  O2 Flow Rate (L/min): 5 L/min  Vent Settings  FiO2 : 28 %      Interdisciplinary Tracheostomy Team will continue to follow while patient is in-house.      Thank you,  Lucille Gutierrez, Interdisciplinary Tracheostomy , Speech-Language Pathologist and Brenda Spence, Interdisciplinary Tracheostomy , Speech-Language Pathologist

## 2025-06-06 ENCOUNTER — APPOINTMENT (OUTPATIENT)
Facility: HOSPITAL | Age: 75
DRG: 011 | End: 2025-06-06
Payer: MEDICARE

## 2025-06-06 LAB
ANION GAP SERPL CALC-SCNC: 7 MMOL/L (ref 2–12)
BUN SERPL-MCNC: 29 MG/DL (ref 6–20)
BUN/CREAT SERPL: 40 (ref 12–20)
CALCIUM SERPL-MCNC: 11.8 MG/DL (ref 8.5–10.1)
CHLORIDE SERPL-SCNC: 100 MMOL/L (ref 97–108)
CO2 SERPL-SCNC: 29 MMOL/L (ref 21–32)
CREAT SERPL-MCNC: 0.72 MG/DL (ref 0.7–1.3)
ERYTHROCYTE [DISTWIDTH] IN BLOOD BY AUTOMATED COUNT: 13.7 % (ref 11.5–14.5)
GLUCOSE BLD STRIP.AUTO-MCNC: 112 MG/DL (ref 65–117)
GLUCOSE BLD STRIP.AUTO-MCNC: 113 MG/DL (ref 65–117)
GLUCOSE BLD STRIP.AUTO-MCNC: 129 MG/DL (ref 65–117)
GLUCOSE BLD STRIP.AUTO-MCNC: 130 MG/DL (ref 65–117)
GLUCOSE BLD STRIP.AUTO-MCNC: 142 MG/DL (ref 65–117)
GLUCOSE SERPL-MCNC: 113 MG/DL (ref 65–100)
HCT VFR BLD AUTO: 28.8 % (ref 36.6–50.3)
HGB BLD-MCNC: 9.1 G/DL (ref 12.1–17)
MAGNESIUM SERPL-MCNC: 1.6 MG/DL (ref 1.6–2.4)
MCH RBC QN AUTO: 28 PG (ref 26–34)
MCHC RBC AUTO-ENTMCNC: 31.6 G/DL (ref 30–36.5)
MCV RBC AUTO: 88.6 FL (ref 80–99)
NRBC # BLD: 0 K/UL (ref 0–0.01)
NRBC BLD-RTO: 0 PER 100 WBC
PHOSPHATE SERPL-MCNC: 3.8 MG/DL (ref 2.6–4.7)
PLATELET # BLD AUTO: 377 K/UL (ref 150–400)
PMV BLD AUTO: 10.6 FL (ref 8.9–12.9)
POTASSIUM SERPL-SCNC: 4.1 MMOL/L (ref 3.5–5.1)
RBC # BLD AUTO: 3.25 M/UL (ref 4.1–5.7)
SERVICE CMNT-IMP: ABNORMAL
SERVICE CMNT-IMP: NORMAL
SERVICE CMNT-IMP: NORMAL
SODIUM SERPL-SCNC: 136 MMOL/L (ref 136–145)
WBC # BLD AUTO: 12.7 K/UL (ref 4.1–11.1)

## 2025-06-06 PROCEDURE — 73030 X-RAY EXAM OF SHOULDER: CPT

## 2025-06-06 PROCEDURE — 71275 CT ANGIOGRAPHY CHEST: CPT

## 2025-06-06 PROCEDURE — 70498 CT ANGIOGRAPHY NECK: CPT

## 2025-06-06 PROCEDURE — 99232 SBSQ HOSP IP/OBS MODERATE 35: CPT | Performed by: PHYSICAL MEDICINE & REHABILITATION

## 2025-06-06 PROCEDURE — 6370000000 HC RX 637 (ALT 250 FOR IP): Performed by: INTERNAL MEDICINE

## 2025-06-06 PROCEDURE — 1100000000 HC RM PRIVATE

## 2025-06-06 PROCEDURE — 84100 ASSAY OF PHOSPHORUS: CPT

## 2025-06-06 PROCEDURE — 2500000003 HC RX 250 WO HCPCS: Performed by: INTERNAL MEDICINE

## 2025-06-06 PROCEDURE — 85027 COMPLETE CBC AUTOMATED: CPT

## 2025-06-06 PROCEDURE — 80048 BASIC METABOLIC PNL TOTAL CA: CPT

## 2025-06-06 PROCEDURE — 6370000000 HC RX 637 (ALT 250 FOR IP): Performed by: HOSPITALIST

## 2025-06-06 PROCEDURE — 2500000003 HC RX 250 WO HCPCS: Performed by: NURSE PRACTITIONER

## 2025-06-06 PROCEDURE — 6370000000 HC RX 637 (ALT 250 FOR IP): Performed by: FAMILY MEDICINE

## 2025-06-06 PROCEDURE — 83735 ASSAY OF MAGNESIUM: CPT

## 2025-06-06 PROCEDURE — 2700000000 HC OXYGEN THERAPY PER DAY

## 2025-06-06 PROCEDURE — 6360000002 HC RX W HCPCS: Performed by: HOSPITALIST

## 2025-06-06 PROCEDURE — 6360000004 HC RX CONTRAST MEDICATION: Performed by: RADIOLOGY

## 2025-06-06 PROCEDURE — 94640 AIRWAY INHALATION TREATMENT: CPT

## 2025-06-06 PROCEDURE — 82962 GLUCOSE BLOOD TEST: CPT

## 2025-06-06 PROCEDURE — 94760 N-INVAS EAR/PLS OXIMETRY 1: CPT

## 2025-06-06 PROCEDURE — 6360000002 HC RX W HCPCS: Performed by: INTERNAL MEDICINE

## 2025-06-06 RX ORDER — IOPAMIDOL 755 MG/ML
100 INJECTION, SOLUTION INTRAVASCULAR
Status: COMPLETED | OUTPATIENT
Start: 2025-06-06 | End: 2025-06-06

## 2025-06-06 RX ORDER — CASTOR OIL AND BALSAM, PERU 788; 87 MG/G; MG/G
OINTMENT TOPICAL 2 TIMES DAILY
Status: DISCONTINUED | OUTPATIENT
Start: 2025-06-06 | End: 2025-06-11 | Stop reason: HOSPADM

## 2025-06-06 RX ADMIN — MIDODRINE HYDROCHLORIDE 15 MG: 5 TABLET ORAL at 18:31

## 2025-06-06 RX ADMIN — IPRATROPIUM BROMIDE 0.5 MG: 0.5 SOLUTION RESPIRATORY (INHALATION) at 22:05

## 2025-06-06 RX ADMIN — ALBUTEROL SULFATE 2.5 MG: 2.5 SOLUTION RESPIRATORY (INHALATION) at 22:05

## 2025-06-06 RX ADMIN — ACETYLCYSTEINE 600 MG: 200 SOLUTION ORAL; RESPIRATORY (INHALATION) at 22:05

## 2025-06-06 RX ADMIN — IPRATROPIUM BROMIDE 0.5 MG: 0.5 SOLUTION RESPIRATORY (INHALATION) at 08:38

## 2025-06-06 RX ADMIN — MIDODRINE HYDROCHLORIDE 15 MG: 5 TABLET ORAL at 12:14

## 2025-06-06 RX ADMIN — IOPAMIDOL 100 ML: 755 INJECTION, SOLUTION INTRAVENOUS at 16:58

## 2025-06-06 RX ADMIN — MIDODRINE HYDROCHLORIDE 15 MG: 5 TABLET ORAL at 06:21

## 2025-06-06 RX ADMIN — IOPAMIDOL 100 ML: 755 INJECTION, SOLUTION INTRAVENOUS at 16:57

## 2025-06-06 RX ADMIN — OXYCODONE HYDROCHLORIDE AND ACETAMINOPHEN 1 TABLET: 5; 325 TABLET ORAL at 13:45

## 2025-06-06 RX ADMIN — Medication: at 12:17

## 2025-06-06 RX ADMIN — ASPIRIN 81 MG CHEWABLE TABLET 81 MG: 81 TABLET CHEWABLE at 08:25

## 2025-06-06 RX ADMIN — SODIUM CHLORIDE, PRESERVATIVE FREE 10 ML: 5 INJECTION INTRAVENOUS at 08:25

## 2025-06-06 RX ADMIN — Medication 400 MG: at 08:27

## 2025-06-06 RX ADMIN — POTASSIUM & SODIUM PHOSPHATES POWDER PACK 280-160-250 MG 250 MG: 280-160-250 PACK at 21:42

## 2025-06-06 RX ADMIN — LANSOPRAZOLE 15 MG: 30 TABLET, ORALLY DISINTEGRATING ORAL at 06:21

## 2025-06-06 RX ADMIN — ACETYLCYSTEINE 600 MG: 200 SOLUTION ORAL; RESPIRATORY (INHALATION) at 08:38

## 2025-06-06 RX ADMIN — Medication 10 ML: at 08:25

## 2025-06-06 RX ADMIN — POTASSIUM & SODIUM PHOSPHATES POWDER PACK 280-160-250 MG 250 MG: 280-160-250 PACK at 08:25

## 2025-06-06 RX ADMIN — CLOPIDOGREL BISULFATE 75 MG: 75 TABLET, FILM COATED ORAL at 08:25

## 2025-06-06 RX ADMIN — ALBUTEROL SULFATE 2.5 MG: 2.5 SOLUTION RESPIRATORY (INHALATION) at 08:38

## 2025-06-06 RX ADMIN — Medication 400 MG: at 21:42

## 2025-06-06 ASSESSMENT — PAIN SCALES - GENERAL
PAINLEVEL_OUTOF10: 7
PAINLEVEL_OUTOF10: 10

## 2025-06-06 ASSESSMENT — PAIN DESCRIPTION - LOCATION: LOCATION: NECK

## 2025-06-06 ASSESSMENT — PAIN DESCRIPTION - DESCRIPTORS: DESCRIPTORS: DISCOMFORT

## 2025-06-06 NOTE — WOUND CARE
WOCN Note:     New consult placed for \"sacrum, buttocks\"    Assessed in 402/01.    Roge Lobato is a 74 y.o. y/o male who presented for Neck mass [R22.1]  Admitted on 5/17/2025; LOS: 20    Lab Results   Component Value Date/Time    WBC 12.7 (H) 06/06/2025 04:31 AM    LABA1C 4.6 09/29/2021 01:34 AM    POCGLU 129 (H) 06/06/2025 07:22 AM    POCGLU 142 (H) 06/05/2025 11:57 PM    HGB 9.1 (L) 06/06/2025 04:31 AM    HCT 28.8 (L) 06/06/2025 04:31 AM     06/06/2025 04:31 AM      Lab Results   Component Value Date/Time    WBC 12.7 (H) 06/06/2025 04:31 AM        Tobacco Use      Smoking status: Former        Packs/day: 0.50        Types: Cigarettes      Smokeless tobacco: Never     Diet NPO Exceptions are: Ice Chips  ADULT TUBE FEEDING; Gastrostomy; 2.0 Calorie; Continuous; 45; Yes; 10; Q 24 hours; 55; 150; Q 3 hours     Assessment:   Patient is alert, communicative and requires assist with repositioning.    Bed: [place] with waffle cushion  GI/: external catheter  Patient reports no pain.  Heels offloaded with pillows.  Heels boggy with blanching erythema.    Patient coughing up blood via trach; notified RN.     Wound Assessment  Left sacrum and upper buttocks, Masd/friction : scattered over field  4 x 1 x 0.1 cm  100% pink/red; no exudate; no odor.   TX: applied triad wound cream; covered with foam dressing.        Wound, Pressure Prevention & Skin Care Recommendations:    Minimize layers of linen/pads under patient to optimize support surface.    2.  Turn/reposition approximately every 2 hours and offload heels.   3.  Manage moisture/ Keep skin folds clean and dry/minimize brief usage.  4.  Specialty bed: immerse low air loss ordered. Confirmation [id number]. Use only flat sheet and one incontinence pad.  5.  Left buttocks: daily cleanse; apply triad wound cream; cover with foam dressing.    Discussed above plan with patient & Autumn RN.    Transition of Care:   Plan to follow as needed while admitted to

## 2025-06-07 ENCOUNTER — APPOINTMENT (OUTPATIENT)
Facility: HOSPITAL | Age: 75
DRG: 011 | End: 2025-06-07
Payer: MEDICARE

## 2025-06-07 ENCOUNTER — ANESTHESIA (OUTPATIENT)
Facility: HOSPITAL | Age: 75
End: 2025-06-07
Payer: MEDICARE

## 2025-06-07 ENCOUNTER — ANESTHESIA EVENT (OUTPATIENT)
Facility: HOSPITAL | Age: 75
End: 2025-06-07
Payer: MEDICARE

## 2025-06-07 LAB
ANION GAP SERPL CALC-SCNC: 8 MMOL/L (ref 2–12)
BUN SERPL-MCNC: 30 MG/DL (ref 6–20)
BUN/CREAT SERPL: 38 (ref 12–20)
CALCIUM SERPL-MCNC: 12 MG/DL (ref 8.5–10.1)
CHLORIDE SERPL-SCNC: 98 MMOL/L (ref 97–108)
CO2 SERPL-SCNC: 26 MMOL/L (ref 21–32)
CREAT SERPL-MCNC: 0.78 MG/DL (ref 0.7–1.3)
ERYTHROCYTE [DISTWIDTH] IN BLOOD BY AUTOMATED COUNT: 13.5 % (ref 11.5–14.5)
ERYTHROCYTE [DISTWIDTH] IN BLOOD BY AUTOMATED COUNT: 13.6 % (ref 11.5–14.5)
FIBRINOGEN PPP-MCNC: 437 MG/DL (ref 200–475)
GLUCOSE BLD STRIP.AUTO-MCNC: 137 MG/DL (ref 65–117)
GLUCOSE SERPL-MCNC: 119 MG/DL (ref 65–100)
HCT VFR BLD AUTO: 27.3 % (ref 36.6–50.3)
HCT VFR BLD AUTO: 29.9 % (ref 36.6–50.3)
HGB BLD-MCNC: 8.3 G/DL (ref 12.1–17)
HGB BLD-MCNC: 9.5 G/DL (ref 12.1–17)
INR PPP: 1 (ref 0.9–1.1)
MAGNESIUM SERPL-MCNC: 1.7 MG/DL (ref 1.6–2.4)
MCH RBC QN AUTO: 28 PG (ref 26–34)
MCH RBC QN AUTO: 28.1 PG (ref 26–34)
MCHC RBC AUTO-ENTMCNC: 30.4 G/DL (ref 30–36.5)
MCHC RBC AUTO-ENTMCNC: 31.8 G/DL (ref 30–36.5)
MCV RBC AUTO: 88.2 FL (ref 80–99)
MCV RBC AUTO: 92.5 FL (ref 80–99)
NRBC # BLD: 0 K/UL (ref 0–0.01)
NRBC # BLD: 0 K/UL (ref 0–0.01)
NRBC BLD-RTO: 0 PER 100 WBC
NRBC BLD-RTO: 0 PER 100 WBC
PHOSPHATE SERPL-MCNC: 3 MG/DL (ref 2.6–4.7)
PLATELET # BLD AUTO: 409 K/UL (ref 150–400)
PLATELET # BLD AUTO: 438 K/UL (ref 150–400)
PMV BLD AUTO: 10.2 FL (ref 8.9–12.9)
PMV BLD AUTO: 10.8 FL (ref 8.9–12.9)
POTASSIUM SERPL-SCNC: 4.1 MMOL/L (ref 3.5–5.1)
PROTHROMBIN TIME: 10.9 SEC (ref 9.2–11.2)
RBC # BLD AUTO: 2.95 M/UL (ref 4.1–5.7)
RBC # BLD AUTO: 3.39 M/UL (ref 4.1–5.7)
SERVICE CMNT-IMP: ABNORMAL
SODIUM SERPL-SCNC: 132 MMOL/L (ref 136–145)
WBC # BLD AUTO: 12.3 K/UL (ref 4.1–11.1)
WBC # BLD AUTO: 15.7 K/UL (ref 4.1–11.1)

## 2025-06-07 PROCEDURE — 6360000002 HC RX W HCPCS: Performed by: RADIOLOGY

## 2025-06-07 PROCEDURE — 75894 X-RAYS TRANSCATH THERAPY: CPT | Performed by: RADIOLOGY

## 2025-06-07 PROCEDURE — 03LY3DZ OCCLUSION OF UPPER ARTERY WITH INTRALUMINAL DEVICE, PERCUTANEOUS APPROACH: ICD-10-PCS | Performed by: RADIOLOGY

## 2025-06-07 PROCEDURE — 03LV3DZ OCCLUSION OF LEFT THYROID ARTERY WITH INTRALUMINAL DEVICE, PERCUTANEOUS APPROACH: ICD-10-PCS | Performed by: RADIOLOGY

## 2025-06-07 PROCEDURE — 36216 PLACE CATHETER IN ARTERY: CPT

## 2025-06-07 PROCEDURE — 6360000004 HC RX CONTRAST MEDICATION: Performed by: RADIOLOGY

## 2025-06-07 PROCEDURE — 2500000003 HC RX 250 WO HCPCS: Performed by: NURSE PRACTITIONER

## 2025-06-07 PROCEDURE — 2500000003 HC RX 250 WO HCPCS: Performed by: NURSE ANESTHETIST, CERTIFIED REGISTERED

## 2025-06-07 PROCEDURE — 85610 PROTHROMBIN TIME: CPT

## 2025-06-07 PROCEDURE — B31Q1ZZ FLUOROSCOPY OF CERVICO-CEREBRAL ARCH USING LOW OSMOLAR CONTRAST: ICD-10-PCS | Performed by: RADIOLOGY

## 2025-06-07 PROCEDURE — 6360000002 HC RX W HCPCS: Performed by: NURSE ANESTHETIST, CERTIFIED REGISTERED

## 2025-06-07 PROCEDURE — 86850 RBC ANTIBODY SCREEN: CPT

## 2025-06-07 PROCEDURE — 6370000000 HC RX 637 (ALT 250 FOR IP): Performed by: INTERNAL MEDICINE

## 2025-06-07 PROCEDURE — 2500000003 HC RX 250 WO HCPCS: Performed by: INTERNAL MEDICINE

## 2025-06-07 PROCEDURE — 36223 PLACE CATH CAROTID/INOM ART: CPT | Performed by: RADIOLOGY

## 2025-06-07 PROCEDURE — 2580000003 HC RX 258: Performed by: NURSE PRACTITIONER

## 2025-06-07 PROCEDURE — 82962 GLUCOSE BLOOD TEST: CPT

## 2025-06-07 PROCEDURE — 6370000000 HC RX 637 (ALT 250 FOR IP): Performed by: HOSPITALIST

## 2025-06-07 PROCEDURE — 70498 CT ANGIOGRAPHY NECK: CPT

## 2025-06-07 PROCEDURE — 86923 COMPATIBILITY TEST ELECTRIC: CPT

## 2025-06-07 PROCEDURE — 2500000003 HC RX 250 WO HCPCS: Performed by: ANESTHESIOLOGY

## 2025-06-07 PROCEDURE — B31N1ZZ FLUOROSCOPY OF OTHER UPPER ARTERIES USING LOW OSMOLAR CONTRAST: ICD-10-PCS | Performed by: RADIOLOGY

## 2025-06-07 PROCEDURE — 6370000000 HC RX 637 (ALT 250 FOR IP): Performed by: FAMILY MEDICINE

## 2025-06-07 PROCEDURE — 86900 BLOOD TYPING SEROLOGIC ABO: CPT

## 2025-06-07 PROCEDURE — 85027 COMPLETE CBC AUTOMATED: CPT

## 2025-06-07 PROCEDURE — 75898 FOLLOW-UP ANGIOGRAPHY: CPT | Performed by: RADIOLOGY

## 2025-06-07 PROCEDURE — 2580000003 HC RX 258: Performed by: RADIOLOGY

## 2025-06-07 PROCEDURE — 2580000003 HC RX 258: Performed by: ANESTHESIOLOGY

## 2025-06-07 PROCEDURE — 2580000003 HC RX 258: Performed by: NURSE ANESTHETIST, CERTIFIED REGISTERED

## 2025-06-07 PROCEDURE — 85384 FIBRINOGEN ACTIVITY: CPT

## 2025-06-07 PROCEDURE — 84100 ASSAY OF PHOSPHORUS: CPT

## 2025-06-07 PROCEDURE — 2000000000 HC ICU R&B

## 2025-06-07 PROCEDURE — 80048 BASIC METABOLIC PNL TOTAL CA: CPT

## 2025-06-07 PROCEDURE — 86901 BLOOD TYPING SEROLOGIC RH(D): CPT

## 2025-06-07 PROCEDURE — 99231 SBSQ HOSP IP/OBS SF/LOW 25: CPT | Performed by: NURSE PRACTITIONER

## 2025-06-07 PROCEDURE — 03LU3DZ OCCLUSION OF RIGHT THYROID ARTERY WITH INTRALUMINAL DEVICE, PERCUTANEOUS APPROACH: ICD-10-PCS | Performed by: RADIOLOGY

## 2025-06-07 PROCEDURE — 6370000000 HC RX 637 (ALT 250 FOR IP): Performed by: NURSE PRACTITIONER

## 2025-06-07 PROCEDURE — 61626 TCAT PERM OCCLS/EMBOL NONCNS: CPT | Performed by: RADIOLOGY

## 2025-06-07 PROCEDURE — 83735 ASSAY OF MAGNESIUM: CPT

## 2025-06-07 RX ORDER — IOPAMIDOL 755 MG/ML
100 INJECTION, SOLUTION INTRAVASCULAR
Status: DISCONTINUED | OUTPATIENT
Start: 2025-06-07 | End: 2025-06-10

## 2025-06-07 RX ORDER — PHENYLEPHRINE HCL IN 0.9% NACL 0.4MG/10ML
SYRINGE (ML) INTRAVENOUS
Status: DISCONTINUED | OUTPATIENT
Start: 2025-06-07 | End: 2025-06-07 | Stop reason: SDUPTHER

## 2025-06-07 RX ORDER — TRANEXAMIC ACID 100 MG/ML
1000 INJECTION, SOLUTION INTRAVENOUS ONCE
Status: COMPLETED | OUTPATIENT
Start: 2025-06-07 | End: 2025-06-07

## 2025-06-07 RX ORDER — SCOPOLAMINE 1 MG/3D
1 PATCH, EXTENDED RELEASE TRANSDERMAL ONCE
Status: DISCONTINUED | OUTPATIENT
Start: 2025-06-07 | End: 2025-06-09

## 2025-06-07 RX ORDER — LIDOCAINE HYDROCHLORIDE 10 MG/ML
INJECTION, SOLUTION EPIDURAL; INFILTRATION; INTRACAUDAL; PERINEURAL PRN
Status: COMPLETED | OUTPATIENT
Start: 2025-06-07 | End: 2025-06-07

## 2025-06-07 RX ORDER — IOPAMIDOL 612 MG/ML
INJECTION, SOLUTION INTRAVASCULAR PRN
Status: COMPLETED | OUTPATIENT
Start: 2025-06-07 | End: 2025-06-07

## 2025-06-07 RX ORDER — SODIUM CHLORIDE, SODIUM LACTATE, POTASSIUM CHLORIDE, AND CALCIUM CHLORIDE .6; .31; .03; .02 G/100ML; G/100ML; G/100ML; G/100ML
1000 INJECTION, SOLUTION INTRAVENOUS ONCE
Status: COMPLETED | OUTPATIENT
Start: 2025-06-07 | End: 2025-06-07

## 2025-06-07 RX ORDER — HEPARIN SODIUM 1000 [USP'U]/ML
INJECTION, SOLUTION INTRAVENOUS; SUBCUTANEOUS
Status: DISCONTINUED | OUTPATIENT
Start: 2025-06-07 | End: 2025-06-07 | Stop reason: SDUPTHER

## 2025-06-07 RX ORDER — ROCURONIUM BROMIDE 10 MG/ML
INJECTION, SOLUTION INTRAVENOUS
Status: DISCONTINUED | OUTPATIENT
Start: 2025-06-07 | End: 2025-06-07 | Stop reason: SDUPTHER

## 2025-06-07 RX ADMIN — POTASSIUM & SODIUM PHOSPHATES POWDER PACK 280-160-250 MG 250 MG: 280-160-250 PACK at 21:03

## 2025-06-07 RX ADMIN — SUGAMMADEX 200 MG: 100 INJECTION, SOLUTION INTRAVENOUS at 09:24

## 2025-06-07 RX ADMIN — Medication 80 MCG: at 07:54

## 2025-06-07 RX ADMIN — SODIUM CHLORIDE: 9 INJECTION, SOLUTION INTRAVENOUS at 07:20

## 2025-06-07 RX ADMIN — HEPARIN SODIUM 400 ML: 1000 INJECTION INTRAVENOUS; SUBCUTANEOUS at 08:00

## 2025-06-07 RX ADMIN — ROCURONIUM BROMIDE 30 MG: 10 INJECTION, SOLUTION INTRAVENOUS at 07:44

## 2025-06-07 RX ADMIN — SODIUM CHLORIDE, PRESERVATIVE FREE 10 ML: 5 INJECTION INTRAVENOUS at 21:06

## 2025-06-07 RX ADMIN — Medication: at 21:10

## 2025-06-07 RX ADMIN — HEPARIN SODIUM 2000 UNITS: 1000 INJECTION INTRAVENOUS; SUBCUTANEOUS at 08:51

## 2025-06-07 RX ADMIN — TRANEXAMIC ACID 1000 MG: 1 INJECTION, SOLUTION INTRAVENOUS at 06:08

## 2025-06-07 RX ADMIN — SODIUM CHLORIDE: 9 INJECTION, SOLUTION INTRAVENOUS at 09:21

## 2025-06-07 RX ADMIN — Medication: at 03:37

## 2025-06-07 RX ADMIN — MIDODRINE HYDROCHLORIDE 15 MG: 5 TABLET ORAL at 11:47

## 2025-06-07 RX ADMIN — HEPARIN SODIUM 3000 UNITS: 1000 INJECTION INTRAVENOUS; SUBCUTANEOUS at 08:16

## 2025-06-07 RX ADMIN — Medication 10 ML: at 21:06

## 2025-06-07 RX ADMIN — OXYCODONE 5 MG: 5 TABLET ORAL at 15:08

## 2025-06-07 RX ADMIN — IOPAMIDOL 76 ML: 612 INJECTION, SOLUTION INTRAVENOUS at 09:21

## 2025-06-07 RX ADMIN — Medication 400 MG: at 21:03

## 2025-06-07 RX ADMIN — TRANEXAMIC ACID 1000 MG: 1 INJECTION, SOLUTION INTRAVENOUS at 05:19

## 2025-06-07 RX ADMIN — PHENYLEPHRINE HYDROCHLORIDE 50 MCG/MIN: 10 INJECTION INTRAVENOUS at 07:54

## 2025-06-07 RX ADMIN — ROCURONIUM BROMIDE 10 MG: 10 INJECTION, SOLUTION INTRAVENOUS at 09:08

## 2025-06-07 RX ADMIN — MIDODRINE HYDROCHLORIDE 15 MG: 5 TABLET ORAL at 18:03

## 2025-06-07 RX ADMIN — SODIUM CHLORIDE, SODIUM LACTATE, POTASSIUM CHLORIDE, AND CALCIUM CHLORIDE 1000 ML: .6; .31; .03; .02 INJECTION, SOLUTION INTRAVENOUS at 05:43

## 2025-06-07 RX ADMIN — MIDODRINE HYDROCHLORIDE 15 MG: 5 TABLET ORAL at 21:10

## 2025-06-07 RX ADMIN — LIDOCAINE HYDROCHLORIDE 6 ML: 10 INJECTION, SOLUTION EPIDURAL; INFILTRATION; INTRACAUDAL; PERINEURAL at 07:59

## 2025-06-07 RX ADMIN — MIDODRINE HYDROCHLORIDE 15 MG: 5 TABLET ORAL at 03:45

## 2025-06-07 ASSESSMENT — PAIN SCALES - GENERAL: PAINLEVEL_OUTOF10: 0

## 2025-06-07 ASSESSMENT — ENCOUNTER SYMPTOMS: SHORTNESS OF BREATH: 1

## 2025-06-07 NOTE — SIGNIFICANT EVENT
0308: NP notified of increased bloody sputum coming from trach and mouth. NP at bedside and ordered scopolamine patch. Labs obtained at this time.  0344: Scopolamine patch applied by primary RN.  0400: Plavix held by NP.  0430: Rapid response called for large amounts of blood clots coming from mouth and trach. Pt needing continuous oral suctioning to control bleeding. NP and intensivists at bedside to assess. TXA neb ordered. STAT CTA ordered. Pt taken to CT accompanied by primary RN and rapid response RN.  0520: Radiologist contacted primary RN and rapid response RN--pt actively hemorrhaging from laryngeal mass. TXA neb started. Repeat labs obtained. 1000cc LR bolus started.  0609: IV TXA started. Rapid response RN at bedside.  0645: Report given to angio RN. Pt's BP dropped-- MAP 63.  0700: Pt taken down to OR for embolization.

## 2025-06-07 NOTE — ANESTHESIA PRE PROCEDURE
Department of Anesthesiology  Preprocedure Note       Name:  Roge Lobato   Age:  74 y.o.  :  1950                                          MRN:  [id number]         Date:  2025      Surgeon: Surgeon(s):  Deepak Plascencia DO    Procedure: Procedure(s):  TRACHEOTOMY  DIREACT LARYNGOSCOPY    Medications prior to admission:   Prior to Admission medications    Medication Sig Start Date End Date Taking? Authorizing Provider   albuterol (PROVENTIL) (2.5 MG/3ML) 0.083% nebulizer solution Take 3 mLs by nebulization every 3 hours as needed for Wheezing 25  Yes Ros Rod MD   ipratropium (ATROVENT) 0.02 % nebulizer solution Take 2.5 mLs by nebulization three times daily 25  Yes Ros Rod MD   acetylcysteine (MUCOMYST) 20 % nebulizer solution Inhale 3 mLs into the lungs 2 times daily as needed (thick mucus) 6/1/25 6/15/25 Yes Ros Rod MD   aspirin 81 MG chewable tablet Take 1 tablet by mouth daily   Yes ProviderBen MD   clopidogrel (PLAVIX) 75 MG tablet Take 1 tablet by mouth daily   Yes ProviderBen MD   phenol (CHLORASEPTIC MOUTH PAIN) 1.4 % LIQD mouth spray Take 1 drop by mouth every 2 hours as needed for Sore Throat 25   Sahara Wade MD   spironolactone (ALDACTONE) 25 MG tablet Take 1 tablet by mouth daily    ProviderBen MD   pantoprazole (PROTONIX) 40 MG tablet Take 1 tablet by mouth daily    ProviderBen MD       Current medications:    Current Facility-Administered Medications   Medication Dose Route Frequency Provider Last Rate Last Admin   • scopolamine (TRANSDERM-SCOP) transdermal patch 1 patch  1 patch TransDERmal Once Salabao, Denise, ACNP   1 patch at 25 0344   • iopamidol (ISOVUE-370) 76 % injection 100 mL  100 mL IntraVENous ONCE PRN Jesus Torres MD       • balsum peru-castor oil (VENELEX) ointment   Topical BID Walter Yap MD   Given at 25 0800   • magnesium oxide (MAG-OX) tablet 400 mg  400 mg Per G

## 2025-06-07 NOTE — ANESTHESIA POSTPROCEDURE EVALUATION
Department of Anesthesiology  Postprocedure Note    Patient: Roge Lobato  MRN: [id number]  YOB: 1950  Date of evaluation: 6/7/2025    Procedure Summary       Date: 06/07/25 Room / Location: [facility] [place] ANGIO IR    Anesthesia Start: 0720 Anesthesia Stop: 0959    Procedure: IR ARCH UNI CAR CERV CEREBRAL Diagnosis: (Embolization)    Scheduled Providers: Jorje Smith MD Responsible Provider: Saul Abdi MD    Anesthesia Type: General ASA Status: 3 - Emergent            Anesthesia Type: General    Alton Phase I: Alton Score: 9    Alton Phase II:      Anesthesia Post Evaluation    Patient location during evaluation: bedside  Patient participation: complete - patient participated  Level of consciousness: awake  Airway patency: patent  Nausea & Vomiting: no nausea  Cardiovascular status: hemodynamically stable  Respiratory status: acceptable  Hydration status: stable  Pain management: adequate    No notable events documented.

## 2025-06-08 LAB
ANION GAP SERPL CALC-SCNC: 10 MMOL/L (ref 2–12)
ANION GAP SERPL CALC-SCNC: 7 MMOL/L (ref 2–12)
ANION GAP SERPL CALC-SCNC: 9 MMOL/L (ref 2–12)
BUN SERPL-MCNC: 52 MG/DL (ref 6–20)
BUN SERPL-MCNC: 55 MG/DL (ref 6–20)
BUN SERPL-MCNC: 58 MG/DL (ref 6–20)
BUN/CREAT SERPL: 28 (ref 12–20)
BUN/CREAT SERPL: 30 (ref 12–20)
BUN/CREAT SERPL: 33 (ref 12–20)
CALCIUM SERPL-MCNC: 10.4 MG/DL (ref 8.5–10.1)
CALCIUM SERPL-MCNC: 10.4 MG/DL (ref 8.5–10.1)
CALCIUM SERPL-MCNC: 10.7 MG/DL (ref 8.5–10.1)
CHLORIDE SERPL-SCNC: 101 MMOL/L (ref 97–108)
CHLORIDE SERPL-SCNC: 102 MMOL/L (ref 97–108)
CHLORIDE SERPL-SCNC: 104 MMOL/L (ref 97–108)
CO2 SERPL-SCNC: 22 MMOL/L (ref 21–32)
CO2 SERPL-SCNC: 24 MMOL/L (ref 21–32)
CO2 SERPL-SCNC: 26 MMOL/L (ref 21–32)
CREAT SERPL-MCNC: 1.58 MG/DL (ref 0.7–1.3)
CREAT SERPL-MCNC: 1.83 MG/DL (ref 0.7–1.3)
CREAT SERPL-MCNC: 2.07 MG/DL (ref 0.7–1.3)
ERYTHROCYTE [DISTWIDTH] IN BLOOD BY AUTOMATED COUNT: 13.5 % (ref 11.5–14.5)
ERYTHROCYTE [DISTWIDTH] IN BLOOD BY AUTOMATED COUNT: 13.8 % (ref 11.5–14.5)
GLUCOSE SERPL-MCNC: 103 MG/DL (ref 65–100)
GLUCOSE SERPL-MCNC: 95 MG/DL (ref 65–100)
GLUCOSE SERPL-MCNC: 98 MG/DL (ref 65–100)
HCT VFR BLD AUTO: 19.6 % (ref 36.6–50.3)
HCT VFR BLD AUTO: 23.6 % (ref 36.6–50.3)
HCT VFR BLD AUTO: 24.1 % (ref 36.6–50.3)
HGB BLD-MCNC: 5.9 G/DL (ref 12.1–17)
HGB BLD-MCNC: 7.6 G/DL (ref 12.1–17)
HGB BLD-MCNC: 7.8 G/DL (ref 12.1–17)
HISTORY CHECK: NORMAL
MAGNESIUM SERPL-MCNC: 2 MG/DL (ref 1.6–2.4)
MCH RBC QN AUTO: 27.4 PG (ref 26–34)
MCH RBC QN AUTO: 28.1 PG (ref 26–34)
MCHC RBC AUTO-ENTMCNC: 30.1 G/DL (ref 30–36.5)
MCHC RBC AUTO-ENTMCNC: 32.2 G/DL (ref 30–36.5)
MCV RBC AUTO: 87.4 FL (ref 80–99)
MCV RBC AUTO: 91.2 FL (ref 80–99)
NRBC # BLD: 0 K/UL (ref 0–0.01)
NRBC # BLD: 0 K/UL (ref 0–0.01)
NRBC BLD-RTO: 0 PER 100 WBC
NRBC BLD-RTO: 0 PER 100 WBC
PHOSPHATE SERPL-MCNC: 5.2 MG/DL (ref 2.6–4.7)
PLATELET # BLD AUTO: 322 K/UL (ref 150–400)
PLATELET # BLD AUTO: 409 K/UL (ref 150–400)
PMV BLD AUTO: 10.3 FL (ref 8.9–12.9)
PMV BLD AUTO: 10.4 FL (ref 8.9–12.9)
POTASSIUM SERPL-SCNC: 5 MMOL/L (ref 3.5–5.1)
POTASSIUM SERPL-SCNC: 5.2 MMOL/L (ref 3.5–5.1)
POTASSIUM SERPL-SCNC: 5.9 MMOL/L (ref 3.5–5.1)
RBC # BLD AUTO: 2.15 M/UL (ref 4.1–5.7)
RBC # BLD AUTO: 2.7 M/UL (ref 4.1–5.7)
SODIUM SERPL-SCNC: 135 MMOL/L (ref 136–145)
WBC # BLD AUTO: 19.8 K/UL (ref 4.1–11.1)
WBC # BLD AUTO: 20.4 K/UL (ref 4.1–11.1)

## 2025-06-08 PROCEDURE — 94760 N-INVAS EAR/PLS OXIMETRY 1: CPT

## 2025-06-08 PROCEDURE — 99231 SBSQ HOSP IP/OBS SF/LOW 25: CPT | Performed by: NURSE PRACTITIONER

## 2025-06-08 PROCEDURE — 36600 WITHDRAWAL OF ARTERIAL BLOOD: CPT

## 2025-06-08 PROCEDURE — 2500000003 HC RX 250 WO HCPCS: Performed by: NURSE PRACTITIONER

## 2025-06-08 PROCEDURE — P9016 RBC LEUKOCYTES REDUCED: HCPCS

## 2025-06-08 PROCEDURE — 93005 ELECTROCARDIOGRAM TRACING: CPT

## 2025-06-08 PROCEDURE — 6370000000 HC RX 637 (ALT 250 FOR IP)

## 2025-06-08 PROCEDURE — 84100 ASSAY OF PHOSPHORUS: CPT

## 2025-06-08 PROCEDURE — 85014 HEMATOCRIT: CPT

## 2025-06-08 PROCEDURE — 6360000002 HC RX W HCPCS

## 2025-06-08 PROCEDURE — 2580000003 HC RX 258

## 2025-06-08 PROCEDURE — 85018 HEMOGLOBIN: CPT

## 2025-06-08 PROCEDURE — 80048 BASIC METABOLIC PNL TOTAL CA: CPT

## 2025-06-08 PROCEDURE — 2000000000 HC ICU R&B

## 2025-06-08 PROCEDURE — 6370000000 HC RX 637 (ALT 250 FOR IP): Performed by: INTERNAL MEDICINE

## 2025-06-08 PROCEDURE — 83735 ASSAY OF MAGNESIUM: CPT

## 2025-06-08 PROCEDURE — 6370000000 HC RX 637 (ALT 250 FOR IP): Performed by: HOSPITALIST

## 2025-06-08 PROCEDURE — 51798 US URINE CAPACITY MEASURE: CPT

## 2025-06-08 PROCEDURE — 36430 TRANSFUSION BLD/BLD COMPNT: CPT

## 2025-06-08 PROCEDURE — 85027 COMPLETE CBC AUTOMATED: CPT

## 2025-06-08 RX ORDER — SODIUM CHLORIDE 9 MG/ML
INJECTION, SOLUTION INTRAVENOUS PRN
Status: DISCONTINUED | OUTPATIENT
Start: 2025-06-08 | End: 2025-06-10

## 2025-06-08 RX ORDER — CALCIUM GLUCONATE 20 MG/ML
1000 INJECTION, SOLUTION INTRAVENOUS ONCE
Status: COMPLETED | OUTPATIENT
Start: 2025-06-08 | End: 2025-06-08

## 2025-06-08 RX ORDER — 0.9 % SODIUM CHLORIDE 0.9 %
500 INTRAVENOUS SOLUTION INTRAVENOUS ONCE
Status: COMPLETED | OUTPATIENT
Start: 2025-06-08 | End: 2025-06-08

## 2025-06-08 RX ORDER — DEXTROSE MONOHYDRATE 100 MG/ML
INJECTION, SOLUTION INTRAVENOUS CONTINUOUS PRN
Status: DISCONTINUED | OUTPATIENT
Start: 2025-06-08 | End: 2025-06-10 | Stop reason: SDUPTHER

## 2025-06-08 RX ADMIN — Medication 400 MG: at 08:54

## 2025-06-08 RX ADMIN — Medication: at 20:41

## 2025-06-08 RX ADMIN — Medication: at 08:55

## 2025-06-08 RX ADMIN — MIDODRINE HYDROCHLORIDE 15 MG: 5 TABLET ORAL at 22:16

## 2025-06-08 RX ADMIN — SODIUM CHLORIDE, PRESERVATIVE FREE 10 ML: 5 INJECTION INTRAVENOUS at 08:55

## 2025-06-08 RX ADMIN — SODIUM CHLORIDE, PRESERVATIVE FREE 10 ML: 5 INJECTION INTRAVENOUS at 20:42

## 2025-06-08 RX ADMIN — Medication 400 MG: at 20:36

## 2025-06-08 RX ADMIN — SODIUM ZIRCONIUM CYCLOSILICATE 10 G: 5 POWDER, FOR SUSPENSION ORAL at 11:36

## 2025-06-08 RX ADMIN — LANSOPRAZOLE 15 MG: 30 TABLET, ORALLY DISINTEGRATING ORAL at 08:54

## 2025-06-08 RX ADMIN — OXYCODONE HYDROCHLORIDE AND ACETAMINOPHEN 1 TABLET: 5; 325 TABLET ORAL at 18:16

## 2025-06-08 RX ADMIN — INSULIN HUMAN 10 UNITS: 100 INJECTION, SOLUTION PARENTERAL at 11:45

## 2025-06-08 RX ADMIN — POTASSIUM & SODIUM PHOSPHATES POWDER PACK 280-160-250 MG 250 MG: 280-160-250 PACK at 08:54

## 2025-06-08 RX ADMIN — DEXTROSE 250 ML: 10 SOLUTION INTRAVENOUS at 11:44

## 2025-06-08 RX ADMIN — ACETAMINOPHEN 650 MG: 325 TABLET ORAL at 20:36

## 2025-06-08 RX ADMIN — MIDODRINE HYDROCHLORIDE 15 MG: 5 TABLET ORAL at 03:58

## 2025-06-08 RX ADMIN — CALCIUM GLUCONATE 1000 MG: 20 INJECTION, SOLUTION INTRAVENOUS at 11:48

## 2025-06-08 RX ADMIN — SODIUM CHLORIDE 500 ML: 0.9 INJECTION, SOLUTION INTRAVENOUS at 09:42

## 2025-06-08 ASSESSMENT — PAIN SCALES - GENERAL
PAINLEVEL_OUTOF10: 0
PAINLEVEL_OUTOF10: 7
PAINLEVEL_OUTOF10: 0

## 2025-06-08 ASSESSMENT — PAIN DESCRIPTION - LOCATION: LOCATION: GENERALIZED

## 2025-06-08 ASSESSMENT — PAIN DESCRIPTION - DESCRIPTORS
DESCRIPTORS: ACHING
DESCRIPTORS: DISCOMFORT

## 2025-06-09 ENCOUNTER — APPOINTMENT (OUTPATIENT)
Facility: HOSPITAL | Age: 75
DRG: 011 | End: 2025-06-09
Payer: MEDICARE

## 2025-06-09 PROBLEM — C32.1 CANCER OF SUPRAGLOTTIS (HCC): Status: ACTIVE | Noted: 2025-05-19

## 2025-06-09 LAB
ANION GAP SERPL CALC-SCNC: 10 MMOL/L (ref 2–12)
ANION GAP SERPL CALC-SCNC: 11 MMOL/L (ref 2–12)
ANION GAP SERPL CALC-SCNC: 11 MMOL/L (ref 2–12)
BUN SERPL-MCNC: 65 MG/DL (ref 6–20)
BUN SERPL-MCNC: 68 MG/DL (ref 6–20)
BUN SERPL-MCNC: 71 MG/DL (ref 6–20)
BUN/CREAT SERPL: 22 (ref 12–20)
BUN/CREAT SERPL: 23 (ref 12–20)
BUN/CREAT SERPL: 27 (ref 12–20)
CALCIUM SERPL-MCNC: 10 MG/DL (ref 8.5–10.1)
CALCIUM SERPL-MCNC: 10.3 MG/DL (ref 8.5–10.1)
CALCIUM SERPL-MCNC: 10.5 MG/DL (ref 8.5–10.1)
CHLORIDE SERPL-SCNC: 100 MMOL/L (ref 97–108)
CHLORIDE SERPL-SCNC: 101 MMOL/L (ref 97–108)
CHLORIDE SERPL-SCNC: 99 MMOL/L (ref 97–108)
CO2 SERPL-SCNC: 20 MMOL/L (ref 21–32)
CO2 SERPL-SCNC: 21 MMOL/L (ref 21–32)
CO2 SERPL-SCNC: 22 MMOL/L (ref 21–32)
CREAT SERPL-MCNC: 2.45 MG/DL (ref 0.7–1.3)
CREAT SERPL-MCNC: 3.08 MG/DL (ref 0.7–1.3)
CREAT SERPL-MCNC: 3.15 MG/DL (ref 0.7–1.3)
ERYTHROCYTE [DISTWIDTH] IN BLOOD BY AUTOMATED COUNT: 13.8 % (ref 11.5–14.5)
ERYTHROCYTE [DISTWIDTH] IN BLOOD BY AUTOMATED COUNT: 14.3 % (ref 11.5–14.5)
GLUCOSE BLD STRIP.AUTO-MCNC: 129 MG/DL (ref 65–117)
GLUCOSE BLD STRIP.AUTO-MCNC: 134 MG/DL (ref 65–117)
GLUCOSE BLD STRIP.AUTO-MCNC: 76 MG/DL (ref 65–117)
GLUCOSE SERPL-MCNC: 103 MG/DL (ref 65–100)
GLUCOSE SERPL-MCNC: 109 MG/DL (ref 65–100)
GLUCOSE SERPL-MCNC: 111 MG/DL (ref 65–100)
HCT VFR BLD AUTO: 21.7 % (ref 36.6–50.3)
HCT VFR BLD AUTO: 25.3 % (ref 36.6–50.3)
HCT VFR BLD AUTO: 27.1 % (ref 36.6–50.3)
HGB BLD-MCNC: 6.6 G/DL (ref 12.1–17)
HGB BLD-MCNC: 8.3 G/DL (ref 12.1–17)
HGB BLD-MCNC: 8.7 G/DL (ref 12.1–17)
HISTORY CHECK: NORMAL
MAGNESIUM SERPL-MCNC: 2 MG/DL (ref 1.6–2.4)
MCH RBC QN AUTO: 27.7 PG (ref 26–34)
MCH RBC QN AUTO: 28.4 PG (ref 26–34)
MCHC RBC AUTO-ENTMCNC: 30.4 G/DL (ref 30–36.5)
MCHC RBC AUTO-ENTMCNC: 32.8 G/DL (ref 30–36.5)
MCV RBC AUTO: 86.6 FL (ref 80–99)
MCV RBC AUTO: 91.2 FL (ref 80–99)
NRBC # BLD: 0 K/UL (ref 0–0.01)
NRBC # BLD: 0 K/UL (ref 0–0.01)
NRBC BLD-RTO: 0 PER 100 WBC
NRBC BLD-RTO: 0 PER 100 WBC
PHOSPHATE SERPL-MCNC: 5.9 MG/DL (ref 2.6–4.7)
PLATELET # BLD AUTO: 326 K/UL (ref 150–400)
PLATELET # BLD AUTO: 346 K/UL (ref 150–400)
PMV BLD AUTO: 10.1 FL (ref 8.9–12.9)
PMV BLD AUTO: 10.2 FL (ref 8.9–12.9)
POTASSIUM SERPL-SCNC: 4.4 MMOL/L (ref 3.5–5.1)
POTASSIUM SERPL-SCNC: 4.6 MMOL/L (ref 3.5–5.1)
POTASSIUM SERPL-SCNC: 5.4 MMOL/L (ref 3.5–5.1)
RBC # BLD AUTO: 2.38 M/UL (ref 4.1–5.7)
RBC # BLD AUTO: 2.92 M/UL (ref 4.1–5.7)
SERVICE CMNT-IMP: ABNORMAL
SERVICE CMNT-IMP: ABNORMAL
SERVICE CMNT-IMP: NORMAL
SODIUM SERPL-SCNC: 130 MMOL/L (ref 136–145)
SODIUM SERPL-SCNC: 131 MMOL/L (ref 136–145)
SODIUM SERPL-SCNC: 134 MMOL/L (ref 136–145)
WBC # BLD AUTO: 19.1 K/UL (ref 4.1–11.1)
WBC # BLD AUTO: 19.5 K/UL (ref 4.1–11.1)

## 2025-06-09 PROCEDURE — 85018 HEMOGLOBIN: CPT

## 2025-06-09 PROCEDURE — 6370000000 HC RX 637 (ALT 250 FOR IP)

## 2025-06-09 PROCEDURE — 2580000003 HC RX 258

## 2025-06-09 PROCEDURE — 80048 BASIC METABOLIC PNL TOTAL CA: CPT

## 2025-06-09 PROCEDURE — 83735 ASSAY OF MAGNESIUM: CPT

## 2025-06-09 PROCEDURE — 2580000003 HC RX 258: Performed by: NURSE PRACTITIONER

## 2025-06-09 PROCEDURE — 85014 HEMATOCRIT: CPT

## 2025-06-09 PROCEDURE — 36430 TRANSFUSION BLD/BLD COMPNT: CPT

## 2025-06-09 PROCEDURE — 89220 SPUTUM SPECIMEN COLLECTION: CPT

## 2025-06-09 PROCEDURE — 76770 US EXAM ABDO BACK WALL COMP: CPT

## 2025-06-09 PROCEDURE — 6360000002 HC RX W HCPCS

## 2025-06-09 PROCEDURE — 6370000000 HC RX 637 (ALT 250 FOR IP): Performed by: HOSPITALIST

## 2025-06-09 PROCEDURE — 94640 AIRWAY INHALATION TREATMENT: CPT

## 2025-06-09 PROCEDURE — 36600 WITHDRAWAL OF ARTERIAL BLOOD: CPT

## 2025-06-09 PROCEDURE — 2000000000 HC ICU R&B

## 2025-06-09 PROCEDURE — 84100 ASSAY OF PHOSPHORUS: CPT

## 2025-06-09 PROCEDURE — P9016 RBC LEUKOCYTES REDUCED: HCPCS

## 2025-06-09 PROCEDURE — 82962 GLUCOSE BLOOD TEST: CPT

## 2025-06-09 PROCEDURE — 2500000003 HC RX 250 WO HCPCS: Performed by: NURSE PRACTITIONER

## 2025-06-09 PROCEDURE — 6370000000 HC RX 637 (ALT 250 FOR IP): Performed by: INTERNAL MEDICINE

## 2025-06-09 PROCEDURE — 94667 MNPJ CHEST WALL 1ST: CPT

## 2025-06-09 PROCEDURE — 85027 COMPLETE CBC AUTOMATED: CPT

## 2025-06-09 PROCEDURE — 43762 RPLC GTUBE NO REVJ TRC: CPT

## 2025-06-09 PROCEDURE — 99232 SBSQ HOSP IP/OBS MODERATE 35: CPT | Performed by: INTERNAL MEDICINE

## 2025-06-09 PROCEDURE — P9045 ALBUMIN (HUMAN), 5%, 250 ML: HCPCS

## 2025-06-09 PROCEDURE — 51798 US URINE CAPACITY MEASURE: CPT

## 2025-06-09 PROCEDURE — 2700000000 HC OXYGEN THERAPY PER DAY

## 2025-06-09 PROCEDURE — 2500000003 HC RX 250 WO HCPCS

## 2025-06-09 PROCEDURE — 99233 SBSQ HOSP IP/OBS HIGH 50: CPT | Performed by: PHYSICAL MEDICINE & REHABILITATION

## 2025-06-09 PROCEDURE — 71045 X-RAY EXAM CHEST 1 VIEW: CPT

## 2025-06-09 RX ORDER — FUROSEMIDE 10 MG/ML
40 INJECTION INTRAMUSCULAR; INTRAVENOUS ONCE
Status: DISCONTINUED | OUTPATIENT
Start: 2025-06-09 | End: 2025-06-09

## 2025-06-09 RX ORDER — MIRTAZAPINE 15 MG/1
15 TABLET, ORALLY DISINTEGRATING ORAL NIGHTLY
Status: DISCONTINUED | OUTPATIENT
Start: 2025-06-09 | End: 2025-06-11 | Stop reason: HOSPADM

## 2025-06-09 RX ORDER — DEXTROSE MONOHYDRATE 100 MG/ML
INJECTION, SOLUTION INTRAVENOUS CONTINUOUS PRN
Status: DISCONTINUED | OUTPATIENT
Start: 2025-06-09 | End: 2025-06-10

## 2025-06-09 RX ORDER — 3% SODIUM CHLORIDE 3 G/100ML
75 INJECTION, SOLUTION INTRAVENOUS CONTINUOUS
Status: DISPENSED | OUTPATIENT
Start: 2025-06-09 | End: 2025-06-09

## 2025-06-09 RX ORDER — ALBUMIN HUMAN 50 G/1000ML
25 SOLUTION INTRAVENOUS ONCE
Status: COMPLETED | OUTPATIENT
Start: 2025-06-09 | End: 2025-06-09

## 2025-06-09 RX ORDER — 0.9 % SODIUM CHLORIDE 0.9 %
500 INTRAVENOUS SOLUTION INTRAVENOUS ONCE
Status: COMPLETED | OUTPATIENT
Start: 2025-06-09 | End: 2025-06-09

## 2025-06-09 RX ORDER — ACETYLCYSTEINE 200 MG/ML
600 SOLUTION ORAL; RESPIRATORY (INHALATION)
Status: DISCONTINUED | OUTPATIENT
Start: 2025-06-09 | End: 2025-06-11 | Stop reason: HOSPADM

## 2025-06-09 RX ORDER — FUROSEMIDE 10 MG/ML
80 INJECTION INTRAMUSCULAR; INTRAVENOUS ONCE
Status: COMPLETED | OUTPATIENT
Start: 2025-06-09 | End: 2025-06-09

## 2025-06-09 RX ORDER — GUAIFENESIN 200 MG/10ML
400 LIQUID ORAL EVERY 4 HOURS PRN
Status: DISCONTINUED | OUTPATIENT
Start: 2025-06-09 | End: 2025-06-11 | Stop reason: HOSPADM

## 2025-06-09 RX ORDER — IPRATROPIUM BROMIDE AND ALBUTEROL SULFATE 2.5; .5 MG/3ML; MG/3ML
1 SOLUTION RESPIRATORY (INHALATION)
Status: DISCONTINUED | OUTPATIENT
Start: 2025-06-09 | End: 2025-06-11 | Stop reason: HOSPADM

## 2025-06-09 RX ORDER — CALCIUM GLUCONATE 20 MG/ML
1000 INJECTION, SOLUTION INTRAVENOUS ONCE
Status: COMPLETED | OUTPATIENT
Start: 2025-06-09 | End: 2025-06-09

## 2025-06-09 RX ADMIN — Medication: at 09:22

## 2025-06-09 RX ADMIN — POTASSIUM & SODIUM PHOSPHATES POWDER PACK 280-160-250 MG 250 MG: 280-160-250 PACK at 10:12

## 2025-06-09 RX ADMIN — SODIUM CHLORIDE, PRESERVATIVE FREE 10 ML: 5 INJECTION INTRAVENOUS at 09:22

## 2025-06-09 RX ADMIN — MIRTAZAPINE 15 MG: 15 TABLET, ORALLY DISINTEGRATING ORAL at 22:51

## 2025-06-09 RX ADMIN — OXYCODONE 5 MG: 5 TABLET ORAL at 01:07

## 2025-06-09 RX ADMIN — ACETYLCYSTEINE 600 MG: 200 SOLUTION ORAL; RESPIRATORY (INHALATION) at 20:37

## 2025-06-09 RX ADMIN — SODIUM ZIRCONIUM CYCLOSILICATE 10 G: 5 POWDER, FOR SUSPENSION ORAL at 19:23

## 2025-06-09 RX ADMIN — MIDODRINE HYDROCHLORIDE 15 MG: 5 TABLET ORAL at 05:06

## 2025-06-09 RX ADMIN — SODIUM CHLORIDE, PRESERVATIVE FREE 10 ML: 5 INJECTION INTRAVENOUS at 20:03

## 2025-06-09 RX ADMIN — ACETAMINOPHEN 650 MG: 325 TABLET ORAL at 16:40

## 2025-06-09 RX ADMIN — IPRATROPIUM BROMIDE AND ALBUTEROL SULFATE 1 DOSE: 2.5; .5 SOLUTION RESPIRATORY (INHALATION) at 20:37

## 2025-06-09 RX ADMIN — MIDODRINE HYDROCHLORIDE 15 MG: 5 TABLET ORAL at 10:12

## 2025-06-09 RX ADMIN — Medication 400 MG: at 20:02

## 2025-06-09 RX ADMIN — Medication 400 MG: at 10:12

## 2025-06-09 RX ADMIN — FUROSEMIDE 80 MG: 10 INJECTION, SOLUTION INTRAMUSCULAR; INTRAVENOUS at 16:41

## 2025-06-09 RX ADMIN — SODIUM CHLORIDE 500 ML: 0.9 INJECTION, SOLUTION INTRAVENOUS at 05:09

## 2025-06-09 RX ADMIN — IPRATROPIUM BROMIDE AND ALBUTEROL SULFATE 1 DOSE: 2.5; .5 SOLUTION RESPIRATORY (INHALATION) at 12:00

## 2025-06-09 RX ADMIN — MIDODRINE HYDROCHLORIDE 15 MG: 5 TABLET ORAL at 22:53

## 2025-06-09 RX ADMIN — DEXTROSE 250 ML: 10 SOLUTION INTRAVENOUS at 19:21

## 2025-06-09 RX ADMIN — GUAIFENESIN 400 MG: 200 SOLUTION ORAL at 20:03

## 2025-06-09 RX ADMIN — INSULIN HUMAN 5 UNITS: 100 INJECTION, SOLUTION PARENTERAL at 19:19

## 2025-06-09 RX ADMIN — MIDODRINE HYDROCHLORIDE 15 MG: 5 TABLET ORAL at 16:34

## 2025-06-09 RX ADMIN — ACETYLCYSTEINE 600 MG: 200 SOLUTION ORAL; RESPIRATORY (INHALATION) at 11:58

## 2025-06-09 RX ADMIN — SODIUM CHLORIDE 75 ML/HR: 3 INJECTION, SOLUTION INTRAVENOUS at 16:20

## 2025-06-09 RX ADMIN — OXYCODONE 5 MG: 5 TABLET ORAL at 20:02

## 2025-06-09 RX ADMIN — DEXTROSE 125 ML: 10 SOLUTION INTRAVENOUS at 20:33

## 2025-06-09 RX ADMIN — ALBUMIN (HUMAN) 12.5 G: 12.5 INJECTION, SOLUTION INTRAVENOUS at 08:21

## 2025-06-09 RX ADMIN — LANSOPRAZOLE 15 MG: 30 TABLET, ORALLY DISINTEGRATING ORAL at 06:49

## 2025-06-09 RX ADMIN — Medication: at 20:03

## 2025-06-09 RX ADMIN — CALCIUM GLUCONATE 1000 MG: 20 INJECTION, SOLUTION INTRAVENOUS at 20:03

## 2025-06-09 ASSESSMENT — PAIN SCALES - GENERAL
PAINLEVEL_OUTOF10: 0
PAINLEVEL_OUTOF10: 0
PAINLEVEL_OUTOF10: 3
PAINLEVEL_OUTOF10: 0
PAINLEVEL_OUTOF10: 2
PAINLEVEL_OUTOF10: 4
PAINLEVEL_OUTOF10: 1

## 2025-06-09 ASSESSMENT — PAIN DESCRIPTION - PAIN TYPE: TYPE: SURGICAL PAIN

## 2025-06-09 ASSESSMENT — PAIN DESCRIPTION - ONSET: ONSET: ON-GOING

## 2025-06-09 ASSESSMENT — PAIN DESCRIPTION - LOCATION
LOCATION: NECK
LOCATION: GENERALIZED

## 2025-06-09 ASSESSMENT — PAIN DESCRIPTION - DESCRIPTORS
DESCRIPTORS: ACHING
DESCRIPTORS: DISCOMFORT

## 2025-06-09 ASSESSMENT — PAIN SCALES - WONG BAKER: WONGBAKER_NUMERICALRESPONSE: HURTS A LITTLE BIT

## 2025-06-09 ASSESSMENT — PAIN DESCRIPTION - ORIENTATION: ORIENTATION: ANTERIOR

## 2025-06-10 ENCOUNTER — HOSPITAL ENCOUNTER (OUTPATIENT)
Facility: HOSPITAL | Age: 75
Discharge: HOME | End: 2025-06-13

## 2025-06-10 DIAGNOSIS — C32.1 CANCER OF SUPRAGLOTTIS (HCC): Primary | ICD-10-CM

## 2025-06-10 LAB
ABO + RH BLD: NORMAL
AMORPH CRY URNS QL MICRO: ABNORMAL
ANION GAP SERPL CALC-SCNC: 12 MMOL/L (ref 2–12)
APPEARANCE UR: ABNORMAL
BACTERIA URNS QL MICRO: ABNORMAL /HPF
BILIRUB UR QL: NEGATIVE
BLD PROD TYP BPU: NORMAL
BLD PROD TYP BPU: NORMAL
BLOOD BANK BLOOD PRODUCT EXPIRATION DATE: NORMAL
BLOOD BANK BLOOD PRODUCT EXPIRATION DATE: NORMAL
BLOOD BANK DISPENSE STATUS: NORMAL
BLOOD BANK DISPENSE STATUS: NORMAL
BLOOD BANK ISBT PRODUCT BLOOD TYPE: 7300
BLOOD BANK ISBT PRODUCT BLOOD TYPE: 7300
BLOOD BANK PRODUCT CODE: NORMAL
BLOOD BANK PRODUCT CODE: NORMAL
BLOOD BANK UNIT TYPE AND RH: NORMAL
BLOOD BANK UNIT TYPE AND RH: NORMAL
BLOOD GROUP ANTIBODIES SERPL: NORMAL
BPU ID: NORMAL
BPU ID: NORMAL
BUN SERPL-MCNC: 74 MG/DL (ref 6–20)
BUN/CREAT SERPL: 21 (ref 12–20)
CALCIUM SERPL-MCNC: 10 MG/DL (ref 8.5–10.1)
CHLORIDE SERPL-SCNC: 100 MMOL/L (ref 97–108)
CO2 SERPL-SCNC: 20 MMOL/L (ref 21–32)
COLOR UR: ABNORMAL
CREAT SERPL-MCNC: 3.53 MG/DL (ref 0.7–1.3)
CREAT UR-MCNC: 18 MG/DL
CROSSMATCH RESULT: NORMAL
CROSSMATCH RESULT: NORMAL
EKG ATRIAL RATE: 77 BPM
EKG DIAGNOSIS: NORMAL
EKG P AXIS: 35 DEGREES
EKG P-R INTERVAL: 202 MS
EKG Q-T INTERVAL: 444 MS
EKG QRS DURATION: 166 MS
EKG QTC CALCULATION (BAZETT): 502 MS
EKG R AXIS: -42 DEGREES
EKG T AXIS: 117 DEGREES
EKG VENTRICULAR RATE: 77 BPM
EPITH CASTS URNS QL MICRO: ABNORMAL /LPF
ERYTHROCYTE [DISTWIDTH] IN BLOOD BY AUTOMATED COUNT: 14.2 % (ref 11.5–14.5)
ERYTHROCYTE [DISTWIDTH] IN BLOOD BY AUTOMATED COUNT: 14.3 % (ref 11.5–14.5)
GLUCOSE BLD STRIP.AUTO-MCNC: 138 MG/DL (ref 65–117)
GLUCOSE BLD STRIP.AUTO-MCNC: 145 MG/DL (ref 65–117)
GLUCOSE SERPL-MCNC: 123 MG/DL (ref 65–100)
GLUCOSE UR STRIP.AUTO-MCNC: NEGATIVE MG/DL
HCT VFR BLD AUTO: 24.1 % (ref 36.6–50.3)
HCT VFR BLD AUTO: 24.8 % (ref 36.6–50.3)
HGB BLD-MCNC: 7.7 G/DL (ref 12.1–17)
HGB BLD-MCNC: 7.9 G/DL (ref 12.1–17)
HGB UR QL STRIP: ABNORMAL
KETONES UR QL STRIP.AUTO: NEGATIVE MG/DL
LEUKOCYTE ESTERASE UR QL STRIP.AUTO: NEGATIVE
MAGNESIUM SERPL-MCNC: 2 MG/DL (ref 1.6–2.4)
MAGNESIUM SERPL-MCNC: 2.1 MG/DL (ref 1.6–2.4)
MCH RBC QN AUTO: 28.2 PG (ref 26–34)
MCH RBC QN AUTO: 28.4 PG (ref 26–34)
MCHC RBC AUTO-ENTMCNC: 31 G/DL (ref 30–36.5)
MCHC RBC AUTO-ENTMCNC: 32.8 G/DL (ref 30–36.5)
MCV RBC AUTO: 86.1 FL (ref 80–99)
MCV RBC AUTO: 91.5 FL (ref 80–99)
NITRITE UR QL STRIP.AUTO: NEGATIVE
NRBC # BLD: 0 K/UL (ref 0–0.01)
NRBC # BLD: 0 K/UL (ref 0–0.01)
NRBC BLD-RTO: 0 PER 100 WBC
NRBC BLD-RTO: 0 PER 100 WBC
PH UR STRIP: 8 (ref 5–8)
PHOSPHATE SERPL-MCNC: 5.1 MG/DL (ref 2.6–4.7)
PHOSPHATE SERPL-MCNC: 5.1 MG/DL (ref 2.6–4.7)
PLATELET # BLD AUTO: 327 K/UL (ref 150–400)
PLATELET # BLD AUTO: 341 K/UL (ref 150–400)
PMV BLD AUTO: 10.2 FL (ref 8.9–12.9)
PMV BLD AUTO: 10.4 FL (ref 8.9–12.9)
POTASSIUM SERPL-SCNC: 4.8 MMOL/L (ref 3.5–5.1)
PROT UR STRIP-MCNC: 30 MG/DL
RBC # BLD AUTO: 2.71 M/UL (ref 4.1–5.7)
RBC # BLD AUTO: 2.8 M/UL (ref 4.1–5.7)
RBC #/AREA URNS HPF: ABNORMAL /HPF (ref 0–5)
SERVICE CMNT-IMP: ABNORMAL
SERVICE CMNT-IMP: ABNORMAL
SODIUM SERPL-SCNC: 132 MMOL/L (ref 136–145)
SODIUM UR-SCNC: 85 MMOL/L
SP GR UR REFRACTOMETRY: 1.02 (ref 1–1.03)
SPECIMEN EXP DATE BLD: NORMAL
UNIT DIVISION: 0
UNIT DIVISION: 0
UNIT ISSUE DATE/TIME: NORMAL
UNIT ISSUE DATE/TIME: NORMAL
UROBILINOGEN UR QL STRIP.AUTO: 0.2 EU/DL (ref 0.2–1)
WBC # BLD AUTO: 17.6 K/UL (ref 4.1–11.1)
WBC # BLD AUTO: 17.8 K/UL (ref 4.1–11.1)
WBC URNS QL MICRO: ABNORMAL /HPF (ref 0–4)

## 2025-06-10 PROCEDURE — 97164 PT RE-EVAL EST PLAN CARE: CPT

## 2025-06-10 PROCEDURE — 2500000003 HC RX 250 WO HCPCS

## 2025-06-10 PROCEDURE — 6360000002 HC RX W HCPCS

## 2025-06-10 PROCEDURE — 6370000000 HC RX 637 (ALT 250 FOR IP)

## 2025-06-10 PROCEDURE — 92507 TX SP LANG VOICE COMM INDIV: CPT

## 2025-06-10 PROCEDURE — 81001 URINALYSIS AUTO W/SCOPE: CPT

## 2025-06-10 PROCEDURE — 82962 GLUCOSE BLOOD TEST: CPT

## 2025-06-10 PROCEDURE — 82570 ASSAY OF URINE CREATININE: CPT

## 2025-06-10 PROCEDURE — 6370000000 HC RX 637 (ALT 250 FOR IP): Performed by: HOSPITALIST

## 2025-06-10 PROCEDURE — 2500000003 HC RX 250 WO HCPCS: Performed by: INTERNAL MEDICINE

## 2025-06-10 PROCEDURE — 2580000003 HC RX 258: Performed by: INTERNAL MEDICINE

## 2025-06-10 PROCEDURE — 97535 SELF CARE MNGMENT TRAINING: CPT

## 2025-06-10 PROCEDURE — 94640 AIRWAY INHALATION TREATMENT: CPT

## 2025-06-10 PROCEDURE — 6370000000 HC RX 637 (ALT 250 FOR IP): Performed by: INTERNAL MEDICINE

## 2025-06-10 PROCEDURE — 93010 ELECTROCARDIOGRAM REPORT: CPT | Performed by: INTERNAL MEDICINE

## 2025-06-10 PROCEDURE — 83735 ASSAY OF MAGNESIUM: CPT

## 2025-06-10 PROCEDURE — 84100 ASSAY OF PHOSPHORUS: CPT

## 2025-06-10 PROCEDURE — 92526 ORAL FUNCTION THERAPY: CPT

## 2025-06-10 PROCEDURE — 2700000000 HC OXYGEN THERAPY PER DAY

## 2025-06-10 PROCEDURE — 94760 N-INVAS EAR/PLS OXIMETRY 1: CPT

## 2025-06-10 PROCEDURE — 80048 BASIC METABOLIC PNL TOTAL CA: CPT

## 2025-06-10 PROCEDURE — 97168 OT RE-EVAL EST PLAN CARE: CPT

## 2025-06-10 PROCEDURE — 94668 MNPJ CHEST WALL SBSQ: CPT

## 2025-06-10 PROCEDURE — 1100000000 HC RM PRIVATE

## 2025-06-10 PROCEDURE — 6360000002 HC RX W HCPCS: Performed by: PHYSICAL MEDICINE & REHABILITATION

## 2025-06-10 PROCEDURE — 89220 SPUTUM SPECIMEN COLLECTION: CPT

## 2025-06-10 PROCEDURE — 84300 ASSAY OF URINE SODIUM: CPT

## 2025-06-10 PROCEDURE — 2500000003 HC RX 250 WO HCPCS: Performed by: NURSE PRACTITIONER

## 2025-06-10 PROCEDURE — 85027 COMPLETE CBC AUTOMATED: CPT

## 2025-06-10 PROCEDURE — 97530 THERAPEUTIC ACTIVITIES: CPT

## 2025-06-10 RX ORDER — HYDROMORPHONE HYDROCHLORIDE 1 MG/ML
1 INJECTION, SOLUTION INTRAMUSCULAR; INTRAVENOUS; SUBCUTANEOUS EVERY 5 MIN PRN
Status: DISCONTINUED | OUTPATIENT
Start: 2025-06-10 | End: 2025-06-11 | Stop reason: HOSPADM

## 2025-06-10 RX ORDER — MIDODRINE HYDROCHLORIDE 5 MG/1
5 TABLET ORAL EVERY 6 HOURS
Status: DISCONTINUED | OUTPATIENT
Start: 2025-06-10 | End: 2025-06-10

## 2025-06-10 RX ORDER — LORAZEPAM 2 MG/ML
2 INJECTION INTRAMUSCULAR
Status: DISCONTINUED | OUTPATIENT
Start: 2025-06-10 | End: 2025-06-11 | Stop reason: HOSPADM

## 2025-06-10 RX ADMIN — LANSOPRAZOLE 15 MG: 30 TABLET, ORALLY DISINTEGRATING ORAL at 06:57

## 2025-06-10 RX ADMIN — SODIUM CHLORIDE, PRESERVATIVE FREE 10 ML: 5 INJECTION INTRAVENOUS at 09:29

## 2025-06-10 RX ADMIN — IPRATROPIUM BROMIDE AND ALBUTEROL SULFATE 1 DOSE: 2.5; .5 SOLUTION RESPIRATORY (INHALATION) at 15:24

## 2025-06-10 RX ADMIN — LORAZEPAM 2 MG: 2 INJECTION INTRAMUSCULAR; INTRAVENOUS at 14:55

## 2025-06-10 RX ADMIN — MIRTAZAPINE 15 MG: 15 TABLET, ORALLY DISINTEGRATING ORAL at 20:48

## 2025-06-10 RX ADMIN — ACETYLCYSTEINE 600 MG: 200 SOLUTION ORAL; RESPIRATORY (INHALATION) at 20:52

## 2025-06-10 RX ADMIN — SODIUM CHLORIDE, PRESERVATIVE FREE 10 ML: 5 INJECTION INTRAVENOUS at 22:30

## 2025-06-10 RX ADMIN — IPRATROPIUM BROMIDE AND ALBUTEROL SULFATE 1 DOSE: 2.5; .5 SOLUTION RESPIRATORY (INHALATION) at 08:33

## 2025-06-10 RX ADMIN — SODIUM BICARBONATE: 84 INJECTION, SOLUTION INTRAVENOUS at 13:03

## 2025-06-10 RX ADMIN — OXYCODONE 5 MG: 5 TABLET ORAL at 11:42

## 2025-06-10 RX ADMIN — ACETAMINOPHEN 650 MG: 325 TABLET ORAL at 06:57

## 2025-06-10 RX ADMIN — ACETYLCYSTEINE 600 MG: 200 SOLUTION ORAL; RESPIRATORY (INHALATION) at 08:33

## 2025-06-10 RX ADMIN — Medication 400 MG: at 09:33

## 2025-06-10 RX ADMIN — Medication 10 ML: at 22:30

## 2025-06-10 RX ADMIN — Medication: at 22:30

## 2025-06-10 RX ADMIN — GUAIFENESIN 400 MG: 200 SOLUTION ORAL at 14:36

## 2025-06-10 RX ADMIN — MIDODRINE HYDROCHLORIDE 15 MG: 5 TABLET ORAL at 11:12

## 2025-06-10 RX ADMIN — HYDROMORPHONE HYDROCHLORIDE 1 MG: 1 INJECTION, SOLUTION INTRAMUSCULAR; INTRAVENOUS; SUBCUTANEOUS at 14:55

## 2025-06-10 RX ADMIN — HYDROMORPHONE HYDROCHLORIDE 1 MG: 1 INJECTION, SOLUTION INTRAMUSCULAR; INTRAVENOUS; SUBCUTANEOUS at 20:47

## 2025-06-10 RX ADMIN — IPRATROPIUM BROMIDE AND ALBUTEROL SULFATE 1 DOSE: 2.5; .5 SOLUTION RESPIRATORY (INHALATION) at 20:52

## 2025-06-10 RX ADMIN — Medication 400 MG: at 20:48

## 2025-06-10 RX ADMIN — Medication: at 09:28

## 2025-06-10 ASSESSMENT — PAIN SCALES - GENERAL
PAINLEVEL_OUTOF10: 0
PAINLEVEL_OUTOF10: 6
PAINLEVEL_OUTOF10: 0
PAINLEVEL_OUTOF10: 0

## 2025-06-10 ASSESSMENT — PAIN SCALES - WONG BAKER
WONGBAKER_NUMERICALRESPONSE: HURTS A LITTLE BIT
WONGBAKER_NUMERICALRESPONSE: NO HURT
WONGBAKER_NUMERICALRESPONSE: NO HURT
WONGBAKER_NUMERICALRESPONSE: HURTS EVEN MORE

## 2025-06-10 ASSESSMENT — PAIN DESCRIPTION - LOCATION
LOCATION: GENERALIZED
LOCATION: GENERALIZED

## 2025-06-10 ASSESSMENT — PAIN DESCRIPTION - DESCRIPTORS: DESCRIPTORS: DISCOMFORT

## 2025-06-10 NOTE — CONSULTS
[facility] CARDIOLOGY  Cardiac Electrophysiology Note      [x]Initial Encounter     []Follow-up      Patient Name: Roge Lobato - :1950 - MRN:[id number]  Primary Cardiologist: unclear  Consulting Cardiologist: Linwood eBnavidez MD       Reason for encounter:   tachycardia    HPI:  74 y.o. male with HTN, history of stroke, complete heart block status post permanent pacemaker, prior history of COVID infection in family 25, chronic pancreatitis and GERD.  He is currently admitted due to enlarging right neck mass and shortness of breath.  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  He is s/p tracheostomy and PEG tube.  He was early in ICU now transferred to the floor.    Cardiology/EP consulted for episodes of tachycardia.    SUBJECTIVE:  Patient reports he is doing okay.  Heart with no stent due to tracheostomy.     Assessment and Plan     Principal Problem:    Neck mass  Active Problems:    Severe protein-calorie malnutrition    Supraglottic mass    Tracheostomy in place (HCC)    Neck pain    Debility    Palliative care encounter  Resolved Problems:    * No resolved hospital problems. *      # Complete heart block s/p dual-chamber Abbott PPM  # Episodes of atrial tachycardia versus sinus tachycardia    Telemetry reviewed episodes of sustained tachycardia with V pacing around 120 bpm for several hours yesterday. Back in normal rate this morning with V pacing.     Pacemaker interrogation shows that he does not have any VA conduction.  Therefore PMT is not possible.  No arrhythmia events noted on device interrogation over the past few days.    He was likely having atrial tachycardia versus sinus tachycardia adjusted.  He did have multiple episodes of PACs with tracking V paced beats.    --Normal pacemaker function.  -- If continues to have episodes of intermittent tachycardia, then consider starting metoprolol succinate 25 
     CRITICAL CARE  CONSULT NOTE      Name: Roge Lobato   : 1950   MRN: [id number]   Date: 2025      REASON FOR CONSULT: Post-Op ICU Management     SUBJECTIVE   Roge Lobato is a 74 y.o. male with hx of HTN, prior stroke, SSS s/p PPM, h/o COVID-19 infection (2025), chronic pancreatitis, GERD who was transferred from [facility] ED to [facility] ED on 25 with enlarging R neck mass and SOB. Pt presented to [facility] ED last night with chief complaint of neck mass x3 months. Reports dysphagia and difficulty swallowing pills as well as SOB. Takes ASA and Plavix. ENT was consulted and requested transfer to [facility]. He underwent laryngoscopy with laryngeal mass biopsy as well as tracheostomy today by ENT. Transferred to ICU post-operatively for airway watch s/p fresh trach.     Review of Systems  All other systems reviewed and otherwise negative unless otherwise stated in HPI above     OBJECTIVE   BP (!) 149/88   Pulse 92   Temp 97.5 °F (36.4 °C) (Oral)   Resp 22   Ht 1.702 m (5' 7\")   Wt 65 kg (143 lb 4.8 oz)   SpO2 100%   BMI 22.44 kg/m²      Temp (24hrs), Av.1 °F (36.7 °C), Min:97.5 °F (36.4 °C), Max:98.5 °F (36.9 °C)           Physical Exam  GEN: awake, alert, and oriented, well appearing, no acute distress  HEENT  -Head: Normocephalic, atraumatic  -Eyes: PERRL, EOMI. No discharge or redness  -Ears: External ears are normal  -Nose: Normal nares  -Mouth and throat: MMM. Normal gums, mucosa, palate. Good dentition.  NECK: Supple, trachea midline with trach in place, dressings clean dry and intact, no masses. No JVD   CV: Regular rate and rhythm, no m/r/g. 2+ radial pulses. Brisk cap refill  LUNGS: CTAB, no distress, equal chest rise, no w/r/c.  ABD: Soft, non-distended, no masses, non tender to palpation  SKIN: Warm, well perfused. No skin rashes or abnormal lesions.  EXT: No clubbing, cyanosis, or edema.  NEURO: Alert and oriented x3. Normal speech. Normal muscle strength and tone. No focal deficits. 
    [facility]  [address], [address] [facility] [address]  W: [phone]  F: [phone]    Reason for Evaluation:   Roge Lobato is a 74 y.o. male who is seen in hospital at the request of Dr. Hobbs for evaluation of laryngeal mass.    Hematology/Oncology Treatment History:   5/17/2025: Direct laryngoscopy with biopsy of laryngeal mass, Dr. Plascencia      History of Present Illness:   Roge Lobato is a pleasant 74 y.o. male who presents today for evaluation of laryngeal mass.    He has a past medical history of HTN, CVA, sick sinus syndrome s/p pacemaker, COVID-19 2/2025, chronic pancreatitis, and GERD.   Transferred from [facility] to [facility] 5/17 with enlarging right neck mass, to be further evaluated by ENT. Neck mass noted to increase in size over the last three months, with subsequent dysphagia, shortness of breath, and bilateral ear pain. Neck mass previously measured up to 2.6 2/2025 cm and now measures 5.1 x 3.7 x 4.9 cm. Underwent direct laryngoscopy with biopsy of laryngeal mass with trach placement 5/17.     Previously hospitalized from 2/8 to 2/18/2025 for COVID-19, was due for follow up with Dr. Givens at [facility] 3/7/2025, but had not seen ENT yet due to stay at SNF.     Supine on trach collar, expelling white foaming secretions from trach. He is having difficulty communicating but has asked that we do not call family yet. He does have a history of alcohol use, denies tobacco use.     Past Medical History:   Diagnosis Date    History of CVA (cerebrovascular accident) 9/29/2021    HTN (hypertension)     Stroke (HCC)      Past Surgical History:   Procedure Laterality Date    EP DEVICE PROCEDURE N/A 9/18/2024    Insert PPM dual performed by Fortino Mcfadden MD at [facility] CARDIAC CATH LAB    LARYNGOSCOPY N/A 5/17/2025    DIREACT LARYNGOSCOPY performed by Deepak Plascencia DO at [facility] MAIN OR    TRACHEOSTOMY N/A 5/17/2025    TRACHEOTOMY performed by Deepak Plascencia DO at [facility] MAIN OR 
  [facility]             Jose Foreman MD             [phone]        NEPHROLOGY CONSULT NOTE     Patient: Roge Lobato MRN: [id number]  PCP: No primary care provider on file.   :     1950  Age:   74 y.o.  Sex:  male      Referring physician: Keyanna Flores DO  Reason for consultation: 74 y.o. male with Neck mass [R22.1] complicated by BERNADINE   Admission Date: 2025 11:55 AM  LOS: 24 days     DISCUSSION / PLAN :      Assessment:  Bernadine possibly ATN -oliguric-due to KOURTNEY and blood loss/hypotension  Mild metabolic acidosis  Hyperkalemia, resolved  Mild hyponatremia  S/p trach  Laryngeal mass  Blood loss anemia  Hypertension, BP meds on hold        Plan:  Not a candidate for renal replacement therapy.  The plan is for palliative care.  Started on IV fluid with bicarb drip at 75 mL/h  Check UA and urine electrolytes  Strict I's and O's  Midodrine for blood pressure support  Avoid more nephrotoxic's  Follow-up renal function in morning      Prognosis guarded       Active Problems / Assessment AAActive  :   Principal Problem:    Neck mass  Active Problems:    Complete heart block (HCC)    Severe protein-calorie malnutrition    Supraglottic mass    Cancer of supraglottis (HCC)    Tracheostomy in place (HCC)    Neck pain    Debility    Palliative care encounter    Atrial tachycardia    Cardiac pacemaker in situ    Shortness of breath    Malnutrition  Resolved Problems:    * No resolved hospital problems. *       Subjective:   HPI: Roge Lobato is a 74 y.o. male who has been admitted to the hospital for neck mass.   He has history of hypertension, stroke, GERD and laryngeal mass diagnosed in 2025.  He was transferred from [facility] to [facility] for cervical mass.  He had a trach placement and biopsy showed a squamous cell carcinoma.  He developed bleeding from laryngeal mass while in hospital and CT with contrast showed active bleeding from the mass.  He had a significant drop in his 
Comprehensive Nutrition Assessment    Type and Reason for Visit: Reassess, Consult    Nutrition Recommendations/Plan:   Pt will be a refeeding risk.   Continue to monitor and correct electrolytes (K+, Mg, Phos) PRN  Recommend starting IV thiamine now, convert to EN when route available  Once G-tube placed/ OK to use: initiate TF of TwoCal HN @ 15 mL/hr, increase by 10 mL q 24 hours to goal of 55 mL/hr  Goal for discharge/ home: 5 cartons TwoCal HN daily, each followed by 240 mL H2O     Malnutrition Assessment:  Malnutrition Status:  Severe malnutrition (05/21/25 1211)    Context:  Chronic Illness     Findings of the 6 clinical characteristics of malnutrition:  Energy Intake:  75% or less estimated energy requirements for 1 month or longer  Weight Loss:  Greater than 10% over 6 months     Body Fat Loss:  Mild body fat loss Orbital, Triceps   Muscle Mass Loss:  Severe muscle mass loss Temples (temporalis), Clavicles (pectoralis & deltoids), Calf (gastrocnemius), Hand (interosseous)  Fluid Accumulation:  No fluid accumulation     Strength:  Not Performed       Nutrition Assessment:    PMHx includes HTN, h/o stroke, SSS s/p PPM, h/o COVID-19 infection (2/2025), chronic pancreatitis, GERD who was transferred from [facility] ED to [facility] ED with enlarging R neck mass and SOB. Pt presented to [facility] ED 5/16. ENT was consulted and requested transfer to [facility].   [facility] was consulted for admission but due to lack of bed availability, ENT requested ED to ED transfer for emergent evaluation and surgery. Pt reported neck mass x 3months.  Pt admitted with Neck mass  -pt now s/p trach on 5/17 with bx of laryngeal mass  -consultation with [facility] otolaryngology for consideratin of laryngectomy. Rad/Onc and Med Onc consulted as well.           5/21: G-tube placement planned for tomorrow 5/22.  Pt started on D5 @ 100 mL/hr yesterday providing 120 gm dex, 408 kcal.   No Mg and Phos today.  K+ has normalized.  Will check Mag and Phos in AM.  He 
INTERVENTIONAL RADIOLOGY CONSULT NOTE      Consult received for G-tube placement.    Patient will need to be off Plavix for 5 days. We can plan for potential G-tube placement on Thursday 5/22/2025. Continue to hold Plavix. Keep NPO after midnight on Wednesday.          Hyun Jacobs MD  
Palliative Medicine  Patient Name: Roge Lobato  YOB: 1950  MRN: [id number]  Age: 74 y.o.  Gender: male    Date of Initial Consult: 5/20/25  Date of Service: 5/20/2025  Time: 3:27 PM  Provider: Ena Watkins MD  Hospital Day: 4  Admit Date: 5/17/2025  Referring Provider: Dr Bianchi       Reasons for Consultation:  Goals of Care and advance care planning    HISTORY OF PRESENT ILLNESS (HPI):   Roge Lobato is a 74 y.o. male with a past medical history of known stroke, SSS s/p PPM, chronic pancreatitis, GERD, and  laryngeal mass dx 2/2025,     He was admitted on 5/17/2025 - initially presented to [facility] with enlarging R neck mass and hoarseness then transferred to [facility].Pt w/ diagnosed R laryngeal mass 2/2025 but was not able to follow up with ENT. CT showing that the mass almost doubled in size from previous imaging now 5.1 x 3.7 x 4.9 cm. S/p trach and mass bx 5/17.   SLP following and has aspiration on MBS, thus is NPO and plan is for PEG.  ENT, Med Onc and Rad Onc following- possible laryngectomy followed by radiation and/or chemotherapy but depends on staging.  Path pending.    Psychosocial: Pt had been living with sandy Dey but she travels and was gone for extended periods of time. Pt will be going home w/ his brother Andre when ready to d/c home.      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 Discuss our team's involvement.   Pt difficult to understand at times, but engaging. Also tangential, going back to his girlfriend Yissel spending his money. Note that Adult Protective Services evaluate pt.   Recent baseline:  Prior to admission 2/2025 pt used a rolling walker and wheelchair in his home, did his ADLs. 
supraglottic mass suspected to be T4aN0 squamous cell carcinoma although path is pending.  I believe his best option for cure would be total laryngectomy, bilateral neck dissection and postop radiation +/- chemotherapy.  I have the following recommendations:  -G tube, nutrition consult, follow SLP recs for PO trials  -CT chest to assess for metastatic disease  -continue antibiotics for tracheitis noted on laryngoscopic exam by Dr. Plascencia.  F/u cultures.  -RT consult for trach care and home trach needs.  Will change him to a 6 cuffless shiley on POD 5 and he will go home with that.  -start discharge planning.    -Will need outpatient consultation for surgery with Dr. Courtney or Dr. Pollack at [facility] Otolaryngology for possible laryngectomy and neck dissections.    Darien Hobbs MD  [facility]  [address], [address]  [place] [address]   (o) [phone]  (f) [phone]   
PRN, Lexus Valle MD, Stopped at 05/19/25 0119    ondansetron (ZOFRAN) injection 4 mg, 4 mg, IntraVENous, Q6H PRN, Lexus Valle MD    naloxone (NARCAN) injection 0.4 mg, 0.4 mg, IntraVENous, PRN, Lexus Valle MD, 0.4 mg at 05/29/25 1908    polyethylene glycol (GLYCOLAX) packet 17 g, 17 g, Oral, Daily PRN, Harris Ruiz PA-C      Documented Home Medications:  Prior to Admission medications    Medication Sig Start Date End Date Taking? Authorizing Provider   amLODIPine (NORVASC) 5 MG tablet Take 1 tablet by mouth daily   Yes Ben Cooley MD   losartan (COZAAR) 25 MG tablet Take 1 tablet by mouth daily 9/24/24  Yes Agustin Wade MD   aspirin 81 MG chewable tablet Take 1 tablet by mouth daily   Yes Ben Cooley MD   clopidogrel (PLAVIX) 75 MG tablet Take 1 tablet by mouth daily   Yes Ben Cooley MD   metoprolol tartrate (LOPRESSOR) 25 MG tablet Take 1 tablet by mouth 2 times daily   Yes Ben Cooley MD   phenol (CHLORASEPTIC MOUTH PAIN) 1.4 % LIQD mouth spray Take 1 drop by mouth every 2 hours as needed for Sore Throat 2/13/25   Sahara Wdae MD   dicyclomine (BENTYL) 10 MG capsule Take 1 capsule by mouth 3 times daily (before meals) 9/23/24   Agustin Wade MD   spironolactone (ALDACTONE) 25 MG tablet Take 1 tablet by mouth daily    Ben Cooley MD   pantoprazole (PROTONIX) 40 MG tablet Take 1 tablet by mouth daily    Ben Cooley MD        History: includes:  Past Medical History:   Diagnosis Date    History of CVA (cerebrovascular accident) 9/29/2021    HTN (hypertension)     Stroke (HCC)       Past Surgical History:   Procedure Laterality Date    EP DEVICE PROCEDURE N/A 9/18/2024    Insert PPM dual performed by Fortino Mcfadden MD at [facility] CARDIAC CATH LAB    IR FLUORO GUIDED GASTROSTOMY TUBE INSERTION PERC W CONTRAST  5/23/2025    IR FLUORO GUIDED GASTROSTOMY TUBE INSERTION PERC W CONTRAST 5/23/2025 Briseida Stokes MD [facility] RAD ANGIO IR    
06/07/25  5:50 AM   Result Value Ref Range    Fibrinogen 437 200 - 475 mg/dL   TYPE AND SCREEN    Collection Time: 06/07/25  5:50 AM   Result Value Ref Range    Crossmatch expiration date 06/10/2025,1834     ABO/Rh B POSITIVE     Antibody Screen NEG       Imaging (my read):  CTA neck: Active hemorrhage from the glottic neoplasm with extension of blood and debris into the oropharynx and nasopharynx. Patent tracheostomy tube is in good position. Debris dependent in the right main bronchus does not occlude the  partially imaged airway.    Assessment/Plan:     Neck mass hemorrhage with trach in place, s/p emergent tumor embolization with Dr Smith   - Neuro/ groin checks  - Further management per primary teams     I have discussed the diagnosis and the intended plan as seen in the above orders with Dr. Smith and intensivist.   Thank you for this consult and participating in the care of this patient.    I have spent 55 minutes of time involved in chart review, lab review, imaging review, consultations with specialists and attendings, family discussion/decision making and documentation.     Signed By: RUSS Concepcion - NP, Neurovascular Nurse Practitioner    June 7, 2025

## 2025-06-10 NOTE — PLAN OF CARE
Problem: Chronic Conditions and Co-morbidities  Goal: Patient's chronic conditions and co-morbidity symptoms are monitored and maintained or improved  5/19/2025 1119 by Hyacinth Flor RN  Outcome: Progressing  5/19/2025 0106 by Rosa M Mascorro RN  Outcome: Progressing     Problem: Pain  Goal: Verbalizes/displays adequate comfort level or baseline comfort level  5/19/2025 1119 by Hyacinth Flor RN  Outcome: Progressing  5/19/2025 0106 by Rosa M Mascorro RN  Outcome: Progressing     Problem: Safety - Adult  Goal: Free from fall injury  5/19/2025 1119 by Hyacinth Flor RN  Outcome: Progressing  5/19/2025 0106 by Rosa M Mascorro RN  Outcome: Progressing     Problem: Discharge Planning  Goal: Discharge to home or other facility with appropriate resources  5/19/2025 1119 by Hyacinth Flor RN  Outcome: Progressing  5/19/2025 0106 by Rosa M Mascorro RN  Outcome: Progressing     
  Problem: Chronic Conditions and Co-morbidities  Goal: Patient's chronic conditions and co-morbidity symptoms are monitored and maintained or improved  5/20/2025 0953 by Hyacinth Flor RN  Outcome: Progressing  5/19/2025 2308 by Ollie Nj RN  Outcome: Progressing     Problem: Pain  Goal: Verbalizes/displays adequate comfort level or baseline comfort level  5/20/2025 0953 by Hyacinth Flor RN  Outcome: Progressing  5/19/2025 2308 by Ollie Nj RN  Outcome: Progressing     Problem: Safety - Adult  Goal: Free from fall injury  5/20/2025 0953 by Hyacinth Flor RN  Outcome: Progressing  5/19/2025 2308 by Ollie Nj RN  Outcome: Progressing     Problem: Discharge Planning  Goal: Discharge to home or other facility with appropriate resources  5/20/2025 0953 by Hyacinth Flor RN  Outcome: Progressing  5/19/2025 2308 by Ollie Nj RN  Outcome: Progressing     Problem: Skin/Tissue Integrity  Goal: Skin integrity remains intact  Description: 1.  Monitor for areas of redness and/or skin breakdown2.  Assess vascular access sites hourly3.  Every 4-6 hours minimum:  Change oxygen saturation probe site4.  Every 4-6 hours:  If on nasal continuous positive airway pressure, respiratory therapy assess nares and determine need for appliance change or resting period  5/20/2025 0953 by Hyacinth Flor RN  Outcome: Progressing  5/19/2025 2308 by Ollie Nj RN  Outcome: Progressing     Problem: Nutrition Deficit:  Goal: Optimize nutritional status  Outcome: Progressing     
  Problem: Chronic Conditions and Co-morbidities  Goal: Patient's chronic conditions and co-morbidity symptoms are monitored and maintained or improved  5/21/2025 1154 by Cesar Giraldo RN  Outcome: Progressing     Problem: Pain  Goal: Verbalizes/displays adequate comfort level or baseline comfort level  5/21/2025 1154 by Cesar Giraldo RN  Outcome: Progressing     Problem: Safety - Adult  Goal: Free from fall injury  5/21/2025 1154 by Cesar Giraldo RN  Outcome: Progressing     Problem: Discharge Planning  Goal: Discharge to home or other facility with appropriate resources  5/21/2025 1154 by Cesar Giraldo RN  Outcome: Progressing     Problem: Skin/Tissue Integrity  Goal: Skin integrity remains intact  Description: 1.  Monitor for areas of redness and/or skin breakdown2.  Assess vascular access sites hourly3.  Every 4-6 hours minimum:  Change oxygen saturation probe site4.  Every 4-6 hours:  If on nasal continuous positive airway pressure, respiratory therapy assess nares and determine need for appliance change or resting period  5/21/2025 1154 by Cesar Giraldo RN  Outcome: Progressing     Problem: Nutrition Deficit:  Goal: Optimize nutritional status  5/21/2025 1154 by Cesar Giraldo RN  Outcome: Progressing     
  Problem: Chronic Conditions and Co-morbidities  Goal: Patient's chronic conditions and co-morbidity symptoms are monitored and maintained or improved  5/22/2025 0820 by Hyacinth Flor RN  Outcome: Progressing  5/21/2025 2240 by Ollie Nj RN  Outcome: Progressing     Problem: Pain  Goal: Verbalizes/displays adequate comfort level or baseline comfort level  5/22/2025 0820 by Hyacinth Flor RN  Outcome: Progressing  5/21/2025 2240 by Ollie Nj RN  Outcome: Progressing     Problem: Safety - Adult  Goal: Free from fall injury  5/22/2025 0820 by Hyacinth Flor RN  Outcome: Progressing  5/21/2025 2240 by Ollie Nj RN  Outcome: Progressing     Problem: Discharge Planning  Goal: Discharge to home or other facility with appropriate resources  5/22/2025 0820 by Hyacinth Flor RN  Outcome: Progressing  5/21/2025 2240 by Ollie Nj RN  Outcome: Progressing     Problem: Skin/Tissue Integrity  Goal: Skin integrity remains intact  Description: 1.  Monitor for areas of redness and/or skin breakdown2.  Assess vascular access sites hourly3.  Every 4-6 hours minimum:  Change oxygen saturation probe site4.  Every 4-6 hours:  If on nasal continuous positive airway pressure, respiratory therapy assess nares and determine need for appliance change or resting period  5/22/2025 0820 by Hyacinth Flor RN  Outcome: Progressing  5/21/2025 2240 by Ollie Nj RN  Outcome: Progressing     Problem: Nutrition Deficit:  Goal: Optimize nutritional status  5/22/2025 0820 by Hyacinth Flor RN  Outcome: Progressing  5/21/2025 2240 by Ollie Nj RN  Outcome: Progressing     
  Problem: Chronic Conditions and Co-morbidities  Goal: Patient's chronic conditions and co-morbidity symptoms are monitored and maintained or improved  5/23/2025 2134 by Adela So RN  Outcome: Progressing  Flowsheets (Taken 5/23/2025 2000)  Care Plan - Patient's Chronic Conditions and Co-Morbidity Symptoms are Monitored and Maintained or Improved: Monitor and assess patient's chronic conditions and comorbid symptoms for stability, deterioration, or improvement  5/23/2025 1251 by Hernesto Hollis RN  Outcome: Progressing     Problem: Pain  Goal: Verbalizes/displays adequate comfort level or baseline comfort level  5/23/2025 2134 by Adela So RN  Outcome: Progressing  5/23/2025 1251 by Hernesto Hollis RN  Outcome: Progressing     Problem: Safety - Adult  Goal: Free from fall injury  5/23/2025 2134 by Adela So RN  Outcome: Progressing  5/23/2025 1251 by Hernesto Hollis RN  Outcome: Progressing     Problem: Discharge Planning  Goal: Discharge to home or other facility with appropriate resources  5/23/2025 2134 by Adela So RN  Outcome: Progressing  Flowsheets (Taken 5/23/2025 2000)  Discharge to home or other facility with appropriate resources: Identify barriers to discharge with patient and caregiver  5/23/2025 1251 by Hernesto Hollis RN  Outcome: Progressing     Problem: Skin/Tissue Integrity  Goal: Skin integrity remains intact  Description: 1.  Monitor for areas of redness and/or skin breakdown2.  Assess vascular access sites hourly3.  Every 4-6 hours minimum:  Change oxygen saturation probe site4.  Every 4-6 hours:  If on nasal continuous positive airway pressure, respiratory therapy assess nares and determine need for appliance change or resting period  5/23/2025 2134 by Adela So RN  Outcome: Progressing  Flowsheets (Taken 5/23/2025 2000)  Skin Integrity Remains Intact: Monitor for areas of redness and/or skin breakdown  5/23/2025 1251 by Hernesto Hollis RN  Outcome: Progressing   
  Problem: Chronic Conditions and Co-morbidities  Goal: Patient's chronic conditions and co-morbidity symptoms are monitored and maintained or improved  5/25/2025 1028 by Cesar Giraldo RN  Outcome: Progressing     Problem: Pain  Goal: Verbalizes/displays adequate comfort level or baseline comfort level  5/25/2025 1028 by Cesar Giraldo RN  Outcome: Progressing     Problem: Safety - Adult  Goal: Free from fall injury  5/25/2025 1028 by Cesar Giraldo RN  Outcome: Progressing     Problem: Discharge Planning  Goal: Discharge to home or other facility with appropriate resources  Outcome: Progressing     Problem: Skin/Tissue Integrity  Goal: Skin integrity remains intact  Description: 1.  Monitor for areas of redness and/or skin breakdown2.  Assess vascular access sites hourly3.  Every 4-6 hours minimum:  Change oxygen saturation probe site4.  Every 4-6 hours:  If on nasal continuous positive airway pressure, respiratory therapy assess nares and determine need for appliance change or resting period  5/25/2025 1028 by Cesar Giraldo RN  Outcome: Progressing     Problem: Nutrition Deficit:  Goal: Optimize nutritional status  5/25/2025 1028 by Cesar Giraldo RN  Outcome: Progressing     Problem: Safety - Medical Restraint  Goal: Remains free of injury from restraints (Restraint for Interference with Medical Device)  Description: INTERVENTIONS:1. Determine that other, less restrictive measures have been tried or would not be effective before applying the restraint2. Evaluate the patient's condition at the time of restraint application3. Inform patient/family regarding the reason for restraint4. Q2H: Monitor safety, psychosocial status, comfort, nutrition and hydration  5/25/2025 1028 by Cesar Giraldo RN  Outcome: Progressing     
  Problem: Chronic Conditions and Co-morbidities  Goal: Patient's chronic conditions and co-morbidity symptoms are monitored and maintained or improved  5/25/2025 2233 by Francesca Szymanski RN  Outcome: Progressing  Flowsheets (Taken 5/25/2025 2000)  Care Plan - Patient's Chronic Conditions and Co-Morbidity Symptoms are Monitored and Maintained or Improved: Monitor and assess patient's chronic conditions and comorbid symptoms for stability, deterioration, or improvement  5/25/2025 1028 by Cesar Giraldo RN  Outcome: Progressing     Problem: Pain  Goal: Verbalizes/displays adequate comfort level or baseline comfort level  5/25/2025 2233 by Francesca Szymanski RN  Outcome: Progressing  Flowsheets (Taken 5/25/2025 2000)  Verbalizes/displays adequate comfort level or baseline comfort level: Encourage patient to monitor pain and request assistance  5/25/2025 1028 by Cesar Giraldo RN  Outcome: Progressing     Problem: Safety - Adult  Goal: Free from fall injury  5/25/2025 2233 by Francesca Szymanski RN  Outcome: Progressing  5/25/2025 1028 by Cesar Giraldo RN  Outcome: Progressing     Problem: Discharge Planning  Goal: Discharge to home or other facility with appropriate resources  5/25/2025 2233 by Francesca Szymanski RN  Outcome: Progressing  Flowsheets (Taken 5/25/2025 2000)  Discharge to home or other facility with appropriate resources: Identify barriers to discharge with patient and caregiver  5/25/2025 1028 by Cesar Giraldo RN  Outcome: Progressing     Problem: Skin/Tissue Integrity  Goal: Skin integrity remains intact  Description: 1.  Monitor for areas of redness and/or skin breakdown2.  Assess vascular access sites hourly3.  Every 4-6 hours minimum:  Change oxygen saturation probe site4.  Every 4-6 hours:  If on nasal continuous positive airway pressure, respiratory therapy assess nares and determine need for appliance change or resting period  5/25/2025 2233 by Francesca Szymanski RN  Outcome: Progressing  Flowsheets (Taken 
  Problem: Chronic Conditions and Co-morbidities  Goal: Patient's chronic conditions and co-morbidity symptoms are monitored and maintained or improved  5/26/2025 0852 by Hyacinth Flor RN  Outcome: Progressing  5/25/2025 2233 by Francesca Szymanski RN  Outcome: Progressing  Flowsheets (Taken 5/25/2025 2000)  Care Plan - Patient's Chronic Conditions and Co-Morbidity Symptoms are Monitored and Maintained or Improved: Monitor and assess patient's chronic conditions and comorbid symptoms for stability, deterioration, or improvement     Problem: Pain  Goal: Verbalizes/displays adequate comfort level or baseline comfort level  5/26/2025 0852 by Hyacinth Flor RN  Outcome: Progressing  5/25/2025 2233 by Francesca Szymanski RN  Outcome: Progressing  Flowsheets (Taken 5/25/2025 2000)  Verbalizes/displays adequate comfort level or baseline comfort level: Encourage patient to monitor pain and request assistance     Problem: Safety - Adult  Goal: Free from fall injury  5/26/2025 0852 by Hyacinth Flor RN  Outcome: Progressing  5/25/2025 2233 by Francesca Szymanski RN  Outcome: Progressing     Problem: Discharge Planning  Goal: Discharge to home or other facility with appropriate resources  5/26/2025 0852 by Hyacinth Flor RN  Outcome: Progressing  5/25/2025 2233 by Francesca Szymanski RN  Outcome: Progressing  Flowsheets (Taken 5/25/2025 2000)  Discharge to home or other facility with appropriate resources: Identify barriers to discharge with patient and caregiver     Problem: Skin/Tissue Integrity  Goal: Skin integrity remains intact  Description: 1.  Monitor for areas of redness and/or skin breakdown2.  Assess vascular access sites hourly3.  Every 4-6 hours minimum:  Change oxygen saturation probe site4.  Every 4-6 hours:  If on nasal continuous positive airway pressure, respiratory therapy assess nares and determine need for appliance change or resting period  5/26/2025 0852 by Hyacinth Flor RN  Outcome: Progressing  5/25/2025 2233 
  Problem: Chronic Conditions and Co-morbidities  Goal: Patient's chronic conditions and co-morbidity symptoms are monitored and maintained or improved  Outcome: Progressing     Problem: Pain  Goal: Verbalizes/displays adequate comfort level or baseline comfort level  Outcome: Progressing     Problem: Safety - Adult  Goal: Free from fall injury  Outcome: Progressing     Problem: Discharge Planning  Goal: Discharge to home or other facility with appropriate resources  Outcome: Progressing     
  Problem: Chronic Conditions and Co-morbidities  Goal: Patient's chronic conditions and co-morbidity symptoms are monitored and maintained or improved  Outcome: Progressing     Problem: Pain  Goal: Verbalizes/displays adequate comfort level or baseline comfort level  Outcome: Progressing     Problem: Safety - Adult  Goal: Free from fall injury  Outcome: Progressing     Problem: Discharge Planning  Goal: Discharge to home or other facility with appropriate resources  Outcome: Progressing      Problem: Skin/Tissue Integrity  Goal: Skin integrity remains intact  Description: 1.  Monitor for areas of redness and/or skin breakdown2.  Assess vascular access sites hourly3.  Every 4-6 hours minimum:  Change oxygen saturation probe site4.  Every 4-6 hours:  If on nasal continuous positive airway pressure, respiratory therapy assess nares and determine need for appliance change or resting period  Outcome: Progressing     Problem: Nutrition Deficit:  Goal: Optimize nutritional status  Outcome: Progressing      Bedside and Verbal shift change report given to Kathy (oncoming nurse) by Jamee (offgoing nurse). Report included the following information Nurse Handoff Report, Index, Intake/Output, MAR, and Cardiac Rhythm V paced .     
  Problem: Chronic Conditions and Co-morbidities  Goal: Patient's chronic conditions and co-morbidity symptoms are monitored and maintained or improved  Outcome: Progressing     Problem: Pain  Goal: Verbalizes/displays adequate comfort level or baseline comfort level  Outcome: Progressing     Problem: Safety - Adult  Goal: Free from fall injury  Outcome: Progressing     Problem: Discharge Planning  Goal: Discharge to home or other facility with appropriate resources  Outcome: Progressing     Problem: Skin/Tissue Integrity  Goal: Skin integrity remains intact  Description: 1.  Monitor for areas of redness and/or skin breakdown2.  Assess vascular access sites hourly3.  Every 4-6 hours minimum:  Change oxygen saturation probe site4.  Every 4-6 hours:  If on nasal continuous positive airway pressure, respiratory therapy assess nares and determine need for appliance change or resting period  Outcome: Progressing     Problem: Nutrition Deficit:  Goal: Optimize nutritional status  Outcome: Progressing     
  Problem: Chronic Conditions and Co-morbidities  Goal: Patient's chronic conditions and co-morbidity symptoms are monitored and maintained or improved  Outcome: Progressing  Flowsheets (Taken 5/28/2025 2000)  Care Plan - Patient's Chronic Conditions and Co-Morbidity Symptoms are Monitored and Maintained or Improved: Monitor and assess patient's chronic conditions and comorbid symptoms for stability, deterioration, or improvement     Problem: Pain  Goal: Verbalizes/displays adequate comfort level or baseline comfort level  Outcome: Progressing     Problem: Safety - Adult  Goal: Free from fall injury  Outcome: Progressing     Problem: Discharge Planning  Goal: Discharge to home or other facility with appropriate resources  Outcome: Progressing  Flowsheets (Taken 5/28/2025 2000)  Discharge to home or other facility with appropriate resources: Identify barriers to discharge with patient and caregiver     Problem: Skin/Tissue Integrity  Goal: Skin integrity remains intact  Description: 1.  Monitor for areas of redness and/or skin breakdown2.  Assess vascular access sites hourly3.  Every 4-6 hours minimum:  Change oxygen saturation probe site4.  Every 4-6 hours:  If on nasal continuous positive airway pressure, respiratory therapy assess nares and determine need for appliance change or resting period  Outcome: Progressing     Problem: Nutrition Deficit:  Goal: Optimize nutritional status  Outcome: Progressing     Problem: Safety - Medical Restraint  Goal: Remains free of injury from restraints (Restraint for Interference with Medical Device)  Description: INTERVENTIONS:1. Determine that other, less restrictive measures have been tried or would not be effective before applying the restraint2. Evaluate the patient's condition at the time of restraint application3. Inform patient/family regarding the reason for restraint4. Q2H: Monitor safety, psychosocial status, comfort, nutrition and hydration  Outcome: Progressing     
  Problem: Chronic Conditions and Co-morbidities  Goal: Patient's chronic conditions and co-morbidity symptoms are monitored and maintained or improved  Recent Flowsheet Documentation  Taken 6/2/2025 2200 by Anali Mays RN  Care Plan - Patient's Chronic Conditions and Co-Morbidity Symptoms are Monitored and Maintained or Improved: Monitor and assess patient's chronic conditions and comorbid symptoms for stability, deterioration, or improvement  Taken 6/2/2025 2000 by Anali Mays RN  Care Plan - Patient's Chronic Conditions and Co-Morbidity Symptoms are Monitored and Maintained or Improved: Monitor and assess patient's chronic conditions and comorbid symptoms for stability, deterioration, or improvement  6/2/2025 1813 by Walter Wayne, RN  Outcome: Adequate for Discharge     Problem: Pain  Goal: Verbalizes/displays adequate comfort level or baseline comfort level  Recent Flowsheet Documentation  Taken 6/3/2025 0325 by Anali Mays RN  Verbalizes/displays adequate comfort level or baseline comfort level:   Encourage patient to monitor pain and request assistance   Assess pain using appropriate pain scale  Taken 6/2/2025 2343 by Anali Mays RN  Verbalizes/displays adequate comfort level or baseline comfort level:   Encourage patient to monitor pain and request assistance   Assess pain using appropriate pain scale  Taken 6/2/2025 2006 by Anali Mays RN  Verbalizes/displays adequate comfort level or baseline comfort level:   Assess pain using appropriate pain scale   Encourage patient to monitor pain and request assistance  6/2/2025 1813 by Walter Wayne, RN  Outcome: Adequate for Discharge     Problem: Discharge Planning  Goal: Discharge to home or other facility with appropriate resources  Recent Flowsheet Documentation  Taken 6/2/2025 2200 by Anali Mays RN  Discharge to home or other facility with appropriate resources: Identify barriers to discharge with patient and caregiver  Taken 6/2/2025 2000 by Tabatha 
  Problem: Chronic Conditions and Co-morbidities  Goal: Patient's chronic conditions and co-morbidity symptoms are monitored and maintained or improved  Recent Flowsheet Documentation  Taken 6/3/2025 2130 by Anali Mays RN  Care Plan - Patient's Chronic Conditions and Co-Morbidity Symptoms are Monitored and Maintained or Improved: Monitor and assess patient's chronic conditions and comorbid symptoms for stability, deterioration, or improvement  Taken 6/3/2025 1944 by Anali Mays RN  Care Plan - Patient's Chronic Conditions and Co-Morbidity Symptoms are Monitored and Maintained or Improved: Monitor and assess patient's chronic conditions and comorbid symptoms for stability, deterioration, or improvement     Problem: Physical Therapy - Adult  Goal: By Discharge: Performs mobility at highest level of function for planned discharge setting.  See evaluation for individualized goals.  Description: FUNCTIONAL STATUS PRIOR TO ADMISSION: patient transferring self to WC ; uses RW to get into bathroom; minimal activity    HOME SUPPORT PRIOR TO ADMISSION: lives with significant other but she is not home full time. Has assistance for ADLs    Physical Therapy Goals  Goals downgraded given limited progress 6/3/2025  1.  Patient will move from supine to sit and sit to supine and roll side to side in bed with minimal assist within 7 day(s).    2.  Patient will perform sit to stand with minimal assist within 7 day(s).  3.  Patient will transfer from bed to chair and chair to bed with minimal assist using the least restrictive device within 7 day(s).  4.  Patient will ambulate with moderate assistance for 10 feet with the least restrictive device within 7 day(s).     Initiated 5/20/2025, reassessment 5/27/2025 - goals 1 - 4 remain appropriate  1.  Patient will move from supine to sit and sit to supine and roll side to side in bed with supervision/set-up within 7 day(s).    2.  Patient will perform sit to stand with 
  Problem: Chronic Conditions and Co-morbidities  Goal: Patient's chronic conditions and co-morbidity symptoms are monitored and maintained or improved  Recent Flowsheet Documentation  Taken 6/5/2025 2030 by Anali Mays RN  Care Plan - Patient's Chronic Conditions and Co-Morbidity Symptoms are Monitored and Maintained or Improved: Monitor and assess patient's chronic conditions and comorbid symptoms for stability, deterioration, or improvement     Problem: Pain  Goal: Verbalizes/displays adequate comfort level or baseline comfort level  Recent Flowsheet Documentation  Taken 6/6/2025 0715 by Anali Masy RN  Verbalizes/displays adequate comfort level or baseline comfort level:   Encourage patient to monitor pain and request assistance   Assess pain using appropriate pain scale  Taken 6/6/2025 0330 by Anali Mays RN  Verbalizes/displays adequate comfort level or baseline comfort level:   Encourage patient to monitor pain and request assistance   Assess pain using appropriate pain scale     Problem: Discharge Planning  Goal: Discharge to home or other facility with appropriate resources  Recent Flowsheet Documentation  Taken 6/5/2025 2030 by Anali Mays RN  Discharge to home or other facility with appropriate resources: Identify barriers to discharge with patient and caregiver     Problem: Skin/Tissue Integrity  Goal: Skin integrity remains intact  Description: 1.  Monitor for areas of redness and/or skin breakdown2.  Assess vascular access sites hourly3.  Every 4-6 hours minimum:  Change oxygen saturation probe site4.  Every 4-6 hours:  If on nasal continuous positive airway pressure, respiratory therapy assess nares and determine need for appliance change or resting period  Recent Flowsheet Documentation  Taken 6/5/2025 2030 by Anali Mays RN  Skin Integrity Remains Intact: Monitor for areas of redness and/or skin breakdown     
  Problem: Chronic Conditions and Co-morbidities  Goal: Patient's chronic conditions and co-morbidity symptoms are monitored and maintained or improved  Recent Flowsheet Documentation  Taken 6/6/2025 0839 by Autumn Darden RN  Care Plan - Patient's Chronic Conditions and Co-Morbidity Symptoms are Monitored and Maintained or Improved: Monitor and assess patient's chronic conditions and comorbid symptoms for stability, deterioration, or improvement     Problem: Pain  Goal: Verbalizes/displays adequate comfort level or baseline comfort level  Recent Flowsheet Documentation  Taken 6/6/2025 0715 by Anali Mays RN  Verbalizes/displays adequate comfort level or baseline comfort level:   Encourage patient to monitor pain and request assistance   Assess pain using appropriate pain scale  Taken 6/6/2025 0330 by Anali Mays RN  Verbalizes/displays adequate comfort level or baseline comfort level:   Encourage patient to monitor pain and request assistance   Assess pain using appropriate pain scale     Problem: Discharge Planning  Goal: Discharge to home or other facility with appropriate resources  Recent Flowsheet Documentation  Taken 6/6/2025 0839 by Autumn Darden RN  Discharge to home or other facility with appropriate resources: Identify barriers to discharge with patient and caregiver     Problem: Skin/Tissue Integrity  Goal: Skin integrity remains intact  Description: 1.  Monitor for areas of redness and/or skin breakdown2.  Assess vascular access sites hourly3.  Every 4-6 hours minimum:  Change oxygen saturation probe site4.  Every 4-6 hours:  If on nasal continuous positive airway pressure, respiratory therapy assess nares and determine need for appliance change or resting period  Recent Flowsheet Documentation  Taken 6/6/2025 0839 by Autumn Darden RN  Skin Integrity Remains Intact: Monitor for areas of redness and/or skin breakdown     Problem: Nutrition Deficit:  Goal: Optimize nutritional 
  Problem: Nutrition Deficit:  Goal: Optimize nutritional status  6/6/2025 2311 by Ashley Ramirez RN  Outcome: Progressing  6/6/2025 1138 by Autumn Darden RN  Outcome: Progressing     Problem: Chronic Conditions and Co-morbidities  Goal: Patient's chronic conditions and co-morbidity symptoms are monitored and maintained or improved  Recent Flowsheet Documentation  Taken 6/6/2025 2000 by Ashley Ramirez RN  Care Plan - Patient's Chronic Conditions and Co-Morbidity Symptoms are Monitored and Maintained or Improved: Monitor and assess patient's chronic conditions and comorbid symptoms for stability, deterioration, or improvement     Problem: Discharge Planning  Goal: Discharge to home or other facility with appropriate resources  Recent Flowsheet Documentation  Taken 6/6/2025 2000 by Ashley Ramirez RN  Discharge to home or other facility with appropriate resources: Identify barriers to discharge with patient and caregiver     Problem: Skin/Tissue Integrity  Goal: Skin integrity remains intact  Description: 1.  Monitor for areas of redness and/or skin breakdown2.  Assess vascular access sites hourly3.  Every 4-6 hours minimum:  Change oxygen saturation probe site4.  Every 4-6 hours:  If on nasal continuous positive airway pressure, respiratory therapy assess nares and determine need for appliance change or resting period  Recent Flowsheet Documentation  Taken 6/6/2025 2000 by Ashley Ramirez RN  Skin Integrity Remains Intact: Monitor for areas of redness and/or skin breakdown     
  Problem: Nutrition Deficit:  Goal: Optimize nutritional status  Outcome: Progressing     Problem: Chronic Conditions and Co-morbidities  Goal: Patient's chronic conditions and co-morbidity symptoms are monitored and maintained or improved  Recent Flowsheet Documentation  Taken 6/7/2025 2000 by Mercy Ferreira RN  Care Plan - Patient's Chronic Conditions and Co-Morbidity Symptoms are Monitored and Maintained or Improved:   Monitor and assess patient's chronic conditions and comorbid symptoms for stability, deterioration, or improvement   Collaborate with multidisciplinary team to address chronic and comorbid conditions and prevent exacerbation or deterioration   Update acute care plan with appropriate goals if chronic or comorbid symptoms are exacerbated and prevent overall improvement and discharge     Problem: Pain  Goal: Verbalizes/displays adequate comfort level or baseline comfort level  Recent Flowsheet Documentation  Taken 6/7/2025 2000 by Mercy Ferreira RN  Verbalizes/displays adequate comfort level or baseline comfort level:   Encourage patient to monitor pain and request assistance   Assess pain using appropriate pain scale   Administer analgesics based on type and severity of pain and evaluate response   Implement non-pharmacological measures as appropriate and evaluate response   Consider cultural and social influences on pain and pain management   Notify Licensed Independent Practitioner if interventions unsuccessful or patient reports new pain     Problem: Discharge Planning  Goal: Discharge to home or other facility with appropriate resources  Recent Flowsheet Documentation  Taken 6/7/2025 2000 by Mercy Ferreira RN  Discharge to home or other facility with appropriate resources:   Identify barriers to discharge with patient and caregiver   Identify discharge learning needs (meds, wound care, etc)   Refer to discharge planning if patient needs post-hospital services based on physician order or 
  Problem: Nutrition Deficit:  Goal: Optimize nutritional status  Outcome: Progressing     Problem: Safety - Adult  Goal: Free from fall injury  Outcome: Progressing     Problem: Skin/Tissue Integrity  Goal: Skin integrity remains intact  Description: 1.  Monitor for areas of redness and/or skin breakdown2.  Assess vascular access sites hourly3.  Every 4-6 hours minimum:  Change oxygen saturation probe site4.  Every 4-6 hours:  If on nasal continuous positive airway pressure, respiratory therapy assess nares and determine need for appliance change or resting period  Outcome: Progressing  Flowsheets  Taken 6/9/2025 1600 by Inderjit Moreno, RN  Skin Integrity Remains Intact: Monitor for areas of redness and/or skin breakdown  Taken 6/9/2025 0800 by Donavan Joseph RN  Skin Integrity Remains Intact:   Monitor for areas of redness and/or skin breakdown   Assess vascular access sites hourly   Every 4-6 hours minimum:  Change oxygen saturation probe site     
  Problem: Nutrition Deficit:  Goal: Optimize nutritional status  Recent Flowsheet Documentation  Taken 6/5/2025 0145 by Lina Brooks RN  Nutrient intake appropriate for improving, restoring, or maintaining nutritional needs:   Assess nutritional status and recommend course of action   Monitor oral intake, labs, and treatment plans   Recommend appropriate diets, oral nutritional supplements, and vitamin/mineral supplements   Order, calculate, and assess calorie counts as needed   Provide specific nutrition education to patient or family as appropriate   Recommend, monitor, and adjust tube feedings and TPN/PPN based on assessed needs     
  Problem: Occupational Therapy - Adult  Goal: By Discharge: Performs self-care activities at highest level of function for planned discharge setting.  See evaluation for individualized goals.  Description: FUNCTIONAL STATUS PRIOR TO ADMISSION:  Patient was ambulatory using w/c primarily; reports being able to stand pivot independently; and using RW to bathroom with assistance   , Prior Level of Assist for ADLs: Needs assistance (sponged bathed),  ,  ,  ,  , Toileting: Independent, Prior Level of Assist for Homemaking: Needs assistance,  , Prior Level of Assist for Transfers: Independent, Active : No     HOME SUPPORT: Patient lived significant other who assisted as needed for ADLS/IADLS .    Occupational Therapy Goals:  Initiated 5/20/2025  1.  Patient will perform grooming with [place] and Set-up  In sitting within 7 day(s).  2.  Patient will perform bathing with Minimal Assist within 7 day(s).  3.  Patient will perform lower body dressing with Minimal Assist within 7 day(s).  4.  Patient will perform toilet transfers with Minimal Assist  within 7 day(s).  5.  Patient will perform all aspects of toileting with Minimal Assist within 7 day(s).  6.  Patient will participate in upper extremity therapeutic exercise/activities with Contact Guard Assist for 15 minutes within 7 day(s).    7.  Patient will utilize energy conservation techniques during functional activities with verbal cues within 7 day(s).   Outcome: Progressing   OCCUPATIONAL THERAPY TREATMENT  Patient: Roge Lobato (74 y.o. male)  Date: 5/21/2025  Primary Diagnosis: Neck mass [R22.1]  Procedure(s) (LRB):  TRACHEOTOMY (N/A)  DIREACT LARYNGOSCOPY (N/A) 4 Days Post-Op   Precautions:                  Chart, occupational therapy assessment, plan of care, and goals were reviewed.    ASSESSMENT  Patient continues to benefit from skilled OT services and is progressing towards goals. Pt received semi-reclined in bed, reported being exhausted today 
  Problem: Occupational Therapy - Adult  Goal: By Discharge: Performs self-care activities at highest level of function for planned discharge setting.  See evaluation for individualized goals.  Description: FUNCTIONAL STATUS PRIOR TO ADMISSION:  Patient was ambulatory using w/c primarily; reports being able to stand pivot independently; and using RW to bathroom with assistance   , Prior Level of Assist for ADLs: Needs assistance (sponged bathed),  ,  ,  ,  , Toileting: Independent, Prior Level of Assist for Homemaking: Needs assistance,  , Prior Level of Assist for Transfers: Independent, Active : No     HOME SUPPORT: Patient lived significant other who assisted as needed for ADLS/IADLS .    Occupational Therapy Goals:  Initiated 5/20/2025  1.  Patient will perform grooming with [place] and Set-up  In sitting within 7 day(s).  2.  Patient will perform bathing with Minimal Assist within 7 day(s).  3.  Patient will perform lower body dressing with Minimal Assist within 7 day(s).  4.  Patient will perform toilet transfers with Minimal Assist  within 7 day(s).  5.  Patient will perform all aspects of toileting with Minimal Assist within 7 day(s).  6.  Patient will participate in upper extremity therapeutic exercise/activities with Contact Guard Assist for 15 minutes within 7 day(s).    7.  Patient will utilize energy conservation techniques during functional activities with verbal cues within 7 day(s).   Outcome: Progressing   OCCUPATIONAL THERAPY EVALUATION    Patient: Roge Lobato (74 y.o. male)  Date: 5/20/2025  Primary Diagnosis: Neck mass [R22.1]  Procedure(s) (LRB):  TRACHEOTOMY (N/A)  DIREACT LARYNGOSCOPY (N/A) 3 Days Post-Op     Precautions:                    ASSESSMENT :  The patient is limited by decreased functional mobility, independence in ADLs, high-level IADLs, ROM, strength, body mechanics, activity tolerance, endurance, safety awareness, coordination, balance, posture s/p admission for R 
  Problem: Occupational Therapy - Adult  Goal: By Discharge: Performs self-care activities at highest level of function for planned discharge setting.  See evaluation for individualized goals.  Description: FUNCTIONAL STATUS PRIOR TO ADMISSION:  Patient was ambulatory using w/c primarily; reports being able to stand pivot independently; and using RW to bathroom with assistance   , Prior Level of Assist for ADLs: Needs assistance (sponged bathed),  ,  ,  ,  , Toileting: Independent, Prior Level of Assist for Homemaking: Needs assistance,  , Prior Level of Assist for Transfers: Independent, Active : No     HOME SUPPORT: Patient lived significant other who assisted as needed for ADLS/IADLS .    Occupational Therapy Goals:  Initiated 5/20/2025-  All Goals Progressing  5/27/2025  1.  Patient will perform grooming with [place] and Set-up  In sitting within 7 day(s).  2.  Patient will perform bathing with Minimal Assist within 7 day(s).  3.  Patient will perform lower body dressing with Minimal Assist within 7 day(s).  4.  Patient will perform toilet transfers with Minimal Assist  within 7 day(s).  5.  Patient will perform all aspects of toileting with Minimal Assist within 7 day(s).  6.  Patient will participate in upper extremity therapeutic exercise/activities with Contact Guard Assist for 15 minutes within 7 day(s).    7.  Patient will utilize energy conservation techniques during functional activities with verbal cues within 7 day(s).   Outcome: Progressing     OCCUPATIONAL THERAPY TREATMENT: WEEKLY REASSESSMENT    Patient: Roge Lobato (74 y.o. male)  Date: 5/27/2025  Primary Diagnosis: Neck mass [R22.1]  Procedure(s) (LRB):  TRACHEOTOMY (N/A)  DIREACT LARYNGOSCOPY (N/A) 10 Days Post-Op   Precautions:                  Chart, occupational therapy assessment, plan of care, and goals were reviewed.    ASSESSMENT  Patient continues to benefit from skilled OT services and is progressing towards goals. 
  Problem: Pain  Goal: Verbalizes/displays adequate comfort level or baseline comfort level  5/26/2025 2124 by Tammi Ott RN  Outcome: Progressing  Flowsheets (Taken 5/26/2025 2124)  Verbalizes/displays adequate comfort level or baseline comfort level:   Encourage patient to monitor pain and request assistance   Assess pain using appropriate pain scale   Administer analgesics based on type and severity of pain and evaluate response   Implement non-pharmacological measures as appropriate and evaluate response     Problem: Safety - Adult  Goal: Free from fall injury  5/26/2025 2124 by Tammi Ott RN  Outcome: Progressing     Problem: Discharge Planning  Goal: Discharge to home or other facility with appropriate resources  5/26/2025 2124 by Tammi Ott RN  Outcome: Progressing  Flowsheets (Taken 5/26/2025 2124)  Discharge to home or other facility with appropriate resources:   Identify barriers to discharge with patient and caregiver   Identify discharge learning needs (meds, wound care, etc)   Refer to discharge planning if patient needs post-hospital services based on physician order or complex needs related to functional status, cognitive ability or social support system     Problem: Skin/Tissue Integrity  Goal: Skin integrity remains intact  Description: 1.  Monitor for areas of redness and/or skin breakdown2.  Assess vascular access sites hourly3.  Every 4-6 hours minimum:  Change oxygen saturation probe site4.  Every 4-6 hours:  If on nasal continuous positive airway pressure, respiratory therapy assess nares and determine need for appliance change or resting period  5/26/2025 2124 by Tammi Ott RN  Outcome: Progressing  Flowsheets (Taken 5/26/2025 2124)  Skin Integrity Remains Intact:   Monitor for areas of redness and/or skin breakdown   Assess vascular access sites hourly   Pressure redistribution bed/mattress (bed type)   Check 
  Problem: Pain  Goal: Verbalizes/displays adequate comfort level or baseline comfort level  Outcome: Progressing     Problem: Safety - Adult  Goal: Free from fall injury  Outcome: Progressing     Problem: Discharge Planning  Goal: Discharge to home or other facility with appropriate resources  Outcome: Progressing     Problem: SLP Adult - Impaired Swallowing  Goal: By Discharge: Advance to least restrictive diet without signs or symptoms of aspiration for planned discharge setting.  See evaluation for individualized goals.  Description: Speech pathology goals  Initiated 5/18/2025; re-evaluation 5/27/2025   1. Patient will participate in MBS within 7 days. MET 5/19/2025.  2. Patient will tolerate PMV with supervision and no change in vital signs within 7 days.   3. Patient will participate in pharyngeal strengthening exercises within 7 days.    5/30/2025 1222 by Brenda Spence, SLP  Outcome: Progressing     Problem: Skin/Tissue Integrity  Goal: Skin integrity remains intact  Description: 1.  Monitor for areas of redness and/or skin breakdown2.  Assess vascular access sites hourly3.  Every 4-6 hours minimum:  Change oxygen saturation probe site4.  Every 4-6 hours:  If on nasal continuous positive airway pressure, respiratory therapy assess nares and determine need for appliance change or resting period  Outcome: Progressing  Flowsheets  Taken 5/30/2025 2000 by Jhonny Ramsey, RN  Skin Integrity Remains Intact: Monitor for areas of redness and/or skin breakdown  Taken 5/30/2025 0800 by Donna Gomez RN  Skin Integrity Remains Intact: Monitor for areas of redness and/or skin breakdown     Problem: Nutrition Deficit:  Goal: Optimize nutritional status  Outcome: Progressing     
  Problem: Physical Therapy - Adult  Goal: By Discharge: Performs mobility at highest level of function for planned discharge setting.  See evaluation for individualized goals.  Description: FUNCTIONAL STATUS PRIOR TO ADMISSION: patient transferring self to WC ; uses RW to get into bathroom; minimal activity    HOME SUPPORT PRIOR TO ADMISSION: lives with significant other but she is not home full time. Has assistance for ADLs    Physical Therapy Goals  Goals downgraded given limited progress 6/3/2025  1.  Patient will move from supine to sit and sit to supine and roll side to side in bed with minimal assist within 7 day(s).    2.  Patient will perform sit to stand with minimal assist within 7 day(s).  3.  Patient will transfer from bed to chair and chair to bed with minimal assist using the least restrictive device within 7 day(s).  4.  Patient will ambulate with moderate assistance for 10 feet with the least restrictive device within 7 day(s).     Initiated 5/20/2025, reassessment 5/27/2025 - goals 1 - 4 remain appropriate  1.  Patient will move from supine to sit and sit to supine and roll side to side in bed with supervision/set-up within 7 day(s).    2.  Patient will perform sit to stand with supervision/set-up within 7 day(s).  3.  Patient will transfer from bed to chair and chair to bed with contact guard assist using the least restrictive device within 7 day(s).  4.  Patient will ambulate with minimal assistance for 10 feet with the least restrictive device within 7 day(s).   5.  Patient will ascend/descend 8 stairs with 1 handrail(s) with moderate assistance within 7 day(s).    6/3/2025 1519 by Amalia Correia, PT  Outcome: Not Progressing  PHYSICAL THERAPY TREATMENT: WEEKLY REASSESSMENT    Patient: Roge Lobato (74 y.o. male)  Date: 6/3/2025  Primary Diagnosis: Neck mass [R22.1]  Procedure(s) (LRB):  TRACHEOTOMY (N/A)  DIREACT LARYNGOSCOPY (N/A) 17 Days Post-Op   Precautions: 
  Problem: Physical Therapy - Adult  Goal: By Discharge: Performs mobility at highest level of function for planned discharge setting.  See evaluation for individualized goals.  Description: FUNCTIONAL STATUS PRIOR TO ADMISSION: patient transferring self to WC ; uses RW to get into bathroom; minimal activity    HOME SUPPORT PRIOR TO ADMISSION: lives with significant other but she is not home full time. Has assistance for ADLs    Physical Therapy Goals  Initiated 5/20/2025  1.  Patient will move from supine to sit and sit to supine and roll side to side in bed with supervision/set-up within 7 day(s).    2.  Patient will perform sit to stand with supervision/set-up within 7 day(s).  3.  Patient will transfer from bed to chair and chair to bed with contact guard assist using the least restrictive device within 7 day(s).  4.  Patient will ambulate with minimal assistance for 10 feet with the least restrictive device within 7 day(s).   5.  Patient will ascend/descend 8 stairs with 1 handrail(s) with moderate assistance within 7 day(s).  Outcome: Progressing  PHYSICAL THERAPY TREATMENT    Patient: Roge Lobato (74 y.o. male)  Date: 5/21/2025  Diagnosis: Neck mass [R22.1] Neck mass  Procedure(s) (LRB):  TRACHEOTOMY (N/A)  DIREACT LARYNGOSCOPY (N/A) 4 Days Post-Op  Precautions: Fall      ASSESSMENT:  Patient continues to benefit from skilled PT services and is slowly progressing towards goals. Continues to present with weakness (R hemiparesis from hx CVA), impaired balance, decreased activity tolerance, productive cough (copious secretions requiring suction during session), and overall decline in functional mobility.  Pt received in bed initially declining therapy, but agreeable to limited session with encouragement and education.  Pt required modA for supine<>sit and was able to tolerate sitting up for ~10 minutes with intermittent assistance for balance d/t posterior lean.  Ended session returned to bed, repositioned 
  Problem: SLP Adult - Impaired Swallowing  Goal: By Discharge: Advance to least restrictive diet without signs or symptoms of aspiration for planned discharge setting.  See evaluation for individualized goals.  Description: Speech pathology goals  Initiated 5/18/2025; re-evaluation 5/27/2025 ; re-eval 6/3/2025   1. Patient will participate in MBS within 7 days. MET 5/19/2025.  2. Patient will tolerate PMV with supervision and no change in vital signs within 7 days. Continued 6/4/2025 6/4/2025 1347 by Barbara Lucas, SLP  Outcome: Progressing     Problem: Nutrition Deficit:  Goal: Optimize nutritional status  6/4/2025 1409 by Mari Wynn, RN  Outcome: Progressing  6/4/2025 0401 by Anali Mays, RN  Flowsheets (Taken 6/4/2025 0401)  Nutrient intake appropriate for improving, restoring, or maintaining nutritional needs:   Assess nutritional status and recommend course of action   Recommend appropriate diets, oral nutritional supplements, and vitamin/mineral supplements     
  Problem: SLP Adult - Impaired Swallowing  Goal: By Discharge: Advance to least restrictive diet without signs or symptoms of aspiration for planned discharge setting.  See evaluation for individualized goals.  Description: Speech pathology goals  Initiated 5/18/2025; re-evaluation 5/27/2025 ; re-eval 6/3/2025   1. Patient will participate in MBS within 7 days. MET 5/19/2025.  2. Patient will tolerate PMV with supervision and no change in vital signs within 7 days. Continued 6/4/2025 6/5/2025 1350 by Brenda Spence, SLP  Outcome: Progressing  6/5/2025 1350 by Brenda Spence, SLP  Outcome: Progressing     Problem: Nutrition Deficit:  Goal: Optimize nutritional status  6/5/2025 1352 by Ayaan Squires, RN  Outcome: Progressing  6/5/2025 0145 by Lina Brooks, RN  Outcome: Progressing  Flowsheets (Taken 6/5/2025 0145)  Nutrient intake appropriate for improving, restoring, or maintaining nutritional needs:   Assess nutritional status and recommend course of action   Monitor oral intake, labs, and treatment plans   Recommend appropriate diets, oral nutritional supplements, and vitamin/mineral supplements   Order, calculate, and assess calorie counts as needed   Provide specific nutrition education to patient or family as appropriate   Recommend, monitor, and adjust tube feedings and TPN/PPN based on assessed needs     
  Problem: SLP Adult - Impaired Swallowing  Goal: By Discharge: Advance to least restrictive diet without signs or symptoms of aspiration for planned discharge setting.  See evaluation for individualized goals.  Description: Speech pathology goals  Initiated 5/18/2025; re-evaluation 5/27/2025 ; re-eval 6/3/2025   1. Patient will participate in MBS within 7 days. MET 5/19/2025.  2. Patient will tolerate PMV with supervision and no change in vital signs within 7 days. Continued 6/4/2025. Discontinued 6/10/2025.  3. Patient and family will participate in education re: oral care and swallowing in regards to tracheostomy. Initiated 6/10/2025.    6/10/2025 1109 by Brenda Spence, SLP  Outcome: Not Progressing     Problem: Physical Therapy - Adult  Goal: By Discharge: Performs mobility at highest level of function for planned discharge setting.  See evaluation for individualized goals.  Description: FUNCTIONAL STATUS PRIOR TO ADMISSION: patient transferring self to WC ; uses RW to get into bathroom; minimal activity    HOME SUPPORT PRIOR TO ADMISSION: lives with significant other but she is not home full time. Has assistance for ADLs    Physical Therapy Goals  Goals downgraded given limited progress 6/3/2025  1.  Patient will move from supine to sit and sit to supine and roll side to side in bed with minimal assist within 7 day(s).    2.  Patient will perform sit to stand with minimal assist within 7 day(s).  3.  Patient will transfer from bed to chair and chair to bed with minimal assist using the least restrictive device within 7 day(s).  4.  Patient will ambulate with moderate assistance for 10 feet with the least restrictive device within 7 day(s).     Initiated 5/20/2025, reassessment 5/27/2025 - goals 1 - 4 remain appropriate  1.  Patient will move from supine to sit and sit to supine and roll side to side in bed with supervision/set-up within 7 day(s).    2.  Patient will perform sit to stand with supervision/set-up 
Patient c/o 6/10  chest pain in the middle of his chest, stated it felt like his heart was beating fast. His heart rate was 70-90 and BP was 129/65. Provider updated, received orders for pain meds. Patient satisfied on pain reassessment. Will continue to monitor    Problem: Chronic Conditions and Co-morbidities  Goal: Patient's chronic conditions and co-morbidity symptoms are monitored and maintained or improved  Outcome: Progressing     Problem: Pain  Goal: Verbalizes/displays adequate comfort level or baseline comfort level  Outcome: Progressing     Problem: Safety - Adult  Goal: Free from fall injury  Outcome: Progressing     Problem: Discharge Planning  Goal: Discharge to home or other facility with appropriate resources  Outcome: Progressing     Problem: Skin/Tissue Integrity  Goal: Skin integrity remains intact  Description: 1.  Monitor for areas of redness and/or skin breakdown2.  Assess vascular access sites hourly3.  Every 4-6 hours minimum:  Change oxygen saturation probe site4.  Every 4-6 hours:  If on nasal continuous positive airway pressure, respiratory therapy assess nares and determine need for appliance change or resting period  Outcome: Progressing     Problem: Nutrition Deficit:  Goal: Optimize nutritional status  Outcome: Progressing     Problem: Safety - Medical Restraint  Goal: Remains free of injury from restraints (Restraint for Interference with Medical Device)  Description: INTERVENTIONS:1. Determine that other, less restrictive measures have been tried or would not be effective before applying the restraint2. Evaluate the patient's condition at the time of restraint application3. Inform patient/family regarding the reason for restraint4. Q2H: Monitor safety, psychosocial status, comfort, nutrition and hydration  Outcome: Progressing     
SPEECH PATHOLOGY MODIFIED BARIUM SWALLOW STUDY  Patient: Roge Lobato (74 y.o. male)  Date: 5/19/2025  Primary Diagnosis: Neck mass [R22.1]  Procedure(s) (LRB):  TRACHEOTOMY (N/A)  DIREACT LARYNGOSCOPY (N/A) 2 Days Post-Op   Precautions:                      ASSESSMENT :  Patient presents with functional oral phase of swallow with liquid consistencies, and severe pharyngeal dysphagia. Pharyngeal dysphagia is characterized by incomplete airway closure resulting in silent aspiration of thin- and mildly-thick liquid consistencies with PMV donned/doffed. A cued cough was ineffective in clearing the airway. Minimal clearance of contrast through the upper esophageal sphincter, with more clearance of mildly thick than thin liquid. Patient performs multiple swallows in attempt to clear the pharynx, however residual contrast is aspirated. Suspect that mass on the right aryepigloittic fold is impacting both airway closure and contrast clearance through upper esophageal sphincter. Head turn toward either side was unsuccessful in preventing aspiration nor in aiding clearance of contrast.     Of note, patient had PMV donned for majority of contrast PO trials, then began to complain of chest tightness. Once PMV doffed, patient expectorated both grey-karol secretions and barium from the trach.     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 Do not anticipate patient will be able to maintain adequate nutrition nor hydration by mouth at this time, would recommend consideration of long-term alternative means of nutrition and hydration. Do encourage vigilant and thorough oral care and for patient to continue to have scant ice chips by mouth to prevent further progression of dysphagia. SLP will continue to follow.     Patient will benefit from skilled intervention to address the above impairments.     PLAN :  Recommendations and Planned Interventions:  Diet: 
Speech LAnguage Pathology RE-EVALUATION    Patient: Roge Lobato (74 y.o. male)  Date: 6/3/2025  Primary Diagnosis: Neck mass [R22.1]  Procedure(s) (LRB):  TRACHEOTOMY (N/A)  DIREACT LARYNGOSCOPY (N/A) 17 Days Post-Op   Precautions:                     ASSESSMENT :  Patient seen at bedside to address dysphagia and PMV use. Patient with increased, bloody secretions from trach. PMV use, even with supervision, is unsafe at this time given poor secretion management (change from previous session). Attempting to use augmentative and alternative forms of communication is unfortunately not an option for this patient given his vision impairment. SLP will continue to follow.     Patient provided with oral care (mouthwash) and ice chips x2. +Cough response after second ice chip. Re-educated patient on importance of oral care and minimal ice chips to prevent further atrophy of swallow musculature. He verbalized understanding. Unfortunately, would not expect patient's swallow function to change or improve without other intervention (laryngectomy and/or XRT).    Patient will benefit from skilled intervention to address the above impairments.     PLAN :  Recommendations and Planned Interventions:  Diet: NPO  - PMV not safe for use at this time due to secretion burden, SLP trials only  - Continue regular oral care with mouthwash at least BID  - Minimal (1-2x) ice chips after oral care to maintain swallow         Re-Evaluation: Progress toward goals: poor. SLP Plan of Care: 3 times/week   Discharge Recommendations: Yes, recommend SLP treatment at next level of care     SUBJECTIVE:   Patient stated, “My heels hurt.”    OBJECTIVE:     Past Medical History:   Diagnosis Date    History of CVA (cerebrovascular accident) 9/29/2021    HTN (hypertension)     Stroke (HCC)      Past Surgical History:   Procedure Laterality Date    EP DEVICE PROCEDURE N/A 9/18/2024    Insert PPM dual performed by Fortino Mcfadden MD at \[facility]\"" CARDIAC CATH 
Positioning devices  Taken 5/27/2025 2000  Skin Integrity Remains Intact: Monitor for areas of redness and/or skin breakdown     
RN  Outcome: Progressing  5/29/2025 0557 by Kathy Ramsey RN  Outcome: Progressing     Problem: Safety - Medical Restraint  Goal: Remains free of injury from restraints (Restraint for Interference with Medical Device)  Description: INTERVENTIONS:1. Determine that other, less restrictive measures have been tried or would not be effective before applying the restraint2. Evaluate the patient's condition at the time of restraint application3. Inform patient/family regarding the reason for restraint4. Q2H: Monitor safety, psychosocial status, comfort, nutrition and hydration  5/29/2025 0909 by Hyacinth Flor RN  Outcome: Progressing  5/29/2025 0557 by Kathy Ramsey, RN  Outcome: Progressing     
Skin integrity remains intact  Description: 1.  Monitor for areas of redness and/or skin breakdown2.  Assess vascular access sites hourly3.  Every 4-6 hours minimum:  Change oxygen saturation probe site4.  Every 4-6 hours:  If on nasal continuous positive airway pressure, respiratory therapy assess nares and determine need for appliance change or resting period  5/27/2025 0858 by Hyacinth Flor, RN  Outcome: Progressing  5/26/2025 2124 by Tammi Ott, KAILA  Outcome: Progressing  Flowsheets (Taken 5/26/2025 2124)  Skin Integrity Remains Intact:   Monitor for areas of redness and/or skin breakdown   Assess vascular access sites hourly   Pressure redistribution bed/mattress (bed type)   Check visual cues for pain   Monitor skin under medical devices   Turn and reposition as indicated     Problem: Nutrition Deficit:  Goal: Optimize nutritional status  Outcome: Progressing     Problem: Safety - Medical Restraint  Goal: Remains free of injury from restraints (Restraint for Interference with Medical Device)  Description: INTERVENTIONS:1. Determine that other, less restrictive measures have been tried or would not be effective before applying the restraint2. Evaluate the patient's condition at the time of restraint application3. Inform patient/family regarding the reason for restraint4. Q2H: Monitor safety, psychosocial status, comfort, nutrition and hydration  Outcome: Progressing     
for restraint4. Q2H: Monitor safety, psychosocial status, comfort, nutrition and hydration  5/25/2025 0038 by Main Diop, RN  Outcome: Progressing  5/24/2025 1045 by Cesar Giraldo, RN  Outcome: Progressing  Flowsheets (Taken 5/24/2025 0417 by Adela So, RN)  Remains free of injury from restraints (restraint for interference with medical device):   Determine that other, less restrictive measures have been tried or would not be effective before applying the restraint   Evaluate the patient's condition at the time of restraint application   Inform patient/family regarding the reason for restraint   Every 2 hours: Monitor safety, psychosocial status, comfort, nutrition and hydration     
occurred on Sunday.  RN notified however reports he c/o similar symptoms last week.   Pt required Min A x 2 supine > sit and scooting to EOB to place B feet on floor with supervision.  Pt has good static sitting balance and participated with BLE knee extension and push ups in sitting position.  Pt progressed to standing briefly with Mod A x 2 with his hands on back of chair for support.  Pt c/o SOB, RR 29, and desaturated to 80s with standing activity.  Pt returned to supine and recovered well with no further complaints.       Patient's progression toward goals since last assessment: slow progress, goals remain appropriate            PLAN :  Goals have been updated based on progression since last assessment.  Patient continues to benefit from skilled intervention to address the above impairments.    Recommendations and Planned Interventions:   bed mobility training, transfer training, gait training, therapeutic exercises, neuromuscular re-education, patient and family training/education, and therapeutic activities      Frequency/Duration: Patient will be followed by physical therapy to address goals, PT Plan of Care: 5 times/week  per day/week to address goals.    Recommendations for staff mobility and toileting assistance:  Recommend that staff assists the patient to meet their mobility and care needs at the bed level until progress has been made with therapy.      Recommendation for discharge: (in order for the patient to meet his/her long term goals):   Moderate intensity short-term skilled physical therapy up to 5x/week    Other factors to consider for discharge: available support system works or is unable to provide adequate supervision and the patient would be alone, patient's current support system is unable to meet their requirements for physical assistance, high risk for falls, not safe to be alone, and concern for safely navigating or managing the home environment    IF patient discharges home will need 
restrictive measures have been tried or would not be effective before applying the restraint   Evaluate the patient's condition at the time of restraint application   Inform patient/family regarding the reason for restraint   Every 2 hours: Monitor safety, psychosocial status, comfort, nutrition and hydration     
x4  Cognition: Appropriate for age attention/concentration    Respiratory Status/Airway:  Trach collar                         After treatment:   Patient left in no apparent distress in bed, Call bell left within reach, and Nursing notified    COMMUNICATION/EDUCATION:   Patient was educated regarding SLP role, MBS results, recommendations, POC.  He demonstrated Good understanding as evidenced by Verbalizing understanding.     The patient's plan of care including recommendations, planned interventions, and recommended diet changes were discussed with: Registered nurse    Patient understands intent and goals of therapy, but is neutral about his/her participation    Thank you,  Brenda Spence, SLP    SLP Individual Minutes  Time In: 1355  Time Out: 1410  Minutes: 15     Problem: SLP Adult - Impaired Swallowing  Goal: By Discharge: Advance to least restrictive diet without signs or symptoms of aspiration for planned discharge setting.  See evaluation for individualized goals.  Description: Speech pathology goals  Initiated 5/18/2025  1. Patient will participate in MBS within 7 days. MET 5/19/2025.  2. Patient will tolerate PMV with supervision and no change in vital signs within 7 days.   3. Patient will participate in pharyngeal strengthening exercises within 7 days.    5/19/2025 1526 by Brenda Spence, SLP  Outcome: Progressing  5/19/2025 1236 by Brenda Spence, SLP  Outcome: Progressing     
3  Passy-[place] Valve Tolerance - Minutes: 10  PMV Placement Recommendations:  (all staff)          After treatment:   Patient left in no apparent distress in bed, Call bell left within reach, and Nursing notified    COMMUNICATION/EDUCATION:   Patient was educated regarding dysphagia, PMV.  He demonstrated Good understanding as evidenced by Verbalizing understanding.     The patient's plan of care including recommendations, planned interventions, and recommended diet changes were discussed with: Registered nurse    Patient/family have participated as able in goal setting and plan of care and Patient/family agree to work toward stated goals and plan of care    Thank you,  Kelsi Huang, SLP    SLP Individual Minutes  Time In: 0834  Time Out: 0852  Minutes: 18     Problem: SLP Adult - Impaired Swallowing  Goal: By Discharge: Advance to least restrictive diet without signs or symptoms of aspiration for planned discharge setting.  See evaluation for individualized goals.  Description: Speech pathology goals  Initiated 5/18/2025  1. Patient will participate in MBS within 7 days. MET 5/19/2025.  2. Patient will tolerate PMV with supervision and no change in vital signs within 7 days.   3. Patient will participate in pharyngeal strengthening exercises within 7 days.    Outcome: Progressing     
PMV-2001 (Purple)  Coughing: No  Secretions: No  Anxiety: No  Vocal Intensity: 2  Vocal Quality: 2  Speech Intelligibility: 3  Passy-Sharita Valve Tolerance - Minutes: 2  Passy-Sharita Valve Tolerance - Seconds: 30  PMV Placement Recommendations: Speech therapy only          After treatment:   Patient left in no apparent distress in bed, Call bell left within reach, and Nursing notified    COMMUNICATION/EDUCATION:   Patient was educated regarding SLP role, speaking valve tolerance, POC.  He demonstrated Fair understanding as evidenced by Nodding.  Brother and cousin present at bedside and verbalized understanding to education provided.     The patient's plan of care including recommendations, planned interventions, and recommended diet changes were discussed with: Registered nurse, Physician, and     Patient/family agree to work toward stated goals and plan of care    Thank you,  Brenda Spence, SLP    SLP Individual Minutes  Time In: 0957  Time Out: 1043  Minutes: 46     Problem: SLP Adult - Impaired Swallowing  Goal: By Discharge: Advance to least restrictive diet without signs or symptoms of aspiration for planned discharge setting.  See evaluation for individualized goals.  Description: Speech pathology goals  Initiated 5/18/2025; re-evaluation 5/27/2025 ; re-eval 6/3/2025   1. Patient will participate in MBS within 7 days. MET 5/19/2025.  2. Patient will tolerate PMV with supervision and no change in vital signs within 7 days. Continued 6/4/2025. Discontinued 6/10/2025.  3. Patient and family will participate in education re: oral care and swallowing in regards to tracheostomy. Initiated 6/10/2025.    Outcome: Not Progressing     
PT Plan of Care: 3 times/week to address goals.    Recommendations for staff mobility and toileting assistance:  Recommend that staff assist the patient out of bed only with use of a mechanical lift.      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 CTA revealed active hemorrhage from the mass and so neurointerventional was consulted for emergent IR embolization of multiple tumor feeders. Transferred to ICU post-operatively.   Past Medical History:   Diagnosis Date    History of CVA (cerebrovascular accident) 9/29/2021    HTN (hypertension)     Stroke (HCC)      Past Surgical History:   Procedure Laterality Date    EP DEVICE PROCEDURE N/A 9/18/2024    Insert PPM dual performed by Fortino Mcfadden MD at [facility] CARDIAC CATH LAB    IR FLUORO GUIDED GASTROSTOMY TUBE INSERTION PERC W CONTRAST  5/23/2025    IR FLUORO GUIDED GASTROSTOMY TUBE INSERTION PERC W CONTRAST 5/23/2025 Briseida Stokes MD [facility] RAD ANGIO IR    LARYNGOSCOPY N/A 5/17/2025    DIREACT LARYNGOSCOPY performed by Deepak Plascencia DO at [facility] MAIN OR    TRACHEOSTOMY N/A 5/17/2025    TRACHEOTOMY performed by Deepak Plascencia DO at [facility] MAIN OR       Home Situation:  Social/Functional History  Lives With: Significant other  Type of Home: House  Home Layout: Two level, Performs ADL's on one level  Home Access: Stairs to enter with rails  Entrance Stairs - 
apparent distress in bed, Call bell left within reach, Nursing notified, and Updated patient's board with:  PMV precautions    COMMUNICATION/EDUCATION:   Patient was educated regarding SLP POC, importance of doffing PMV in case of emergency with respiratory status, call button for help with PMV if needed, PMV precautions, maintenance of swallow function to prevent further atrophy.  He demonstrated Good understanding as evidenced by Verbalizing understanding and Teachback method.     The patient's plan of care including recommendations, planned interventions, and recommended diet changes were discussed with: Registered nurse    Patient/family agree to work toward stated goals and plan of care    Thank you,  Brenda Spence, SLP    SLP Individual Minutes  Time In: 1008  Time Out: 1031  Minutes: 23     Problem: SLP Adult - Impaired Swallowing  Goal: By Discharge: Advance to least restrictive diet without signs or symptoms of aspiration for planned discharge setting.  See evaluation for individualized goals.  Description: Speech pathology goals  Initiated 5/18/2025; re-evaluation 5/27/2025   1. Patient will participate in MBS within 7 days. MET 5/19/2025.  2. Patient will tolerate PMV with supervision and no change in vital signs within 7 days.   3. Patient will participate in pharyngeal strengthening exercises within 7 days.    Outcome: Progressing     
demonstrated Good understanding as evidenced by Verbalizing understanding.     The patient's plan of care including recommendations, planned interventions, and recommended diet changes were discussed with: Registered nurse, Physician, and Physician assistant    Patient/family have participated as able in goal setting and plan of care and Patient/family agree to work toward stated goals and plan of care    Thank you,  Kelsi Huang, SLP    SLP Individual Minutes  Time In: 0944  Time Out: 1016  Minutes: 32     Problem: SLP Adult - Impaired Swallowing  Goal: By Discharge: Advance to least restrictive diet without signs or symptoms of aspiration for planned discharge setting.  See evaluation for individualized goals.  Description: Speech pathology goals  Initiated 5/18/2025  1. Patient will participate in MBS within 7 days  2. Patient will tolerate PMV with supervision and no change in vital signs within 7 days    Outcome: Progressing     
discussed with: Registered nurse    Patient/family agree to work toward stated goals and plan of care    Thank you,  Barbara Lucas, SLP    SLP Individual Minutes  Time In: 1220  Time Out: 1230  Minutes: 10    Problem: SLP Adult - Impaired Swallowing  Goal: By Discharge: Advance to least restrictive diet without signs or symptoms of aspiration for planned discharge setting.  See evaluation for individualized goals.  Description: Speech pathology goals  Initiated 5/18/2025; re-evaluation 5/27/2025 ; re-eval 6/3/2025   1. Patient will participate in MBS within 7 days. MET 5/19/2025.  2. Patient will tolerate PMV with supervision and no change in vital signs within 7 days. Continued 6/4/2025     Outcome: Progressing     
emergency with respiratory status, call button for help with PMV if needed, PMV precautions, maintenance of swallow function to prevent further atrophy.  He demonstrated Good understanding as evidenced by Verbalizing understanding and Teachback method.     The patient's plan of care including recommendations, planned interventions, and recommended diet changes were discussed with: Registered nurse    Patient/family agree to work toward stated goals and plan of care    Thank you,  Brenda Spence, SLP    SLP Individual Minutes  Time In: 0929  Time Out: 0955  Minutes: 26     Problem: SLP Adult - Impaired Swallowing  Goal: By Discharge: Advance to least restrictive diet without signs or symptoms of aspiration for planned discharge setting.  See evaluation for individualized goals.  Description: Speech pathology goals  Initiated 5/18/2025; re-evaluation 5/27/2025   1. Patient will participate in MBS within 7 days. MET 5/19/2025.  2. Patient will tolerate PMV with supervision and no change in vital signs within 7 days.   3. Patient will participate in pharyngeal strengthening exercises within 7 days.    Outcome: Progressing     
importance of oral care and swallow maintenance.  He demonstrated Fair understanding as evidenced by Verbalizing understanding.     The patient's plan of care including recommendations, planned interventions, and recommended diet changes were discussed with: Registered nurse and Registered dietitian    Patient understands intent and goals of therapy, but is neutral about his/her participation    Thank you,  Brenda Spence, SLP    SLP Individual Minutes  Time In: 1225  Time Out: 1241  Minutes: 16     Problem: SLP Adult - Impaired Swallowing  Goal: By Discharge: Advance to least restrictive diet without signs or symptoms of aspiration for planned discharge setting.  See evaluation for individualized goals.  Description: Speech pathology goals  Initiated 5/18/2025; re-evaluation 5/27/2025 ; re-eval 6/3/2025   1. Patient will participate in MBS within 7 days. MET 5/19/2025.  2. Patient will tolerate PMV with supervision and no change in vital signs within 7 days. Continued 6/4/2025 6/5/2025 1350 by Brenda Spence, SLP  Outcome: Progressing    
notified    COMMUNICATION/EDUCATION:   Patient was educated regarding SLP POC.  He demonstrated Fair understanding as evidenced by Nodding.     The patient's plan of care including recommendations, planned interventions, and recommended diet changes were discussed with: Registered nurse    Patient understands intent and goals of therapy, but is neutral about his/her participation    Thank you,  Brenda Spence, SLP    SLP Individual Minutes  Time In: 1055  Time Out: 1114  Minutes: 19     Problem: SLP Adult - Impaired Swallowing  Goal: By Discharge: Advance to least restrictive diet without signs or symptoms of aspiration for planned discharge setting.  See evaluation for individualized goals.  Description: Speech pathology goals  Initiated 5/18/2025  1. Patient will participate in MBS within 7 days. MET 5/19/2025.  2. Patient will tolerate PMV with supervision and no change in vital signs within 7 days.   3. Patient will participate in pharyngeal strengthening exercises within 7 days.    Outcome: Progressing     
short-term skilled occupational therapy up to 5x/week    Other factors to consider for discharge: available support system works or is unable to provide adequate supervision and the patient would be alone, patient's current support system is unable to meet their requirements for physical assistance, poor safety awareness, impaired cognition, high risk for falls, not safe to be alone, and concern for safely navigating or managing the home environment    IF patient discharges home will need the following DME: continuing to assess with progress       SUBJECTIVE:   Patient stated “When am I going.”    OBJECTIVE DATA SUMMARY:   Cognitive/Behavioral Status:  Orientation  Overall Orientation Status: Impaired  Cognition  Overall Cognitive Status: Exceptions  Arousal/Alertness: Appears intact  Following Commands: Follows one step commands with repetition  Cognition Comment: intermittently restless during mobility    Functional Mobility and Transfers for ADLs:  Bed Mobility:  Bed Mobility Training  Bed Mobility Training: Yes  Rolling: Partial/Moderate assistance;2 Person assistance  Supine to Sit: Partial/Moderate assistance;2 Person assistance  Sit to Supine: Partial/Moderate assistance;Substantial/Maximal assistance;2 Person assistance  Scooting: Minimal assistance;2 Person assistance     Transfers:   Transfer Training  Transfer Training: No           Balance:     Balance  Sitting: Impaired  Sitting - Static: Fair (occasional);Poor (constant support)  Sitting - Dynamic: Poor (constant support)  Standing - Static: Not tested  Standing - Dynamic: Not tested      ADL Intervention:    Upper Extremity Dressing: .  - Hospital Gown : Maximal Assistance and Seated EOB     Upper Extremity Bathing: .  - Upper Extremity Bathing : Maximal Assistance and Seated EOB      [facility] AM-PACTM \"6 Clicks\"                                                       Daily Activity Inpatient Short Form  How much help from another person does 
understanding as evidenced by Verbalizing understanding.     The patient's plan of care including recommendations, planned interventions, and recommended diet changes were discussed with: Physical therapist, Occupational therapist, and Registered nurse    Patient/family have participated as able in goal setting and plan of care and Patient/family agree to work toward stated goals and plan of care    Thank you,  Kelsi Huang, SLP    SLP Individual Minutes  Time In: 1050  Time Out: 1106  Minutes: 16       Problem: SLP Adult - Impaired Swallowing  Goal: By Discharge: Advance to least restrictive diet without signs or symptoms of aspiration for planned discharge setting.  See evaluation for individualized goals.  Description: Speech pathology goals  Initiated 5/18/2025; re-evaluation 5/27/2025   1. Patient will participate in MBS within 7 days. MET 5/19/2025.  2. Patient will tolerate PMV with supervision and no change in vital signs within 7 days.   3. Patient will participate in pharyngeal strengthening exercises within 7 days.    Outcome: Progressing     
                                                                                                                                                                                                                           [facility]-PAC®      Basic Mobility Inpatient Short Form (6-Clicks) Version 2  How much HELP from another person do you currently need... (If the patient hasn't done an activity recently, how much help from another person do you think they would need if they tried?) Total A Lot A Little None   1.  Turning from your back to your side while in a flat bed without using bedrails? []  1 []  2 [x]  3  []  4   2.  Moving from lying on your back to sitting on the side of a flat bed without using bedrails? []  1 []  2 [x]  3  []  4   3.  Moving to and from a bed to a chair (including a wheelchair)? []  1 [x]  2 []  3  []  4   4. Standing up from a chair using your arms (e.g. wheelchair or bedside chair)? []  1 [x]  2 []  3  []  4   5.  Walking in hospital room? [x]  1 []  2 []  3  []  4   6.  Climbing 3-5 steps with a railing? [x]  1 []  2 []  3  []  4     Raw Score: 12/24                            Cutoff score <=171,2,3 had higher odds of discharging home with home health or need of SNF/IPR.    1. Kim Montaño, Meredith Hutchinson, Quirino Richardson, Mei Humphrey, Kyler Valles, Efren Montaño.  Validity of the -PAC “6-Clicks” Inpatient Daily Activity and Basic Mobility Short Forms. Physical Therapy Mar 2014, 94 (3) 379-391; DOI: 10.2522/ptj.[email or web address]  2. William SIDHU, Alvarado BUNCH, Angel BUNCH, Phil BUNCH. Association of AM-PAC \"6-Clicks\" Basic Mobility and Daily Activity Scores With Discharge Destination. Phys Ther. 2021 Apr 4;101(4):[id number]. doi: 10.1093/ptj/[email or web address]. PMID: 43372909.  3. Nayely BUNCH, Tamar D, Wanda S, Marlyn K, Natalio S. Activity Measure for Post-Acute Care \"6-Clicks\" Basic Mobility Scores Predict Discharge Destination After Acute Care Hospitalization in [facility] 
of personal grooming?: A Lot  How much help for eating meals?: Total  AM-PAC Inpatient Daily Activity Raw Score: 8  AM-PAC Inpatient ADL T-Scale Score : 22.86  ADL Inpatient CMS 0-100% Score: 85.69  ADL Inpatient CMS G-Code Modifier : CM     Interpretation of Tool:  Represents clinically-significant functional categories (i.e. Activities of daily living).  Cutoff score 39.4 (19) correlates to a good likelihood of discharging home versus a facility  Kim Montaño, Meredith Hutchinson, Quirino Richardson, Mei Humphrey, Kyler Valles, Efren Montaño, AM-PAC “6-Clicks” Functional Assessment Scores Predict Acute Care Hospital Discharge Destination, Physical Therapy, Volume 94, Issue 9, 1 September 2014, Pages 3568-4526, [email or web address]       Pain Rating:  Patient with facial grimacing with all bed level activity. Repositioned for comfort at conclusion of session    Activity Tolerance:   Fair  and requires rest breaks  Please refer to the flowsheet for vital signs taken during this treatment.    After treatment:   Patient left in no apparent distress in bed, Call bell within reach, Bed/ chair alarm activated, Side rails x3, and Heels elevated for pressure relief    COMMUNICATION/EDUCATION:   The patient's plan of care was discussed with: physical therapist, occupational therapist, and registered nurse    Patient Education  Education Given To: Patient  Education Provided: Plan of Care;Mobility Training;Transfer Training  Education Method: Demonstration;Verbal  Barriers to Learning: None  Education Outcome: Verbalized understanding;Demonstrated understanding;Continued education needed    Thank you for this referral.  Barbara Dorman OT  Minutes: 24

## 2025-06-10 NOTE — PROGRESS NOTES
MD Gunnar  [facility]  [facility]  [address] [place] [address]  P: [phone]  [facility]  [address] [address]  P: [phone]  [facility]  [address], [address], [place] [address]  P: [phone]    Time and Counseling: I spent a total of 60 minutes in care of this patient during today's encounter.    Carbon Copy:  Darien Hobbs, Sridhar Colin*

## 2025-06-11 ENCOUNTER — HOSPITAL ENCOUNTER (INPATIENT)
Facility: HOSPITAL | Age: 75
LOS: 5 days | DRG: 146 | End: 2025-06-16
Attending: HOSPITALIST | Admitting: HOSPITALIST
Payer: MEDICARE

## 2025-06-11 VITALS
SYSTOLIC BLOOD PRESSURE: 122 MMHG | BODY MASS INDEX: 21.45 KG/M2 | HEART RATE: 98 BPM | TEMPERATURE: 99.4 F | HEIGHT: 67 IN | DIASTOLIC BLOOD PRESSURE: 66 MMHG | OXYGEN SATURATION: 93 % | RESPIRATION RATE: 31 BRPM | WEIGHT: 136.69 LBS

## 2025-06-11 PROCEDURE — 6360000002 HC RX W HCPCS: Performed by: HOSPITALIST

## 2025-06-11 PROCEDURE — 6370000000 HC RX 637 (ALT 250 FOR IP)

## 2025-06-11 PROCEDURE — 6560000002 HC HOSPICE GENERAL INPATIENT

## 2025-06-11 PROCEDURE — 6370000000 HC RX 637 (ALT 250 FOR IP): Performed by: HOSPITALIST

## 2025-06-11 PROCEDURE — 94640 AIRWAY INHALATION TREATMENT: CPT

## 2025-06-11 PROCEDURE — 94668 MNPJ CHEST WALL SBSQ: CPT

## 2025-06-11 PROCEDURE — 2500000003 HC RX 250 WO HCPCS: Performed by: HOSPITALIST

## 2025-06-11 PROCEDURE — 6360000002 HC RX W HCPCS: Performed by: PHYSICAL MEDICINE & REHABILITATION

## 2025-06-11 PROCEDURE — 2500000003 HC RX 250 WO HCPCS

## 2025-06-11 PROCEDURE — 6360000002 HC RX W HCPCS

## 2025-06-11 RX ORDER — MORPHINE SULFATE 2 MG/ML
2 INJECTION, SOLUTION INTRAMUSCULAR; INTRAVENOUS
Status: DISCONTINUED | OUTPATIENT
Start: 2025-06-11 | End: 2025-06-14

## 2025-06-11 RX ORDER — SCOPOLAMINE 1 MG/3D
1 PATCH, EXTENDED RELEASE TRANSDERMAL
Status: DISCONTINUED | OUTPATIENT
Start: 2025-06-11 | End: 2025-06-16 | Stop reason: HOSPADM

## 2025-06-11 RX ORDER — SODIUM CHLORIDE 9 MG/ML
INJECTION, SOLUTION INTRAVENOUS PRN
Status: DISCONTINUED | OUTPATIENT
Start: 2025-06-11 | End: 2025-06-16 | Stop reason: HOSPADM

## 2025-06-11 RX ORDER — MORPHINE SULFATE 2 MG/ML
2 INJECTION, SOLUTION INTRAMUSCULAR; INTRAVENOUS EVERY 30 MIN PRN
Status: DISCONTINUED | OUTPATIENT
Start: 2025-06-11 | End: 2025-06-15

## 2025-06-11 RX ORDER — HYDROMORPHONE HYDROCHLORIDE 2 MG/1
2 TABLET ORAL
Refills: 0 | Status: DISCONTINUED | OUTPATIENT
Start: 2025-06-11 | End: 2025-06-15

## 2025-06-11 RX ORDER — PROCHLORPERAZINE EDISYLATE 5 MG/ML
10 INJECTION INTRAMUSCULAR; INTRAVENOUS EVERY 4 HOURS PRN
Status: DISCONTINUED | OUTPATIENT
Start: 2025-06-11 | End: 2025-06-16 | Stop reason: HOSPADM

## 2025-06-11 RX ORDER — DIAZEPAM 10 MG/2ML
5 INJECTION, SOLUTION INTRAMUSCULAR; INTRAVENOUS EVERY 4 HOURS
Status: DISCONTINUED | OUTPATIENT
Start: 2025-06-11 | End: 2025-06-14

## 2025-06-11 RX ORDER — ACETAMINOPHEN 160 MG/5ML
650 SUSPENSION ORAL EVERY 4 HOURS PRN
Status: DISCONTINUED | OUTPATIENT
Start: 2025-06-11 | End: 2025-06-16 | Stop reason: HOSPADM

## 2025-06-11 RX ORDER — BISACODYL 10 MG
10 SUPPOSITORY, RECTAL RECTAL DAILY PRN
Status: DISCONTINUED | OUTPATIENT
Start: 2025-06-11 | End: 2025-06-16 | Stop reason: HOSPADM

## 2025-06-11 RX ORDER — GLYCOPYRROLATE 0.2 MG/ML
0.2 INJECTION INTRAMUSCULAR; INTRAVENOUS 4 TIMES DAILY
Status: DISCONTINUED | OUTPATIENT
Start: 2025-06-11 | End: 2025-06-16 | Stop reason: HOSPADM

## 2025-06-11 RX ORDER — SODIUM CHLORIDE 0.9 % (FLUSH) 0.9 %
5-40 SYRINGE (ML) INJECTION PRN
Status: DISCONTINUED | OUTPATIENT
Start: 2025-06-11 | End: 2025-06-16 | Stop reason: HOSPADM

## 2025-06-11 RX ORDER — ATROPINE SULFATE 10 MG/ML
2 SOLUTION/ DROPS OPHTHALMIC EVERY 4 HOURS PRN
Status: DISCONTINUED | OUTPATIENT
Start: 2025-06-11 | End: 2025-06-16 | Stop reason: HOSPADM

## 2025-06-11 RX ORDER — ONDANSETRON 2 MG/ML
4 INJECTION INTRAMUSCULAR; INTRAVENOUS EVERY 6 HOURS PRN
Status: DISCONTINUED | OUTPATIENT
Start: 2025-06-11 | End: 2025-06-16 | Stop reason: HOSPADM

## 2025-06-11 RX ORDER — MORPHINE SULFATE 4 MG/ML
4 INJECTION, SOLUTION INTRAMUSCULAR; INTRAVENOUS
Status: DISCONTINUED | OUTPATIENT
Start: 2025-06-11 | End: 2025-06-15

## 2025-06-11 RX ORDER — SODIUM CHLORIDE 0.9 % (FLUSH) 0.9 %
5-40 SYRINGE (ML) INJECTION EVERY 12 HOURS SCHEDULED
Status: DISCONTINUED | OUTPATIENT
Start: 2025-06-11 | End: 2025-06-16 | Stop reason: HOSPADM

## 2025-06-11 RX ORDER — DIAZEPAM 10 MG/2ML
10 INJECTION, SOLUTION INTRAMUSCULAR; INTRAVENOUS
Status: DISCONTINUED | OUTPATIENT
Start: 2025-06-11 | End: 2025-06-14

## 2025-06-11 RX ORDER — ACETAMINOPHEN 325 MG/1
650 TABLET ORAL EVERY 4 HOURS PRN
Status: DISCONTINUED | OUTPATIENT
Start: 2025-06-11 | End: 2025-06-11

## 2025-06-11 RX ADMIN — MORPHINE SULFATE 2 MG: 2 INJECTION, SOLUTION INTRAMUSCULAR; INTRAVENOUS at 17:03

## 2025-06-11 RX ADMIN — MORPHINE SULFATE 2 MG: 2 INJECTION, SOLUTION INTRAMUSCULAR; INTRAVENOUS at 14:13

## 2025-06-11 RX ADMIN — GLYCOPYRROLATE 0.2 MG: 0.2 INJECTION INTRAMUSCULAR; INTRAVENOUS at 14:13

## 2025-06-11 RX ADMIN — HYDROMORPHONE HYDROCHLORIDE 1 MG: 1 INJECTION, SOLUTION INTRAMUSCULAR; INTRAVENOUS; SUBCUTANEOUS at 00:44

## 2025-06-11 RX ADMIN — LORAZEPAM 2 MG: 2 INJECTION INTRAMUSCULAR; INTRAVENOUS at 00:44

## 2025-06-11 RX ADMIN — GUAIFENESIN 400 MG: 200 SOLUTION ORAL at 10:08

## 2025-06-11 RX ADMIN — SODIUM CHLORIDE, PRESERVATIVE FREE 10 ML: 5 INJECTION INTRAVENOUS at 21:17

## 2025-06-11 RX ADMIN — HYDROMORPHONE HYDROCHLORIDE 1 MG: 1 INJECTION, SOLUTION INTRAMUSCULAR; INTRAVENOUS; SUBCUTANEOUS at 10:08

## 2025-06-11 RX ADMIN — LORAZEPAM 2 MG: 2 INJECTION INTRAMUSCULAR; INTRAVENOUS at 05:34

## 2025-06-11 RX ADMIN — IPRATROPIUM BROMIDE AND ALBUTEROL SULFATE 1 DOSE: 2.5; .5 SOLUTION RESPIRATORY (INHALATION) at 08:30

## 2025-06-11 RX ADMIN — DIAZEPAM 5 MG: 5 INJECTION, SOLUTION INTRAMUSCULAR; INTRAVENOUS at 21:16

## 2025-06-11 RX ADMIN — DIAZEPAM 5 MG: 5 INJECTION, SOLUTION INTRAMUSCULAR; INTRAVENOUS at 11:10

## 2025-06-11 RX ADMIN — GUAIFENESIN 400 MG: 200 SOLUTION ORAL at 00:44

## 2025-06-11 RX ADMIN — HYDROMORPHONE HYDROCHLORIDE 1 MG: 1 INJECTION, SOLUTION INTRAMUSCULAR; INTRAVENOUS; SUBCUTANEOUS at 05:34

## 2025-06-11 RX ADMIN — GLYCOPYRROLATE 0.2 MG: 0.2 INJECTION INTRAMUSCULAR; INTRAVENOUS at 21:16

## 2025-06-11 RX ADMIN — DIAZEPAM 5 MG: 5 INJECTION, SOLUTION INTRAMUSCULAR; INTRAVENOUS at 17:04

## 2025-06-11 RX ADMIN — IPRATROPIUM BROMIDE AND ALBUTEROL SULFATE 1 DOSE: 2.5; .5 SOLUTION RESPIRATORY (INHALATION) at 02:23

## 2025-06-11 RX ADMIN — GLYCOPYRROLATE 0.2 MG: 0.2 INJECTION INTRAMUSCULAR; INTRAVENOUS at 17:04

## 2025-06-11 RX ADMIN — ACETYLCYSTEINE 600 MG: 200 SOLUTION ORAL; RESPIRATORY (INHALATION) at 08:30

## 2025-06-11 RX ADMIN — MORPHINE SULFATE 2 MG: 2 INJECTION, SOLUTION INTRAMUSCULAR; INTRAVENOUS at 11:09

## 2025-06-11 RX ADMIN — MORPHINE SULFATE 2 MG: 2 INJECTION, SOLUTION INTRAMUSCULAR; INTRAVENOUS at 21:27

## 2025-06-11 RX ADMIN — ACETAMINOPHEN 650 MG: 325 TABLET ORAL at 00:52

## 2025-06-11 RX ADMIN — MORPHINE SULFATE 2 MG: 2 INJECTION, SOLUTION INTRAMUSCULAR; INTRAVENOUS at 20:03

## 2025-06-11 RX ADMIN — MORPHINE SULFATE 2 MG: 2 INJECTION, SOLUTION INTRAMUSCULAR; INTRAVENOUS at 23:00

## 2025-06-11 RX ADMIN — GUAIFENESIN 400 MG: 200 SOLUTION ORAL at 05:34

## 2025-06-11 RX ADMIN — Medication: at 10:08

## 2025-06-11 RX ADMIN — DIAZEPAM 5 MG: 5 INJECTION, SOLUTION INTRAMUSCULAR; INTRAVENOUS at 14:14

## 2025-06-11 ASSESSMENT — PAIN SCALES - GENERAL
PAINLEVEL_OUTOF10: 0
PAINLEVEL_OUTOF10: 4
PAINLEVEL_OUTOF10: 2
PAINLEVEL_OUTOF10: 4
PAINLEVEL_OUTOF10: 4
PAINLEVEL_OUTOF10: 0

## 2025-06-11 ASSESSMENT — PAIN DESCRIPTION - LOCATION
LOCATION: GENERALIZED
LOCATION: GENERALIZED

## 2025-06-11 ASSESSMENT — PAIN DESCRIPTION - DESCRIPTORS: DESCRIPTORS: DISCOMFORT

## 2025-06-11 NOTE — DISCHARGE SUMMARY
Discharge Summary       PATIENT ID: Roge Lobato  MRN: [id number]   YOB: 1950    DATE OF ADMISSION: 5/17/2025 11:55 AM    DATE OF DISCHARGE: 6/11/25   PRIMARY CARE PROVIDER: No primary care provider on file.     ATTENDING PHYSICIAN: Fuad Headley MD  DISCHARGING PROVIDER: Fuad Headley MD    To contact this individual call [phone] and ask the  to page.  If unavailable ask to be transferred the Adult Hospitalist Department.    CONSULTATIONS: IP CONSULT TO HOSPITALIST  IP CONSULT TO OTOLARYNGOLOGY  IP CONSULT TO RADIATION ONCOLOGY  IP CONSULT TO ONCOLOGY  IP CONSULT TO PALLIATIVE CARE  IP CONSULT TO DIETITIAN  IP CONSULT TO RESPIRATORY CARE  IP CONSULT TO CASE MANAGEMENT  IP CONSULT TO INTERVENTIONAL RADIOLOGY  IP CONSULT TO VASCULAR ACCESS TEAM  IP CONSULT TO PULMONOLOGY  IP CONSULT TO CARDIOLOGY  IP CONSULT TO INTENSIVIST  IP CONSULT TO NEPHROLOGY  IP CONSULT TO CASE MANAGEMENT  IP CONSULT TO HOSPICE    PROCEDURES/SURGERIES: Procedure(s) with comments:  TRACHEOTOMY - Tracheostomy  DIREACT LARYNGOSCOPY    ADMITTING DIAGNOSES & HOSPITAL COURSE:   Roge Lobato is a 74 y.o. male with HTN, h/o stroke, SSS s/p PPM, h/o COVID-19 infection (2/2025), chronic pancreatitis, GERD who was transferred from [facility] ED to [facility] ED with enlarging R neck mass and SOB. Pt presented to [facility] ED last night. ENT was consulted and requested transfer to [facility]. [facility] was consulted for admission but due to lack of bed availability, ENT requested ED to ED transfer for emergent evaluation and surgery. Pt reported neck mass x 3months. He was hospitalized then discharged to SNF and was unable to see ENT. He reports difficulty swallowing pills in pudding yesterday and also developed SOB, bilateral ear pain. He has had CP with coughing x2 days. He had some weight loss initially but recently gained some of the weight back. He also reports some trouble walking. He last took ASA yesterday morning.

## 2025-06-11 NOTE — H&P
[facility] HOSPICE BRIEF H  & P  Christian Judd MD  Answering service: [phone]        Date of Service:  2025  NAME:  Roge Lobato  :  1950  MRN:  [id number]      Admission Summary:   75 yo male with PMHx of CVA, SSS s/p PPM, Chronic pancreatitis, GERD and laryngeal mass on 2025, presented due to difficulty breathing. Patient presented initially to [facility] noted to have enlarging right neck mass and transferred to [facility] for further evaluation. CT showed neck mass has doubled in size. Patient underwent emergent tracheostomy and biopsy of mass. Patient has been NPO due to aspiration on [facility]. Patient also had G tube placed. 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 Patient seen by Onc and ENT. Patient was declined for transfer to [facility]. Patient started to have further decline with large amounts of bloody secretions. Patient underwent embolization by IR. Patient required transfusion and has been having worsening renal function. Patient is not appropriate for dialysis and is having hypotension. Patient's trach has been dislodge couple times and required replacement. Patient continues to have difficult to manage tracheal secretions requiring significant skilled care and frequent suctioning.     Patient was seen at bedside with hospice team and discussed with RN   Patient is unresponsive on 28% FiO2 on 5L   Patient is comfortable but having significant secretions and requires suctioning every 1 hour          Assessment & Plan:      Acute respiratory failure with hypoxia s/p trach due to   Squamous cell carcinoma of Supraglottis  Acute Renal Failure   Acute blood loss anemia      Admit to [facility] GIP- CALL [phone] WITH ALL QUESTIONS  Patient meets criteria for GIP due to active symptom management, frequent trach care and suctioning and IV medications    Scheduled IV

## 2025-06-11 NOTE — PROGRESS NOTES
Hospitalist Progress Note  Curtis Bryan MD  Answering service: [phone]        Date of Service:  2025  NAME:  Roge Lobato  :  1950  MRN:  [id number]      Admission Summary:   Roge Lobato is a 74 y.o. male with HTN, h/o stroke, SSS s/p PPM, h/o COVID-19 infection (2025), chronic pancreatitis, GERD who was transferred from [facility] ED to [facility] ED with enlarging R neck mass and SOB. Pt presented to [facility] ED last night. ENT was consulted and requested transfer to [facility]. [facility] HM was consulted for admission but due to lack of bed availability, ENT requested ED to ED transfer for emergent evaluation and surgery. Pt reported neck mass x 3months. He was hospitalized then discharged to SNF and was unable to see ENT. He reports difficulty swallowing pills in pudding yesterday and also developed SOB, bilateral ear pain. He has had CP with coughing x2 days. He had some weight loss initially but recently gained some of the weight back. He also reports some trouble walking. He last took ASA yesterday morning. He denies f/c/n/v, abdominal pain, diarrhea, constipation, dysuria, headache, falls, injuries, LOC.        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 
                                                                                                Hospitalist Progress Note  Madyson Baires MD  Answering service: [phone]        Date of Service:  6/3/2025  NAME:  Roge Lobato  :  1950  MRN:  [id number]      Admission Summary:   25 Roge Lobato is a 74 y.o. male who presents for swelling in his neck and difficulty breathing.  He states he was told he had a neck mass a few months ago but has not been able to follow-up due to lack of insurance and financial constraints.  He reports worsening pressure to his neck, and difficulty breathing over the past few weeks.  Denies fever or chills.  Denies vomiting.  Denies chest pain.  Has a history of hypertension.      25 ENT  I received a call from Dr. Amezquita from [facility] regarding this patient with a known history of laryngeal mass but for various reasons he has not been able to follow up. He now presents with worsening hoarseness. I reviewed the CT neck images and do agree that he could be transferred to [facility] Hospitalist service and kept NPO for likely tracheostomy. He will discuss the plan with the patient and his brother (POA).    Thank you for allowing me to participate in the care of your patient. Deepak Plascencia D.O. [facility] Otolaryngology      25  [facility] Roge Lobato is a 74 y.o. male with HTN, h/o stroke, SSS s/p PPM, h/o COVID-19 infection (2025), chronic pancreatitis, GERD who was transferred from [facility] ED to [facility] ED with enlarging R neck mass and SOB. Pt presented to [facility] ED last night. ENT was consulted and requested transfer to [facility]. [facility] was consulted for admission but due to lack of bed availability, ENT requested ED to ED transfer for emergent evaluation and surgery. Pt reported neck mass x 3months. He was hospitalized then discharged to SNF and was unable to see ENT. He reports difficulty swallowing pills in pudding yesterday and also developed SOB, 
                                                                                                Hospitalist Progress Note  Ros Rod MD  Answering service: [phone]        Date of Service:  2025  NAME:  Roge Lobato  :  1950  MRN:  [id number]      Admission Summary:   25 Roge Lobato is a 74 y.o. male who presents for swelling in his neck and difficulty breathing.  He states he was told he had a neck mass a few months ago but has not been able to follow-up due to lack of insurance and financial constraints.  He reports worsening pressure to his neck, and difficulty breathing over the past few weeks.  Denies fever or chills.  Denies vomiting.  Denies chest pain.  Has a history of hypertension.      25 ENT  I received a call from Dr. Amezquita from [facility] regarding this patient with a known history of laryngeal mass but for various reasons he has not been able to follow up. He now presents with worsening hoarseness. I reviewed the CT neck images and do agree that he could be transferred to [facility] Hospitalist service and kept NPO for likely tracheostomy. He will discuss the plan with the patient and his brother (POA).    Thank you for allowing me to participate in the care of your patient. Deepak Plascencia D.O. [facility] Otolaryngology      25  [facility] Roge Lobato is a 74 y.o. male with HTN, h/o stroke, SSS s/p PPM, h/o COVID-19 infection (2025), chronic pancreatitis, GERD who was transferred from [facility] ED to [facility] ED with enlarging R neck mass and SOB. Pt presented to [facility] ED last night. ENT was consulted and requested transfer to [facility]. [facility] was consulted for admission but due to lack of bed availability, ENT requested ED to ED transfer for emergent evaluation and surgery. Pt reported neck mass x 3months. He was hospitalized then discharged to SNF and was unable to see ENT. He reports difficulty swallowing pills in pudding yesterday and also developed SOB, 
                                                                                                Hospitalist Progress Note  Walter Yap MD  Answering service: [phone]        Date of Service:  2025  NAME:  Roge Lobato  :  1950  MRN:  [id number]      Admission Summary:     Patient admitted with laryngeal mass.  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    Interval history / Subjective:     Patient noted to have bloody sputum coming out from the trach.     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  -CT soft tissue of the neck shows more than doubling in size of the partially necrotic large right infrahilar right neck mass with airway narrowing with risk for airway compromise  - S/p laryngoscopy and biopsy and tracheostomy on    -S/p PEG tube placement by IR   -Surgical pathology positive for squamous cell carcinoma, poorly differentiated with keratinization  -Respiratory culture shows mixed gram-negative rods  - Completed Zosyn and linezolid for possible tracheitis  - Radiation oncology and medical oncology consulted, no plan for radiation or chemo for now until he has his total laryngectomy  -Palliative care also evaluated the patient  -Plan was outpatient referral to [facility] otolaryngology , will need to schedule an oupatient appt with Dr. Pollack or Dr. Courtney, prefer in 2-3 weeks, but may ok in 3-4 weeks   - Patient initially requiring ICU admission  - Patient is overall not progressing well, still with a lot of secretions in the trach and unable to tolerate PMV that well  - Also noted bloody sputum coming out from trach, ordered for CTA of the chest and neck    Leukocytosis  - Likely reactive    Hypertension  - Antihypertensives not on hold due to hypotensive episode this admission  - Hypotension is improving with midodrine    Sick sinus syndrome  - S/p pacemaker placement  - Patient with intermittent tachycardia noted  - Can restart 
                                                                                                Hospitalist Progress Note  Yi Bianchi MD  Answering service: [phone]        Date of Service:  2025  NAME:  Roge Lobato  :  1950  MRN:  [id number]      Admission Summary:   Roge Lobato is a 74 y.o. male with HTN, h/o stroke, SSS s/p PPM, h/o COVID-19 infection (2025), chronic pancreatitis, GERD who was transferred from [facility] ED to [facility] ED with enlarging R neck mass and SOB. Pt presented to [facility] ED last night. ENT was consulted and requested transfer to [facility]. [facility] HM was consulted for admission but due to lack of bed availability, ENT requested ED to ED transfer for emergent evaluation and surgery. Pt reported neck mass x 3months. He was hospitalized then discharged to SNF and was unable to see ENT. He reports difficulty swallowing pills in pudding yesterday and also developed SOB, bilateral ear pain. He has had CP with coughing x2 days. He had some weight loss initially but recently gained some of the weight back. He also reports some trouble walking. He last took ASA yesterday morning. He denies f/c/n/v, abdominal pain, diarrhea, constipation, dysuria, headache, falls, injuries, LOC.        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 
                                                                                                Hospitalist Progress Note  Yi Bianchi MD  Answering service: [phone]        Date of Service:  2025  NAME:  Roge Lobato  :  1950  MRN:  [id number]      Admission Summary:   Roge Lobato is a 74 y.o. male with HTN, h/o stroke, SSS s/p PPM, h/o COVID-19 infection (2025), chronic pancreatitis, GERD who was transferred from [facility] ED to [facility] ED with enlarging R neck mass and SOB. Pt presented to [facility] ED last night. ENT was consulted and requested transfer to [facility]. [facility] HM was consulted for admission but due to lack of bed availability, ENT requested ED to ED transfer for emergent evaluation and surgery. Pt reported neck mass x 3months. He was hospitalized then discharged to SNF and was unable to see ENT. He reports difficulty swallowing pills in pudding yesterday and also developed SOB, bilateral ear pain. He has had CP with coughing x2 days. He had some weight loss initially but recently gained some of the weight back. He also reports some trouble walking. He last took ASA yesterday morning. He denies f/c/n/v, abdominal pain, diarrhea, constipation, dysuria, headache, falls, injuries, LOC.        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 
                                                                                                Hospitalist Progress Note  Yi Bianchi MD  Answering service: [phone]        Date of Service:  2025  NAME:  Roge Lobato  :  1950  MRN:  [id number]      Admission Summary:   Roge Lobato is a 74 y.o. male with HTN, h/o stroke, SSS s/p PPM, h/o COVID-19 infection (2025), chronic pancreatitis, GERD who was transferred from [facility] ED to [facility] ED with enlarging R neck mass and SOB. Pt presented to [facility] ED last night. ENT was consulted and requested transfer to [facility]. [facility] HM was consulted for admission but due to lack of bed availability, ENT requested ED to ED transfer for emergent evaluation and surgery. Pt reported neck mass x 3months. He was hospitalized then discharged to SNF and was unable to see ENT. He reports difficulty swallowing pills in pudding yesterday and also developed SOB, bilateral ear pain. He has had CP with coughing x2 days. He had some weight loss initially but recently gained some of the weight back. He also reports some trouble walking. He last took ASA yesterday morning. He denies f/c/n/v, abdominal pain, diarrhea, constipation, dysuria, headache, falls, injuries, LOC.        Interval history / Subjective:      No acute event, less secretions from trach   CT CHEST W CONTRAST  Result Date: 2025  1. Interval enlargement of a small, subcentimeter right upper paratracheal lymph node, not pathologically enlarged; this change is nonspecific in the setting of interval tracheostomy, and follow-up is recommended. 2. Otherwise, no evidence of thoracic metastatic disease. 3. Increasing right lower lobe segmental atelectasis. Superimposed pneumonia cannot be excluded. 4. Trace bilateral pleural effusions. 5. Cardiomegaly with coronary artery atherosclerosis. 6. Known large infrahyoid neck mass, partially visualized. Tracheostomy tube in satisfactory position. Electronically signed by Sudheer 
                                                                                                Hospitalist Progress Note  Yi Bianchi MD  Answering service: [phone]        Date of Service:  2025  NAME:  Roge Lobato  :  1950  MRN:  [id number]      Admission Summary:   Roge Lobato is a 74 y.o. male with HTN, h/o stroke, SSS s/p PPM, h/o COVID-19 infection (2025), chronic pancreatitis, GERD who was transferred from [facility] ED to [facility] ED with enlarging R neck mass and SOB. Pt presented to [facility] ED last night. ENT was consulted and requested transfer to [facility]. [facility] HM was consulted for admission but due to lack of bed availability, ENT requested ED to ED transfer for emergent evaluation and surgery. Pt reported neck mass x 3months. He was hospitalized then discharged to SNF and was unable to see ENT. He reports difficulty swallowing pills in pudding yesterday and also developed SOB, bilateral ear pain. He has had CP with coughing x2 days. He had some weight loss initially but recently gained some of the weight back. He also reports some trouble walking. He last took ASA yesterday morning. He denies f/c/n/v, abdominal pain, diarrhea, constipation, dysuria, headache, falls, injuries, LOC.        Interval history / Subjective:      No acute event,  Tolerate tube feeding       CT CHEST W CONTRAST  Result Date: 2025  1. Interval enlargement of a small, subcentimeter right upper paratracheal lymph node, not pathologically enlarged; this change is nonspecific in the setting of interval tracheostomy, and follow-up is recommended. 2. Otherwise, no evidence of thoracic metastatic disease. 3. Increasing right lower lobe segmental atelectasis. Superimposed pneumonia cannot be excluded. 4. Trace bilateral pleural effusions. 5. Cardiomegaly with coronary artery atherosclerosis. 6. Known large infrahyoid neck mass, partially visualized. Tracheostomy tube in satisfactory position. Electronically signed by Sudheer 
                                                 Hospitalist Night Cover     Name: Roge Lobato  YOB: 1950      Overnight update:        Notified of hypotension s/p IV Dilaudid.  VS: BP 85/47, HR 75, RR 18, O2 sat 100% on TC 5 L    -  ml IV bolus x 1   - BP improved to 104/52 s/p bolus     Denise Delgado, FNP   
                                                 Hospitalist Night Cover     Name: Roge Lobato  YOB: 1950      Overnight update:        Notified of increase bloody drainage orally and per trach with large blood clots. On assessment, patient's VS remained stable, no respiratory distress noted.    - held Plavix  - Scopolamine patch x 1  - send CBC  - monitor for progression of symptoms    0431: RRT called for persistent and increased bloody secretions coming out from tracheally and orally, needing frequent suctioning. Large blood clots suction orally  VS: /73, HR 92, RR 22, O2 sat 98% TC 5 L    - H/H 9.5/29.9  - Intensivist consultation for further management: high risk for airway compromise, at bedside to assess patient  - ICU team to take over care     Critical Care Attestation:  This patient is unstable and critically ill. Due to a high probability of clinically significant, life threatening deterioration, the patient required my highest level of preparedness to intervene emergently and I personally spent this critical care time directly and personally managing the patient. This critical care time included obtaining a history; examining the patient; pulse oximetry; ordering and review of studies; arranging urgent treatment with development of a management plan; evaluation of patient's response to treatment; frequent reassessment; and, discussions with other providers and/or family. This critical care time was performed to assess and manage the high probability of imminent, life-threatening deterioration that could result in multi-organ failure and death. It was exclusive of separately billable procedures, treating other patients, and teaching time.      Time Spent:     I personally spent 35 minutes in providing critical care time.  JESUS MANUEL Mendoza   
                                                 Hospitalist Night Cover     Name: Roge Lobato  YOB: 1950      Overnight update:        Notified of patient pulled both IV, multiple failed attempts to reinsert    - Vascular team consult for IV access    0416: Patient's started to get physically aggressive, kicking and hitting staff. New IV inserted at bedside by dynamic mobile    - B/L wrist restraints  - Olanzepine 5 mg IM x PRN for aggression x 1 dose     JESUS MANUEL Mendoza   
                       [facility]     ICU TEAM Progress Note           Name: Roge Lobato   : 1950   MRN: [id number]   Date: 2025          CU PROBLEM LIST   -Right laryngeal mass continues  -Hypotension intermittent, but improved-suspect sedation related.  -Acute Delirium      HISTORY OF PRESENT ILLNESS:     Admission HPI:  \"Roge Lobato is a 74 y.o. male hx of HTN, prior stroke, SSS s/p PPM, h/o COVID-19 infection (2025), chronic pancreatitis, GERD who was transferred from [facility] ED to [facility] ED on 25 with enlarging R neck mass and SOB. Pt presented to [facility] ED last night with chief complaint of neck mass x3 months. Reports dysphagia and difficulty swallowing pills as well as SOB. Takes ASA and Plavix. ENT was consulted and requested transfer to [facility]. He underwent laryngoscopy with laryngeal mass biopsy as well as tracheostomy 25 by ENT.\"  Went to ICU post op.  Transferred to the floor and hospitalist took over care on 25. Oncology was consulted.  Trach got dislodged twice. Last replaced 25 with # 6 Shiley and sutured in place.      25: Patient transferred to ICU for hypotension post replacement due to sedative medication.     SUBJECTIVE:     -no acute events overnight.  Status post trach exchange with 6 Shiley.   - No acute events overnight, [facility] transfer attempt for earlier laryngectomy was denied.      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    Biopsy positive for squamous cell carcinoma, poorly differentiated with keratinization, p16 negative.    -Rad/Onc and Med Onc consulted. No plan for radiation /chemo for now until he has his next total laryngectomy   -Trach dislodged multiple times, exchanged for 6 Shiley.  Sutured in place and secured.    --Begin Mucomyst, albuterol,  --Appreciate ENT input.     Completed antibiotic (Zosyn and linezolid) for possible tracheitis. moderate 
     CRITICAL CARE  PROGRESS NOTE      Name: Roge Lobato   : 1950   MRN: [id number]   Date: 6/10/2025      REASON FOR ICU ADMISSION:  Hemorrhagic neck mass     BRIEF PATIENT SUMMARY AND HOSPITAL COURSE   Roge Lobato is a 74 y.o. male with HTN, CVA on Aspirin & Plavix, SSS s/p PPM, chronic pancreatitis, GERD, and laryngeal mass dx 2025. He was admitted on 2025 at [facility] for and right neck mass and hoarseness, was transferred to [facility]. Pt diagnosed with R laryngeal mass 2025 but was not able to follow up with ENT. CT at [facility] showed the mass almost doubled in size from previous imaging. He underwent trach placement and mass bx 25 which showed squamous cell carcinoma. Per notes, ENT, Med Onc, Rad Onc, and palliative care following, and pt needs laryngectomy before he could consider radiation or chemotherapy. Overnight patient developed persistent bleeding from the laryngeal mass associated with significant bloody secretions. CTA revealed active hemorrhage from the mass and so neurointerventional was consulted for emergent IR embolization of multiple tumor feeders. Transferred back to ICU post-operatively.     : Still with some blood tinged secretions but improving. Patient able to manage them well. Hgb did drop overnight with associated BERNADINE from blood loss. Transfused 1u pRBC and provide additional fluids. Monitor for further bleeding, repeat labs this PM, continue trach collar as tolerated.     : Transfused 1u pRBC again this morning for Hgb drop. Increased thick secretions overnight however bleeding continues to improve. Continue pulmonary hygiene and goals of care discussions given persistent Hgb drops and worsening renal function. Plan for family meeting tomorrow to discuss goals of care further, palliative team following     6/10: Goals of care discussions held today with patient and family, alongside the help of Palliative Care, SLP, and ENT. Decision made to transition on 
     CRITICAL CARE  PROGRESS NOTE      Name: Roge Lobato   : 1950   MRN: [id number]   Date: 2025      REASON FOR ICU ADMISSION:  Hemorrhagic neck mass     BRIEF PATIENT SUMMARY AND HOSPITAL COURSE   Roge Lobato is a 74 y.o. male with HTN, CVA on Aspirin & Plavix, SSS s/p PPM, chronic pancreatitis, GERD, and laryngeal mass dx 2025. He was admitted on 2025 at [facility] for and right neck mass and hoarseness, was transferred to [facility]. Pt diagnosed with R laryngeal mass 2025 but was not able to follow up with ENT. CT at [facility] showed the mass almost doubled in size from previous imaging. He underwent trach placement and mass bx 25 which showed squamous cell carcinoma. Per notes, ENT, Med Onc, Rad Onc, and palliative care following, and pt needs laryngectomy before he could consider radiation or chemotherapy. Overnight patient developed persistent bleeding from the laryngeal mass associated with significant bloody secretions. CTA revealed active hemorrhage from the mass and so neurointerventional was consulted for emergent IR embolization of multiple tumor feeders. Transferred back to ICU post-operatively.     : Still with some blood tinged secretions but improving. Patient able to manage them well. Hgb did drop overnight with associated BERNADINE from blood loss. Transfused 1u pRBC and provide additional fluids. Monitor for further bleeding, repeat labs this PM, continue trach collar as tolerated.     : Transfused 1u pRBC again this morning for Hgb drop. Increased thick secretions overnight however bleeding continues to improve. Continue pulmonary hygiene and goals of care discussions given persistent Hgb drops and worsening renal function. Plan for family meeting tomorrow to discuss goals of care further, palliative team following     COMPREHENSIVE ASSESSMENT & PLAN     Squamous cell carcinoma of the right supraglottis  - CT soft tissue of the neck shows more than doubling in size of the 
  2030: Follow-up BG check after IV insulin/D10.  BG 76.  This RN will give patient 125cc D10 although BG technically too high at this time for protocol.  Patient is very frustrated and upset with condition and may not allow me do finger sticks often as he is wanting to decline certain aspects of care.  2040: Pt declined chest PT vest. Education provided.  
  Palliative Medicine   [place] [phone] (COPE)        [facility] [phone] (EDER)      Palliative Medicine re-consultation received and appreciated - we have been following peripherally in patient's care. We met with patient on 5/20, goals clear at that time for full restorative measures. Plan was for G tube, then [facility] as recommended by ENT for laryngectomy. He needs chemotherapy and radiation, but this cannot be started until laryngectomy. While he has been waiting for dispo/transfer/surgery - he has been getting weaker while in the hospital laying in bed.     Met w/ patient at bedside along with Dr. Watkins, he reports that things are \"going backwards instead of forwards,\" he told speech therapy today that he was \"sick of this, nothing's happening.\" He verbalizes his frustration that \"the plan keeps changing\" and he is frustrated \"if you can't do it why can't you get me to someone who will?\" He verbalizes frustration that he keeps getting told that people are telling him he needs surgery but no one will do it. This is very valid - patient is frustrated in his care. He is very frustrated things are not happening, and these feelings are completely valid in his current condition. Supportive listening provided.     We explore with him his goals, he remains clear that he wants efforts to prolong his life and \"anything that will help.\" Anticipate his comments that he has said regarding nothing is happening, not doing this, etc. - it is an emotional response to frustrating situation, when exploring his goals of care he wants things to prolong his life and surgery/[facility] follow-up - he is frustrated that he has been inpatient for days with multiple attempts to try to transfer and medical team telling him he needs surgery, and unable to get the surgery.     Very complex situation - ENT evaluated on 5/29 and recommended [facility] transfer for expedited treatment. Although patient has not had PET scan/fully 
  Palliative Medicine   [place] [phone] (COPE)        [facility] [phone] (COPE)      Palliative Medicine has been following peripherally in patient's care - patient does have AMD on chart naming his brother as primary healthcare decision maker.     Noted plan to transfer to [facility] - our team is following peripherally, please call/page for any urgent needs.       Thank you for including Palliative Medicine in the care of Roge Lobato         Teresa Jerez LCSW     
  Physician Progress Note      PATIENT:               NEDRA JACOB  CSN #:                  [id number]  :                       1950  ADMIT DATE:       2025 11:55 AM  DISCH DATE:  RESPONDING  PROVIDER #:        Yi Bianchi MD          QUERY TEXT:    Please clarify the patient?s nutritional status:    The clinical indicators include:  Cachectic ( Dr Bianchi)    Farzana Prather, RD: Malnutrition Status: Severe malnutrition (25 1211)  Context:  Chronic Illness  Findings of the 6 clinical characteristics of malnutrition:  Energy Intake:  75% or less estimated energy requirements for 1 month or   longer  Weight Loss:  Greater than 10% over 6 months  Body Fat Loss:  Mild body fat loss Orbital, Triceps  Muscle Mass Loss:  Severe muscle mass loss Temples (temporalis), Clavicles   (pectoralis & deltoids), Calf (gastrocnemius), Hand (interosseous)  Fluid Accumulation:  No fluid accumulation    Nutrition Recommendations/Plan:  1. Continue daily thiamine  2. Added on Phos and Mag to today's labs, replete if needed.  3. Adjusted TF to correct formula of TwoCal HN @ 25 mL/hr (this is a higher   kcal formula; therefore, similar calories to Jevity @ 35 mL/hr).  Increase by   10 mL q 24 hours, or as labs permit.  Goal remains TwoCal @ 55 mL/hr and then   adjusting to bolus as below.  4. Continue to monitor and correct electrolytes (K+, Mg, Phos) PRN - before   advancing nutrition  5. Will convert to bolus feeds once stable on continuous  6. Goal for discharge/ home: 5 cartons TwoCal HN daily, each followed by 240   mL H2O  Options provided:  -- Protein calorie malnutrition severe  -- Other - I will add my own diagnosis  -- Disagree - Not applicable / Not valid  -- Disagree - Clinically unable to determine / Unknown  -- Refer to Clinical Documentation Reviewer    PROVIDER RESPONSE TEXT:    This patient has severe protein calorie malnutrition.    Query created by: DACIA CARRASQUILLO on 2025 12:12 
0000 Received report from [facility] and [facility] care.  0400 Deepthi NP updated on marginal BP, order received to continue providing comfort.  0730 Bedside and Verbal shift change report given to *** (oncoming nurse) by Zakiya (offgoing nurse). Report included the following information Nurse Handoff Report, Adult Overview, Intake/Output, MAR, and Recent Results.    
0200: Elkin, DRAGAN notified of no UOP this shift so far, and bladder scan volume of 33; per NP, RN to check AM labs now.    0435: Cr 2.45, BUN 65 on AM labs, NP notified. Still no UOP at this time. 500 cc fluid bolus ordered now.  
0315- patient pulled out IV's and notified DRAGAN Walker for a midline order.  Notified [facility].      0417- [facility] at bedside, Patient is being aggressive and hitting and kicking staff.  Notified DRAGAN Walker for an order for wrist restraints, this nurse is afraid patient will pull out Trach.      0550- called patient's brother Andre to make aware of placing patient in restraints to keep patient safe.      0730- Bedside and Verbal shift change report given to KAILA Guerrero (oncoming nurse) by KAILA Chapman (offgoing nurse). Report included the following information SBAR, Kardex, ED Summary, MAR, and Cardiac Rhythm V-paced .     
0500 TRANSFER - OUT REPORT:    Verbal report given to brianna harris on Roge Lobato  being transferred to [facility] for urgent transfer       Report consisted of patient's Situation, Background, Assessment and   Recommendations(SBAR).     Information from the following report(s) Nurse Handoff Report, ED Encounter Summary, ED SBAR, Adult Overview, Intake/Output, MAR, Recent Results, Cardiac Rhythm NSR, Quality Measures, and Neuro Assessment was reviewed with the receiving nurse.           Lines:   Peripheral IV 05/29/25 Left;Proximal Forearm (Active)   Site Assessment Clean, dry & intact 06/01/25 0400   Line Status Flushed;Capped 06/01/25 0400   Line Care Cap changed;Connections checked and tightened 06/01/25 0400   Phlebitis Assessment No symptoms 06/01/25 0400   Infiltration Assessment 0 06/01/25 0400   Alcohol Cap Used Yes 06/01/25 0400   Dressing Status Clean, dry & intact 06/01/25 0400   Dressing Type Transparent 06/01/25 0400       Peripheral IV 05/29/25 Posterior;Right Forearm (Active)   Site Assessment Clean, dry & intact 06/01/25 0400   Line Status Flushed;Capped 06/01/25 0400   Line Care Cap changed;Connections checked and tightened 06/01/25 0400   Phlebitis Assessment No symptoms 06/01/25 0400   Infiltration Assessment 0 06/01/25 0400   Alcohol Cap Used Yes 06/01/25 0400   Dressing Status Clean, dry & intact 06/01/25 0400   Dressing Type Transparent 06/01/25 0400        Opportunity for questions and clarification was provided.      Patient transported with:  Monitor and Registered Nurse       
0530: patient not wanting to go to CT at this time, agreeable for daytime, patient appears down.   
0600: Patient POA and brother requesting visitors to be vetted by him due to home bedbug situation.       
0940: Patient refusing CT at this time, provider aware   
1438: Provider made aware that G tube cannot be placed until 5/22 as last dose of plavix was given on 5/16.   
1530-Aliese at bedside.  Andre, patient's brother and his POA, has requested Aliese not be at bedside.  Andre said \"Aliese bothers my brother about money and has bed bugs\".  Nurse asked Lovely to leave per Andre's instructions.  Lovely immediatly left without incident.    
1700 Pt came back from angio, full of pink colored secretion on his gauze cover, attempted to change it, Trach popped up, tried to reinsert it in vain, called RT, neither she could.  1713 RRT Called d/t trach dislodgement.  Pt was not distress, Dr. Bianchi, critical care Dr. Rose, Brendan respiratory, and RRT team arrived. Pt anxous during procedure Ativan 2mg ordered Successfully reinserted #[id number].  1745 RRT end    Bedside and Verbal shift change report given to Adela (oncoming nurse) by Jamee (offgoing nurse). Report included the following information Nurse Handoff Report, Index, Intake/Output, MAR, and Cardiac Rhythm V paced .     
4 Eyes Skin Assessment     NAME:  Roge Lobato  YOB: 1950  MEDICAL RECORD NUMBER:  [id number]    The patient is being assessed for  Admission    I agree that at least one RN has performed a thorough Head to Toe Skin Assessment on the patient. ALL assessment sites listed below have been assessed.      Areas assessed by both nurses:    Head, Face, Ears, Shoulders, Back, Chest, Arms, Elbows, Hands, Sacrum. Buttock, Coccyx, Ischium, Legs. Feet and Heels, and Under Medical Devices         Does the Patient have a Wound? No noted wound(s)       Bernard Prevention initiated by RN: No  Wound Care Orders initiated by RN: No    Pressure Injury (Stage 3,4, Unstageable, DTI, NWPT, and Complex wounds) if present, place Wound referral order by RN under : No    New Ostomies, if present place, Ostomy referral order under : No     Nurse 1 eSignature: Electronically signed by Rosa M Mascorro RN on 5/19/25 at 3:22 AM EDT    **SHARE this note so that the co-signing nurse can place an eSignature**    Nurse 2 eSignature: Electronically signed by Brenda Ely RN on 5/19/25 at 4:29 AM EDT   
4 Eyes Skin Assessment     NAME:  Roge Lobato  YOB: 1950  MEDICAL RECORD NUMBER:  [id number]    The patient is being assessed for  Post-Op Surgical    I agree that at least one RN has performed a thorough Head to Toe Skin Assessment on the patient. ALL assessment sites listed below have been assessed.      Areas assessed by both nurses:    Head, Face, Ears, Shoulders, Back, Chest, Arms, Elbows, Hands, Sacrum. Buttock, Coccyx, Ischium, Legs. Feet and Heels, Under Medical Devices , and Other trach        Does the Patient have a Wound? Yes wound(s) were present on assessment. LDA wound assessment was Initiated and completed by RN       Bernard Prevention initiated by RN: Yes  Wound Care Orders initiated by RN: Yes    Pressure Injury (Stage 3,4, Unstageable, DTI, NWPT, and Complex wounds) if present, place Wound referral order by RN under : No (right heel dark-nonblanching-wound care consulted)    New Ostomies, if present place, Ostomy referral order under : No     Nurse 1 eSignature: Electronically signed by Jody Berg RN on 5/17/25 at 6:51 PM EDT    **SHARE this note so that the co-signing nurse can place an eSignature**    Nurse 2 eSignature: Electronically signed by ZACHARIAH HEWITT RN on 5/17/25 at 6:54 PM EDT    
5/21: CT called again this shift to see if patient would come for CT, patient refusing at this time. CT asking if scan is still wanted/ can we reorder the scan when patient is willing to participate. Education provided to patient but consistent no at this time.    Relayed to CT we need it for staging per note.    0300: missed 1 lab draw, patient said \"we are done\" not allowing any more attempts at this time. He is acceptable for another nurse to attempt to get labs/IV or ultrasound. Charge placed new IV    Patient allowed mouth swab x1 this shift, doesn't want gown changed nor new 4x4 under trach collar. wanted suction x3 times this shift. Copious yellow secretions coughed up through shift, minimal suctioned.    
6 Shiley cuffed placed by surgeon under direct visualization. Obturator sent with patient.   
[id number]: Deep suctioned patient, changed patient's bedding and gown, patient is urinating. Replaced pad underneath patient's trach that catches secretions. This RN and Nurse Extern educated patient on suctioning self as well, patient seemed agreeable.   
[place] [phone] (COPE)  [facility] [phone] (COPE)      GOALS OF CARE: DNR/I, after extensive discussion - transition to comfort focused care       TREATMENT PREFERENCES:   Code Status: DNR/I    Advance Care Planning:  [x] [facility] Interdisciplinary Team has updated the ACP Navigator with Health Care Agent and Patient Capacity    Primary Decision Maker (Health Care Agent):   Relationship to patient:  [x] Named in a scanned document   [] Legal Next of Kin  [] Guardian      Family Meeting Documentation    Participants: Dr. Watkins, Dr. Plascencia, patient, brother/MPOA Andre, and cousin (like a sister) Gloria, Speech therapist Brenda Gutierrez, and bedside RN also present    Discussion: The Palliative Medicine SW along with Dr. Watkins and ENT, Dr. Plascencia, speech therapy, bedside RN, and brother / MPOA Andre and cousin Gloria met in the room w/ patient to further discuss care plan and options moving forward.     Unfortunately, patient has declined significantly during prolonged hospital stay. IT was discussed in detail that although a total laryngectomy may be an option - it is discussed how challenging this surgery would be on the patient and his overall \"big picture\" and his ability to tolerate such an extensive procedure and his recovery. We discuss quality of life - that if family wanted to pursue this surgery, there is worry he may not survive the procedure and even if he did - would have extensive/complex follow-up and recovery. Also - the surgery is just one piece of his complex medical picture. Even if he were to survive the surgery - Oncology has been open about their worries of his ability to tolerate any cancer directed treatments. We also discuss that his kidneys are failing, and our worry that he would not be a good dialysis candidate. It is discussed if he proceeds with possible surgery he is at risk for infection, bleeding, etc. And is essentially bed bound at this point 
Bedside shift change report given to Hyacinth RN (oncoming nurse) by Francesca BRIGHT (offgoing nurse). Report included the following information Nurse Handoff Report.    
[facility] transferred initiated via [facility].  
Called [facility] Melba GANN at [phone] ext [phone] to request P2P left a VM with my call back cell#  
Called to bedside for trach dislodgement, Dr. Rose came to assistance was trach replacement unable to pass the #6 Shiley cuffless attempted, but was successful with placing a #4 Shiley cuff less. Nurse at bedside Dr. Rose will follow up with ENT doctor with patient's status.  
Called to bedside for trach dislodgement.      Unable to pass a 6 cuffless.  Even with bronch assist could not pass tortuous anatomy  no visible mass or obstruction     Able to pass and secure 4 cuffless.      PMH:  Roge Lobato is a 74 y.o. male POD 5 from tracheostomy and DL/biopsy.  CT chest clear.   Path returned as squamous cell carcinoma .  About to have IR guided G tube.    Vitals:    05/23/25 1435   BP:    Pulse: 73   Resp: 16   Temp:    SpO2: 100%      PROCEDURE:  Ativan 2 mg x 1   Tracheostomy tube change  After obtaining verbal consent the patient was reclined with towel roll behind his shoulders. I carefully placed a 6 cuffless Shiley. I secured the tracheostomy tube appliance with a soft Velcro strap. the patient reported breathing well and there was good voice after applying the Passy-Sharita valve.  The patient tolerated the procedure well.    Updated Dr Hobbs.    
Clinical Pharmacy Note: IV to PO Automatic Conversion  Please note: Roge Lobato’s medication- thiamine has been changed from IV to PO based on the following critiera:    Patient is tolerating oral medications  Patient is tolerating a diet more advanced than clear liquids  Patient is not requiring vasopressors    This IV to PO conversion is based on the P&T approved automatic conversion policy for eligible patients.  Please call with questions.    
Department of Otolaryngology  Progress Note      SUBJECTIVE:  ATSP regarding tracheostomy dislodged.  Roge Lobato is a 74 y.o. male currently s/p 5/16/2025 tracheostomy and DL/biopsy of a large right supraglottic mass.  It was noted on imaging months ago but it was a challenge for him to get evaluated for various reasons.  It has continued to grow to the point of causing respiratory distress and Dr. Plascencia performed the above procedure while on call.  Dr. Darien Hobbs and I have been consulted to take over for this physician as he is a [facility] doctor.  He has had difficulty swallowing and lost some weight over these months.  Had Rad/onc eval today for suspected squamous cell carcinoma.  CT looks like a T4aN0.  Had previous MBS, SLP recommended G tube.     OBJECTIVE      Physical  VITALS:  BP (!) 87/56   Pulse (!) 119   Temp 98.1 °F (36.7 °C) (Oral)   Resp 26   Ht 1.702 m (5' 7\")   Wt 66.2 kg (146 lb)   SpO2 100%   BMI 22.87 kg/m²   Thin elderly male lying supine with size 4 uncuffed Shiley trach malpositioned. Using medium nasal speculum and headlight allowing direct visualization, size four uncuffed trach placed without difficulty. Size 4 trach appeared short for patient's needs, therefore trach changed to size 6 uncuffed Shiley trach. Faceplate secured with 3-0 sutures in four quadrants, velcro trach tie replaced.      ASSESSMENT AND PLAN    Airway is secure with trach sutured in place.   Recommend inpatient transfer to [facility] for evaluation as patient will benefit from treatment of his laryngeal cancer to likely include surgical laryngectomy, a procedure which I am unable to provide. In order to minimize potential for seeding of malignancy to his tracheostomy site, please expedite transfer for initiation of his treatment.  
Department of Otolaryngology  Progress Note      SUBJECTIVE:  Patient transferred to ICU for monitoring.    OBJECTIVE      Physical  VITALS:  /61   Pulse 99   Temp 98.4 °F (36.9 °C) (Oral)   Resp 21   Ht 1.702 m (5' 7\")   Wt 55.6 kg (122 lb 9.2 oz)   SpO2 96%   BMI 19.20 kg/m²   NECK:  trach secure, patent, functional.    ASSESSMENT AND PLAN  75 yo male with trach and PEG, stable awaiting transfer to [facility] for evaluation and treatment of apparent T4N0M0 SCC supraglottic larynx.  
Dr. Hobbs, changed patient's trach today to #6 Shiley cuffless. Spare trach's and inner cannulas is at bedside, patient went for a procedure in IR. Placed a 50% venti-mask on trach collar for transport.  
Excessive pinkish- tan colored secretions noted during coughing spells. Trach is intact. Suctioning frequently. CPT started by RT. Noted effective. Pt c/o SOB, CXR completed. Unchanged from previous assessment.   
I have received signout from Dr. Plascencia.   Briefly, Mr. Lobato  Is a 74-year-old gentleman presenting with advanced right supraglottic mass with thyroid cartilage erosion.  No lymphadenopathy.  He underwent urgent tracheostomy  with laryngoscopy and biopsy of the mass.  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   I came to round but patient was off the floor for modified barium swallow.  I suspect he will need a G-tube, port placement, and consultation with [facility] ot[facility]ology for consideratin of laryngectomy.  Rad/Onc and Med Onc consulted.  May be a good candidate for total laryngectomy and postop XRT.  Will see him later today and place note.    Darien Hobbs MD  [facility]  [address], [address]  [place] [address]   (o) [phone]  (f) [phone]   
Med/ONC  Events noted  D/w palliative care  Agree with hospice  Call / message if questions  
Med/ONC  T4N0Mx squamous cell carcinoma supraglottis.   Post tracheostomy and DL/biopsy     Path back:   FINAL PATHOLOGIC DIAGNOSIS        Right larynx, mass; biopsy:        Squamous cell carcinoma, poorly differentiated, with keratinization,   p16 negative; (see comment).   Comment   P16 immunohistochemical staining on block 1A is negative.     CT chest no mets/ non specific LN  Would consider PET as outpt  Pt to see [facility] ENT  No plans for any inpt systemic therapy from us  Would consider palliative care support  Pt can fu with us as outpt  Pt transfer here from [facility] and can fu with ONC there  Call/ message if questions  
Neurovascular Brief Progress Note:  0545: The intensivist contacted [facility]NP regarding abnormal CTA findings showing active hemorrhage originating from a glottic neoplasm, with extension of blood and debris into the oropharynx and nasopharynx. A Rapid Response Team was activated due to the patient actively coughing up approximately 400cc of blood and clots, accompanied by hypotension (SBP in the 70s). The patient received LR bolus, resulting in improvement of SBP to 102. Case was discussed with Dr. Smith, who reviewed the CTA and recommended emergent IR for embolization.     Per intensivist, post-embolization, the patient will be transferred to [facility] for ENT evaluation. At bedside, the patient was actively coughing up blood from the tracheostomy site and mouth, with large clots noted. Neurological examination could not be performed due to ongoing bleeding. The patient remains alert and oriented.    Case d/w: intensivist, patient, KEHINDE Montano, DYLLAN, Dr. Smith, patient's brother, hospitalist NP, primary nurse, and anesthesia     Spent 25 minutes of time involved in chart review, lab review, imaging review       Rebecca Montero [facility]-NP  Neurovascular Nurse Practitioner  [phone]   
Neurovascular Brief Progress Note:  Patient is s/p catheter cerebral/cervical arteriogram with embolization of tumor feeders to the oropharyngeal mass by Dr. Smith. Right groin arteriotomy site is C/D/I without oozing, hematoma, tenderness, redness, bruising. + distal pulses.    The patient is resting in bed watching TV with brother at the bedside. Patient noted with mild bloody secretions at the trach site. No excessive bleeding noted. The patient denied any pain.     Review of Systems:   Negative for headache or visual changes. Denies cough, wheezing, or shortness of breath. No chest pain, palpitations, or orthopnea. No abdominal pain, nausea, or vomiting. Denies musculoskeletal pain or extremity discomfort. No psychiatric, neurological, endocrine, hematologic, or additional cardiac symptoms beyond those described in the above.     Physical Exam:   Blood pressure 123/62, pulse 83, temperature 97.7 °F (36.5 °C), temperature source Oral, resp. rate 23, height 1.702 m (5' 7\"), weight 57.6 kg (126 lb 14.4 oz), SpO2 100%. Net IO Since Admission: 9,198.96 mL [06/07/25 1958]    GEN: Cooperative, Calm, Well developed, well nourished patient in NAD, flat affect  HEENT: Normocephalic. Non-icter, no congestion; trach color inplace with secretions   Lungs: Coarse bilaterally ant, non-labored breathing, trach   Cardiac: S1,S2, normal rate and rhythm, with no murmurs. no gallops  Abdomen: hypoactive bowel sounds, no distention, soft, non-tender  Extremities: 2+ Radial pulses, no clubbing, cyanosis, or edema  Skin: no rashes or lesions noted; Skin color appropriate and warm for ethnicity.       NEURO:  Mental status: Oriented to time, situation, place and person. Able to follow commands appropriately  Cranial Nerves: Attention and fund of knowledge is appropriate/intact. Speech is hoarse/hypophonia. Normal recent and remote memory. EOMI, PERRL, 3mm, no nystagmus, no ptosis. Full facial strength, no asymmetry. Hearing intact 
Noted small /moderate amt of bloody seration from trach.  Jamir VALDIVIA aware.   Made comfort  + pulse noted to R leg.  Drsg to R groin C/D/I no bleeding or hematoma noted.  Patient has a strong cough .  Suction mouth as needed.      
Nurse at bedside to make pt aware that transport has been put in for pt to go to angio. Patient complaining of SOB. Patient oxygen at 100%. Patient suctioned and inner cannula changed. Patient still complaining of SOB. Patient denies anxiety. Charge at bedside. Patient suctioned. Patient still feeling SOB, and belly/chest tightness that he has been having since trach was placed. Provider made aware.  Provider at bedside. PRN order placed.       -[facility] unable to take patient CT scan done.   
Nutrition Contact    6/5: pt still awaiting dispo.  He is tolerating continuous TF @ goal, but still not ready to transition to bolus feeds today. Pt still with c/o diarrhea but is not having any!  Discussed with RN and SLP.  Suspect we are missing some of what he is trying to communicate as he is very difficult to understand and there are reported barriers to using other forms of communication d/t poor eye sight.  Overall I am concerned for his QOL and functional decline, psyche. Oncology note today indicating he is currently not a good chemo candidate d/t poor performance.  Would consider at least re-engaging palliative care at this point to help navigate these issues / readdress goals of care given he still has a very difficult course ahead of him.    Discussed with Dr. Yap.    RD is following.      6/4: Visited with pt to discuss transitioning to bolus feeds as we had planned last week before his brief ICU stay.  Pt worried about diarrhea.  Explained it might be better not having TF running 24/7.  He was still not ready and given dispo, would be OK to stay on continuous and transition to bolus at facility (vs if he were going home he would not have a pump).  Still ideal to transition to bolus while admitted, but he has already have very significant life changing events happen this admission and therefore, I wanted to give him some control on this matter.  He stated the hunger pains were intermittent now.  I told him I would look into getting him something for his diarrhea and  [place] back later this week to see if he was ready to transition.     Spoke with RN who reported no diarrhea, only a smear.   Spoke with RD who saw pt yesterday and he c/o diarrhea to her.  Suspect this is intermittent, not diarrhea, but loose stool.  May benefit from additional fiber or Banatrol, but I do not think Imodium is necessary today if he is only having a smear.  Could be considered PRN.  For now, continue current continuous TF 
Nutrition Contact    Visited with pt to discuss transitioning to bolus feeds as we had planned last week before his brief ICU stay.  Pt worried about diarrhea.  Explained it might be better not having TF running 24/7.  He was still not ready and given dispo, would be OK to stay on continuous and transition to bolus at facility (vs if he were going home he would not have a pump).  Still ideal to transition to bolus while admitted, but he has already have very significant life changing events happen this admission and therefore, I wanted to give him some control on this matter.  He stated the hunger pains were intermittent now.  I told him I would look into getting him something for his diarrhea and  [place] back later this week to see if he was ready to transition.     Spoke with RN who reported no diarrhea, only a smear.   Spoke with RD who saw pt yesterday and he c/o diarrhea to her.  Suspect this is intermittent, not diarrhea, but loose stool.  May benefit from additional fiber or Banatrol, but I do not think Imodium is necessary today if he is only having a smear.  Could be considered PRN.  For now, continue current continuous TF and will [place] back later this week to readdress with pt.    Farzana Prather RD  Available via [employer]    
Occupational Therapy  05/18/25    Chart review completed in prep for OT eval. Pt cleared to participate in session by RN and attempted to see for OT eval - pt currently declining to participate with OT despite education on importance of mobility; pt states he has \"a lot on his mind,\" and \"isn't up to it right now.\" Agreeable to participate tomorrow.  Of note, pt reports he cannot walk and this is also stated in CM note. Will follow up with patient tomorrow to complete evaluation.    Thank you  Jessica Manzo, OTR/L    
Occupational Therapy  06/06/25    Chart reviewed up to this date. Patient received semi-reclined in bed with noted significant bloody sputum on protective pad. RN alerted and at bedside to provide care. Will defer and continue to follow up as able/medically appropriate.     Maryjane Martinez, OTD, OTR/L      
Occupational Therapy:    Chart reviewed up to date. Pt stating he was not up to participate in therapy today. Encouraged OOB mobility but pt deferred. Will defer for now and continue to follow in acute setting.    Thank you,  Deonna DIEHL, OTR/L  
Occupational Therapy: Defer    Chart reviewed up to date. Spoke with Rn. Pt currently 130s bpm at rest. Will defer for now and continue to follow.     Thank you,  Deonna Harrell OTRONY, OTR/L  
Occupational Therapy: defer    Order received and acknowledged. Pt stating he was having increased pain levels. RN alert. Will continue to follow for OT eval.    Thank you,  Deonna Harrell OTRONY, OTR/L  
Occupational therapy: Defer    Chart reviewed up to date. Cleared by RN. Pt asking to defer therapy today stating being tired and having to go to CT later and possible peg placement. Encouraged OOB mobility. Will defer for now and attempt back later as able/medically stable.    Thank you,  Deonna Harrell OTRONY, OTR/L    
Order received and acknowledged. Pt stating he was having increased pain levels. RN alert. Will continue to follow for PT rosendo.       Jet Wong, PT    
PT Contact Note    PT orders received, chart reviewed, orders acknowledged. Pt cleared for participate in session by RN and attempted to see for PT eval - pt currently declining to participate with PT despite education on importance of mobility; pt states he has \"a lot on his mind,\" and \"isn't up to it right now.\" Agreeable to participate tomorrow.  Of note, pt reports he cannot walk and this is also stated in CM note. Will follow up with patient tomorrow to complete evaluation.    Thank you,  Roopa Aguiar, PT, DPT  
PT Contact Note    Pt chart reviewed and up to date. Spoke with RN who reports pt tachy to 130's at rest, requesting to defer PT at this time. Will follow up with patient as able.    Thank you,  Roopa Aguiar, PT, DPT  
Palliative Medicine      Code Status: Full Code    Advance Care Planning:    ** Patient has AMD on file naming brother Andre as his Medical Power of  - he affirms this today that Andre is who he trusts     Patient / Family Encounter Documentation    Participants (names): Dr. Watkins, patient     Narrative: The Palliative Medicine SW and Dr. Watkins met with the patient at bedside - he initially doesn't have PMV in, however, agrees to let us put it in and this helped some with communication.     He is difficult to understand at times but engages with us. He tells us that prior to admission in February he was using a rolling walker. Difficult to get clear timeline/history of his functional status - he does share that he has poor eyesight and has been unable to drive since 2011. Reportedly living conditions were very poor and patient could not get off his couch.     Patient knows at this point that we are worried that the laryngeal mass is cancer - need pathology to help determine plan, however, he knows we are worried about cancer. SW explored his motivation/willingness to undergo interventions (mentioning possible [facility] input) and he shares he is willing. He also understands likelihood of feeding tube for nutrition.     He has experienced significant loss - including his eye sight, two brothers, and death of child. He has difficulty sharing where he finds his strength - would benefit from ongoing support.     He does tell us that he is leaning towards going to his brother Andre's home at discharge who will be able to provide more care and help get to appointments. He recognizes he will need more support when leaving the hospital.     Patient describes concerns about his significant other, mentions bank statements? Says Yissel is spending his money? He mentions money however difficult to determine what he is saying exactly and very tangential. SW asked clarifying questions directly if he has concerns about his 
Palliative Medicine  Patient Name: Roge Lobato  YOB: 1950  MRN: [id number]  Age: 74 y.o.  Gender: male    Date of Initial Consult: 5/20/25, reconsult 6/5  Date of Service: 6/10/2025  Time: 12:03 PM  Provider: Ena Watkins MD  Hospital Day: 25  Admit Date: 5/17/2025  Referring Provider: Dr Bianchi , Dr Yap      Reasons for Consultation:  Goals of Care and advance care planning    HISTORY OF PRESENT ILLNESS (HPI):   Roge Lobato is a 74 y.o. male with a past medical history of known stroke, SSS s/p PPM, chronic pancreatitis, GERD, and  laryngeal mass dx 2/2025,     He was admitted on 5/17/2025 - initially presented to [facility] with enlarging R neck mass and hoarseness then transferred to [facility].Pt w/ diagnosed R laryngeal mass 2/2025 but was not able to follow up with ENT. CT showing that the mass almost doubled in size from previous imaging now 5.1 x 3.7 x 4.9 cm. S/p trach and mass bx 5/17.   SLP following and has aspiration on MBS, thus is NPO.   G tube placed 5/23/25.  ENT, Med Onc and Rad Onc following- needs laryngectomy before he could consider radiation or chemotherapy.   [facility] transfer, recommends outpatient surgery. As of 6/5- [facility] transfer as well.   Path- squamous cell carcinoma.   Chest CT w/out mets. PET scan needs to be done as outpatient.   6/6- now with bloody secretions. S/p embolization  6/7- CTA showed tumor is enlarged and new cervical lymphadenopathy.  6/9- Transfused 6/8, worsening renal function.   6/10- HgB stable, oliguric     Psychosocial: Pt had been living with [facility] but she travels and was gone for extended periods of time. Pt will be going home w/ his brother Andre when ready to d/c home.  They have had 3 brothers pass away in the past few years.   Pt is a [employer] .   Cannot see very well, so has not driven in many years.     PALLIATIVE DIAGNOSES:    Right laryngeal mass and neck pain  Chronic joint pain from arthritis in 
Palliative Medicine  Patient Name: Roge Lobato  YOB: 1950  MRN: [id number]  Age: 74 y.o.  Gender: male    Date of Initial Consult: 5/20/25, reconsult 6/5  Date of Service: 6/11/2025  Time: 9:32 AM  Provider: Ena Watkins MD  Hospital Day: 26  Admit Date: 5/17/2025  Referring Provider: Dr Bianchi , Dr Yap      Reasons for Consultation:  Goals of Care and advance care planning    HISTORY OF PRESENT ILLNESS (HPI):   oRge Lobato is a 74 y.o. male with a past medical history of known stroke, SSS s/p PPM, chronic pancreatitis, GERD, and  laryngeal mass dx 2/2025,     He was admitted on 5/17/2025 - initially presented to [facility] with enlarging R neck mass and hoarseness then transferred to [facility].Pt w/ diagnosed R laryngeal mass 2/2025 but was not able to follow up with ENT. CT showing that the mass almost doubled in size from previous imaging now 5.1 x 3.7 x 4.9 cm. S/p trach and mass bx 5/17.   SLP following and has aspiration on MBS, thus is NPO.   G tube placed 5/23/25.  ENT, Med Onc and Rad Onc following- needs laryngectomy before he could consider radiation or chemotherapy.   [facility], recommends [facility]. [facility] of 6/5- [facility] as well.   Path- squamous cell carcinoma.   Chest CT w/out mets. PET scan needs to be done as outpatient.   6/6- now with bloody secretions. S/p embolization  6/7- CTA showed tumor is enlarged and new cervical lymphadenopathy.  6/9- Transfused 6/8, worsening renal function.   6/10- HgB stable, oliguric     Psychosocial: Pt had been living with [facility] but she travels and was gone for extended periods of time. Pt will be going home w/ his brother Andre when ready to d/c home.  They have had 3 brothers pass away in the past few years.   Pt is a [place].   Cannot see very well, so has not driven in many years.     PALLIATIVE DIAGNOSES:    Right laryngeal mass and neck pain  Chronic joint pain from arthritis in 
Patient became hypotensive after he received IV Dilaudid and IV Ativan for trach replacement.  Hypotension persisted despite IV albumin and 250 mL saline bolus, administering another 500 cc.  Would like for him to be observed in the ICU, discussed with intensivist.  
Patient has been transitioned to hospice. Will sign off  
Physical Therapy - defer  Attempted to see pt however he is preparing to go off the floor for PEG and CT.  Will continue to follow per POC.  
Physical Therapy 5/28/2025    Chart reviewed up to date. Attempted PT session earlier. Pt declined despite encouragement and education on importance of mobility. Will continue to follow.    Thank you.  Gracy Be, PT, DPT    
Physical Therapy Note  06/09/2025    Chart reviewed and recent events noted; spoke with RN who requests therapy to defer at this time 2* medical status. Will continue to follow.    Esthela Power, PT, DPT  
Pt. Cont to have large and frequency of coughing with much secretions.  Pt is alert/orient, follows commands answers questions His brother and his cousin are here in room with palliative care team. Pt wanting his brother to make decisions for him.  They decided to do radiation therapy, but later Andre Lobato (brother) called and said they just want to go the comfort care route, without any radiation therapy.  Pt being transferred to hospitalist service and will  be with Hospice tomorrow when they come in the morning.        
RRT called. Patient reports CP by pacemaker, and SOB. Pain medication given, and troponin given. EKG obtained. VS stable.  
Rapid Response  Rapid response room 402 called overhead at 0431. RRT responding.    Rapid response called for  Excessive bloody secretions/clots out of trach.    On arrival, pt is having excessive secretions and clots from his mouth and tracheal airway. O2 saturations >95% on room air. VSS. Patient is alert and oriented.    ICU was consulted; orders for stat CT w/ contrast of head and neck, and inhaled txa.    Noted that pt was experiencing active bleeding and pooling into his oropharynx from radiologist. Intensivist team notified. Team has reached out to [facility] and other providers which may offer better options for mass removal.    -Inhaled TXA administered  -IV TXA administered  -1L LR bolus administered  Tube feed stopped  -Hgb/T&S/Coag tests drawn      Remaining at bedside to provide intensive care resources while pending move to procedure.      Rapid Response Team Sepsis Screening  Is the patient's history suggestive of a new infection? No    Are two or more SIRS criteria present? No    Is there evidence of Organ Dysfunction? [facility] Sepsis OD: None    Communication with provider: Yes, MD Perez, DRAGAN Yañez, DRAGAN Walker (name)    Was a Code Sepsis called at this encounter? No. Why? N/A    
Referral source:   Roge Lobato at [facility] in [facility] 4 IM.  attended rounds in the IMCU as part of the Interdisciplinary team where the patient's ongoing care was discussed. I reviewed the medical record as part of this encounter.     Outcome: Interdisciplinary team are aware of  availability and were encouraged to request support as needed.      Advised nurse to contact [facility] for any further referrals.The  on-call can be reached at [phone]).     Rev. Suyapa Nance MDiv, [facility]  Staff     
Roge Lobato is a 74 y.o. male POD 5 from tracheostomy and DL/biopsy.  CT chest clear.   Path returned as squamous cell carcinoma .  About to have IR guided G tube.    BP (!) 151/59   Pulse 65   Temp 97.9 °F (36.6 °C) (Oral)   Resp 16   Ht 1.702 m (5' 7\")   Wt 59 kg (130 lb 1.1 oz)   SpO2 100%   BMI 20.37 kg/m²    NAD  Trach well seated with silk sutures in place.  Dressing changed.    Trach cultures: GNRs  On Zosyn    CT chest: no pulmonary metastatic disease    PROCEDURE:  Tracheostomy tube change  After obtaining verbal consent the patient was reclined.  The sutures were removed from the wound and the tracheostomy was removed.  I carefully placed a 6 cuffless Shiley.  I secured the tracheostomy tube appliance with a soft  Velcro strap. the patient reported breathing well and there was good voice after applying the Passy-Sharita valve.  The patient tolerated the procedure well.    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    Doing well on POD5.  First trach change done.  Now has 6 CFS shiley.  I have [facility] a referral to [facility] but will need to schedule an oupatient appt with Dr. Pollack or Dr. Courtney if CM and/or primary can assist with this while in house ideally before the holiday weekend.  G tube today.  Then follow nutrition recs.    Darien Hobbs MD  [facility]   [address] [place] [address], [address]  [place] [address]   (c) [phone]  (o) [phone]  (f) [phone]    
Roge Lobato is a 74 y.o. male POD 6 from tracheostomy and DL/biopsy.  CT chest clear.   Path returned as squamous cell carcinoma .  G tube yesterday.  First trach change to 6 CFS yesterday.  Hours later it came out with routine trach dressing change.  Dr. Rose unable to get it back in but was able to get in a 4 CFS.  Greatly appreciate her quick response.  Overnight pulling at IV that has since been replaced.  Currently in restraints.    BP (!) 145/65   Pulse 93   Temp 98.5 °F (36.9 °C)   Resp 19   Ht 1.702 m (5' 7\")   Wt 59 kg (130 lb 1.1 oz)   SpO2 99%   BMI 20.37 kg/m²    NAD  Trach well seated.  Thick mucus suctioned.  Verbalizes can breath ok and doesn't want tracheostomy manipulated with.    Trach cultures: GNRs  On Zosyn    CT chest: no pulmonary metastatic disease      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    Will go home with 4 CFS estela which is a change from previous plan.  Continue routine trach care and frequent suctioning  Continue treatment for GNRs w/ zosyn.  I have placed a referral to [facility] but team will need to schedule an oupatient appt with Dr. Pollack or Dr. Courtney if CM/primary team can arrange.  Follow nutrition recs for tube feeds.  Dispo: SNF likely with outpatient follow up consultation with [facility]  Call if needed.  Will follow peripherally at this point.    Darien Hobbs MD  [facility], [address]  [place] [address]   (c) [phone]  (o) [phone]  (f) [phone]    
Roge Lobato is a 74 y.o. male POD2 from tracheostomy and DL/biopsy.  No difficulties with ventilation.  Declined CT chest b/c of anxiety about scan.      /61   Pulse 67   Temp 98.9 °F (37.2 °C) (Oral)   Resp 20   Ht 1.702 m (5' 7\")   Wt 57.4 kg (126 lb 8.7 oz)   SpO2 100%   BMI 19.82 kg/m²    NAD  Trach well seated with silk sutures in place.  Dressing changed.    A/P:  Doing well on POD2.  Path pending.  He tells me he would try again on the CT chest if given a medication for anxiety before this.  I would suggest a dose of valium 45-60 minutes before CT chest.  This would be important for staging and determining surgical candidacy for laryngectomy.    Darien Hobbs MD  [facility]   [address], [address]  [place] [address]   (c) [phone]  (o) [phone]  (f) [phone]    
Roge Lobato is a 74 y.o. male POD4 from tracheostomy and DL/biopsy.  Declined CT chest b/c of anxiety about scan.   Path returned as squamous cell carcinoma     /66   Pulse 72   Temp 97.7 °F (36.5 °C) (Oral)   Resp 18   Ht 1.702 m (5' 7\")   Wt 57.3 kg (126 lb 5.2 oz)   SpO2 100%   BMI 19.79 kg/m²    NAD  Trach well seated with silk sutures in place.  Dressing changed.    Trach cultures: GNRs  On Zosyn    A/P:  74 y.o. M with T4N0Mx squamous cell carcinoma supraglottis.    Doing well on POD4.    He tells me he would try again on the CT chest if given a medication for anxiety before this.  I told him this has been ordered and explained why we are doing it.  I have placed a referral to [facility] but will need to schedule an oupatient appt with Dr. Pollack or Dr. Courtney if CM can assist with this while in house.  Anticipate changing to 6 CFS shiley as early as tomorrow.  G tube.    Darien Hobbs MD  [facility]  [address], [address]  [place] [address]   (c) [phone]  (o) [phone]  (f) [phone]    
SLP Contact Note    Given patient transitioning to comfort measures, do feel it is appropriate to liberalize patient's diet to include favorite foods, drinks, etc. However, also know that patient is certainly going to aspirate thin liquids (refer to [facility] results 5/19/25), and high likelihood patient will have severe pharyngeal residue and/or aspiration with solids; both of these things can cause extreme discomfort to patients.     Would recommend giving patient favorite foods/drinks in small amounts, sitting upright, with suction from trach and oral cavity at the ready, only if patient is awake, alert, and actively requesting these items. Note this oral intake and subsequent aspiration would almost certainly hasten the end of this patient's life given his debility and lack of regular and thorough oral care. Would also be understandable to provide patient with food/drink items to chew/swish in mouth, then expectorate and have oral cavity suctioned, just to provide some comfort and favorite/familiar tastes as he nears the end of his life.     If there are further questions from medical staff and/or family members, please do not hesitate to reach out via [employer].         
SLP Contact Note    Modified Barium Swallow Study (MBSS) completed on this patient. Patient observed with silent aspiration. Recommend patient remain NPO. Per chart review ENT has already been having conversations with the patient about potential PEG placement. Given this study, do feel PEG placement is appropriate. Full procedure note to follow.     Thank you,  Brenda Spence M.Ed, CCC-SLP (pronouns: she/her/hers)  Speech-Language Pathologist    
Shift Summary    1000: Trach inner cannula changed. SLP at bedside to initiate PVM and swallow eval d/t dysphagia and hx aspiration yesterday. Plan to remain NPO except meds in applesauce/magic cup; MBS scheduled for tomorrow. Refer to WINSTON Dolan note for further detail.      1030: MD Belen (ENT) at bedside. Additional trach care performed by provider and myself.     1200: Pt refused PT/OT services today. States, \"I'm too depressed to do anything today.\" Agreed to participate tomorrow  
Speech pathology brief note; full note to follow. Evaluation completed. Patient with concern for dysphagia and risk for aspiration due to baseline swallow function related to laryngeal mass, now with new trach. Recommend NPO except meds crushed in puree and ice chips; PMV must be donned for all PO intake and for communication attempts, however must be removed prior to leaving the room. Recommend MBS prior to diet initiation. Thank you.    ALEXIS Boucher Ed., CCC-SLP     
Spiritual Health History and Assessment/Progress Note  [facility]    Palliative Care,  ,  ,      Name: Roge Lobato MRN: [id number]    Age: 74 y.o.     Sex: male   Language: English   Islam: Yazdanism   Neck mass     Date: 6/9/2025            Total Time Calculated: 10 min              Spiritual Assessment began in [facility] 7S1 INTENSIVE CARE        Referral/Consult From: Palliative Care   Encounter Overview/Reason: Palliative Care  Service Provided For: Patient not available    Cindi, Belief, Meaning:   Patient unable to assess at this time  Family/Friends No family/friends present      Importance and Influence:  Patient unable to assess at this time  Family/Friends No family/friends present    Community:  Patient   Family/Friends No family/friends present    Assessment and Plan of Care:     Patient Interventions include:   Family/Friends Interventions include: No family/friends present    Patient Plan of Care: Spiritual Care available upon further referral  Family/Friends Plan of Care: No future visits per patient/family request    I attempted to visit Roge Lobato for a palliative intiial spiritual health assessment.     The patient is trached and appeared to be asleep. He did not become alert. No family was present.     Electronically signed by CIRILO QUINONES on 6/9/2025 at 8:11 AM   
Spiritual Health History and Assessment/Progress Note  [facility]    Palliative Care,  ,  ,      Name: Roge Lobato MRN: [id number]    Age: 74 y.o.     Sex: male   Language: English   Sabianism: Mormonism   Neck mass     Date: 5/22/2025            Total Time Calculated: 15 min              Spiritual Assessment began in [facility] 4W TELEMETRY        Referral/Consult From: Palliative Care   Encounter Overview/Reason: Palliative Care  Service Provided For: Patient not available    Cindi, Belief, Meaning:   Patient unable to assess at this time  Family/Friends No family/friends present      Importance and Influence:  Patient unable to assess at this time  Family/Friends No family/friends present    Community:  Patient   Family/Friends No family/friends present    Assessment and Plan of Care:     Patient Interventions include:   Family/Friends Interventions include: No family/friends present    Patient Plan of Care: Spiritual Care available upon further referral  Family/Friends Plan of Care: No family/friends present    I attempted to visit Roge Lobato for a palliative initial spiritual health assessment     The patient appeared to be asleep and did not alert. No family was present.     Electronically signed by CIRILO QUINONES on 5/22/2025 at 10:05 AM   
TRANSFER - IN REPORT:    Verbal report received from Blanca BRIGHT on Roge Lobato  being received from [facility] for urgent transfer      Report consisted of patient's Situation, Background, Assessment and   Recommendations(SBAR).     Information from the following report(s) Nurse Handoff Report, Adult Overview, Intake/Output, MAR, Recent Results, Cardiac Rhythm NSR, Quality Measures, and Neuro Assessment was reviewed with the receiving nurse.    Opportunity for questions and clarification was provided.      Assessment completed upon patient's arrival to unit and care assumed.     4 Eyes Skin Assessment     NAME:  Roge Lobato  YOB: 1950  MEDICAL RECORD NUMBER:  [id number]    The patient is being assessed for  Admission    I agree that at least one RN has performed a thorough Head to Toe Skin Assessment on the patient. ALL assessment sites listed below have been assessed.      Areas assessed by both nurses:    Head, Face, Ears, Shoulders, Back, Chest, Arms, Elbows, Hands, Sacrum. Buttock, Coccyx, Ischium, Legs. Feet and Heels, and Under Medical Devices         Does the Patient have a Wound? No noted wound(s)       Bernard Prevention initiated by RN: No  Wound Care Orders initiated by RN: No    Pressure Injury (Stage 3,4, Unstageable, DTI, NWPT, and Complex wounds) if present, place Wound referral order by RN under : No    New Ostomies, if present place, Ostomy referral order under : No     Nurse 1 eSignature: Electronically signed by Jhonny Ramsey RN on 5/29/25 at 11:05 PM EDT    **SHARE this note so that the co-signing nurse can place an eSignature**    Nurse 2 eSignature: Electronically signed by MANDY PASCUAL RN on 5/29/25 at 11:07 PM EDT     
TRANSFER - OUT REPORT:    Verbal report given to KAILA Gutierrez on Roge Lobato  being transferred to 4W room [facility] for routine progression of patient care       Report consisted of patient's Situation, Background, Assessment and   Recommendations(SBAR).     Information from the following report(s) Nurse Handoff Report and Neuro Assessment was reviewed with the receiving nurse.           Lines:   Peripheral IV 05/16/25 Left Forearm (Active)   Site Assessment Clean, dry & intact 05/18/25 0800   Line Status Flushed;Normal saline locked 05/18/25 0800   Line Care Connections checked and tightened 05/18/25 0800   Phlebitis Assessment No symptoms 05/18/25 0800   Infiltration Assessment 0 05/18/25 0800   Alcohol Cap Used No 05/18/25 0800   Dressing Status Clean, dry & intact 05/18/25 0800   Dressing Type Transparent 05/18/25 0800   Dressing Intervention New 05/16/25 2142        Opportunity for questions and clarification was provided.      Patient transported with:  Monitor and Registered Nurse        
Tracheostomy end time 14:14  
      Current Nutrition Therapies:  Diet: NPO  Supplements: NPO  Meal Intake:   No data found.  Supplement Intake:  Patient Vitals for the past 168 hrs:   PO Supplement (%)   05/18/25 1800 1 - 25%   05/18/25 1600 0%     Nutrition Support: none at this time      Anthropometric Measures:  Height: 170.2 cm (5' 7\")  Ideal Body Weight (IBW): 148 lbs (67 kg)       Current Body Weight: 59 kg (130 lb 1.1 oz), 87.9 % IBW. Weight Source: Bed scale  Current BMI (kg/m2): 20.4                               Wt Readings from Last 10 Encounters:   05/19/25 59 kg (130 lb 1.1 oz)   05/16/25 63 kg (139 lb)   02/08/25 63.4 kg (139 lb 12.4 oz)   11/09/24 67.6 kg (149 lb)   10/16/24 64.9 kg (143 lb)   09/22/24 64.7 kg (142 lb 10.2 oz)       Weight History Weight - Scale Weight - Scale Weight Method   5/19/2025 130 lbs 1 oz 59 kg Bed scale   5/18/2025 129 lbs 14 oz 58.9 kg Bed scale   5/17/2025 143 lbs 5 oz 65 kg Bed scale   5/16/2025 139 lbs 63.1 kg -   2/8/2025 139 lbs 12 oz 63.4 kg -   11/9/2024 149 lbs 67.6 kg Stated   10/16/2024 143 lbs 64.9 kg Stated   9/22/2024 142 lbs 10 oz 64.7 kg Actual;Standing scale   9/18/2024 145 lbs 8 oz 66 kg Bed scale   9/17/2024 147 lbs 11 oz 67 kg Bed scale   9/16/2024 148 lbs 2 oz 67.2 kg Bed scale   9/14/2024 155 lbs 14 oz 70.7 kg Bed scale   12/16/2021 148 lbs 67.1 kg -   9/30/2021 150 lbs 68 kg -   9/29/2021 160 lbs 72.6 kg -   9/28/2021 160 lbs 2 oz 72.6 kg -   8/20/2021 145 lbs 8 oz 66 kg -       Nutrition Diagnosis:   Severe malnutrition related to inadequate protein-energy intake, catabolic illness, increase demand for energy/nutrients as evidenced by NPO or clear liquid status due to medical condition, poor intake prior to admission, weight loss, criteria as identified in malnutrition assessment    Nutrition Interventions:   Food and/or Nutrient Delivery: Continue NPO, Start Tube Feeding  Nutrition Education/Counseling: No recommendation at this time  Coordination of Nutrition Care: Continue to 
(VENELEX) ointment   Topical BID Walter Yap MD   Given at 06/07/25 2110    magnesium oxide (MAG-OX) tablet 400 mg  400 mg Per G Tube BID Madyson Baires MD   400 mg at 06/07/25 2103    potassium & sodium phosphates (PHOS-NAK) 280-160-250 MG packet 250 mg  1 packet Per G Tube BID Madyson Baires MD   250 mg at 06/07/25 2103    phenol 1.4 % mouth spray 1 spray  1 spray Mouth/Throat PRN Sakshi Mckeon APRN - NP        oxyCODONE-acetaminophen (PERCOCET) 5-325 MG per tablet 1 tablet  1 tablet Oral Q8H PRN Madyson Baires MD   1 tablet at 06/06/25 1345    oxyCODONE (ROXICODONE) immediate release tablet 5 mg  5 mg Per G Tube Q4H PRN Madyson Baires MD   5 mg at 06/07/25 1508    lansoprazole (PREVACID SOLUTAB) disintegrating tablet 15 mg  15 mg Per G Tube QAM AC Madyson Baires MD   15 mg at 06/06/25 0621    albuterol (PROVENTIL) (2.5 MG/3ML) 0.083% nebulizer solution 2.5 mg  2.5 mg Nebulization Q3H PRN Sunni Liao MD   2.5 mg at 06/06/25 2205    midodrine (PROAMATINE) tablet 15 mg  15 mg Oral Q6H Sunni Liao MD   15 mg at 06/08/25 0358    [Held by provider] clopidogrel (PLAVIX) tablet 75 mg  75 mg Per G Tube Daily Ros Rod MD   75 mg at 06/06/25 0825    [Held by provider] aspirin chewable tablet 81 mg  81 mg Per G Tube Daily Yi Bianchi MD   81 mg at 06/06/25 0825    sodium chloride flush 0.9 % injection 5-40 mL  5-40 mL IntraVENous 2 times per day Denise Delgado ACNP   10 mL at 06/07/25 2106    sodium chloride flush 0.9 % injection 5-40 mL  5-40 mL IntraVENous PRN Salabao, Denise, ACNP        0.9 % sodium chloride infusion   IntraVENous PRN Denise Delgado ACNP   New Bag at 06/07/25 0921    OLANZapine (ZyPREXA) 5 mg in sterile water 1 mL injection  5 mg IntraMUSCular PRN Denise Delgado ACNP        glucose chewable tablet 16 g  4 tablet Oral PRN Sakshi Mckeon APRN - NP        dextrose bolus 10% 125 mL  125 mL IntraVENous PRN Sakshi Mckeon APRN - NP   Stopped at 05/25/25 1305    Or    
06/07/25 0400   Dressing Type Transparent 06/07/25 0400        Opportunity for questions and clarification was provided.      Patient transported with: Monitor, RN, CRNA          
Daily  sodium chloride flush 0.9 % injection 5-40 mL, 5-40 mL, IntraVENous, 2 times per day  sodium chloride flush 0.9 % injection 5-40 mL, 5-40 mL, IntraVENous, PRN  0.9 % sodium chloride infusion, , IntraVENous, PRN  OLANZapine (ZyPREXA) 5 mg in sterile water 1 mL injection, 5 mg, IntraMUSCular, PRN  [Held by provider] enoxaparin (LOVENOX) injection 40 mg, 40 mg, SubCUTAneous, Daily  acetaminophen (TYLENOL) tablet 650 mg, 650 mg, Oral, Q4H PRN  sodium chloride flush 0.9 % injection 5-40 mL, 5-40 mL, IntraVENous, 2 times per day  sodium chloride flush 0.9 % injection 5-40 mL, 5-40 mL, IntraVENous, PRN  0.9 % sodium chloride infusion, , IntraVENous, PRN  ondansetron (ZOFRAN) injection 4 mg, 4 mg, IntraVENous, Q6H PRN  naloxone (NARCAN) injection 0.4 mg, 0.4 mg, IntraVENous, PRN  polyethylene glycol (GLYCOLAX) packet 17 g, 17 g, Oral, Daily PRN  ASSESSMENT AND PLAN    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 June Moore and myself. During this meeting we discussed in detail what a total laryngectomy really involves and his quality of life and life expectancy with or without total laryngectomy. All questions were answered. Patient and his family have decided for comfort measures and possibly palliative radiation. I  have updated Dr. Hobbs regarding these recent events.    Deepak Plascencia DO  6/10/2025  10:55 AM  
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,PER ENT  I   - status post laryngoscopy w/ laryngeal mass biopsy,path pending ad  tracheostomy w/ 6-0 Shiley cuffed tube  - Stable on trach collar   - Speech therapy consulted, MBS failed:   - G tube  by IR on Thursday 5/22/25  - Concern for tracheitis, cover for possible pseudomonas (thick gray sputum noted from trach, gram neg and pos noted on gram stain),on zosyn.Dc linezolid(sputum cx and mRSA screen negative for staph), will complete 7 days tx   - Sputum cx MODERATE Mixed Gram Negative Rods Abnormal    - MRSA screen negative  - pain control as needed, pt states chest tightness, on oxycodone, dilaudid prn  - Holding aspirin and plavix for G tube  on 5/22  - OK for VTE ppx   -consultation with [facility] otolaryngology for consideratin of laryngectomy.  Rad/Onc and Med Onc consulted. : will need port in the near future if chemo planned   -Palliative following,full code and wants to purse standard medical care   -CT chest for staging reviewed , will need outpt PET per oncologist  -change trach to cuffless  per ENT        Hypertension  -Controlled with iv lopressor     H/o stroke  - hold ASA and clopidogrel for now, will resume after peg on 5/22     SSS s/p PPM  - rate controlled with metoprolol     GERD   - home PPI    Hypokalemia: replace with iv     Diet  -NPO  -Start D5NS w/kcl  -Accucheks q6 hourly   Will start tube feeding after G tube by IR       Code status: Full  Prophylaxis: Lovenox  Care Plan discussed with: patient bedside,RN    Anticipated Disposition: TBD  Central Line:   n/a             Review of Systems:   Pertinent items are noted in HPI.         Vital Signs:    Last 24hrs VS reviewed since prior progress note. Most recent are:  Vitals:    05/23/25 1200   BP:    Pulse: 65   Resp:    Temp:    SpO2:          Intake/Output Summary (Last 24 hours) at 5/23/2025 1254  Last data filed at 
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,PER ENT  I   - status post laryngoscopy w/ laryngeal mass biopsy,path pending ad  tracheostomy w/ 6-0 Shiley cuffed tube  - trach  changed to cuffless #6 5/23, but accidentally out, unable to put size 6 back, now #4 CFS estela , will go home with 4 CFS , continue routine care and frequent suctioning   - Concern for tracheitis, cover for possible pseudomonas (thick gray sputum noted from trach, gram neg and pos noted on gram stain),on zosyn.Dc linezolid(sputum cx and mRSA screen negative for staph), will complete 7 days tx   - Sputum cx MODERATE Mixed Gram Negative Rods Abnormal    - MRSA screen negative  - pain control as needed, pt states chest tightness, on oxycodone, dilaudid prn  -- Speech therapy consulted, MBS failed:   - G tube  by IR  5/23/25  - Holding aspirin and plavix for G tube  on 5/22  - OK for VTE ppx   -consultation with [facility] otolaryngology for consideratin of laryngectomy.  Rad/Onc and Med Onc consulted. : will need port in the near future if chemo planned   -Palliative following,full code and wants to purse standard medical care   -CT chest for staging reviewed , will need outpt PET per oncologist  --start tube feeding 5/24 at 15cc/hr, will remaining this rate for 24 hours then increase by 10 mL q 24 hours to goal of 55 mL/hr   Watch for refeeding   Increase tube feeding today, decrease volume to water flush   Repleat phosphate     Acute delirium   -start seroquel HS  Prn haldol   Repeat chest xray          Hypertension  -change Iv metoprolol to tab via g tube     H/o stroke  - hold ASA and clopidogrel for now, will resume after peg on 5/22  Resume      SSS s/p PPM  - rate controlled with metoprolol     GERD   - home PPI    Hypokalemia: replace with iv     Diet  -NPO  -Start D5NS w/kcl  -Accucheks q6 hourly   Slowly advance tube feeding           Code status: Full  Prophylaxis: 
MODERATE Mixed Gram Negative Rods Abnormal.  MRSA screen negative  -consultation with [facility] otolaryngology for consideratin of laryngectomy.  Rad/Onc and Med Onc consulted. No  plan for radiation /chemo for now until he has his next total laryngectomy   - ENT following, initial plan was outpatient referral to [facility] otolaryngology , will need to schedule an oupatient appt with Dr. Pollack or Dr. Courtney, prefer in 2-3 weeks,  but may ok in   3-4 weeks.   -Trach got dislodged twice the last 24 hours.  ENT recommended transfer to [facility] laryngectomy, will initiate transfer    Diet:  -N.p.o.  -PEG placed on 5/23.  Continue tube feeding     Acute delirium, improved with nightly Seroquel    Hypertension and tachycardia  -Start low-dose metoprolol via PEG    H/o stroke  - Aspirin and Plavix which were held for PEG now resumed    SSS s/p PPM  - Intermittent tachycardia persisted, low-dose Lopressor started     GERD   - home PPI    Hypokalemia: replace with iv       Code status: Full  Prophylaxis: Lovenox  Care Plan discussed with: patient bedside,RN  , ENT , cm , brother called and updated   Anticipated Disposition: Initial plan was for long-term acute care but now with recurrent tracheostomy dislodgment, ENT recommending transfer to U, initiating transfer 5/29  Central Line:   n/a             Review of Systems:   Pertinent items are noted in HPI.         Vital Signs:    Last 24hrs VS reviewed since prior progress note. Most recent are:  Vitals:    05/29/25 0600   BP:    Pulse: (!) 123   Resp:    Temp:    SpO2:          Intake/Output Summary (Last 24 hours) at 5/29/2025 0706  Last data filed at 5/28/2025 1515  Gross per 24 hour   Intake 150 ml   Output --   Net 150 ml          Physical Examination:     I had a face to face encounter with this patient and independently examined them on 5/29/2025 as outlined below:          General :Cachectic, alert x 2, awake, more confused   HEENT: PEERL, EOMI,, Trach, large clear secretions 
Prophylaxis: Lovenox  Tubes: Tracheostomy  Lines: Peripheral IV  Drains: None  SUP: Protonix  Diet: Diet NPO   Bowel Reg: Miralax  Activity Level: PT/OT/SLP  ABCDEF Bundle/Checklist Completed: Yes  Multidisciplinary Rounds Completed:  Yes  Goals of Care Discussion/Palliative: Yes  Patient/Family Updated: Yes  Disposition: Transfer to non-ICU bed    SUBJECTIVE     No overnight events. Doing well this morning without complaint. Working with speech on AM rounds         OBJECTIVE   /70   Pulse 88   Temp 97.5 °F (36.4 °C) (Oral)   Resp 29   Ht 1.702 m (5' 7\")   Wt 58.9 kg (129 lb 13.6 oz)   SpO2 100%   BMI 20.34 kg/m²      Temp (24hrs), Av °F (36.7 °C), Min:97.5 °F (36.4 °C), Max:98.5 °F (36.9 °C)           Physical Exam  GEN: awake, alert, and oriented, well appearing, no acute distress  HEENT  -Head: Normocephalic, atraumatic  -Eyes: PERRL, EOMI. No discharge or redness  -Ears: External ears are normal  -Nose: Normal nares  -Mouth and throat: MMM. Normal gums, mucosa, palate.   NECK: Supple, trachea midline with trach in place, dressings clean dry and intact, no masses. No JVD   CV: Regular rate and rhythm, no m/r/g. 2+ radial pulses. Brisk cap refill  LUNGS: CTAB, no distress, equal chest rise, no w/r/c.  ABD: Soft, non-distended, no masses, non tender to palpation  SKIN: Warm, well perfused. No skin rashes or abnormal lesions.  EXT: No clubbing, cyanosis, or edema.  NEURO: Alert and oriented x3. Normal speech. Normal muscle strength and tone. No focal deficits. Cranial nerves intact    Intake/Output:     Intake/Output Summary (Last 24 hours) at 2025 0815  Last data filed at 2025 0500  Gross per 24 hour   Intake 750 ml   Output 255 ml   Net 495 ml       Labs and Data: Reviewed 25  Recent Labs     25  1241 25  0538   WBC 11.0 11.8* 9.6   RBC 3.18* 3.28* 3.24*   HGB 9.3* 9.6* 9.5*   HCT 30.0* 30.0* 30.1*   MCV 94.3 91.5 92.9   RDW 17.1* 16.5* 16.4*   * 
TUBE INSERTION PERC W CONTRAST 5/23/2025 Briseida Stokes MD [facility] RAD ANGIO IR    LARYNGOSCOPY N/A 5/17/2025    DIREACT LARYNGOSCOPY performed by Deepak Plascencia DO at [facility] MAIN OR    TRACHEOSTOMY N/A 5/17/2025    TRACHEOTOMY performed by Deepak Plascencia DO at [facility] MAIN OR     No Known Allergies  Social History     Socioeconomic History    Marital status: Single     Spouse name: None    Number of children: None    Years of education: None    Highest education level: None   Tobacco Use    Smoking status: Former     Current packs/day: 0.50     Types: Cigarettes    Smokeless tobacco: Never   Substance and Sexual Activity    Alcohol use: Yes    Drug use: Yes     Types: Marijuana (Weed)   Social History Narrative     with two daughters in good health.    10th-grade education from [employer]         ** Merged History Encounter **    [place] native. In [place] for 55 years.     Social Drivers of Health     Food Insecurity: No Food Insecurity (2/9/2025)    Hunger Vital Sign     Worried About Running Out of Food in the Last Year: Never true     Ran Out of Food in the Last Year: Never true   Transportation Needs: Unmet Transportation Needs (2/9/2025)    PRAPARE - Transportation     Lack of Transportation (Medical): Yes     Lack of Transportation (Non-Medical): Yes   Housing Stability: Low Risk  (2/9/2025)    Housing Stability Vital Sign     Unable to Pay for Housing in the Last Year: No     Number of Times Moved in the Last Year: 0     Homeless in the Last Year: No     Family History   Problem Relation Age of Onset    Alcohol Abuse Father     Kidney Disease Mother        Review of systems was obtained and pertinent findings reviewed above. Past medical history, social history, family history, medications, and allergies are located in the electronic medical record.    Physical Exam:   /60   Pulse 81   Temp 98.9 °F (37.2 °C) (Axillary)   Resp 22   Ht 1.702 m (5' 7.01\")   Wt 56.4 kg (124 lb 
getting good care prior to this hospital stay with his significant other - his son/patient's nephew had to go over and take food because patient was so weak ([facility] has been involved).     Andre reflects that he thinks his brother is \"tired\" and he has shared with him in the past he is \"not afraid to die.\" Andre also shares they have lost 3 siblings over the course of three years. Support provided.     We discuss having meeting - Andre can come in tomorrow/Tuesday at 10:00 a.m.     Thank you for including Palliative Medicine in the care of Roge Lobato         Teresa Jerez LCSW     
given.   TRANSFER - OUT REPORT:    Verbal report given to ICU on [facility]  being transferred    Report consisted of patient's Situation, Background, Assessment and   Recommendations(SBAR).     Information from the following report(s) Nurse Handoff Report, Adult Overview, Surgery Report, MAR, Recent Results, Cardiac Rhythm Vpaced, Neuro Assessment, and Event Log was reviewed with the receiving nurse.           Lines:   Peripheral IV 05/29/25 Left;Proximal Forearm (Active)       Peripheral IV 05/29/25 Posterior;Right Forearm (Active)        Opportunity for questions and clarification was provided.      Patient transported with:  Monitor and Registered Nurse, rapid                   
replacement due to sedative medication.     ICU DAILY CHECKLIST     Code Status:Full code  DVT Prophylaxis:Lovenox  T/L/D: PIV  SUP: Protonix  Diet: TF  Activity Level:bedrest  ABCDEF Bundle/Checklist Completed:Yes  Disposition: in ICU  Multidisciplinary Rounds Completed: Pending  Goals of Care Discussion/Palliative: NA  Patient/Family Updated: Yes, brother at bedside    OBJECTIVE  Vitals:    05/29/25 1837 05/29/25 1840 05/29/25 1912 05/29/25 1915   BP: (!) 82/45 (!) 78/44 116/69 135/62   Pulse:       Resp:       Temp:       TempSrc:       SpO2:       Weight:       Height:           EXAM:   GEN: awake alert unable to assess orientation   HEENT  -Head: NC/AT  -Eyes: PERRL, EOMI. No discharge or redness  -Nose: Normal nares.  -Mouth and throat: mouth dry, Trach   NECK: Supple No JVD, Trach in place, midline.   CV: RRR, no m/r/g.  LUNGS: Coarse. Blood tinged secretions   ABD: Soft, NT/ND,PEG in place  NEURO: awake, follows commands and moves all extremities.     CCM time:  32 mins.  This patient is critically ill with risk of clinical deterioration.  The documented time was time spent providing direct patient care, formulating care plan and discussing this plan with other providers, updating family and discussing goals of care with patient and/or family. It does not include time spent performing any procedures that are billed separtately.    RANGEL Diehl  [facility] Critical Care Medicine   5/29/2025   
to [facility] ED with enlarging R neck mass and SOB. Pt presented to [facility] ED 5/16. ENT was consulted and requested transfer to [facility].   [facility] was consulted for admission but due to lack of bed availability, ENT requested ED to ED transfer for emergent evaluation and surgery. Pt reported neck mass x 3months.  Pt admitted with Neck mass  -pt now s/p trach on 5/17 with bx of laryngeal mass  -consultation with [facility] otolaryngology for consideratin of laryngectomy. Rad/Onc and Med Onc consulted as well.   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  No BM since 5/25 - consider bowel regimen.  Awaiting P2P, but plan is still for LTAC.  Again, decannulated himself accidentally with coughing overnight.  No desaturation of dyspnea, new #4 trach inserted with bronch.  Spoke with pt at bedside.  Endorses hunger pains, but denies N/V or abdominal pain otherwise.    Reports he was having loose stools, but seem better.  Discussed his tube feeds, his body adjusting to liquid diet, stools should hopefully start to lessen/ bulk up, but definitely something to monitor.  Explained why we are going slow and my plans to adjust to bolus feeds.  At the current rate, he is receiving 3 cartons daily.  Goal is 5 cartons.  It is reasonable to consider adjusting to bolus now given his hunger pains to see if this lessens. Will speak with nursing.        5/28: f/u. Reviewed labs, Na, K+, Phos, Mg WNL, BG <150.  Spoke with RN, pt tolerating feeds well. Plan to advance to 35 ml/hr today with 150 ml H2O q 3 hrs.     5/27: follow-up.  Trach exchanged with 18 Citizen of [place] G-tube placed 5/23 in IR.  Trach exchanged with 6 cuffless on 5/23, but then later fell out during dressing change, unable to pass 6 cuffless again and instead replaced with 4 
today and demonstrated to RN  SLP evaluation appreciated  Suspect Tracheitis, cultures obtained, would benefit from antibiotics with Pseudomonal coverage  Will need Radiation oncology consultation  Will likely need a PEG tube to supplement PO intake specially during radiation treatments  DVT prophylaxis tomorrow morning  Anticoagulation Tuesday morning  Trach change and suture removal Thursday/Friday this week  Stoma suture removal on 5/27/2025    Deepak Plascencia DO  5/18/2025  11:17 AM  
1.702 m (5' 7\")   Wt 65.5 kg (144 lb 6.4 oz)   SpO2 100%   BMI 22.62 kg/m²   ECOG PS: 2  General: chronically ill appearing, fatigued   Eyes: anicteric sclerae  HENT: oropharynx clear  Neck: supple, trach present   Respiratory: normal respiratory effort on trach collar   GI: soft, nontender, nondistended, no masses  Skin: no rashes, no ecchymoses, no petechiae  Neuro: grossly intact    Results:     Lab Results   Component Value Date/Time    WBC 10.6 05/27/2025 05:57 AM    HGB 9.7 05/27/2025 05:57 AM    HCT 31.7 05/27/2025 05:57 AM     05/27/2025 05:57 AM    MCV 94.1 05/27/2025 05:57 AM     Lab Results   Component Value Date/Time     05/28/2025 01:14 PM    K 3.5 05/28/2025 01:14 PM     05/28/2025 01:14 PM    CO2 28 05/28/2025 01:14 PM    BUN 11 05/28/2025 01:14 PM    GFRAA >60 12/16/2021 04:47 PM     Lab Results   Component Value Date/Time    ALT <6 05/27/2025 05:57 AM    GLOB 4.6 05/27/2025 05:57 AM       Records from labs reviewed and summarized above.  Test results above have been reviewed.      Imaging:     CT Soft Tissue Neck W Contrast:  5/16/2025  1. More than doubling in size of the partially necrotic large right infrahyoid neck mass (5.1 x 3.7 x 4.9 cm) centered at the right aryepiglottic fold.  Involvement of the right thyroid cartilage. Extension to the left of midline. Narrowing of the airway. Patient is at risk for airway compromise.  2. No lymphadenopathy    CT Result (most recent):  CT CHEST W CONTRAST 05/22/2025    Narrative  INDICATION: T4a supraglottic malignancy.  screen for pulmonary disease    COMPARISON: CT September 2024    TECHNIQUE: Following intravenous administration of 100 cc Isovue-300, 5 mm axial  images were obtained through the chest. Coronal and sagittal reformats were  generated.  CT dose reduction was achieved through use of a standardized  protocol tailored for this examination and automatic exposure control for dose  modulation.    FINDINGS: There is a large 
Tube  Lines: Peripheral IV  Drains: None  SUP: Lansoprazole   Diet: Diet NPO Exceptions are: Ice Chips  ADULT TUBE FEEDING; Gastrostomy; 2.0 Calorie; Continuous; 45; Yes; 10; Q 24 hours; 55; 150; Q 3 hours   Bowel Reg: Miralax  Activity Level: PT/OT/SLP  ABCDEF Bundle/Checklist Completed: Yes  Multidisciplinary Rounds Completed:  Yes  Goals of Care Discussion/Palliative: Yes  Patient/Family Updated: Yes  Disposition: Stay in ICU    SUBJECTIVE     Seen on AM rounds resting comfortably in bed.  No new complaints. Some continued thin bloody secretions/sputum from trach but improving overall. Strong cough and managing secretions well. No new complaints.       OBJECTIVE   /76   Pulse 74   Temp 98.4 °F (36.9 °C) (Oral)   Resp 13   Ht 1.702 m (5' 7\")   Wt 57.6 kg (126 lb 14.4 oz)   SpO2 99%   BMI 19.88 kg/m²      Temp (24hrs), Av.8 °F (34.9 °C), Min:68 °F (20 °C), Max:99.2 °F (37.3 °C)           Physical Exam  GEN: awake, alert, and oriented, well appearing, no acute distress  HEENT  -Head: Normocephalic, atraumatic  -Eyes: PERRL, EOMI. No discharge or redness  -Ears: External ears are normal  -Nose: Normal nares  -Mouth and throat: MMM. Normal gums, mucosa, palate. Good dentition.  NECK: Supple, trachea midline, no masses. No JVD. Trach in place with thin blood-tinged secretions   CV: Regular rate and rhythm, no m/r/g. 2+ radial pulses. Brisk cap refill  LUNGS: CTAB, no distress, equal chest rise, no w/r/c.  ABD: Soft, non-distended, no masses, non tender to palpation  SKIN: Warm, well perfused. No skin rashes or abnormal lesions.  EXT: No clubbing, cyanosis, or edema.  NEURO: Alert and oriented x3. Normal speech. Normal muscle strength and tone. No focal deficits. Cranial nerves intact      Intake/Output:     Intake/Output Summary (Last 24 hours) at 2025 0710  Last data filed at 2025 0630  Gross per 24 hour   Intake 685 ml   Output 100 ml   Net 585 ml       Labs and Data: Reviewed 
and interpreted patient's lab and all other diagnostic data    Notes reviewed from all clinical/nonclinical/nursing services involved in patient's clinical care. Care coordination discussions were held with appropriate clinical/nonclinical/ nursing providers based on care coordination needs.     Labs:     Recent Labs     05/19/25 0025 05/20/25  0348   WBC 15.5* 11.0   HGB 9.2* 8.6*   HCT 29.3* 27.8*   * 374     Recent Labs     05/18/25  0538 05/19/25 0025 05/20/25  0348    146* 143   K 3.7 3.2* 3.3*   * 115* 110*   CO2 26 27 23   BUN 13 16 13   MG 1.8 1.4* 2.0   PHOS 5.0* 3.3 2.9     No results for input(s): \"ALT\", \"TP\", \"GLOB\", \"GGT\" in the last 72 hours.    Invalid input(s): \"SGOT\", \"GPT\", \"AP\", \"TBIL\", \"TBILI\", \"ALB\", \"AML\", \"AMYP\", \"LPSE\", \"HLPSE\"    No results for input(s): \"INR\", \"APTT\" in the last 72 hours.    Invalid input(s): \"PTP\"   Recent Labs     05/18/25  0538   TIBC 216*           Medications Reviewed:     Current Facility-Administered Medications   Medication Dose Route Frequency    iopamidol (ISOVUE-370) 76 % injection 100 mL  100 mL IntraVENous ONCE PRN    metoprolol (LOPRESSOR) injection 5 mg  5 mg IntraVENous Q6H    enoxaparin (LOVENOX) injection 40 mg  40 mg SubCUTAneous Daily    acetaminophen (TYLENOL) tablet 650 mg  650 mg Oral Q4H PRN    oxyCODONE (ROXICODONE) immediate release tablet 5 mg  5 mg Oral Q4H PRN    pantoprazole (PROTONIX) 40 mg in sodium chloride (PF) 0.9 % 10 mL injection  40 mg IntraVENous Daily    linezolid (ZYVOX) IVPB 600 mg  600 mg IntraVENous Q12H    piperacillin-tazobactam (ZOSYN) 3,375 mg in sodium chloride 0.9 % 50 mL IVPB (addEASE)  3,375 mg IntraVENous Q8H    ondansetron (ZOFRAN) injection 4 mg  4 mg IntraVENous Once    sodium chloride flush 0.9 % injection 5-40 mL  5-40 mL IntraVENous 2 times per day    sodium chloride flush 0.9 % injection 5-40 mL  5-40 mL IntraVENous PRN    0.9 % sodium chloride infusion   IntraVENous PRN    ondansetron (ZOFRAN) 
history, social history, family history, medications, and allergies are located in the electronic medical record.    Physical Exam:   /75   Pulse 75   Temp 98.4 °F (36.9 °C) (Oral)   Resp 19   Ht 1.702 m (5' 7\")   Wt 59.9 kg (132 lb 0.9 oz)   SpO2 100%   BMI 20.68 kg/m²   ECOG PS: 2  General: chronically ill appearing, fatigued   Eyes: anicteric sclerae  Neck: supple, trach present   Respiratory: normal respiratory effort on trach collar   Skin: no rashes, no ecchymoses, no petechiae  Neuro: in bed in ICU debility    Results:     Lab Results   Component Value Date/Time    WBC 19.5 06/09/2025 02:11 AM    HGB 8.7 06/09/2025 06:48 AM    HCT 27.1 06/09/2025 06:48 AM     06/09/2025 02:11 AM    MCV 91.2 06/09/2025 02:11 AM     Lab Results   Component Value Date/Time     06/09/2025 02:11 AM    K 4.6 06/09/2025 02:11 AM     06/09/2025 02:11 AM    CO2 22 06/09/2025 02:11 AM    BUN 65 06/09/2025 02:11 AM    GFRAA >60 12/16/2021 04:47 PM     Lab Results   Component Value Date/Time    ALT 6 05/29/2025 02:27 AM    GLOB 5.0 05/29/2025 02:27 AM       Records from labs reviewed and summarized above.  Test results above have been reviewed.      Imaging:     CT Soft Tissue Neck W Contrast:  5/16/2025  1. More than doubling in size of the partially necrotic large right infrahyoid neck mass (5.1 x 3.7 x 4.9 cm) centered at the right aryepiglottic fold.  Involvement of the right thyroid cartilage. Extension to the left of midline. Narrowing of the airway. Patient is at risk for airway compromise.  2. No lymphadenopathy    CT Result (most recent):  IMPRESSION:  1. Active hemorrhage seen in the large, infiltrative glottic neoplasm with  moderate opacification of the nasopharynx and oropharynx.  2. Right-sided pneumonia.       Assessment/PLAN:     #) T4N0M0 squamous cell carcinoma supraglottis   Partially necrotic large right infrahyoid neck mass  Seen by ENT here and referred to ENT at [facility].    CT chest with 
monitoring/evaluation.   Estimated Nutrition Needs:   Energy Requirements Based On: Kcal/kg  Weight Used for Energy Requirements: Admission (59 kg)  Energy (kcal/day): 5411-7382 (35-40 kcal/kg, hypermetabolic disease)  Weight Used for Protein Requirements: Admission  Protein (g/day):  (1.5-2 gm/kg - hypercatabolic)     Fluid (ml/day): 1 mL/kcal    Brittany Arriaza, RD      
of alcohol use, denies tobacco use.      05-29-25 ICU Readmission -  Patient became hypotensive after trach replacement, and sedation with ativan and dialudid. Transferred to ICU    [id number] eceived a call for ICU stepdown.  Patient in bed.  Status post tracheostomy.  Clear drainage coming from the tracheostomy.  Patient able to move both lower extremity upon instructions.  Continues to be weak and frail.  Left shoulder appears to be adducted and in a fixed position.  Has left upper chest pacemaker pocket.  Patient awake and trying to communicate.  Unable to complete sentences due to lack of talking device plus excessive tracheal secretions.      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  Biopsy positive for squamous cell carcinoma, poorly differentiated with keratinization, p16 negative.    Per Dr. 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 MODERATE Mixed Gram Negative Rods Abnormal.  MRSA screen negative  Trach got dislodged twice.  ENT recommended transfer to [facility] laryngectomy,   Palliative consult completed  Radiation oncology consult completed  Oncology consultation completed  Rad/Onc and Med Onc consulted. No  plan for radiation /chemo for now until he has his next total laryngectomy   -consultation with [facility] for consideratin of laryngectomy.     -ENT following, initial plan was outpatient referral to [facility] , will need to schedule an oupatient appt with Dr. Pollack or Dr. Courtney, prefer in 2-3 weeks,  but may ok in 3-4 weeks.   PEG placed on 5/23.  Continue tube feeding      Leukocytosis:  No fever tumor 
order to TwoCal HN @ 25 mL/hr (1100 kcal) now, but pending labs, may further adjust/ increase to TwoCal @ 30 mL/hr = 1320 kcal, or TwoCal @ 35 mL/hr = 1540 kcal.    Addendum @ 1304: phos did drop from yesterday, although WNL.    Discussed with MD, RN, and in IDRs.  Plan is likely for pt to go to [facility] now, so would be OK to go to this kind of facility not at goal/ work his way to goal there.  I would not recommend he go home without being at goal.  Given dispo, favor remaining conservative for now and will keep @ 25 mL/hr with orders to increase by 10 mL q 24 hours to goal of 55 mL/hr.    Flushes currently 100 mL h2o q 3 hours = 800 mL free h2O.    Na+ OK right now, but may need more.  Suspect the pain he felt with flush over weekend was site of insertion pain.  He has been tolerating TF/ flushes ever since.  Will increase to 150 mL H2o q 3 hours = 1200 mL free H2o flushes which is same total as bolus recommendations.          5/23: G-tube has yet to be placed.  Pt has now been here x 6 days, severely malnourished, without any nutrition.  He is a severe refeeding risk.  Once G-tube is placed, pt will still take several days to reach goal.  Low Mag and Phos today. Plan for cuffless trach today.    5/21: G-tube placement planned for tomorrow 5/22.  Pt started on D5 @ 100 mL/hr yesterday providing 120 gm dex, 408 kcal.   No Mg and Phos today.  K+ has normalized.  Will check Mag and Phos in AM.  He needs to be started on thiamine.    5 cartons of TwoCal HN = 2370 kcal, 100 gm pro, 830 mL free H2O. Follow each bolus by 240 mL H2O.  Total free water = 2030 mL/day.  Given refeeding risk, recommend starting with continuous feeds. TwoCal HN @ 15 mL/hr (660 kcal, ~11 kcal/kg), increase by 10 mL q 24 hours to goal of 55 mL/hr which is slightly over bolus goal volume.  However, when he start reaching goal/ when lytes stabilize, can adjust to bolus regimen.    Pt reports UBW of 152 lb, reported symptoms of throat pain/ 
IMAGING FINDINGS:   Objective data reviewed:  labs, images, records, medication use, vitals, and chart     FINAL COMMENTS   Thank you for allowing Palliative Medicine to participate in the care of Roge Lobato.    Only check if applicable and billing time based rather than MDM  [x] The total encounter time on this service date was __35__ minutes which was spent performing a face-to-face encounter and personally completing the provider-level activities documented in the note. This includes time spent prior to the visit and after the visit in direct care of the patient. This time does not include time spent in any separately reportable services.    Electronically signed by   Ena Watkins MD  Palliative Care Team  on 6/6/2025 at 1:06 PM      
Likely prerenal in setting of acute blood loss as well as recent contrast load for CTA + angio   - Nephrology following, not a candidate for renal replacement therapy   - Renal ultrasound unremarkable   - no labs, comfort measures     Hypertension  - Antihypertensives held due to hypotension  - BP appears controlled     Sick sinus syndrome  - Has PPM  - NSR HR 89     History of CVA  - On aspirin and Plavix, stopped due to bleeding  - now on comfort measures    Code status: DNR  Prophylaxis: SCDs  Care Plan discussed with: palliative care  Anticipated Disposition: Hospice - consulted  Central Line:   none     Review of Systems:     As mentioned above in the interval history.    Vital Signs:    Last 24hrs VS reviewed since prior progress note. Most recent are:  Vitals:    06/10/25 1200   BP: 119/76   Pulse: 84   Resp: 29   Temp: 98.5 °F (36.9 °C)   SpO2:        Intake/Output Summary (Last 24 hours) at 6/10/2025 1410  Last data filed at 6/10/2025 0800  Gross per 24 hour   Intake 2530.99 ml   Output 350 ml   Net 2180.99 ml      Physical Examination:     I had a face to face encounter with this patient and independently examined them on 6/10/2025 as outlined below:        Vital Signs: /69  HR 89 sats 98% trach collar  General : alert x 3, awake, no acute distress,   HEENT: EOMI, moist mucus membrane  Neck: tracheostomy in place, copious dark yellow brown thick secretions  Chest: no accessory muscle use, no increased WOB  CVS: SR on tele, HR 89  Abd: tubefeeding running. No abdominal pain  : urine clear and yellow (urinary diversion device)  Ext: Sore, MALLORY  Neuro/Psych: Flat affect, sensory grossly within normal limit, Strength equal in all extremities, generally weak  Skin: warm       Data Review:   Review and/or order of clinical lab test  Review and/or order of tests in the medicine section of CPT    I have independently reviewed and interpreted patient's lab and all other diagnostic data    Notes reviewed from 
Reviewed:     Current Facility-Administered Medications   Medication Dose Route Frequency    [START ON 5/19/2025] enoxaparin (LOVENOX) injection 40 mg  40 mg SubCUTAneous Daily    acetaminophen (TYLENOL) tablet 650 mg  650 mg Oral Q4H PRN    oxyCODONE (ROXICODONE) immediate release tablet 5 mg  5 mg Oral Q4H PRN    pantoprazole (PROTONIX) 40 mg in sodium chloride (PF) 0.9 % 10 mL injection  40 mg IntraVENous Daily    ondansetron (ZOFRAN) injection 4 mg  4 mg IntraVENous Once    sodium chloride flush 0.9 % injection 5-40 mL  5-40 mL IntraVENous 2 times per day    sodium chloride flush 0.9 % injection 5-40 mL  5-40 mL IntraVENous PRN    0.9 % sodium chloride infusion   IntraVENous PRN    ondansetron (ZOFRAN) injection 4 mg  4 mg IntraVENous Q6H PRN    naloxone (NARCAN) injection 0.4 mg  0.4 mg IntraVENous PRN    hydrALAZINE (APRESOLINE) injection 10 mg  10 mg IntraVENous Q6H PRN    HYDROmorphone HCl PF (DILAUDID) injection 0.5 mg  0.5 mg IntraVENous Q4H PRN    polyethylene glycol (GLYCOLAX) packet 17 g  17 g Oral Daily PRN    losartan (COZAAR) tablet 25 mg  25 mg Oral Daily    metoprolol tartrate (LOPRESSOR) tablet 25 mg  25 mg Oral BID    [Held by provider] spironolactone (ALDACTONE) tablet 25 mg  25 mg Oral Daily     ______________________________________________________________________  EXPECTED LENGTH OF STAY: 4  ACTUAL LENGTH OF STAY:          1                 Mina Guzmán MD    
displayed.     Recent Labs     05/29/25  0227   GLOB 5.0*     No results for input(s): \"INR\", \"APTT\" in the last 72 hours.    Invalid input(s): \"PTP\"   Invalid input(s): \"PHI\", \"PCO2I\", \"PO2I\", \"FIO2I\"  No results for input(s): \"CPK\", \"CKMB\" in the last 72 hours.    Invalid input(s): \"TROIQ\", \"BNPP\"    Ventilator Settings:  Mode Rate Tidal Volume Pressure FiO2 PEEP                    Peak airway pressure:      Minute ventilation:          MEDS: Reviewed    Chest X-Ray:  Reviewed      CRITICAL CARE CONSULTANT NOTE  I had a face to face encounter with the patient, reviewed and interpreted patient data including clinical events, labs, images, vital signs, I/O's, and examined patient.  I have discussed the case and the plan and management of the patient's care with the consulting services, the bedside nurses and the respiratory therapist.      NOTE OF PERSONAL INVOLVEMENT IN CARE   This patient has a high probability of imminent, clinically significant deterioration, which requires the highest level of preparedness to intervene urgently. I participated in the decision-making and personally managed or directed the management of the following life and organ supporting interventions that required my frequent assessment to treat or prevent imminent deterioration.    I personally spent 55 minutes of critical care time.  This is time spent at this critically ill patient's bedside actively involved in patient care as well as the coordination of care.  This does not include any procedural time which has been billed separately.    Sunni Liao   Staff Intensivist/Infectious Disease  [facility]  5/30/2025       
ALPRAZolam (XANAX) tablet 0.5 mg  0.5 mg Oral Once PRN    enoxaparin (LOVENOX) injection 40 mg  40 mg SubCUTAneous Daily    acetaminophen (TYLENOL) tablet 650 mg  650 mg Oral Q4H PRN    oxyCODONE (ROXICODONE) immediate release tablet 5 mg  5 mg Oral Q4H PRN    pantoprazole (PROTONIX) 40 mg in sodium chloride (PF) 0.9 % 10 mL injection  40 mg IntraVENous Daily    ondansetron (ZOFRAN) injection 4 mg  4 mg IntraVENous Once    sodium chloride flush 0.9 % injection 5-40 mL  5-40 mL IntraVENous 2 times per day    sodium chloride flush 0.9 % injection 5-40 mL  5-40 mL IntraVENous PRN    0.9 % sodium chloride infusion   IntraVENous PRN    ondansetron (ZOFRAN) injection 4 mg  4 mg IntraVENous Q6H PRN    naloxone (NARCAN) injection 0.4 mg  0.4 mg IntraVENous PRN    hydrALAZINE (APRESOLINE) injection 10 mg  10 mg IntraVENous Q6H PRN    HYDROmorphone HCl PF (DILAUDID) injection 0.5 mg  0.5 mg IntraVENous Q4H PRN    polyethylene glycol (GLYCOLAX) packet 17 g  17 g Oral Daily PRN     ______________________________________________________________________  EXPECTED LENGTH OF STAY: 11  ACTUAL LENGTH OF STAY:          10                 Yi Bianchi MD    
Oral Q4H PRN    oxyCODONE (ROXICODONE) immediate release tablet 5 mg  5 mg Oral Q4H PRN    pantoprazole (PROTONIX) 40 mg in sodium chloride (PF) 0.9 % 10 mL injection  40 mg IntraVENous Daily    ondansetron (ZOFRAN) injection 4 mg  4 mg IntraVENous Once    sodium chloride flush 0.9 % injection 5-40 mL  5-40 mL IntraVENous 2 times per day    sodium chloride flush 0.9 % injection 5-40 mL  5-40 mL IntraVENous PRN    0.9 % sodium chloride infusion   IntraVENous PRN    ondansetron (ZOFRAN) injection 4 mg  4 mg IntraVENous Q6H PRN    naloxone (NARCAN) injection 0.4 mg  0.4 mg IntraVENous PRN    hydrALAZINE (APRESOLINE) injection 10 mg  10 mg IntraVENous Q6H PRN    HYDROmorphone HCl PF (DILAUDID) injection 0.5 mg  0.5 mg IntraVENous Q4H PRN    polyethylene glycol (GLYCOLAX) packet 17 g  17 g Oral Daily PRN     ______________________________________________________________________  EXPECTED LENGTH OF STAY: 9  ACTUAL LENGTH OF STAY:          9                 Yi Bianchi MD    
This time does not include time spent in any separately reportable services.    Electronically signed by   Ena Watkins MD  Palliative Care Team  on 6/5/2025 at 4:07 PM      
anxious    ESAS Depression:          LAB AND IMAGING FINDINGS:   Objective data reviewed:  labs, images, records, medication use, vitals, and chart     FINAL COMMENTS   Thank you for allowing Palliative Medicine to participate in the care of Roge Lobato.    Only check if applicable and billing time based rather than MDM  [x] The total encounter time on this service date was __55__ minutes which was spent performing a face-to-face encounter and personally completing the provider-level activities documented in the note. This includes time spent prior to the visit and after the visit in direct care of the patient. This time does not include time spent in any separately reportable services.    Electronically signed by   Ena Watkins MD  Palliative Care Team  on 6/9/2025 at 10:05 AM      
dilatation in the neck of the pancreas with probable  sidebranch ectasia in the tail of pancreas. Valuation is limited. Findings may  be secondary to chronic pancreatitis. However given the finding of the dilated  common bile duct consider GI consultation      Electronically signed by Satish Dash    Most Recent Echo  No results found for this or any previous visit.      NOTE OF PERSONAL INVOLVEMENT IN CARE   I had a face to face encounter with the patient, reviewed and interpreted patient data including clinical events, labs, images, vital signs, I/O's, and examined patient.  I have discussed the case and the plan and management of the patient's care with the consulting services, the bedside nurses and the respiratory therapist.      CRITICAL CARE DOCUMENTATION  This patient has a high probability of imminent, clinically significant deterioration, which requires the highest level of preparedness to intervene urgently. I participated in the decision-making and personally managed or directed the management of the following life and organ supporting interventions that required my frequent assessment to treat or prevent imminent deterioration.    I personally spent 65 minutes of critical care time.  This is time spent at this critically ill patient's bedside actively involved in patient care as well as the coordination of care.  This does not include any procedural time which has been billed separately.    Harris Ruiz PA-C   Critical Care Medicine  [facility]  06/07/25 1:27 PM       
tablet 650 mg  650 mg Oral Q4H PRN    sodium chloride flush 0.9 % injection 5-40 mL  5-40 mL IntraVENous 2 times per day    sodium chloride flush 0.9 % injection 5-40 mL  5-40 mL IntraVENous PRN    0.9 % sodium chloride infusion   IntraVENous PRN    ondansetron (ZOFRAN) injection 4 mg  4 mg IntraVENous Q6H PRN    naloxone (NARCAN) injection 0.4 mg  0.4 mg IntraVENous PRN    polyethylene glycol (GLYCOLAX) packet 17 g  17 g Oral Daily PRN     ______________________________________________________________________  EXPECTED LENGTH OF STAY: 19  ACTUAL LENGTH OF STAY:          19                 Walter Yap MD   
61 26 - 388 NG/ML Final       B Vitamins  Folate   Date Value Ref Range Status   05/18/2025 14.3 5.0 - 21.0 ng/mL Final     Vitamin B-12   Date Value Ref Range Status   05/18/2025 1700 (H) 193 - 986 pg/mL Final         Farzana Prather RD  Available via [email or web address]    
Per G Tube Daily    magnesium oxide (MAG-OX) tablet 400 mg  400 mg Per G Tube Daily    aspirin chewable tablet 81 mg  81 mg Per G Tube Daily    sodium chloride flush 0.9 % injection 5-40 mL  5-40 mL IntraVENous 2 times per day    sodium chloride flush 0.9 % injection 5-40 mL  5-40 mL IntraVENous PRN    0.9 % sodium chloride infusion   IntraVENous PRN    lidocaine 2 % injection 100 mg  100 mg IntraDERmal Once    OLANZapine (ZyPREXA) 5 mg in sterile water 1 mL injection  5 mg IntraMUSCular PRN    QUEtiapine (SEROQUEL) tablet 25 mg  25 mg Per G Tube Nightly    thiamine (B-1) injection 100 mg  100 mg IntraVENous Daily    glucose chewable tablet 16 g  4 tablet Oral PRN    dextrose bolus 10% 125 mL  125 mL IntraVENous PRN    Or    dextrose bolus 10% 250 mL  250 mL IntraVENous PRN    glucagon injection 1 mg  1 mg SubCUTAneous PRN    dextrose 10 % infusion   IntraVENous Continuous PRN    enoxaparin (LOVENOX) injection 40 mg  40 mg SubCUTAneous Daily    acetaminophen (TYLENOL) tablet 650 mg  650 mg Oral Q4H PRN    ondansetron (ZOFRAN) injection 4 mg  4 mg IntraVENous Once    sodium chloride flush 0.9 % injection 5-40 mL  5-40 mL IntraVENous 2 times per day    sodium chloride flush 0.9 % injection 5-40 mL  5-40 mL IntraVENous PRN    0.9 % sodium chloride infusion   IntraVENous PRN    ondansetron (ZOFRAN) injection 4 mg  4 mg IntraVENous Q6H PRN    naloxone (NARCAN) injection 0.4 mg  0.4 mg IntraVENous PRN    polyethylene glycol (GLYCOLAX) packet 17 g  17 g Oral Daily PRN     ______________________________________________________________________  EXPECTED LENGTH OF STAY: 13  ACTUAL LENGTH OF STAY:          14                 Ros Rod MD    
kg)  Energy (kcal/day): 2970-8406 (35-40 kcal/kg, hypermetabolic disease)  Weight Used for Protein Requirements: Admission  Protein (g/day):  (1.5-2 gm/kg - hypercatabolic)     Fluid (ml/day): 1 mL/kcal    Nutrition Related Findings:   Edema: Right lower extremity, Left lower extremity            RLE Edema: Trace  LLE Edema: Trace      Last BM: 06/06/25      Wounds:   Wound Type: None      Current Nutrition Therapies:  Diet: NPO  Nutrition Support: TF via G-tube as above      Anthropometric Measures:  Height: 170.2 cm (5' 7\")  Ideal Body Weight (IBW): 148 lbs (67 kg)    Admission Body Weight: 59 kg (130 lb)  Current Body Weight: 57.6 kg (126 lb 14.4 oz), 85.7 % IBW. Weight Source: Bed scale  Current BMI (kg/m2): 19.9  Usual Body Weight: 68.9 kg (152 lb)  % Weight Change (Calculated): -16.9  Weight Adjustment For: No Adjustment                 BMI Categories: Underweight (BMI less than 22) age over 65    Wt Readings from Last 10 Encounters:   06/06/25 57.6 kg (126 lb 14.4 oz)   05/16/25 63 kg (139 lb)   02/08/25 63.4 kg (139 lb 12.4 oz)   11/09/24 67.6 kg (149 lb)   10/16/24 64.9 kg (143 lb)   09/22/24 64.7 kg (142 lb 10.2 oz)       Weight History Weight - Scale Weight - Scale Weight Method   5/19/2025 130 lbs 1 oz 59 kg Bed scale   5/18/2025 129 lbs 14 oz 58.9 kg Bed scale   5/17/2025 143 lbs 5 oz 65 kg Bed scale   5/16/2025 139 lbs 63.1 kg -   2/8/2025 139 lbs 12 oz 63.4 kg -   11/9/2024 149 lbs 67.6 kg Stated   10/16/2024 143 lbs 64.9 kg Stated   9/22/2024 142 lbs 10 oz 64.7 kg Actual;Standing scale   9/18/2024 145 lbs 8 oz 66 kg Bed scale   9/17/2024 147 lbs 11 oz 67 kg Bed scale   9/16/2024 148 lbs 2 oz 67.2 kg Bed scale   9/14/2024 155 lbs 14 oz 70.7 kg Bed scale   12/16/2021 148 lbs 67.1 kg -   9/30/2021 150 lbs 68 kg -   9/29/2021 160 lbs 72.6 kg -   9/28/2021 160 lbs 2 oz 72.6 kg -   8/20/2021 145 lbs 8 oz 66 kg -       Nutrition Diagnosis:   Severe malnutrition related to inadequate protein-energy 
g Oral Daily PRN     ______________________________________________________________________  EXPECTED LENGTH OF STAY: 19  ACTUAL LENGTH OF STAY:          18                 Madyson Baires MD    
% Final       Ferritin   Date Value Ref Range Status   05/18/2025 61 26 - 388 NG/ML Final       B Vitamins  Folate   Date Value Ref Range Status   05/18/2025 14.3 5.0 - 21.0 ng/mL Final     Vitamin B-12   Date Value Ref Range Status   05/18/2025 1700 (H) 193 - 986 pg/mL Final         Mely Ramachandran RD  Available via [employer]    
[employer]    
considered normal, 150-199 mg/dL  borderline high,  200-499 mg/dL high and  greater than or equal to 500 mg/dL very high.       Recent Labs     06/09/25  1828 06/09/25  2030 06/09/25  2248 06/10/25  0648 06/10/25  1059   POCGLU 134* 76 129* 138* 145*       Lab Results   Component Value Date/Time    LABA1C 4.6 09/29/2021 01:34 AM    EAG 85 09/29/2021 01:34 AM         No results found for: \"MMA\"      Iron   Date Value Ref Range Status   05/18/2025 21 (L) 35 - 150 ug/dL Final     TIBC   Date Value Ref Range Status   05/18/2025 216 (L) 250 - 450 ug/dL Final     Iron % Saturation   Date Value Ref Range Status   05/18/2025 10 (L) 20 - 50 % Final       Ferritin   Date Value Ref Range Status   05/18/2025 61 26 - 388 NG/ML Final       B Vitamins  Folate   Date Value Ref Range Status   05/18/2025 14.3 5.0 - 21.0 ng/mL Final     Vitamin B-12   Date Value Ref Range Status   05/18/2025 1700 (H) 193 - 986 pg/mL Final         Dagmar Delaney RD CNSC  Available via "[employer]

## 2025-06-11 NOTE — CARE COORDINATION
Brief Note:  4:20pm  CM received call from Altagracia De La Cruzliaison with [facility] [phone].      [employer] ins offering a pre-determination Peer-2-Peer. Request due by 25 at 10am.      Please call [facility] Melba GANN at [phone] ext [phone] to request P2P with  Providers first and last name, cell phone number and availability for Medical Director to call back  Please provide:    Patient's Name:Roge Lobato  : 1950  ID# [id number]    Above information can be left on [facility]'s voicemail.    CM informed Dr Bianchi of above to pass on to the Next hospitalist for tomorrow (Thursday).     Kim Rose, LEIW, CRM  [phone]  
Brief Note:  Dispo plan has changed. Patient's tracheostomy became dislodged today.   Plan is now transfer to [facility] ([facility].    Dr Bryan initiated transfer.  Transfer documents received.    GENNARO Tavares, CRM  [phone]  
CM called the [facility] After Hours line ([phone]) earlier today. CM was on hold for a long time. CM was given the option to leave this CM's phone number with the After Hours Line and they will save this CM's place and call this CM. CM will make an [facility] referral when they call. GENNARO Cavazos,ACM-    5:40pm Update- CM called the [facility] Hotline ([phone]) and spoke with Sonja Jara to make a report on behalf of this pt. The report #[id number]. This will be sent to [facility] ([phone]).  
CM called the [facility] [place] at [phone] to inquire about the status of pt's transfer to [facility]. The Transfer Center representative informed CM that the transfer paperwork has not been received by U.    CM located the transfer paperwork in pt's hard chart. CM faxed the paperwork to [facility] at [phone]. Fax failed (CM attempted multiple times).    12:00 PM: Nursing contacted [facility] to inform them that the fax is not going through to [facility]. The [facility] requested that the paperwork be faxed to them at [phone]. They will ensure that it is sent to U. Nursing faxed the paperwork to the [facility] as requested.    CM called the Critical access hospital Center at [phone] to confirm whether they received the paperwork. The representative informed CM that they have not received it. The Transfer Center requested that CM scan the paperwork to their email at [email or web address]. CM scanned paperwork as requested.    Vane Gonzalez LMSW,   [phone]  
CM called the [facility] at [phone] to inquire about the status of pt's transfer to [facility]. The Transfer Center representative informed CM that paperwork has been received by [facility].  Per representative, this case is on hold until Monday, 6/2 as [facility] provider will need to connect with [facility] provider to discuss.    Tala Louie RN/CRM  [phone]    
Care Management Initial Assessment       RUR:19%  Readmission? No  1st IM letter given? No-To be given by Pt Registration  1st [facility] letter given: No      05/18/25 1220   Service Assessment   Patient Orientation Alert and Oriented;Other (see comment)  (Assessment completed with Andre Lobato, brother and POA)   Cognition Alert   History Provided By Child/Family;Medical Record   Primary Caregiver Other (Comment)  (Pt's caregiver was Yissel Bentley prior to admission.)   Support Systems Family Members   PCP Verified by CM No   Prior Functional Level Assistance with the following:;Bathing;Dressing;Toileting;Mobility   Current Functional Level Other (see comment)  (TBD)   Can patient return to prior living arrangement No   Ability to make needs known: Fair   Family able to assist with home care needs: Yes   Would you like for me to discuss the discharge plan with any other family members/significant others, and if so, who? No     CM spoke with pt's brother, Andre Lobato ([phone]) and introduced him to the role of CM and transition of care. This pt was living with a long term girlfriend, Yissel Bentley prior to admission. However, according to pt's brother, he will be taking this pt to his home when the pt is discharged. He stated that the conditions of the pt/girlfriend's home were deplorable. He said that the home is infested with bugs and that she leaves the pt alone for a week at a time. Per the brother, this pt cannot walk. He said that he and one of his sons found the pt on the couch of the home with bugs on his clothing and on the food.     This pt now has a trach and may need a PEG tube at d/c. Also, he will likely be evaluated by therapy to establish his level of function. He will likely need rehab before going to the brother's home.CM will follow. GENNARO Cavazos,ACM-SW    Brother-Andre Lobato  Address- [address] [address]  Phone- [phone]  
NERY Note     Attending notified CM that patient was not accepted for transfer. Attending asked that CM send referral to LTAC. CM sent referral to [facility] via [facility].    CM to continue to follow for NERY needs.    Sakshi Reese BSN, RN, ONC, CMSRN  Nurse Care Manager, [phone]  
Transition Of Care:    Dispo pending. [facility] transfer for needed surgery declined. Attending MD requested CM reach out to transfer center ([phone]) to request transfer to [facility] or [facility]. Chanel with transfer center initiating request. MD provided attending ENT surgeon Dr. Boubacar Lynch contact info for [facility] and [facility] to contact about patient.     RUR: 18%    Prior Level of Functioning: assistance needed in adls.Patient lived with friend/caregiver Yissel Bentley.      Disposition: [facility] declined transfer. Pending acceptance at [facility] or [facility] for needed surgery.      POWER: Wed 5/28/25 pending ins auth and final acceptance       Follow up appointments: [facility] appt at [facility] (4 weeks out)  DME needed: TBD by accepting facility  Transportation at discharge: likely  BLS (trach and peg tube)  IM/IMM Medicare/ letter given: 5/28/25     Caregiver Contact: brother (Andre Lobato) [phone]    Discharge Caregiver contacted prior to discharge? yes  Care Conference needed? no    Barriers to discharge: accepting hospital for transfer    Adelia Sales RN/CRM  [phone]     
Transition Of Care:    Dispo pending. [facility] transfer for needed surgery declined. Per attending MD, [facility] declined patient for transfer and attending requesting CM initiate appeal process with [facility]. [employer] insurance had denied this plan prior. CM called Jamee zheng ([phone]) with [facility] and she is initiating the appeal today 5/4/25. Transport TBD.      RUR: 18%     Prior Level of Functioning: assistance needed in adls.Patient lived with friend/caregiver Yissel Bentley.      Disposition: [facility] declined transfer. [facility] for needed surgery.      POWER: Wed 5/28/25 pending ins auth and final acceptance       Follow up appointments: Per attending's recommendations    Transportation at discharge: likely  BLS (trach and peg tube)  IM/IMM Medicare/ letter given: 5/28/25     Caregiver Contact: brother (Andre Lobaot) [phone]     Discharge Caregiver contacted prior to discharge? yes  Care Conference needed? no     Barriers to discharge: accepting hospital for transfer. Insurance appeal for LTAC.      Adelia Sales RN/CRM  [phone]  
Transition Of Care:    Update 2:03pm: CM received email form from [facility] and provided to attending. Attending signed and CM emailed signed form back to Jamee bassett at [facility] to initiate expedited appeal.    11:15pm: CM did not receive form for expedited appeal from LTAC liaison until late evening 5/5/25 after CM had gone for the day. The attending MD is new to patient today and a new form with this MD's name being sent to CM for appeal to be initiated. CM provided attending's phone number to [facility] liaison.     CM received call from [facility] nurse navigator: Delphine Henriquez ([phone]) and Jessica Swain ([phone]). They request CM to call them once the patient is discharged to keep track of patient and the outpatient plan with [facility]. CM to notify them when patient discharges.    5/5/25: Dispo pending. [facility] transfer for needed surgery declined. Per attending MD, [facility] and [facility] have declined patient for transfer and attending requesting CM initiate appeal process with [facility]. [employer] had denied this plan prior. CM called Jamee zheng ([phone]) with [facility] and she is initiating the appeal today 5/4/25. Transport TBD.      RUR: 18%     Prior Level of Functioning: assistance needed in adls.Patient lived with friend/caregiver Yissel Bentley.      Disposition: [facility] declined transfer. Pending acceptance at [facility] or [facility] for needed surgery.      POWER: Wed 5/28/25 pending ins auth and final acceptance       Follow up appointments: Per attending's recommendations     Transportation at discharge: likely  BLS (trach and peg tube)  IM/IMM Medicare/ letter given: 5/28/25     Caregiver Contact: brother (Andre Lobato) [phone]     Discharge Caregiver contacted prior to discharge? yes  Care Conference needed? no     Barriers to discharge: accepting hospital for transfer. Insurance appeal for LTAC.      Adelia Sales RN/CRM  [phone]  
Transition of Care Plan:    CM spoke with [facility]. Patient to be admitted to [facility] this morning.     Geoff Graham, MPH  Care Manager l [facility]  Available via [employer]  
Transition of Care Plan:    RUR: 18%  Prior Level of Functioning:   Disposition:   Options:   LTAC or SNF if no cancer treatment plan (chemo/rad)  Home with caregiver (Yissel Bentley) and home health SN/PT/OT and possible cancer treatments. According to Yissel, patient would be left at home alone during the day with \"people\" coming in to the home to check on him. Brother to drive to outpatient treatments.  This is patient's choice.  Home with brother (Andre) with HH SN/PT/OT and possible cancer treatments    CM called the patient's brother (Andre) and left voicemail message to have call returned.     1:53pm  CM received returned call from Andre.  CM provided updates. Andre to visit patient this evening to encourage patient to come to his home where he will have 24/7 care and supervision.     PWOER: Monday 5/26/25  Follow up appointments:   DME needed: If #1 facility to determine dme needs    Transportation at discharge: TBD    IM/IMM Medicare/ letter given:   1st letter-no  Caregiver Contact: brother (Andre Lobato [phone])  Discharge Caregiver contacted prior to discharge? yes  Care Conference needed?   Barriers to discharge: medical stability     UPDATE:  [facility] (Altagracia [phone]) has approved patient for admission \"pending plans for mass\" as facility does not provide oncology treatment.    GENNARO Tavares, CRM  [phone]  
Transition of Care Plan:    RUR: 18%  Prior Level of Functioning: assistance needed in adls.Patient lived with friend/caregiver Yissel Bentley.     Disposition: plan admit to [facility] (pending ins auth).  If approved patient would be hospitalized at [facility] for  approx 3 weeks. If patient needs follow-up appts at [facility] in next 3 weeks, he will not be accepted for admission at this LTAC ([facility]).    POWER: Wed 5/28/25 pending ins auth and final acceptance    Accepting facility:     Date authorization started with reference number: auth started 5/27/25 (by Altagracia [phone])  Date authorization received and expires:     Follow up appointments: [facility] at [facility] ([facility] out)  DME needed: TBD by accepting facility  Transportation at discharge: likely  BLS (trach and peg tube)  IM/IMM Medicare/ letter given:     Caregiver Contact: brother (Andre Lobato)  Discharge Caregiver contacted prior to discharge? yes  Care Conference needed? no  Barriers to discharge:  ins auth     LEI TavaresW, CRM  [phone]  
Transition of Care Plan:    RUR: 22% - high  Prior Level of Functioning: independent  Disposition: hospice - GIP anticipated for tomorrow; referral sent to [facility]  Follow up appointments: hospice  DME needed: defer to hospice  Transportation at discharge: BLS if not GIP  Caregiver Contact: brother - Andre Lobato - [phone]  Discharge Caregiver contacted prior to discharge? yes  Care Conference needed? no  Barriers to discharge: medical; hospice    CM received hospice consult from Palliative Medicine team. Called patient's brother, Andre, and offered choice for hospice. Brother agreeable to [facility] for evaluation of hospice services.    Referral sent to [facility] via Careport.    Geoff Graham, MPH  Care Manager l [facility]  Available via [employer]  
likely BLS  IM/IMM Medicare/ letter given:   1st letter not given  Is patient a [place] and connected with [facility]?    If yes, was  transfer form completed and [facility] notified?   Caregiver Contact: brother ([phone])    Discharge Caregiver contacted prior to discharge? yes  Care Conference needed? no    Barriers to discharge:   1. if snf to home ins auth required   2.  If hospital and then d/c to home family will need to be trained to administer peg feeds and trained for trach care.     Kim Rose, LEIW, CRM  [phone]

## 2025-06-12 PROCEDURE — 6360000002 HC RX W HCPCS: Performed by: HOSPITALIST

## 2025-06-12 PROCEDURE — 2500000003 HC RX 250 WO HCPCS: Performed by: HOSPITALIST

## 2025-06-12 PROCEDURE — 6370000000 HC RX 637 (ALT 250 FOR IP): Performed by: HOSPITALIST

## 2025-06-12 PROCEDURE — 6560000002 HC HOSPICE GENERAL INPATIENT

## 2025-06-12 RX ADMIN — DIAZEPAM 5 MG: 5 INJECTION, SOLUTION INTRAMUSCULAR; INTRAVENOUS at 08:38

## 2025-06-12 RX ADMIN — DIAZEPAM 5 MG: 5 INJECTION, SOLUTION INTRAMUSCULAR; INTRAVENOUS at 22:22

## 2025-06-12 RX ADMIN — MORPHINE SULFATE 2 MG: 2 INJECTION, SOLUTION INTRAMUSCULAR; INTRAVENOUS at 22:22

## 2025-06-12 RX ADMIN — DIAZEPAM 5 MG: 5 INJECTION, SOLUTION INTRAMUSCULAR; INTRAVENOUS at 05:53

## 2025-06-12 RX ADMIN — GLYCOPYRROLATE 0.2 MG: 0.2 INJECTION INTRAMUSCULAR; INTRAVENOUS at 18:25

## 2025-06-12 RX ADMIN — MORPHINE SULFATE 2 MG: 2 INJECTION, SOLUTION INTRAMUSCULAR; INTRAVENOUS at 15:19

## 2025-06-12 RX ADMIN — MORPHINE SULFATE 4 MG: 4 INJECTION INTRAVENOUS at 16:09

## 2025-06-12 RX ADMIN — MORPHINE SULFATE 2 MG: 2 INJECTION, SOLUTION INTRAMUSCULAR; INTRAVENOUS at 18:25

## 2025-06-12 RX ADMIN — GLYCOPYRROLATE 0.2 MG: 0.2 INJECTION INTRAMUSCULAR; INTRAVENOUS at 15:20

## 2025-06-12 RX ADMIN — GLYCOPYRROLATE 0.2 MG: 0.2 INJECTION INTRAMUSCULAR; INTRAVENOUS at 20:51

## 2025-06-12 RX ADMIN — DIAZEPAM 5 MG: 5 INJECTION, SOLUTION INTRAMUSCULAR; INTRAVENOUS at 15:19

## 2025-06-12 RX ADMIN — MORPHINE SULFATE 2 MG: 2 INJECTION, SOLUTION INTRAMUSCULAR; INTRAVENOUS at 01:26

## 2025-06-12 RX ADMIN — SODIUM CHLORIDE, PRESERVATIVE FREE 10 ML: 5 INJECTION INTRAVENOUS at 08:39

## 2025-06-12 RX ADMIN — SODIUM CHLORIDE, PRESERVATIVE FREE 10 ML: 5 INJECTION INTRAVENOUS at 20:51

## 2025-06-12 RX ADMIN — GLYCOPYRROLATE 0.2 MG: 0.2 INJECTION INTRAMUSCULAR; INTRAVENOUS at 08:38

## 2025-06-12 RX ADMIN — DIAZEPAM 5 MG: 5 INJECTION, SOLUTION INTRAMUSCULAR; INTRAVENOUS at 01:59

## 2025-06-12 RX ADMIN — MORPHINE SULFATE 2 MG: 2 INJECTION, SOLUTION INTRAMUSCULAR; INTRAVENOUS at 07:18

## 2025-06-12 RX ADMIN — MORPHINE SULFATE 2 MG: 2 INJECTION, SOLUTION INTRAMUSCULAR; INTRAVENOUS at 04:14

## 2025-06-12 RX ADMIN — MORPHINE SULFATE 2 MG: 2 INJECTION, SOLUTION INTRAMUSCULAR; INTRAVENOUS at 10:28

## 2025-06-12 RX ADMIN — DIAZEPAM 5 MG: 5 INJECTION, SOLUTION INTRAMUSCULAR; INTRAVENOUS at 18:25

## 2025-06-12 RX ADMIN — ACETAMINOPHEN 650 MG: 650 SUSPENSION ORAL at 01:26

## 2025-06-12 ASSESSMENT — PAIN DESCRIPTION - LOCATION: LOCATION: GENERALIZED

## 2025-06-12 ASSESSMENT — PAIN SCALES - GENERAL
PAINLEVEL_OUTOF10: 3
PAINLEVEL_OUTOF10: 2

## 2025-06-12 NOTE — PLAN OF CARE
2000: Bedside shift change report given to Torey (oncoming nurse) by Irene (offgoing nurse). Report included the following information Nurse Handoff Report, MAR, Recent Results, and Cardiac Rhythm ST c 1 AV and L BBB .     Scheduled IV morphine given. Trach care for secretions.  2030: RT transition patient to RA. Continue to monitor. Patient SpO2 sats maintain >95%.  2100: New IV placed R forearm. L AC IV appears positional. Family visits at bedside.  2135: Patient HR~ 130, scheduled IV diazepam given. Patient moaning. prn IV morphine given.  2145: Patient telemetry shows intermittent irregularity, Atrial Fibrillation mixed with sinus tachycardia. HR approx 110-115.  2230: Trach suction for secretions. Patient tolerates fair. O2 sats stable.  2300: scheduled IV morphine to maintain patient comfort. Patient continues ST c 1 AV, BBB with intermittent PVC, irregular HR.  0130: Patient feels warm to touch during repositioning. Axillary temp 100.5 F. Trach suction, inner cannula changed. Liquid tylenol given via NG tube. Scheduled morphine IV.  0400: Axillary temp 99.2. Patient resting when undisturbed.

## 2025-06-13 PROCEDURE — 6560000002 HC HOSPICE GENERAL INPATIENT

## 2025-06-13 PROCEDURE — 94760 N-INVAS EAR/PLS OXIMETRY 1: CPT

## 2025-06-13 PROCEDURE — 6360000002 HC RX W HCPCS: Performed by: HOSPITALIST

## 2025-06-13 PROCEDURE — 2500000003 HC RX 250 WO HCPCS: Performed by: HOSPITALIST

## 2025-06-13 RX ADMIN — MORPHINE SULFATE 2 MG: 2 INJECTION, SOLUTION INTRAMUSCULAR; INTRAVENOUS at 16:35

## 2025-06-13 RX ADMIN — DIAZEPAM 5 MG: 5 INJECTION, SOLUTION INTRAMUSCULAR; INTRAVENOUS at 10:12

## 2025-06-13 RX ADMIN — MORPHINE SULFATE 2 MG: 2 INJECTION, SOLUTION INTRAMUSCULAR; INTRAVENOUS at 19:04

## 2025-06-13 RX ADMIN — DIAZEPAM 5 MG: 5 INJECTION, SOLUTION INTRAMUSCULAR; INTRAVENOUS at 19:05

## 2025-06-13 RX ADMIN — SODIUM CHLORIDE, PRESERVATIVE FREE 10 ML: 5 INJECTION INTRAVENOUS at 09:06

## 2025-06-13 RX ADMIN — DIAZEPAM 5 MG: 5 INJECTION, SOLUTION INTRAMUSCULAR; INTRAVENOUS at 06:36

## 2025-06-13 RX ADMIN — SODIUM CHLORIDE, PRESERVATIVE FREE 10 ML: 5 INJECTION INTRAVENOUS at 22:08

## 2025-06-13 RX ADMIN — MORPHINE SULFATE 2 MG: 2 INJECTION, SOLUTION INTRAMUSCULAR; INTRAVENOUS at 01:29

## 2025-06-13 RX ADMIN — DIAZEPAM 5 MG: 5 INJECTION, SOLUTION INTRAMUSCULAR; INTRAVENOUS at 14:54

## 2025-06-13 RX ADMIN — GLYCOPYRROLATE 0.2 MG: 0.2 INJECTION INTRAMUSCULAR; INTRAVENOUS at 09:05

## 2025-06-13 RX ADMIN — DIAZEPAM 5 MG: 5 INJECTION, SOLUTION INTRAMUSCULAR; INTRAVENOUS at 02:25

## 2025-06-13 RX ADMIN — GLYCOPYRROLATE 0.2 MG: 0.2 INJECTION INTRAMUSCULAR; INTRAVENOUS at 16:35

## 2025-06-13 RX ADMIN — MORPHINE SULFATE 2 MG: 2 INJECTION, SOLUTION INTRAMUSCULAR; INTRAVENOUS at 12:44

## 2025-06-13 RX ADMIN — MORPHINE SULFATE 2 MG: 2 INJECTION, SOLUTION INTRAMUSCULAR; INTRAVENOUS at 07:21

## 2025-06-13 RX ADMIN — MORPHINE SULFATE 2 MG: 2 INJECTION, SOLUTION INTRAMUSCULAR; INTRAVENOUS at 04:23

## 2025-06-13 RX ADMIN — MORPHINE SULFATE 2 MG: 2 INJECTION, SOLUTION INTRAMUSCULAR; INTRAVENOUS at 14:58

## 2025-06-13 RX ADMIN — GLYCOPYRROLATE 0.2 MG: 0.2 INJECTION INTRAMUSCULAR; INTRAVENOUS at 12:44

## 2025-06-13 RX ADMIN — DIAZEPAM 5 MG: 5 INJECTION, SOLUTION INTRAMUSCULAR; INTRAVENOUS at 23:09

## 2025-06-13 RX ADMIN — MORPHINE SULFATE 2 MG: 2 INJECTION, SOLUTION INTRAMUSCULAR; INTRAVENOUS at 10:11

## 2025-06-13 RX ADMIN — MORPHINE SULFATE 2 MG: 2 INJECTION, SOLUTION INTRAMUSCULAR; INTRAVENOUS at 22:06

## 2025-06-13 RX ADMIN — GLYCOPYRROLATE 0.2 MG: 0.2 INJECTION INTRAMUSCULAR; INTRAVENOUS at 22:06

## 2025-06-13 RX ADMIN — MORPHINE SULFATE 2 MG: 2 INJECTION, SOLUTION INTRAMUSCULAR; INTRAVENOUS at 11:39

## 2025-06-13 ASSESSMENT — PAIN SCALES - GENERAL: PAINLEVEL_OUTOF10: 0

## 2025-06-13 NOTE — WOUND CARE
WOCN Note    Follow up.  Noted patients transition to hospice.  Will sign off, reconsult if needed.    LEI BonnerN RN [facility] Inpatient Wound Care  Available on [facility]  Office [phone]

## 2025-06-13 NOTE — PROGRESS NOTES
TRANSFER - OUT REPORT:    Verbal report given to Олег on Roge Lobato  being transferred to [facility] for routine progression of patient care   - Inpatient Hospice    Report consisted of patient's Situation, Background, Assessment and   Recommendations(SBAR).     Information from the following report(s) Nurse Handoff Report, MAR, and Recent Results was reviewed with the receiving nurse.           Lines:   Peripheral IV 06/11/25 Right;Dorsal Forearm (Active)   Site Assessment Clean, dry & intact 06/12/25 0840   Line Status Blood return noted;Flushed;Capped 06/12/25 0840   Line Care Connections checked and tightened;Cap changed;Line pulled back 06/12/25 0840   Phlebitis Assessment No symptoms 06/12/25 0840   Infiltration Assessment 0 06/12/25 0840   Alcohol Cap Used Yes 06/12/25 0840   Dressing Status Clean, dry & intact 06/12/25 0840   Dressing Type Transparent 06/12/25 0840   Dressing Intervention New 06/11/25 2130       Peripheral IV 06/12/25 Left;Anterior Forearm (Active)        Opportunity for questions and clarification was provided.      Patient transported with:  Tech and belongings/supplies    Patient moved over to [address] [place] [address] bed. Transfer @ 2120.

## 2025-06-14 PROCEDURE — 94760 N-INVAS EAR/PLS OXIMETRY 1: CPT

## 2025-06-14 PROCEDURE — 2500000003 HC RX 250 WO HCPCS: Performed by: HOSPITALIST

## 2025-06-14 PROCEDURE — 6370000000 HC RX 637 (ALT 250 FOR IP): Performed by: HOSPITALIST

## 2025-06-14 PROCEDURE — 6360000002 HC RX W HCPCS: Performed by: HOSPITALIST

## 2025-06-14 PROCEDURE — 6560000002 HC HOSPICE GENERAL INPATIENT

## 2025-06-14 RX ORDER — DIAZEPAM 10 MG/2ML
10 INJECTION, SOLUTION INTRAMUSCULAR; INTRAVENOUS EVERY 30 MIN PRN
Status: DISCONTINUED | OUTPATIENT
Start: 2025-06-14 | End: 2025-06-16 | Stop reason: HOSPADM

## 2025-06-14 RX ORDER — DIAZEPAM 10 MG/2ML
10 INJECTION, SOLUTION INTRAMUSCULAR; INTRAVENOUS EVERY 4 HOURS
Status: DISCONTINUED | OUTPATIENT
Start: 2025-06-14 | End: 2025-06-15

## 2025-06-14 RX ORDER — MORPHINE SULFATE 2 MG/ML
4 INJECTION, SOLUTION INTRAMUSCULAR; INTRAVENOUS
Status: DISCONTINUED | OUTPATIENT
Start: 2025-06-14 | End: 2025-06-15

## 2025-06-14 RX ADMIN — GLYCOPYRROLATE 0.2 MG: 0.2 INJECTION INTRAMUSCULAR; INTRAVENOUS at 09:29

## 2025-06-14 RX ADMIN — SODIUM CHLORIDE, PRESERVATIVE FREE 10 ML: 5 INJECTION INTRAVENOUS at 09:29

## 2025-06-14 RX ADMIN — GLYCOPYRROLATE 0.2 MG: 0.2 INJECTION INTRAMUSCULAR; INTRAVENOUS at 22:19

## 2025-06-14 RX ADMIN — MORPHINE SULFATE 2 MG: 2 INJECTION, SOLUTION INTRAMUSCULAR; INTRAVENOUS at 11:21

## 2025-06-14 RX ADMIN — SODIUM CHLORIDE, PRESERVATIVE FREE 10 ML: 5 INJECTION INTRAVENOUS at 22:20

## 2025-06-14 RX ADMIN — DIAZEPAM 5 MG: 5 INJECTION, SOLUTION INTRAMUSCULAR; INTRAVENOUS at 03:01

## 2025-06-14 RX ADMIN — MORPHINE SULFATE 4 MG: 2 INJECTION, SOLUTION INTRAMUSCULAR; INTRAVENOUS at 19:25

## 2025-06-14 RX ADMIN — DIAZEPAM 5 MG: 5 INJECTION, SOLUTION INTRAMUSCULAR; INTRAVENOUS at 11:21

## 2025-06-14 RX ADMIN — DIAZEPAM 5 MG: 5 INJECTION, SOLUTION INTRAMUSCULAR; INTRAVENOUS at 14:29

## 2025-06-14 RX ADMIN — DIAZEPAM 10 MG: 5 INJECTION, SOLUTION INTRAMUSCULAR; INTRAVENOUS at 22:19

## 2025-06-14 RX ADMIN — MORPHINE SULFATE 2 MG: 2 INJECTION, SOLUTION INTRAMUSCULAR; INTRAVENOUS at 13:03

## 2025-06-14 RX ADMIN — DIAZEPAM 10 MG: 5 INJECTION, SOLUTION INTRAMUSCULAR; INTRAVENOUS at 18:08

## 2025-06-14 RX ADMIN — MORPHINE SULFATE 4 MG: 2 INJECTION, SOLUTION INTRAMUSCULAR; INTRAVENOUS at 22:19

## 2025-06-14 RX ADMIN — DIAZEPAM 5 MG: 5 INJECTION, SOLUTION INTRAMUSCULAR; INTRAVENOUS at 07:00

## 2025-06-14 RX ADMIN — MORPHINE SULFATE 2 MG: 2 INJECTION, SOLUTION INTRAMUSCULAR; INTRAVENOUS at 04:07

## 2025-06-14 RX ADMIN — MORPHINE SULFATE 2 MG: 2 INJECTION, SOLUTION INTRAMUSCULAR; INTRAVENOUS at 07:00

## 2025-06-14 RX ADMIN — GLYCOPYRROLATE 0.2 MG: 0.2 INJECTION INTRAMUSCULAR; INTRAVENOUS at 16:46

## 2025-06-14 RX ADMIN — MORPHINE SULFATE 4 MG: 2 INJECTION, SOLUTION INTRAMUSCULAR; INTRAVENOUS at 16:46

## 2025-06-14 RX ADMIN — MORPHINE SULFATE 2 MG: 2 INJECTION, SOLUTION INTRAMUSCULAR; INTRAVENOUS at 01:12

## 2025-06-14 RX ADMIN — GLYCOPYRROLATE 0.2 MG: 0.2 INJECTION INTRAMUSCULAR; INTRAVENOUS at 13:03

## 2025-06-14 ASSESSMENT — PAIN SCALES - GENERAL: PAINLEVEL_OUTOF10: 0

## 2025-06-14 NOTE — PROGRESS NOTES
Hospice Progress Note             [facility]   Christian Judd MD  Answering service: [phone]        Date of Service:  2025  NAME:  Roge Lobato  :  1950  MRN:  [id number]    Patient seen and examined  Unresponsive   Mild respiratory distress with shallow breathing via trach   Lung sounds significantly decreased on right , fair on left  CVS: RRR    Morphine increased to 4mg q3h and Valium increased to 10mg q4h  PRN valium changed [id number]    Will continue to follow and monitor symptoms         Medications Reviewed:     Current Facility-Administered Medications   Medication Dose Route Frequency    morphine (PF) injection 4 mg  4 mg IntraVENous Q3H    diazePAM (VALIUM) injection 10 mg  10 mg IntraVENous Q4H    diazePAM (VALIUM) injection 10 mg  10 mg IntraVENous Q30 Min PRN    sodium chloride flush 0.9 % injection 5-40 mL  5-40 mL IntraVENous 2 times per day    sodium chloride flush 0.9 % injection 5-40 mL  5-40 mL IntraVENous PRN    0.9 % sodium chloride infusion   IntraVENous PRN    morphine (PF) injection 2 mg  2 mg IntraVENous Q30 Min PRN    Or    morphine (PF) injection 4 mg  4 mg IntraVENous Q1H PRN    bisacodyl (DULCOLAX) suppository 10 mg  10 mg Rectal Daily PRN    ondansetron (ZOFRAN) injection 4 mg  4 mg IntraVENous Q6H PRN    prochlorperazine (COMPAZINE) injection 10 mg  10 mg IntraVENous Q4H PRN    atropine 1 % ophthalmic solution 2 drop  2 drop SubLINGual Q4H PRN    glycopyrrolate (ROBINUL) injection 0.2 mg  0.2 mg IntraVENous 4x Daily    scopolamine (TRANSDERM-SCOP) transdermal patch 1 patch  1 patch TransDERmal Q72H    acetaminophen (TYLENOL) suspension 650 mg  650 mg Per G Tube Q4H PRN    HYDROmorphone (DILAUDID) tablet 2 mg  2 mg Oral Q1H PRN                  Christian Judd MD

## 2025-06-15 VITALS
DIASTOLIC BLOOD PRESSURE: 44 MMHG | SYSTOLIC BLOOD PRESSURE: 73 MMHG | BODY MASS INDEX: 20.92 KG/M2 | WEIGHT: 133.6 LBS | HEART RATE: 87 BPM | OXYGEN SATURATION: 55 % | RESPIRATION RATE: 16 BRPM | TEMPERATURE: 98.6 F

## 2025-06-15 PROCEDURE — 6360000002 HC RX W HCPCS: Performed by: HOSPITALIST

## 2025-06-15 PROCEDURE — 94760 N-INVAS EAR/PLS OXIMETRY 1: CPT

## 2025-06-15 PROCEDURE — 6560000002 HC HOSPICE GENERAL INPATIENT

## 2025-06-15 PROCEDURE — 2500000003 HC RX 250 WO HCPCS: Performed by: HOSPITALIST

## 2025-06-15 PROCEDURE — 6370000000 HC RX 637 (ALT 250 FOR IP): Performed by: HOSPITALIST

## 2025-06-15 RX ORDER — KETOROLAC TROMETHAMINE 30 MG/ML
30 INJECTION, SOLUTION INTRAMUSCULAR; INTRAVENOUS EVERY 6 HOURS PRN
Status: DISCONTINUED | OUTPATIENT
Start: 2025-06-15 | End: 2025-06-16 | Stop reason: HOSPADM

## 2025-06-15 RX ORDER — HYDROMORPHONE HYDROCHLORIDE 1 MG/ML
2 INJECTION, SOLUTION INTRAMUSCULAR; INTRAVENOUS; SUBCUTANEOUS EVERY 30 MIN PRN
Status: DISCONTINUED | OUTPATIENT
Start: 2025-06-15 | End: 2025-06-16 | Stop reason: HOSPADM

## 2025-06-15 RX ORDER — DIAZEPAM 10 MG/2ML
15 INJECTION, SOLUTION INTRAMUSCULAR; INTRAVENOUS EVERY 4 HOURS
Status: DISCONTINUED | OUTPATIENT
Start: 2025-06-15 | End: 2025-06-16 | Stop reason: HOSPADM

## 2025-06-15 RX ORDER — HYDROMORPHONE HYDROCHLORIDE 1 MG/ML
2 INJECTION, SOLUTION INTRAMUSCULAR; INTRAVENOUS; SUBCUTANEOUS
Status: DISCONTINUED | OUTPATIENT
Start: 2025-06-15 | End: 2025-06-16 | Stop reason: HOSPADM

## 2025-06-15 RX ORDER — HYDROMORPHONE HYDROCHLORIDE 2 MG/1
2 TABLET ORAL
Status: DISCONTINUED | OUTPATIENT
Start: 2025-06-15 | End: 2025-06-15

## 2025-06-15 RX ADMIN — DIAZEPAM 15 MG: 5 INJECTION, SOLUTION INTRAMUSCULAR; INTRAVENOUS at 19:14

## 2025-06-15 RX ADMIN — SODIUM CHLORIDE, PRESERVATIVE FREE 10 ML: 5 INJECTION INTRAVENOUS at 08:20

## 2025-06-15 RX ADMIN — GLYCOPYRROLATE 0.2 MG: 0.2 INJECTION INTRAMUSCULAR; INTRAVENOUS at 13:24

## 2025-06-15 RX ADMIN — MORPHINE SULFATE 4 MG: 2 INJECTION, SOLUTION INTRAMUSCULAR; INTRAVENOUS at 04:46

## 2025-06-15 RX ADMIN — HYDROMORPHONE HYDROCHLORIDE 2 MG: 1 INJECTION, SOLUTION INTRAMUSCULAR; INTRAVENOUS; SUBCUTANEOUS at 19:14

## 2025-06-15 RX ADMIN — ACETAMINOPHEN 650 MG: 650 SUSPENSION ORAL at 07:41

## 2025-06-15 RX ADMIN — HYDROMORPHONE HYDROCHLORIDE 2 MG: 1 INJECTION, SOLUTION INTRAMUSCULAR; INTRAVENOUS; SUBCUTANEOUS at 16:38

## 2025-06-15 RX ADMIN — DIAZEPAM 15 MG: 5 INJECTION, SOLUTION INTRAMUSCULAR; INTRAVENOUS at 10:47

## 2025-06-15 RX ADMIN — GLYCOPYRROLATE 0.2 MG: 0.2 INJECTION INTRAMUSCULAR; INTRAVENOUS at 16:38

## 2025-06-15 RX ADMIN — DIAZEPAM 15 MG: 5 INJECTION, SOLUTION INTRAMUSCULAR; INTRAVENOUS at 15:15

## 2025-06-15 RX ADMIN — DIAZEPAM 10 MG: 5 INJECTION, SOLUTION INTRAMUSCULAR; INTRAVENOUS at 11:50

## 2025-06-15 RX ADMIN — GLYCOPYRROLATE 0.2 MG: 0.2 INJECTION INTRAMUSCULAR; INTRAVENOUS at 08:20

## 2025-06-15 RX ADMIN — DIAZEPAM 10 MG: 5 INJECTION, SOLUTION INTRAMUSCULAR; INTRAVENOUS at 02:45

## 2025-06-15 RX ADMIN — HYDROMORPHONE HYDROCHLORIDE 2 MG: 1 INJECTION, SOLUTION INTRAMUSCULAR; INTRAVENOUS; SUBCUTANEOUS at 13:24

## 2025-06-15 RX ADMIN — DIAZEPAM 10 MG: 5 INJECTION, SOLUTION INTRAMUSCULAR; INTRAVENOUS at 06:32

## 2025-06-15 RX ADMIN — HYDROMORPHONE HYDROCHLORIDE 2 MG: 1 INJECTION, SOLUTION INTRAMUSCULAR; INTRAVENOUS; SUBCUTANEOUS at 10:47

## 2025-06-15 RX ADMIN — MORPHINE SULFATE 4 MG: 2 INJECTION, SOLUTION INTRAMUSCULAR; INTRAVENOUS at 07:41

## 2025-06-15 RX ADMIN — KETOROLAC TROMETHAMINE 30 MG: 30 INJECTION, SOLUTION INTRAMUSCULAR at 10:51

## 2025-06-15 RX ADMIN — GLYCOPYRROLATE 0.2 MG: 0.2 INJECTION INTRAMUSCULAR; INTRAVENOUS at 20:26

## 2025-06-15 RX ADMIN — SODIUM CHLORIDE, PRESERVATIVE FREE 10 ML: 5 INJECTION INTRAVENOUS at 20:26

## 2025-06-15 RX ADMIN — MORPHINE SULFATE 4 MG: 2 INJECTION, SOLUTION INTRAMUSCULAR; INTRAVENOUS at 01:43

## 2025-06-15 NOTE — PLAN OF CARE
Problem: Safety - Adult  Goal: Free from fall injury  Outcome: Progressing     Problem: Pressure Injury - Risk of  Goal: Prevention of pressure injury  Description: Patient  will remain free from acquired or worsening pressure injuries as evidenced by zero acquired pressure injuries or no changes to current pressure injury during skin assessment each shift during the inpatient hospice stay.Patient  and or family/caregiver will verbalize recall of interventions and preventions of alteration in skin integrity during the admission process and ongoing as needed during the inpatient hospice stay.  Outcome: Progressing

## 2025-06-16 NOTE — DISCHARGE SUMMARY
[facility] Adult  Hospitalist Group     Death Discharge Summary   PATIENT ID: Roge Lobato  MRN: [id number]   YOB: 1950    DATE OF ADMISSION: 6/11/2025 10:27 AM    PRIMARY CARE PROVIDER: No primary care provider on file.   ATTENDING PHYSICIAN: Christian Judd MD  CONSULTATIONS:   None    PROCEDURES/SURGERIES:   * No surgery found *    REASON FOR ADMISSION: Cancer of supraglottis (HCC)     HOSPITAL PROBLEM LIST:  Patient Active Problem List   Diagnosis    Degeneration of cervical intervertebral disc    Cannabis abuse    Arm weakness    Spinal stenosis of cervical region    History of CVA (cerebrovascular accident)    Degenerative disc disease, lumbar    Hypertensive emergency    Elevated hemoglobin    Hypokalemia    Chest pain    HTN (hypertension), benign    Leg weakness    Abnormal LFTs    Complete heart block (HCC)    Transaminitis    Severe protein-calorie malnutrition    Supraglottic mass    Neck mass    Cancer of supraglottis (HCC)    Tracheostomy in place (HCC)    Neck pain    Debility    Palliative care encounter    Atrial tachycardia    Cardiac pacemaker in situ    Shortness of breath    Malnutrition       DATE AND TIME OF DEATH: 6/15/2025 @ 2145PM hours     CODE STATUS AT DISCHARGE:   Full Code   X DNR    Partial   X Comfort Care     DISCHARGE DIAGNOSES: squamous cell carcinoma of supraglottis    Brief HPI and Hospital Course:      75 yo male with PMHx of CVA, SSS s/p PPM, Chronic pancreatitis, GERD and laryngeal mass on 2/2025, presented due to difficulty breathing. Patient presented initially to [facility] noted to have enlarging right neck mass and transferred to [facility] for further evaluation. CT showed neck mass has doubled in size. Patient underwent emergent tracheostomy and biopsy of mass. Patient has been NPO due to aspiration on [facility]. Patient also had G tube placed. 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 Patient seen by Onc and ENT. Patient was

## 2025-06-16 NOTE — PROGRESS NOTES
Date of Death: 6/15/2025   Admitted Date: 2025  Time of Death:      Facility of Care: [facility]  Level of Care: Hospice  Patient Room: 611     Hospice Attending: Dutch  Hospice Diagnosis: Squamous Cell Carcinoma Suprglottis    Attending PhysicianDutch Notified of death by Hospice RN  Hospice liaisonAdela Notified of death     Death Pronouncement   Pronouncement of death completed by:   Samantha Palacios RN Nursing supervisor     Hospice staff not present at the time of death  Family not present at the time of death     Pt noted to be:  Without responsive to voice or touch  Without spontaneous pulse or respiration after one minute of auscultation  Pupils fixed and dilated  TOD:         The pt  within [facility]     The following were notified of the patient's death:  -[facility]  Supervisor - Janis  [facility] [employer] - Gadiel Ku       Medications were disposed of per facility protocol     Discharge Summary   Discharge Reason: Death     Summary of Care Provided  [x] Post mortem care provided by Samantha Manzanares RN  [] Notification of  home by nursing supervisor  [x] Record of Death completed     Valuables  [x] yes  [] no    Valuables comments: Glasses  Pt had belongings listed that were locked uo with security on admit.      Disciplines involved: [x] RN [] SW [x]  [] YODER [] Vol [] PT [] OT [] ST [] BC

## (undated) DEVICE — ELECTRODE PT RET AD L9FT HI MOIST COND ADH HYDRGEL CORDED

## (undated) DEVICE — TOWEL,OR,DSP,ST,BLUE,STD,4/PK,20PK/CS: Brand: MEDLINE

## (undated) DEVICE — GARMENT,MEDLINE,DVT,INT,CALF,MED, GEN2: Brand: MEDLINE

## (undated) DEVICE — SUTURE PERMAHAND SZ 0 L30IN NONABSORBABLE BLK L26MM SH 1/2 K834H

## (undated) DEVICE — 3M™ IOBAN™ 2 ANTIMICROBIAL INCISE DRAPE 6650EZ: Brand: IOBAN™ 2

## (undated) DEVICE — SKIN PREP TRAY 4 COMPARTM TRAY: Brand: MEDLINE INDUSTRIES, INC.

## (undated) DEVICE — CORD ES L12FT BPLR FRCP

## (undated) DEVICE — TUBING, SUCTION, 1/4" X 12', STRAIGHT: Brand: MEDLINE

## (undated) DEVICE — AGENT HEMOSTATIC SURG ORIGINAL ABS 2X14IN LOOSE KNIT 12/CA

## (undated) DEVICE — X-RAY DETECTABLE SPONGES,16 PLY: Brand: VISTEC

## (undated) DEVICE — GUARD TEETH AD NYL FOR LARYNSCP POS PROTCT

## (undated) DEVICE — PAD,NON-ADHERENT,3X8,STERILE,LF,1/PK: Brand: MEDLINE

## (undated) DEVICE — CODMAN® SURGICAL PATTIES 1/2" X 1/2" (1.27CM X 1.27CM): Brand: CODMAN®

## (undated) DEVICE — GLOVE SURG SZ 7 CRM LTX FREE POLYISOPRENE POLYMER BEAD ANTI

## (undated) DEVICE — MEDI-TRACE CADENCE ADULT, DEFIBRILLATION ELECTRODE -RTS  (10 PR/PK) - PHYSIO-CONTROL: Brand: MEDI-TRACE CADENCE

## (undated) DEVICE — ENT-SMH: Brand: MEDLINE INDUSTRIES, INC.

## (undated) DEVICE — INTRODUCER SHTH L13CM OD7FR SH ORNG HUB SEAMLESS SAFSHTH

## (undated) DEVICE — SUTURE PERMA-HAND SZ 2-0 L30IN NONABSORBABLE BLK L26MM SH K833H

## (undated) DEVICE — BLADE ES ELASTOMERIC COAT INSUL DURABLE BEND UPTO 90DEG

## (undated) DEVICE — SOLUTION IRRIG 1000ML 09% SOD CHL USP PIC PLAS CONTAINER

## (undated) DEVICE — SUTURE V-LOC 180 SZ 2-0 L12IN ABSRB VLT GS-21 L37MM 1/2 CIR VLOCM0315

## (undated) DEVICE — KIT ACCS INTRO 4FR L10CM NDL 21GA L7CM GWIRE L40CM

## (undated) DEVICE — SUTURE ETHILON SZ 2-0 L18IN NONABSORBABLE BLK L26MM FS 3/8 664G

## (undated) DEVICE — PACEMAKER PACK: Brand: MEDLINE INDUSTRIES, INC.

## (undated) DEVICE — DRAPE,REIN 53X77,STERILE: Brand: MEDLINE

## (undated) DEVICE — KIT INTRO 9FR L13CM DIA0.118IN SPLITTABLE HEMSTAT ROBUST

## (undated) DEVICE — TRAY,IRRIGATION,PISTON SYRINGE,60ML,STRL: Brand: MEDLINE

## (undated) DEVICE — GUIDEWIRE VASC L40CM DIA0018IN NDL 21GA L7CM Z S STL MAK

## (undated) DEVICE — APPLICATOR MEDICATED 10.5 CC SOLUTION CLR STRL CHLORAPREP

## (undated) DEVICE — SUTURE ABSORBABLE WND CLOSURE 4-0 P-12 12 IN UD V-LOC

## (undated) DEVICE — MARKER,SKIN,WI/RULER AND LABELS: Brand: MEDLINE

## (undated) DEVICE — SPONGE,PEANUT,XRAY,ST,SM,3/8",5/CARD: Brand: MEDLINE INDUSTRIES, INC.

## (undated) DEVICE — SPONGE GZ W4XL4IN COT RADPQ HIGHLY ABSRB STERILE

## (undated) DEVICE — BLUNTFILL: Brand: MONOJECT

## (undated) DEVICE — 40418 TRENDELENBURG ONE-STEP ARM PROTECTORS LARGE (1 PAIR): Brand: 40418 TRENDELENBURG ONE-STEP ARM PROTECTORS LARGE (1 PAIR)

## (undated) DEVICE — PENCIL ES CRD L10FT HND SWCHING ROCK SWCH W/ EDGE COAT BLDE

## (undated) DEVICE — TUBE TRACH AD SZ 6 L77MM INNR CANN ID65MM OUTER CANN

## (undated) DEVICE — TUBING SUCT 10FR MAL ALUM SHFT FN CAP VENT UNIV CONN W/ OBT